# Patient Record
Sex: FEMALE | Race: WHITE | NOT HISPANIC OR LATINO | Employment: OTHER | URBAN - METROPOLITAN AREA
[De-identification: names, ages, dates, MRNs, and addresses within clinical notes are randomized per-mention and may not be internally consistent; named-entity substitution may affect disease eponyms.]

---

## 2024-02-03 ENCOUNTER — HOSPITAL ENCOUNTER (INPATIENT)
Facility: MEDICAL CENTER | Age: 54
LOS: 2 days | DRG: 378 | End: 2024-02-06
Attending: EMERGENCY MEDICINE | Admitting: STUDENT IN AN ORGANIZED HEALTH CARE EDUCATION/TRAINING PROGRAM
Payer: COMMERCIAL

## 2024-02-03 DIAGNOSIS — F10.90 ALCOHOL DRINKING PROBLEM: ICD-10-CM

## 2024-02-03 DIAGNOSIS — K70.30 ALCOHOLIC CIRRHOSIS, UNSPECIFIED WHETHER ASCITES PRESENT (HCC): ICD-10-CM

## 2024-02-03 DIAGNOSIS — K92.2 UPPER GI BLEED: Primary | ICD-10-CM

## 2024-02-03 PROCEDURE — 99285 EMERGENCY DEPT VISIT HI MDM: CPT

## 2024-02-04 ENCOUNTER — APPOINTMENT (OUTPATIENT)
Dept: RADIOLOGY | Facility: MEDICAL CENTER | Age: 54
DRG: 378 | End: 2024-02-04
Attending: EMERGENCY MEDICINE
Payer: COMMERCIAL

## 2024-02-04 ENCOUNTER — ANESTHESIA (OUTPATIENT)
Dept: SURGERY | Facility: MEDICAL CENTER | Age: 54
DRG: 378 | End: 2024-02-04
Payer: COMMERCIAL

## 2024-02-04 ENCOUNTER — ANESTHESIA EVENT (OUTPATIENT)
Dept: SURGERY | Facility: MEDICAL CENTER | Age: 54
DRG: 378 | End: 2024-02-04
Payer: COMMERCIAL

## 2024-02-04 PROBLEM — K70.30 ALCOHOLIC CIRRHOSIS OF LIVER WITHOUT ASCITES (HCC): Status: ACTIVE | Noted: 2024-02-04

## 2024-02-04 PROBLEM — D62 ACUTE BLOOD LOSS ANEMIA: Status: ACTIVE | Noted: 2024-02-04

## 2024-02-04 PROBLEM — F10.10 ALCOHOL ABUSE: Status: ACTIVE | Noted: 2024-02-04

## 2024-02-04 PROBLEM — E83.42 HYPOMAGNESEMIA: Status: ACTIVE | Noted: 2024-02-04

## 2024-02-04 PROBLEM — K92.0 HEMATEMESIS: Status: ACTIVE | Noted: 2024-02-04

## 2024-02-04 PROBLEM — Z71.89 ADVANCE CARE PLANNING: Status: ACTIVE | Noted: 2024-02-04

## 2024-02-04 PROBLEM — E87.6 HYPOKALEMIA: Status: ACTIVE | Noted: 2024-02-04

## 2024-02-04 LAB
ABO + RH BLD: NORMAL
ABO GROUP BLD: NORMAL
ALBUMIN SERPL BCP-MCNC: 3.2 G/DL (ref 3.2–4.9)
ALBUMIN/GLOB SERPL: 1 G/DL
ALP SERPL-CCNC: 76 U/L (ref 30–99)
ALT SERPL-CCNC: 11 U/L (ref 2–50)
ANION GAP SERPL CALC-SCNC: 11 MMOL/L (ref 7–16)
APTT PPP: 27.8 SEC (ref 24.7–36)
AST SERPL-CCNC: 21 U/L (ref 12–45)
BARCODED ABORH UBTYP: 6200
BARCODED PRD CODE UBPRD: NORMAL
BARCODED UNIT NUM UBUNT: NORMAL
BASOPHILS # BLD AUTO: 0.7 % (ref 0–1.8)
BASOPHILS # BLD: 0.06 K/UL (ref 0–0.12)
BILIRUB SERPL-MCNC: 1.6 MG/DL (ref 0.1–1.5)
BLD GP AB SCN SERPL QL: NORMAL
BUN SERPL-MCNC: 22 MG/DL (ref 8–22)
CALCIUM ALBUM COR SERPL-MCNC: 8.9 MG/DL (ref 8.5–10.5)
CALCIUM SERPL-MCNC: 8.3 MG/DL (ref 8.5–10.5)
CHLORIDE SERPL-SCNC: 102 MMOL/L (ref 96–112)
CO2 SERPL-SCNC: 22 MMOL/L (ref 20–33)
COMPONENT R 8504R: NORMAL
CREAT SERPL-MCNC: 0.48 MG/DL (ref 0.5–1.4)
EKG IMPRESSION: NORMAL
EOSINOPHIL # BLD AUTO: 0.06 K/UL (ref 0–0.51)
EOSINOPHIL NFR BLD: 0.7 % (ref 0–6.9)
ERYTHROCYTE [DISTWIDTH] IN BLOOD BY AUTOMATED COUNT: 62.6 FL (ref 35.9–50)
ERYTHROCYTE [DISTWIDTH] IN BLOOD BY AUTOMATED COUNT: 64.5 FL (ref 35.9–50)
GFR SERPLBLD CREATININE-BSD FMLA CKD-EPI: 112 ML/MIN/1.73 M 2
GLOBULIN SER CALC-MCNC: 3.1 G/DL (ref 1.9–3.5)
GLUCOSE SERPL-MCNC: 124 MG/DL (ref 65–99)
HCT VFR BLD AUTO: 22.2 % (ref 37–47)
HCT VFR BLD AUTO: 26.5 % (ref 37–47)
HCT VFR BLD AUTO: 27.7 % (ref 37–47)
HCT VFR BLD AUTO: 31.7 % (ref 37–47)
HGB BLD-MCNC: 11 G/DL (ref 12–16)
HGB BLD-MCNC: 7.6 G/DL (ref 12–16)
HGB BLD-MCNC: 9.2 G/DL (ref 12–16)
HGB BLD-MCNC: 9.3 G/DL (ref 12–16)
IMM GRANULOCYTES # BLD AUTO: 0.03 K/UL (ref 0–0.11)
IMM GRANULOCYTES NFR BLD AUTO: 0.3 % (ref 0–0.9)
INR PPP: 1.33 (ref 0.87–1.13)
LACTATE SERPL-SCNC: 2 MMOL/L (ref 0.5–2)
LIPASE SERPL-CCNC: 17 U/L (ref 11–82)
LYMPHOCYTES # BLD AUTO: 1.59 K/UL (ref 1–4.8)
LYMPHOCYTES NFR BLD: 17.9 % (ref 22–41)
MAGNESIUM SERPL-MCNC: 1.3 MG/DL (ref 1.5–2.5)
MAGNESIUM SERPL-MCNC: 1.4 MG/DL (ref 1.5–2.5)
MCH RBC QN AUTO: 32.5 PG (ref 27–33)
MCH RBC QN AUTO: 32.7 PG (ref 27–33)
MCHC RBC AUTO-ENTMCNC: 33.6 G/DL (ref 32.2–35.5)
MCHC RBC AUTO-ENTMCNC: 34.7 G/DL (ref 32.2–35.5)
MCV RBC AUTO: 93.6 FL (ref 81.4–97.8)
MCV RBC AUTO: 97.5 FL (ref 81.4–97.8)
MONOCYTES # BLD AUTO: 0.85 K/UL (ref 0–0.85)
MONOCYTES NFR BLD AUTO: 9.6 % (ref 0–13.4)
NEUTROPHILS # BLD AUTO: 6.29 K/UL (ref 1.82–7.42)
NEUTROPHILS NFR BLD: 70.8 % (ref 44–72)
NRBC # BLD AUTO: 0 K/UL
NRBC BLD-RTO: 0 /100 WBC (ref 0–0.2)
PLATELET # BLD AUTO: 119 K/UL (ref 164–446)
PLATELET # BLD AUTO: 86 K/UL (ref 164–446)
PLATELETS.RETICULATED NFR BLD AUTO: 4.7 % (ref 0.6–13.1)
PMV BLD AUTO: 10.6 FL (ref 9–12.9)
PMV BLD AUTO: 10.7 FL (ref 9–12.9)
POTASSIUM SERPL-SCNC: 3.4 MMOL/L (ref 3.6–5.5)
PRODUCT TYPE UPROD: NORMAL
PROT SERPL-MCNC: 6.3 G/DL (ref 6–8.2)
PROTHROMBIN TIME: 16.7 SEC (ref 12–14.6)
RBC # BLD AUTO: 2.83 M/UL (ref 4.2–5.4)
RBC # BLD AUTO: 2.84 M/UL (ref 4.2–5.4)
RH BLD: NORMAL
SODIUM SERPL-SCNC: 135 MMOL/L (ref 135–145)
TROPONIN T SERPL-MCNC: <6 NG/L (ref 6–19)
UNIT STATUS USTAT: NORMAL
WBC # BLD AUTO: 7.1 K/UL (ref 4.8–10.8)
WBC # BLD AUTO: 8.9 K/UL (ref 4.8–10.8)

## 2024-02-04 PROCEDURE — 93005 ELECTROCARDIOGRAM TRACING: CPT | Performed by: ANESTHESIOLOGY

## 2024-02-04 PROCEDURE — 700102 HCHG RX REV CODE 250 W/ 637 OVERRIDE(OP): Performed by: STUDENT IN AN ORGANIZED HEALTH CARE EDUCATION/TRAINING PROGRAM

## 2024-02-04 PROCEDURE — 160048 HCHG OR STATISTICAL LEVEL 1-5: Performed by: INTERNAL MEDICINE

## 2024-02-04 PROCEDURE — A9270 NON-COVERED ITEM OR SERVICE: HCPCS | Performed by: STUDENT IN AN ORGANIZED HEALTH CARE EDUCATION/TRAINING PROGRAM

## 2024-02-04 PROCEDURE — 96367 TX/PROPH/DG ADDL SEQ IV INF: CPT

## 2024-02-04 PROCEDURE — 36430 TRANSFUSION BLD/BLD COMPNT: CPT

## 2024-02-04 PROCEDURE — 700105 HCHG RX REV CODE 258: Performed by: HOSPITALIST

## 2024-02-04 PROCEDURE — 06L38CZ OCCLUSION OF ESOPHAGEAL VEIN WITH EXTRALUMINAL DEVICE, VIA NATURAL OR ARTIFICIAL OPENING ENDOSCOPIC: ICD-10-PCS | Performed by: INTERNAL MEDICINE

## 2024-02-04 PROCEDURE — 83690 ASSAY OF LIPASE: CPT

## 2024-02-04 PROCEDURE — 160203 HCHG ENDO MINUTES - 1ST 30 MINS LEVEL 4: Performed by: INTERNAL MEDICINE

## 2024-02-04 PROCEDURE — 99497 ADVNCD CARE PLAN 30 MIN: CPT | Mod: MISDOCU | Performed by: HOSPITALIST

## 2024-02-04 PROCEDURE — 85025 COMPLETE CBC W/AUTO DIFF WBC: CPT

## 2024-02-04 PROCEDURE — 86901 BLOOD TYPING SEROLOGIC RH(D): CPT

## 2024-02-04 PROCEDURE — C9113 INJ PANTOPRAZOLE SODIUM, VIA: HCPCS | Performed by: EMERGENCY MEDICINE

## 2024-02-04 PROCEDURE — 700111 HCHG RX REV CODE 636 W/ 250 OVERRIDE (IP): Performed by: STUDENT IN AN ORGANIZED HEALTH CARE EDUCATION/TRAINING PROGRAM

## 2024-02-04 PROCEDURE — 86900 BLOOD TYPING SEROLOGIC ABO: CPT

## 2024-02-04 PROCEDURE — 30233N1 TRANSFUSION OF NONAUTOLOGOUS RED BLOOD CELLS INTO PERIPHERAL VEIN, PERCUTANEOUS APPROACH: ICD-10-PCS | Performed by: STUDENT IN AN ORGANIZED HEALTH CARE EDUCATION/TRAINING PROGRAM

## 2024-02-04 PROCEDURE — 85610 PROTHROMBIN TIME: CPT

## 2024-02-04 PROCEDURE — 85055 RETICULATED PLATELET ASSAY: CPT

## 2024-02-04 PROCEDURE — 96366 THER/PROPH/DIAG IV INF ADDON: CPT

## 2024-02-04 PROCEDURE — 80053 COMPREHEN METABOLIC PANEL: CPT

## 2024-02-04 PROCEDURE — 93005 ELECTROCARDIOGRAM TRACING: CPT | Performed by: EMERGENCY MEDICINE

## 2024-02-04 PROCEDURE — 84484 ASSAY OF TROPONIN QUANT: CPT

## 2024-02-04 PROCEDURE — 85730 THROMBOPLASTIN TIME PARTIAL: CPT

## 2024-02-04 PROCEDURE — 99222 1ST HOSP IP/OBS MODERATE 55: CPT | Performed by: INTERNAL MEDICINE

## 2024-02-04 PROCEDURE — 700111 HCHG RX REV CODE 636 W/ 250 OVERRIDE (IP): Mod: JZ | Performed by: HOSPITALIST

## 2024-02-04 PROCEDURE — 85027 COMPLETE CBC AUTOMATED: CPT

## 2024-02-04 PROCEDURE — 160035 HCHG PACU - 1ST 60 MINS PHASE I: Performed by: INTERNAL MEDICINE

## 2024-02-04 PROCEDURE — 83605 ASSAY OF LACTIC ACID: CPT

## 2024-02-04 PROCEDURE — 43244 EGD VARICES LIGATION: CPT | Performed by: INTERNAL MEDICINE

## 2024-02-04 PROCEDURE — 96368 THER/DIAG CONCURRENT INF: CPT

## 2024-02-04 PROCEDURE — 770000 HCHG ROOM/CARE - INTERMEDIATE ICU *

## 2024-02-04 PROCEDURE — 86850 RBC ANTIBODY SCREEN: CPT

## 2024-02-04 PROCEDURE — 85018 HEMOGLOBIN: CPT

## 2024-02-04 PROCEDURE — 99291 CRITICAL CARE FIRST HOUR: CPT | Performed by: STUDENT IN AN ORGANIZED HEALTH CARE EDUCATION/TRAINING PROGRAM

## 2024-02-04 PROCEDURE — 700111 HCHG RX REV CODE 636 W/ 250 OVERRIDE (IP): Performed by: EMERGENCY MEDICINE

## 2024-02-04 PROCEDURE — 700111 HCHG RX REV CODE 636 W/ 250 OVERRIDE (IP): Performed by: ANESTHESIOLOGY

## 2024-02-04 PROCEDURE — 36415 COLL VENOUS BLD VENIPUNCTURE: CPT

## 2024-02-04 PROCEDURE — P9016 RBC LEUKOCYTES REDUCED: HCPCS

## 2024-02-04 PROCEDURE — 96375 TX/PRO/DX INJ NEW DRUG ADDON: CPT

## 2024-02-04 PROCEDURE — 83735 ASSAY OF MAGNESIUM: CPT

## 2024-02-04 PROCEDURE — 700105 HCHG RX REV CODE 258: Performed by: ANESTHESIOLOGY

## 2024-02-04 PROCEDURE — 700105 HCHG RX REV CODE 258: Performed by: STUDENT IN AN ORGANIZED HEALTH CARE EDUCATION/TRAINING PROGRAM

## 2024-02-04 PROCEDURE — 86923 COMPATIBILITY TEST ELECTRIC: CPT

## 2024-02-04 PROCEDURE — 160002 HCHG RECOVERY MINUTES (STAT): Performed by: INTERNAL MEDICINE

## 2024-02-04 PROCEDURE — 85014 HEMATOCRIT: CPT

## 2024-02-04 PROCEDURE — 160009 HCHG ANES TIME/MIN: Performed by: INTERNAL MEDICINE

## 2024-02-04 PROCEDURE — 71045 X-RAY EXAM CHEST 1 VIEW: CPT

## 2024-02-04 PROCEDURE — 700105 HCHG RX REV CODE 258: Performed by: EMERGENCY MEDICINE

## 2024-02-04 PROCEDURE — 06L28CZ OCCLUSION OF GASTRIC VEIN WITH EXTRALUMINAL DEVICE, VIA NATURAL OR ARTIFICIAL OPENING ENDOSCOPIC: ICD-10-PCS | Performed by: INTERNAL MEDICINE

## 2024-02-04 PROCEDURE — 700101 HCHG RX REV CODE 250: Performed by: ANESTHESIOLOGY

## 2024-02-04 PROCEDURE — C9113 INJ PANTOPRAZOLE SODIUM, VIA: HCPCS | Performed by: STUDENT IN AN ORGANIZED HEALTH CARE EDUCATION/TRAINING PROGRAM

## 2024-02-04 PROCEDURE — 96365 THER/PROPH/DIAG IV INF INIT: CPT

## 2024-02-04 RX ORDER — MIDAZOLAM HYDROCHLORIDE 1 MG/ML
INJECTION INTRAMUSCULAR; INTRAVENOUS PRN
Status: DISCONTINUED | OUTPATIENT
Start: 2024-02-04 | End: 2024-02-04 | Stop reason: SURG

## 2024-02-04 RX ORDER — SPIRONOLACTONE 25 MG/1
25 TABLET ORAL 2 TIMES DAILY
Status: ON HOLD | COMMUNITY
Start: 2024-01-15 | End: 2024-02-06

## 2024-02-04 RX ORDER — POTASSIUM CHLORIDE 20 MEQ/1
40 TABLET, EXTENDED RELEASE ORAL ONCE
Status: COMPLETED | OUTPATIENT
Start: 2024-02-04 | End: 2024-02-04

## 2024-02-04 RX ORDER — MAGNESIUM SULFATE HEPTAHYDRATE 40 MG/ML
2 INJECTION, SOLUTION INTRAVENOUS ONCE
Status: COMPLETED | OUTPATIENT
Start: 2024-02-04 | End: 2024-02-04

## 2024-02-04 RX ORDER — DEXTROSE AND SODIUM CHLORIDE 5; .9 G/100ML; G/100ML
INJECTION, SOLUTION INTRAVENOUS CONTINUOUS
Status: DISCONTINUED | OUTPATIENT
Start: 2024-02-04 | End: 2024-02-05

## 2024-02-04 RX ORDER — DEXAMETHASONE SODIUM PHOSPHATE 4 MG/ML
INJECTION, SOLUTION INTRA-ARTICULAR; INTRALESIONAL; INTRAMUSCULAR; INTRAVENOUS; SOFT TISSUE PRN
Status: DISCONTINUED | OUTPATIENT
Start: 2024-02-04 | End: 2024-02-04 | Stop reason: SURG

## 2024-02-04 RX ORDER — GAUZE BANDAGE 2" X 2"
100 BANDAGE TOPICAL DAILY
Status: DISCONTINUED | OUTPATIENT
Start: 2024-02-04 | End: 2024-02-06 | Stop reason: HOSPADM

## 2024-02-04 RX ORDER — ONDANSETRON 2 MG/ML
4 INJECTION INTRAMUSCULAR; INTRAVENOUS ONCE
Status: COMPLETED | OUTPATIENT
Start: 2024-02-04 | End: 2024-02-04

## 2024-02-04 RX ORDER — SODIUM CHLORIDE, SODIUM LACTATE, POTASSIUM CHLORIDE, CALCIUM CHLORIDE 600; 310; 30; 20 MG/100ML; MG/100ML; MG/100ML; MG/100ML
INJECTION, SOLUTION INTRAVENOUS CONTINUOUS
Status: DISCONTINUED | OUTPATIENT
Start: 2024-02-04 | End: 2024-02-04 | Stop reason: HOSPADM

## 2024-02-04 RX ORDER — HALOPERIDOL 5 MG/ML
1 INJECTION INTRAMUSCULAR
Status: DISCONTINUED | OUTPATIENT
Start: 2024-02-04 | End: 2024-02-04 | Stop reason: HOSPADM

## 2024-02-04 RX ORDER — PANTOPRAZOLE SODIUM 40 MG/10ML
80 INJECTION, POWDER, LYOPHILIZED, FOR SOLUTION INTRAVENOUS ONCE
Status: COMPLETED | OUTPATIENT
Start: 2024-02-04 | End: 2024-02-04

## 2024-02-04 RX ORDER — PANTOPRAZOLE SODIUM 40 MG/10ML
40 INJECTION, POWDER, LYOPHILIZED, FOR SOLUTION INTRAVENOUS 2 TIMES DAILY
Status: DISCONTINUED | OUTPATIENT
Start: 2024-02-04 | End: 2024-02-06 | Stop reason: HOSPADM

## 2024-02-04 RX ORDER — SODIUM CHLORIDE 9 MG/ML
1000 INJECTION, SOLUTION INTRAVENOUS ONCE
Status: COMPLETED | OUTPATIENT
Start: 2024-02-04 | End: 2024-02-04

## 2024-02-04 RX ORDER — MAGNESIUM SULFATE HEPTAHYDRATE 40 MG/ML
2 INJECTION, SOLUTION INTRAVENOUS ONCE
Status: DISCONTINUED | OUTPATIENT
Start: 2024-02-04 | End: 2024-02-04

## 2024-02-04 RX ORDER — LIDOCAINE HYDROCHLORIDE 20 MG/ML
INJECTION, SOLUTION EPIDURAL; INFILTRATION; INTRACAUDAL; PERINEURAL PRN
Status: DISCONTINUED | OUTPATIENT
Start: 2024-02-04 | End: 2024-02-04 | Stop reason: SURG

## 2024-02-04 RX ORDER — ACETAMINOPHEN 325 MG/1
650 TABLET ORAL EVERY 6 HOURS PRN
Status: DISCONTINUED | OUTPATIENT
Start: 2024-02-04 | End: 2024-02-06 | Stop reason: HOSPADM

## 2024-02-04 RX ORDER — HYDRALAZINE HYDROCHLORIDE 20 MG/ML
10 INJECTION INTRAMUSCULAR; INTRAVENOUS EVERY 4 HOURS PRN
Status: DISCONTINUED | OUTPATIENT
Start: 2024-02-04 | End: 2024-02-06 | Stop reason: HOSPADM

## 2024-02-04 RX ORDER — FUROSEMIDE 20 MG/1
20 TABLET ORAL
Status: ON HOLD | COMMUNITY
Start: 2023-10-17 | End: 2024-02-06

## 2024-02-04 RX ORDER — ONDANSETRON 2 MG/ML
4 INJECTION INTRAMUSCULAR; INTRAVENOUS
Status: DISCONTINUED | OUTPATIENT
Start: 2024-02-04 | End: 2024-02-04 | Stop reason: HOSPADM

## 2024-02-04 RX ORDER — MIDAZOLAM HYDROCHLORIDE 1 MG/ML
1 INJECTION INTRAMUSCULAR; INTRAVENOUS
Status: DISCONTINUED | OUTPATIENT
Start: 2024-02-04 | End: 2024-02-04 | Stop reason: HOSPADM

## 2024-02-04 RX ORDER — MAGNESIUM SULFATE HEPTAHYDRATE 40 MG/ML
4 INJECTION, SOLUTION INTRAVENOUS ONCE
Status: COMPLETED | OUTPATIENT
Start: 2024-02-04 | End: 2024-02-04

## 2024-02-04 RX ORDER — SUCCINYLCHOLINE CHLORIDE 20 MG/ML
INJECTION INTRAMUSCULAR; INTRAVENOUS PRN
Status: DISCONTINUED | OUTPATIENT
Start: 2024-02-04 | End: 2024-02-04 | Stop reason: SURG

## 2024-02-04 RX ORDER — SODIUM CHLORIDE 9 MG/ML
INJECTION, SOLUTION INTRAVENOUS CONTINUOUS
Status: DISCONTINUED | OUTPATIENT
Start: 2024-02-04 | End: 2024-02-04

## 2024-02-04 RX ORDER — ONDANSETRON 2 MG/ML
INJECTION INTRAMUSCULAR; INTRAVENOUS PRN
Status: DISCONTINUED | OUTPATIENT
Start: 2024-02-04 | End: 2024-02-04 | Stop reason: SURG

## 2024-02-04 RX ORDER — SODIUM CHLORIDE, SODIUM LACTATE, POTASSIUM CHLORIDE, CALCIUM CHLORIDE 600; 310; 30; 20 MG/100ML; MG/100ML; MG/100ML; MG/100ML
INJECTION, SOLUTION INTRAVENOUS
Status: DISCONTINUED | OUTPATIENT
Start: 2024-02-04 | End: 2024-02-04 | Stop reason: SURG

## 2024-02-04 RX ORDER — DIPHENHYDRAMINE HYDROCHLORIDE 50 MG/ML
12.5 INJECTION INTRAMUSCULAR; INTRAVENOUS
Status: DISCONTINUED | OUTPATIENT
Start: 2024-02-04 | End: 2024-02-04 | Stop reason: HOSPADM

## 2024-02-04 RX ORDER — FOLIC ACID 1 MG/1
1 TABLET ORAL DAILY
Status: DISCONTINUED | OUTPATIENT
Start: 2024-02-04 | End: 2024-02-06 | Stop reason: HOSPADM

## 2024-02-04 RX ORDER — HYDRALAZINE HYDROCHLORIDE 20 MG/ML
5 INJECTION INTRAMUSCULAR; INTRAVENOUS
Status: DISCONTINUED | OUTPATIENT
Start: 2024-02-04 | End: 2024-02-04 | Stop reason: HOSPADM

## 2024-02-04 RX ADMIN — THERA TABS 1 TABLET: TAB at 10:11

## 2024-02-04 RX ADMIN — ONDANSETRON 4 MG: 2 INJECTION INTRAMUSCULAR; INTRAVENOUS at 02:37

## 2024-02-04 RX ADMIN — PANTOPRAZOLE SODIUM 40 MG: 40 INJECTION, POWDER, FOR SOLUTION INTRAVENOUS at 17:59

## 2024-02-04 RX ADMIN — PANTOPRAZOLE SODIUM 40 MG: 40 INJECTION, POWDER, FOR SOLUTION INTRAVENOUS at 12:32

## 2024-02-04 RX ADMIN — SODIUM CHLORIDE, POTASSIUM CHLORIDE, SODIUM LACTATE AND CALCIUM CHLORIDE: 600; 310; 30; 20 INJECTION, SOLUTION INTRAVENOUS at 15:53

## 2024-02-04 RX ADMIN — OCTREOTIDE ACETATE 50 MCG/HR: 200 INJECTION, SOLUTION INTRAVENOUS; SUBCUTANEOUS at 01:17

## 2024-02-04 RX ADMIN — MAGNESIUM SULFATE HEPTAHYDRATE 4 G: 4 INJECTION, SOLUTION INTRAVENOUS at 04:35

## 2024-02-04 RX ADMIN — LIDOCAINE HYDROCHLORIDE 60 MG: 20 INJECTION, SOLUTION EPIDURAL; INFILTRATION; INTRACAUDAL at 15:56

## 2024-02-04 RX ADMIN — DEXTROSE AND SODIUM CHLORIDE: 5; 900 INJECTION, SOLUTION INTRAVENOUS at 23:35

## 2024-02-04 RX ADMIN — DEXAMETHASONE SODIUM PHOSPHATE 8 MG: 4 INJECTION INTRA-ARTICULAR; INTRALESIONAL; INTRAMUSCULAR; INTRAVENOUS; SOFT TISSUE at 15:59

## 2024-02-04 RX ADMIN — FOLIC ACID 1 MG: 1 TABLET ORAL at 10:12

## 2024-02-04 RX ADMIN — ONDANSETRON 4 MG: 2 INJECTION INTRAMUSCULAR; INTRAVENOUS at 16:00

## 2024-02-04 RX ADMIN — Medication 100 MG: at 10:12

## 2024-02-04 RX ADMIN — SODIUM CHLORIDE: 9 INJECTION, SOLUTION INTRAVENOUS at 03:55

## 2024-02-04 RX ADMIN — PROPOFOL 200 MG: 10 INJECTION, EMULSION INTRAVENOUS at 15:56

## 2024-02-04 RX ADMIN — FENTANYL CITRATE 50 MCG: 50 INJECTION, SOLUTION INTRAMUSCULAR; INTRAVENOUS at 15:56

## 2024-02-04 RX ADMIN — PANTOPRAZOLE SODIUM 80 MG: 40 INJECTION, POWDER, FOR SOLUTION INTRAVENOUS at 01:03

## 2024-02-04 RX ADMIN — MIDAZOLAM HYDROCHLORIDE 1 MG: 1 INJECTION, SOLUTION INTRAMUSCULAR; INTRAVENOUS at 15:53

## 2024-02-04 RX ADMIN — SODIUM CHLORIDE 1000 ML: 9 INJECTION, SOLUTION INTRAVENOUS at 00:41

## 2024-02-04 RX ADMIN — CEFTRIAXONE SODIUM 1000 MG: 1 INJECTION, POWDER, FOR SOLUTION INTRAMUSCULAR; INTRAVENOUS at 02:36

## 2024-02-04 RX ADMIN — POTASSIUM CHLORIDE 40 MEQ: 1500 TABLET, EXTENDED RELEASE ORAL at 02:36

## 2024-02-04 RX ADMIN — MAGNESIUM SULFATE HEPTAHYDRATE 2 G: 2 INJECTION, SOLUTION INTRAVENOUS at 17:04

## 2024-02-04 RX ADMIN — SUCCINYLCHOLINE CHLORIDE 100 MG: 20 INJECTION, SOLUTION INTRAMUSCULAR; INTRAVENOUS at 15:56

## 2024-02-04 RX ADMIN — OCTREOTIDE ACETATE 50 MCG/HR: 200 INJECTION, SOLUTION INTRAVENOUS; SUBCUTANEOUS at 03:53

## 2024-02-04 ASSESSMENT — LIFESTYLE VARIABLES
NAUSEA AND VOMITING: NO NAUSEA AND NO VOMITING
VISUAL DISTURBANCES: NOT PRESENT
AVERAGE NUMBER OF DAYS PER WEEK YOU HAVE A DRINK CONTAINING ALCOHOL: 7
AGITATION: NORMAL ACTIVITY
HEADACHE, FULLNESS IN HEAD: NOT PRESENT
TOTAL SCORE: 0
HAVE PEOPLE ANNOYED YOU BY CRITICIZING YOUR DRINKING: NO
TOTAL SCORE: 0
TOTAL SCORE: 0
HOW MANY TIMES IN THE PAST YEAR HAVE YOU HAD 5 OR MORE DRINKS IN A DAY: 10
ALCOHOL_USE: YES
DOES PATIENT WANT TO TALK TO SOMEONE ABOUT QUITTING: NO
TREMOR: NO TREMOR
AUDITORY DISTURBANCES: NOT PRESENT
VISUAL DISTURBANCES: NOT PRESENT
TREMOR: NO TREMOR
HAVE YOU EVER FELT YOU SHOULD CUT DOWN ON YOUR DRINKING: NO
DOES PATIENT WANT TO STOP DRINKING: YES
ON A TYPICAL DAY WHEN YOU DRINK ALCOHOL HOW MANY DRINKS DO YOU HAVE: 5
ANXIETY: NO ANXIETY (AT EASE)
NAUSEA AND VOMITING: NO NAUSEA AND NO VOMITING
TOTAL SCORE: 0
EVER HAD A DRINK FIRST THING IN THE MORNING TO STEADY YOUR NERVES TO GET RID OF A HANGOVER: NO
PAROXYSMAL SWEATS: NO SWEAT VISIBLE
ORIENTATION AND CLOUDING OF SENSORIUM: ORIENTED AND CAN DO SERIAL ADDITIONS
ANXIETY: NO ANXIETY (AT EASE)
CONSUMPTION TOTAL: POSITIVE
HEADACHE, FULLNESS IN HEAD: NOT PRESENT
TOTAL SCORE: 0
AGITATION: NORMAL ACTIVITY
PAROXYSMAL SWEATS: NO SWEAT VISIBLE
EVER FELT BAD OR GUILTY ABOUT YOUR DRINKING: NO
ORIENTATION AND CLOUDING OF SENSORIUM: ORIENTED AND CAN DO SERIAL ADDITIONS
AUDITORY DISTURBANCES: NOT PRESENT

## 2024-02-04 ASSESSMENT — PATIENT HEALTH QUESTIONNAIRE - PHQ9
1. LITTLE INTEREST OR PLEASURE IN DOING THINGS: NOT AT ALL
2. FEELING DOWN, DEPRESSED, IRRITABLE, OR HOPELESS: NOT AT ALL
SUM OF ALL RESPONSES TO PHQ9 QUESTIONS 1 AND 2: 0
2. FEELING DOWN, DEPRESSED, IRRITABLE, OR HOPELESS: NOT AT ALL
SUM OF ALL RESPONSES TO PHQ9 QUESTIONS 1 AND 2: 0
1. LITTLE INTEREST OR PLEASURE IN DOING THINGS: NOT AT ALL

## 2024-02-04 ASSESSMENT — COGNITIVE AND FUNCTIONAL STATUS - GENERAL
SUGGESTED CMS G CODE MODIFIER DAILY ACTIVITY: CH
DAILY ACTIVITIY SCORE: 24
MOBILITY SCORE: 24
SUGGESTED CMS G CODE MODIFIER MOBILITY: CH

## 2024-02-04 ASSESSMENT — ENCOUNTER SYMPTOMS
COUGH: 0
DIARRHEA: 0
BLOOD IN STOOL: 1
BLURRED VISION: 0
VOMITING: 1
NAUSEA: 0
SHORTNESS OF BREATH: 0
ABDOMINAL PAIN: 0
SPUTUM PRODUCTION: 0
HEARTBURN: 0
VOMITING: 0
FEVER: 0
HEADACHES: 1
NERVOUS/ANXIOUS: 1
CHILLS: 0
ABDOMINAL PAIN: 1
NAUSEA: 1
MYALGIAS: 0

## 2024-02-04 ASSESSMENT — PAIN DESCRIPTION - PAIN TYPE
TYPE: ACUTE PAIN;SURGICAL PAIN
TYPE: SURGICAL PAIN
TYPE: ACUTE PAIN

## 2024-02-04 ASSESSMENT — PAIN SCALES - GENERAL: PAIN_LEVEL: 5

## 2024-02-04 ASSESSMENT — FIBROSIS 4 INDEX
FIB4 SCORE: 4.05
FIB4 SCORE: 4.05

## 2024-02-04 NOTE — ANESTHESIA PREPROCEDURE EVALUATION
Case: [id number] Date/Time: 02/04/24 1616    Procedure: GASTROSCOPY (Esophagus) - pt states to preop nurse and OR nurse that she is post menopausal    Anesthesia type: MAC    Pre-op diagnosis: GI bleeding    Location: [facility] OR 06 / SURGERY [facility]    Surgeons: Javi Paz M.D.           GI bleeding, likely variceal due to hx of EtOH cirrhosis.  Hb improved with PRBC.    Relevant Problems      (positive) Alcoholic cirrhosis of liver without ascites (HCC)       Physical Exam    Airway   Mallampati: II  TM distance: >3 FB  Neck ROM: full       Cardiovascular - normal exam  Rhythm: regular  Rate: normal  (-) murmur     Dental       Very poor dentition     Pulmonary - normal exam  Breath sounds clear to auscultation     Abdominal    Neurological - normal exam                   Anesthesia Plan    ASA 3   ASA physical status 3 criteria: alcohol and/or substance dependence or abuse    Plan - general       Airway plan will be ETT    (GETA given hx of vomiting during prior upper endoscopy)      Induction: intravenous    Postoperative Plan: Postoperative administration of opioids is intended.    Pertinent diagnostic labs and testing reviewed    Informed Consent:    Anesthetic plan and risks discussed with patient.    Use of blood products discussed with: patient whom consented to blood products.

## 2024-02-04 NOTE — ASSESSMENT & PLAN NOTE
Patient was of sound mind and voluntarily participated in the conversation.  Patient and nursing staff were present for the conversation.  I discussed advance care planning face to face with the patient for at least 18 minutes, including diagnosis, prognosis, plan of care, risks and benefits of any therapies that could be offered, as well as alternatives including palliation      Code status full

## 2024-02-04 NOTE — ED TRIAGE NOTES
"Chief Complaint   Patient presents with    Bloody Stools     Black tarry stools (diarrhea) since Friday. Denies thinners, denies pain.   Stats vomiting dark red blood today. Noticed clots in both stool and vomit.  +dizziness, pt ambulated to bathroom got dizzy and had to lower self to floor. Did not hit head/denies injuries.        BIB [facility] to [facility], pt on monitor and in gown, labs drawn and sent. Pt consists of: above complaint, pt A&Ox4, on room air. Pt states she quit drinking in August, used to drink 1-2 glasses of wine/night. Pt states she \"slipped\" on Thursday and had 6 shots of fireball. Pt states taking lasix and spironolactone. States \"I got my stomach drained\" in September.     Medications given en route:1L NS , 4 mg zofran     /58   Pulse (!) 115   Resp 15   Ht 1.651 m (5' 5\")   Wt 63.5 kg (140 lb)   SpO2 98%   BMI 23.30 kg/m²     "

## 2024-02-04 NOTE — ASSESSMENT & PLAN NOTE
She recently relapsed and started drinking again, last drink on Thursday    No clinical signs of withdrawal  Patient counseled on alcohol cessation

## 2024-02-04 NOTE — ED NOTES
Pharmacy Medication Reconciliation      ~Medication reconciliation updated and complete per patient at bedside   ~Allergies have been verified and updated   ~No oral ABX within the last 30 days  ~Patient home pharmacy :  [facility]      ~Anticoagulants (rivaroxaban, apixaban, edoxaban, dabigatran, warfarin, enoxaparin) taken in the last 14 days? NO  ~Anticoagulant: N/A Last dose: N/A

## 2024-02-04 NOTE — HOSPITAL COURSE
This a 54-year-old female with a past medical history significant for alcoholic liver cirrhosis, history of GI bleed on 11/8/2023 status post endoscopy done at office 20 show patient advises status post banding presented to the ER on 2/4/2024 after she was noted to have black tarry stool.    Patient report that he has been having melena for the last 2 days, hematemesis at 2 AM.  Patient report that he has been taking ibuprofen for few days as her dog knocked her on the ground.    She stated that she relapsed on drinking alcohol.  Upen presentation in ER, she noted to be tachycardic, hypotensive; hemoglobin 9.3, platelet 109.    Interval events:  --Patient continues remain tachycardic hypotensive, currently getting IV fluid along with octreotide drip and Protonix 40 mg IV twice daily.   --Her hemoglobin dropped from 9.3-7.6  --GI was consulted, patient will be undergoing endoscopy.  Currently NPO.  --See will be started on CIWA protocol for monitoring alcohol withdrawal, will replete patient electrolyte as needed.  Will continue p.o. thiamine and folic acid.

## 2024-02-04 NOTE — ASSESSMENT & PLAN NOTE
Hematemesis and melena.  History of esophageal varices due to alcoholic cirrhosis s/p banding 11/2023    EGD 2/5/2024  GAVE bleeding, s/p banding x 1.   Some PHG oozing, not significant.   No overt gastric varices.   Esophageal varices x 3, moderate sized, no active bleeding, s/p banding x 4.   Grossly normal other part of upper GI.   Old blood in stomach, cover around 45% of area.     Hemoglobin stable continue close clinical monitoring  Continue Protonix and octreotide transition to Prilosec tomorrow  Will need to transition to beta-blocker on discharge    Bleeding appears to be clinically resolved at this time will DC ceftriaxone  Reinforced importance of complete alcohol cessation and close outpatient follow-up with GI

## 2024-02-04 NOTE — PROGRESS NOTES
4 Eyes Skin Assessment Completed by GUS Roach and GUS Correa.    Head WDL  Ears WDL  Nose WDL  Mouth WDL  Neck WDL  Breast/Chest WDL  Shoulder Blades WDL  Spine WDL  (R) Arm/Elbow/Hand WDL  (L) Arm/Elbow/Hand WDL  Abdomen WDL  Groin WDL  Scrotum/Coccyx/Buttocks WDL  (R) Leg WDL  (L) Leg WDL  (R) Heel/Foot/Toe Redness and Blanching  (L) Heel/Foot/Toe Redness and Blanching          Devices In Places ECG, Blood Pressure Cuff, and Pulse Ox      Interventions In Place Pillows and Pressure Redistribution Mattress    Possible Skin Injury No    Pictures Uploaded Into Epic N/A  Wound Consult Placed N/A  RN Wound Prevention Protocol Ordered No

## 2024-02-04 NOTE — H&P
Hospital Medicine History & Physical Note    Date of Service  2/4/2024    Primary Care Physician  Pcp Pt States None    Code Status  Full Code    Chief Complaint  Chief Complaint   Patient presents with    Bloody Stools     Black tarry stools (diarrhea) since Friday. Denies thinners, denies pain.   Stats vomiting dark red blood today. Noticed clots in both stool and vomit.  +dizziness, pt ambulated to bathroom got dizzy and had to lower self to floor. Did not hit head/denies injuries.        History of Presenting Illness  Stacy Oliva is a 54 y.o. female who presented 2/3/2024 with hematemesis and blood in stools.  PMH of alcoholic cirrhosis.  Patient has had melena for the past 2 days, hematemesis since earlier tonight.  She has a history of GI bleed 11/8/2023, EGD was done at [facility] which showed esophageal varices s/p banding.  She said she relapsed and has been drinking alcohol for the past week, last drink was 2 days ago.  She is also been taking ibuprofen for a few days after her dog knocked her on the ground.    In the ED afebrile, tachycardic.  Labs show hemoglobin of 5.3, potassium 3.4, T. bili 1.6.    Addendum:  Patient's blood pressure dropped to 80 systolic on multiple measurements in both arms, arterial hemoglobin stat and had dropped from 9.3 on presentation to 7.6, due to hypotension and significant drop in hemoglobin 1 unit PRBC ordered.    I discussed the plan of care with patient, bedside RN, and edp .    Review of Systems  Review of Systems   Constitutional:  Negative for chills and fever.   Respiratory:  Negative for cough and shortness of breath.    Cardiovascular:  Negative for chest pain.   Gastrointestinal:  Positive for blood in stool, nausea and vomiting. Negative for abdominal pain and diarrhea.   Genitourinary:  Negative for dysuria and urgency.       Past Medical History  Etoh cirrhosis    Surgical History   has no past surgical history on file.     Family History  Family history  reviewed with patient. There is no family history that is pertinent to the chief complaint.     Social History   reports that she has never smoked. She has never used smokeless tobacco. She reports current alcohol use. She reports current drug use.    Allergies  No Known Allergies    Medications  None       Physical Exam  Temp:  [36.3 °C (97.4 °F)] 36.3 °C (97.4 °F)  Pulse:  [114-115] 114  Resp:  [15-20] 20  BP: (111-121)/(58-60) 121/60  SpO2:  [98 %] 98 %  Blood Pressure: 121/60   Temperature: 36.3 °C (97.4 °F)   Pulse: (!) 114   Respiration: 20   Pulse Oximetry: 98 %       Physical Exam  Constitutional:       Appearance: Normal appearance.   HENT:      Head: Normocephalic and atraumatic.      Mouth/Throat:      Mouth: Mucous membranes are moist.      Pharynx: No oropharyngeal exudate or posterior oropharyngeal erythema.   Cardiovascular:      Rate and Rhythm: Normal rate and regular rhythm.      Pulses: Normal pulses.      Heart sounds: Normal heart sounds. No murmur heard.  Pulmonary:      Effort: Pulmonary effort is normal. No respiratory distress.      Breath sounds: Normal breath sounds.   Musculoskeletal:         General: No swelling or tenderness. Normal range of motion.   Skin:     General: Skin is warm and dry.   Neurological:      General: No focal deficit present.      Mental Status: She is alert and oriented to person, place, and time.   Psychiatric:         Mood and Affect: Mood normal.         Laboratory:  Recent Labs     02/04/24 0023   WBC 8.9   RBC 2.84*   HEMOGLOBIN 9.3*   HEMATOCRIT 27.7*   MCV 97.5   MCH 32.7   MCHC 33.6   RDW 62.6*   PLATELETCT 119*   MPV 10.6     Recent Labs     02/04/24 0023   SODIUM 135   POTASSIUM 3.4*   CHLORIDE 102   CO2 22   GLUCOSE 124*   BUN 22   CREATININE 0.48*   CALCIUM 8.3*     Recent Labs     02/04/24 0023   ALTSGPT 11   ASTSGOT 21   ALKPHOSPHAT 76   TBILIRUBIN 1.6*   LIPASE 17   GLUCOSE 124*     Recent Labs     02/04/24 0023   APTT 27.8   INR 1.33*     No  "results for input(s): \"NTPROBNP\" in the last 72 hours.      No results for input(s): \"TROPONINT\" in the last 72 hours.    Imaging:  DX-CHEST-PORTABLE (1 VIEW)   Final Result      No acute cardiopulmonary disease.          X-Ray:  I have personally reviewed the images and compared with prior images. and My impression is: No acute process  EKG:  I have personally reviewed the images and compared with prior images. and My impression is: Sinus tachycardia, ST depressions anteriorly inversions in the inferior and lateral leads    Assessment/Plan:  Justification for Admission Status  I anticipate this patient will require at least two midnights for appropriate medical management, necessitating inpatient admission because GI bleed    * Hematemesis- (present on admission)  Assessment & Plan  Hematemesis and melena.  History of esophageal varices due to alcoholic cirrhosis s/p banding 11/2023  Octreotide drip, IV pantoprazole, ceftriaxone  Keep n.p.o., GI consult in the morning    Hypomagnesemia- (present on admission)  Assessment & Plan  1.4 on presentation, IV repletion.  Labs ordered to continue monitoring    Alcohol abuse- (present on admission)  Assessment & Plan  Recently relapsed and has been drinking heavily this past week, last week was 2 days ago  She is tachycardic possibly due to anemia.  May be early withdrawal, otherwise no symptoms of withdrawal  Thiamine, folate, multivitamin  Monitoring start CIWA if necessary    Alcoholic cirrhosis of liver without ascites (HCC)- (present on admission)  Assessment & Plan  She recently relapsed and started drinking again, last request 2 days ago  MELD-Na 15 indicating less than 2% 90-day mortality    Acute blood loss anemia- (present on admission)  Assessment & Plan  Baseline hemoglobin 11-12, 9.3 on presentation.  Secondary to GI bleed  H/H ordered continue monitoring, transfuse if less than 7    Hypokalemia- (present on admission)  Assessment & Plan  3.4 on presentation, " secondary to vomiting.  Replete as needed check magnesium levels.  BMP ordered continue monitoring      Patient is critically ill.   The patient continues to have: GI bleed and acute blood loss anemia  If untreated there is a high chance of deterioration And eventually death.   The critical care that I am providing today is: Extensive chart review including notes, labs, imaging, EKG and interpretation.  Patient has a GI bleed with history of esophageal varices and banding of alcoholic cirrhosis, started on octreotide drip.  Needs very close monitoring with frequent labs and transfusion as needed.  The critical that has been undertaken is medically complex.   There has been no overlap in critical care time.   Critical Care Time not including procedures: 37 min      VTE prophylaxis:    pharmacologic prophylaxis contraindicated due to gi bleed

## 2024-02-04 NOTE — ED PROVIDER NOTES
ER Provider Note    Scribed for Sae Rojas Ii, M.d. by Mame Salomon. 2/4/2024  12:23 AM    Primary Care Provider: No primary care provider noted.    CHIEF COMPLAINT  Chief Complaint   Patient presents with    Bloody Stools     Black tarry stools (diarrhea) since Friday. Denies thinners, denies pain.   Stats vomiting dark red blood today. Noticed clots in both stool and vomit.  +dizziness, pt ambulated to bathroom got dizzy and had to lower self to floor. Did not hit head/denies injuries.      EXTERNAL RECORDS REVIEWED  Other No other records available for review.    HPI/ROS  LIMITATION TO HISTORY   Select: : None  OUTSIDE HISTORIAN(S):  None.    Stacy Oliva is a 54 y.o. female who presents to the ED via EMS for evaluation of black tarry stools onset 2 days ago. She describes that she has had this problem before in September when she required a upper endoscopy with variceal banding. The patient mentions that she did not need a blood transfusion.The patient notes that she had an appointment set up for a follow up 3 months later, which would have been this month. The patient got a letter telling her the amount of the visit, which was around $8,000, so she made her concerns aware to her doctor.  Her doctor in [facility] cancelled the appointment as long as the patient was not having any symptoms. The patient started to have loose black tarry stools on Friday and then began to vomit dark red blood today. The patient reports that she noticed clots in both her stool and emesis. The patient was given 1 liter NS and 4 mg of Zofran en route. The patient states she also has dizziness, but denies any pain. She states that she ambulated to the bathroom earlier today, got dizzy and had to lower herself to the floor. The patient did not hit her head and denies any injuries. The patient has liver disease due to alcohol.  The patient notes that she has not drank from August, but had a slip up on Thursday  "and had 6 shots of fireball due to a lot of recent stress. Per nursing note, the patient used to drink 1 to 2 glasses of wine a night. She reports that she takes lasix and spironolactone. The patient denies the use of blood thinners.     PAST MEDICAL HISTORY  Past Medical History:   Diagnosis Date    Patient denies medical problems        SURGICAL HISTORY  History reviewed. No pertinent surgical history.      FAMILY HISTORY  History reviewed. No pertinent family history.      SOCIAL HISTORY   reports that she has never smoked. She has never used smokeless tobacco. She reports current alcohol use. She reports current drug use.      CURRENT MEDICATIONS  No current outpatient medications       ALLERGIES  Patient has no known allergies.      PHYSICAL EXAM  /58   Pulse (!) 115   Resp 15   Ht 1.651 m (5' 5\")   Wt 63.5 kg (140 lb)   SpO2 98%   BMI 23.30 kg/m²     Physical Exam  Vitals and nursing note reviewed.   Constitutional:       Appearance: Normal appearance.   HENT:      Nose: Nose normal. No congestion.      Mouth/Throat:      Mouth: Mucous membranes are moist.   Eyes:      General: No scleral icterus.     Extraocular Movements: Extraocular movements intact.      Pupils: Pupils are equal, round, and reactive to light.   Cardiovascular:      Rate and Rhythm: Regular rhythm. Tachycardia present.   Pulmonary:      Effort: Pulmonary effort is normal.      Breath sounds: Normal breath sounds.   Abdominal:      General: There is no distension.      Tenderness: There is no abdominal tenderness.   Musculoskeletal:         General: No swelling or tenderness.   Skin:     General: Skin is warm.   Neurological:      General: No focal deficit present.      Mental Status: She is alert.      Comments: No tremor.    Psychiatric:         Mood and Affect: Mood normal.          DIAGNOSTIC STUDIES    Labs:   Results for orders placed or performed during the hospital encounter of 02/03/24   COD (ADULT)   Result Value Ref " Range    ABO Grouping Only A     Rh Grouping Only POS     Antibody Screen-Cod NEG     Component R       R99                 Red Cells, LR       [id number]   issued       02/04/24   05:44      Product Type R99     Dispense Status issued     Unit Number (Barcoded) [id number]     Product Code (Barcoded) F2537G44     Blood Type (Barcoded) 6200    CBC WITH DIFFERENTIAL   Result Value Ref Range    WBC 8.9 4.8 - 10.8 K/uL    RBC 2.84 (L) 4.20 - 5.40 M/uL    Hemoglobin 9.3 (L) 12.0 - 16.0 g/dL    Hematocrit 27.7 (L) 37.0 - 47.0 %    MCV 97.5 81.4 - 97.8 fL    MCH 32.7 27.0 - 33.0 pg    MCHC 33.6 32.2 - 35.5 g/dL    RDW 62.6 (H) 35.9 - 50.0 fL    Platelet Count 119 (L) 164 - 446 K/uL    MPV 10.6 9.0 - 12.9 fL    Neutrophils-Polys 70.80 44.00 - 72.00 %    Lymphocytes 17.90 (L) 22.00 - 41.00 %    Monocytes 9.60 0.00 - 13.40 %    Eosinophils 0.70 0.00 - 6.90 %    Basophils 0.70 0.00 - 1.80 %    Immature Granulocytes 0.30 0.00 - 0.90 %    Nucleated RBC 0.00 0.00 - 0.20 /100 WBC    Neutrophils (Absolute) 6.29 1.82 - 7.42 K/uL    Lymphs (Absolute) 1.59 1.00 - 4.80 K/uL    Monos (Absolute) 0.85 0.00 - 0.85 K/uL    Eos (Absolute) 0.06 0.00 - 0.51 K/uL    Baso (Absolute) 0.06 0.00 - 0.12 K/uL    Immature Granulocytes (abs) 0.03 0.00 - 0.11 K/uL    NRBC (Absolute) 0.00 K/uL   COMP METABOLIC PANEL   Result Value Ref Range    Sodium 135 135 - 145 mmol/L    Potassium 3.4 (L) 3.6 - 5.5 mmol/L    Chloride 102 96 - 112 mmol/L    Co2 22 20 - 33 mmol/L    Anion Gap 11.0 7.0 - 16.0    Glucose 124 (H) 65 - 99 mg/dL    Bun 22 8 - 22 mg/dL    Creatinine 0.48 (L) 0.50 - 1.40 mg/dL    Calcium 8.3 (L) 8.5 - 10.5 mg/dL    Correct Calcium 8.9 8.5 - 10.5 mg/dL    AST(SGOT) 21 12 - 45 U/L    ALT(SGPT) 11 2 - 50 U/L    Alkaline Phosphatase 76 30 - 99 U/L    Total Bilirubin 1.6 (H) 0.1 - 1.5 mg/dL    Albumin 3.2 3.2 - 4.9 g/dL    Total Protein 6.3 6.0 - 8.2 g/dL    Globulin 3.1 1.9 - 3.5 g/dL    A-G Ratio 1.0 g/dL   LIPASE   Result Value Ref Range     Lipase 17 11 - 82 U/L   PROTHROMBIN TIME   Result Value Ref Range    PT 16.7 (H) 12.0 - 14.6 sec    INR 1.33 (H) 0.87 - 1.13   APTT   Result Value Ref Range    APTT 27.8 24.7 - 36.0 sec   LACTIC ACID   Result Value Ref Range    Lactic Acid 2.0 0.5 - 2.0 mmol/L   ABO Rh Confirm   Result Value Ref Range    ABO Rh Confirm A POS    ESTIMATED GFR   Result Value Ref Range    GFR (CKD-EPI) 112 >60 mL/min/1.73 m 2   MAGNESIUM   Result Value Ref Range    Magnesium 1.4 (L) 1.5 - 2.5 mg/dL   TROPONIN   Result Value Ref Range    Troponin T <6 6 - 19 ng/L   HEMOGLOBIN AND HEMATOCRIT   Result Value Ref Range    Hemoglobin 7.6 (L) 12.0 - 16.0 g/dL    Hematocrit 22.2 (L) 37.0 - 47.0 %   EKG   Result Value Ref Range    Report       [facility].    Test Date:  2024  Pt Name:    CARMEN CUADRA                 Department: ER  MRN:        [id number]                      Room:       [facility]  Gender:     Female                       Technician: [id number]  :        1970                   Requested By:ER TRIAGE PROTOCOL  Order #:    [id number]                    Reading MD: Sae Rojas II, MD    Measurements  Intervals                                Axis  Rate:       111                          P:          66  OK:         115                          QRS:        62  QRSD:       83                           T:          -86  QT:         313  QTc:        426    Interpretive Statements  Sinus tachycardia  Probable left atrial enlargement  Nonspecific repol abnormality, diffuse leads. 1mm ST depression inferior  lateral leads  Impression: Sinus tachycardia with nonspecific repol changes  No previous ECG available for comparison  Electronically Signed On 2024 00:33:57 PST by An jessica Rojas II, MD              EKG:   I have independently interpreted this EKG  Results for orders placed or performed during the hospital encounter of 24   EKG   Result Value Ref Range    Report       [facility] Emergency Dept.    Test Date:  2024  Pt Name:    CARMEN CUADRA                 Department: ER  MRN:        [id number]                      Room:        28  Gender:     Female                       Technician: [id number]  :        1970                   Requested By:ER TRIAGE PROTOCOL  Order #:    [id number]                    Reading MD: Sae Rojas II, MD    Measurements  Intervals                                Axis  Rate:       111                          P:          66  WV:         115                          QRS:        62  QRSD:       83                           T:          -86  QT:         313  QTc:        426    Interpretive Statements  Sinus tachycardia  Probable left atrial enlargement  Nonspecific repol abnormality, diffuse leads. 1mm ST depression inferior  lateral leads  Impression: Sinus tachycardia with nonspecific repol changes  No previous ECG available for comparison  Electronically Signed On 2024 00:33:57 PST by An jessica Rojas II, MD             Radiology:     Radiologist interpretation:   DX-CHEST-PORTABLE (1 VIEW)   Final Result      No acute cardiopulmonary disease.           COURSE & MEDICAL DECISION MAKING     INITIAL ASSESSMENT, COURSE AND PLAN  Care Narrative:     12:23 AM - This is an emergent evaluation of a 54 year old woman with history of alcohol dependence, variceal bleeding and banding in November now here with vomiting blood and dark tarry stools starting yesterday.  On exam she has tachycardia, nontender abdomen. Will start treatment for presumed bleeding varices with protonix, octreotide infusion. Labs ordered per nursing GI bleed protocol. I independently reviewed CXR at bedside and there is no sign of free air. EKG with sinus tachycardia.     1:08 AM - Reviewed labs. Hemoglobin 9.3. Hematocrit 27.7. (no comparison) Platelet count of 119. Potassium of 3.4. Glucose 124.     1:38 AM - The patient will be medicated with zofran 4 mg.      2:04 AM - I discussed the patient's case and the above findings with Dr. Cuevas (Hospitalist) who will admit the patient for GI bleed. She has not had any vomiting or bloody stools while in the ER.      CRITICAL CARE  The very real possibilty of a deterioration of this patient's condition required the highest level of my preparedness for sudden, emergent intervention.  I provided critical care services, which included medication orders, frequent reevaluations of the patient's condition and response to treatment, ordering and reviewing test results, and discussing the case with various consultants.  The critical care time associated with the care of the patient was 30 minutes. Review chart for interventions. This time is exclusive of any other billable procedures.        PROBLEM LIST  #Upper GI bleed    #Cirrhosis    DISPOSITION AND DISCUSSIONS  I have discussed management of the patient with the following physicians and SALMA's:  Dr. Cuevas (Hospitalist)     Discussion of management with other [facility] or appropriate source(s): None       Barriers to care at this time, including but not limited to: Patient does not have established PCP.     DISPOSITION:  Patient will be hospitalized by Dr. Cuevas in guarded condition.     FINAL DIAGNOSIS  1. Upper GI bleed    2. Alcohol drinking problem    3. Alcoholic cirrhosis, unspecified whether ascites present (HCC)       IMame (Scribe), am scribing for, and in the presence of, VIRGIL Goldstein II.    Electronically signed by: Mame Salomon (Scribe), 2/4/2024    ISae II, M* personally performed the services described in this documentation, as scribed by Mame Salomon in my presence, and it is both accurate and complete.      The note accurately reflects work and decisions made by me.  Sae Rojas II, M.D.  2/4/2024  8:32 AM

## 2024-02-04 NOTE — ED NOTES
Pt transferring to S169/01 via gurney on cardiac monitor with SMT RN. Pt A&Ox4, on room air, steady gait. Belongings within possession. Pt transferring with blood transfusion on pump @ 120mL/hr.

## 2024-02-04 NOTE — PROGRESS NOTES
Hospital Medicine Daily Progress Note    Date of Service  2/4/2024    Chief Complaint  Stacy Oliva is a 54 y.o. female admitted 2/3/2024 with   Chief Complaint   Patient presents with    Bloody Stools     Black tarry stools (diarrhea) since Friday. Denies thinners, denies pain.   Stats vomiting dark red blood today. Noticed clots in both stool and vomit.  +dizziness, pt ambulated to bathroom got dizzy and had to lower self to floor. Did not hit head/denies injuries.         Hospital Course  This a 54-year-old female with a past medical history significant for alcoholic liver cirrhosis, history of GI bleed on 11/8/2023 status post endoscopy done at office 20 show patient advises status post banding presented to the ER on 2/4/2024 after she was noted to have black tarry stool.    Patient report that he has been having melena for the last 2 days, hematemesis at 2 AM.  Patient report that he has been taking ibuprofen for few days as her dog knocked her on the ground.    She stated that she relapsed on drinking alcohol.  Upen presentation in ER, she noted to be tachycardic, hypotensive; hemoglobin 9.3, platelet 109.    Interval events:  --Patient continues remain tachycardic hypotensive, currently getting IV fluid along with octreotide drip and Protonix 40 mg IV twice daily.   --Her hemoglobin dropped from 9.3-7.6  --GI was consulted, patient will be undergoing endoscopy.  Currently NPO.  --See will be started on CIWA protocol for monitoring alcohol withdrawal, will replete patient electrolyte as needed.  Will continue p.o. thiamine and folic acid.    I have discussed this patient's plan of care and discharge plan at IDT rounds today with Case Management, Nursing, Nursing leadership, and other members of the IDT team.    Consultants/Specialty  GI and Critocal care     Code Status  Full Code    Disposition  The patient is not medically cleared for discharge to home or a post-acute facility.  Anticipate discharge to: home  with close outpatient follow-up    I have placed the appropriate orders for post-discharge needs.    Review of Systems  Review of Systems   Constitutional:  Positive for malaise/fatigue.   HENT:  Negative for congestion.    Eyes:  Negative for blurred vision.   Respiratory:  Negative for cough and sputum production.    Gastrointestinal:  Positive for abdominal pain and melena. Negative for heartburn, nausea and vomiting.   Genitourinary:  Negative for dysuria.   Musculoskeletal:  Negative for myalgias.   Neurological:  Positive for headaches.   Psychiatric/Behavioral:  The patient is nervous/anxious.         Physical Exam  Temp:  [36.3 °C (97.4 °F)-36.7 °C (98 °F)] 36.5 °C (97.7 °F)  Pulse:  [103-120] 103  Resp:  [14-20] 18  BP: ()/(49-65) 105/65  SpO2:  [89 %-99 %] 97 %    Physical Exam  Vitals and nursing note reviewed.   HENT:      Head: Normocephalic and atraumatic.   Eyes:      Extraocular Movements: Extraocular movements intact.   Cardiovascular:      Rate and Rhythm: Tachycardia present.   Abdominal:      General: There is no distension.      Palpations: Abdomen is soft.   Musculoskeletal:      Cervical back: Neck supple.      Right lower leg: No edema.      Left lower leg: No edema.   Neurological:      Mental Status: She is alert and oriented to person, place, and time.         Fluids    Intake/Output Summary (Last 24 hours) at 2/4/2024 1152  Last data filed at 2/4/2024 0915  Gross per 24 hour   Intake 1315 ml   Output --   Net 1315 ml       Laboratory  Recent Labs     02/04/24 0023 02/04/24  0428   WBC 8.9  --    RBC 2.84*  --    HEMOGLOBIN 9.3* 7.6*   HEMATOCRIT 27.7* 22.2*   MCV 97.5  --    MCH 32.7  --    MCHC 33.6  --    RDW 62.6*  --    PLATELETCT 119*  --    MPV 10.6  --      Recent Labs     02/04/24 0023   SODIUM 135   POTASSIUM 3.4*   CHLORIDE 102   CO2 22   GLUCOSE 124*   BUN 22   CREATININE 0.48*   CALCIUM 8.3*     Recent Labs     02/04/24 0023   APTT 27.8   INR 1.33*                Imaging  DX-CHEST-PORTABLE (1 VIEW)   Final Result      No acute cardiopulmonary disease.           Assessment/Plan  * Hematemesis- (present on admission)  Assessment & Plan  Hematemesis and melena.  History of esophageal varices due to alcoholic cirrhosis s/p banding 11/2023  Octreotide drip, IV pantoprazole, ceftriaxone  Repeat H&H every 6 hours.  Her hemoglobin dropped from 9.8-7.6.  GI consulted, currently n.p.o.    Hypomagnesemia- (present on admission)  Assessment & Plan  1.4  Will replete as needed    Alcohol abuse- (present on admission)  Assessment & Plan  Recently relapsed and has been drinking heavily this past week, last week was 2 days ago  She is tachycardic possibly due to anemia.  May be early withdrawal, otherwise no symptoms of withdrawal  Thiamine, folate, multivitamin  Monitoring start CIWA if necessary    Alcoholic cirrhosis of liver without ascites (HCC)- (present on admission)  Assessment & Plan  She recently relapsed and started drinking again, last request 2 days ago  MELD-Na 15 indicating less than 2% 90-day mortality    Acute blood loss anemia- (present on admission)  Assessment & Plan  Patient baseline hemoglobin is 11-12, presents to 9.3.  Repeat hemoglobin dropped significantly to 7.6  Continue PPI, octreotide      Hypokalemia- (present on admission)  Assessment & Plan  3.4 on presentation, secondary to vomiting.   replete         VTE prophylaxis: scd    During this time.  I went and evaluated the patient at bedside, ordered stat hemoglobin hematocrit; called GI physician; and IMCU for close monitoring  intensivist.  Patient is being transferred to.  Currently patient is n.p.o. and will be going for egd

## 2024-02-04 NOTE — PROGRESS NOTES
Pt assessed during bedside shift report by RN. Pt currently receiving blood transfusion with no complaints. Pt vitals stable, but pt tachycardic. Pt on room air with no complaints of nausea, vomiting, and no BM since admission. Following transfusion, RN paged hospitalist for orders. Page done at 0948. Answering service could not see who was assigned to the pt and let on-call provider know. On-call provider reached out at 1105. Provider expressed concern over pt condition and came to see pt; provider recommending transfer to IMCU for monitoring. Pt transferred. Report given at bedside to RN.

## 2024-02-04 NOTE — ED NOTES
PIV established via USG, pt tolerated well.   Hospitalist updated on pt hgb 7.6, drop from 9.3 Hgb @ 0030 and BP 97/53 now.

## 2024-02-04 NOTE — ED NOTES
Pt BP checked bilaterally, SBP in the 80s, most recent 84/49. Pt A&Ox4, -119. Hospitalist notified, new orders placed for repeat Hgb.

## 2024-02-04 NOTE — CONSULTS
55yo F with previous variceal bleed here with melena/hematemesis.  Hgb drop from 9.3 --> 7.6, BP 90s-100s.   GI consulted from floor team    She is well appearing at bedside, no current complaints    Will transfer to IMCU for higher level monitoring given risk of decompensation with variceal bleed and hgb drop    Alex Tejeda M.D.

## 2024-02-04 NOTE — CONSULTS
Date of Consultation:  2/4/2024    Patient: : Stacy Oliva  MRN: [id number]    Referring Physician:  Dr. Magalie Fowler MD     GI:Javi Paz M.D.     Reason for Consultation: GI bleeding    History of Present Illness: The patient is a 54 years old female with medical history of alcohol cirrhosis, actively drinking, recent GI bleeding in November 2003, s/p EGD and colonoscopy, which showed variceal bleeding s/p banding.  This time, presented to inpatient GI service for bloody bowel movement, coffee-ground emesis.      Patient also taking ibuprofen for couple days due to an accident with her dog.        Per the patient, there was no significant findings on colonoscopy.  She will be rescope in 5 to 10 years.  We saw the patient at bedside, the patient is breathing on room air, denies any abdominal pain.  Feeling better after transfusion.  Last bowel movement hours ago.  Last coffee-ground emesis/hematemesis, yesterday.              Past Medical History:   Diagnosis Date    Patient denies medical problems          History reviewed. No pertinent surgical history.    History reviewed. No pertinent family history.    Social History     Socioeconomic History    Marital status:    Tobacco Use    Smoking status: Never    Smokeless tobacco: Never   Vaping Use    Vaping Use: Never used   Substance and Sexual Activity    Alcohol use: Yes     Comment: quit august -- used to drink 1-2 glasses wine/night    Drug use: Yes     Comment: marijuana           Physical Exam:  Vitals:    02/04/24 0611 02/04/24 0631 02/04/24 0725 02/04/24 0915   BP: (!) 89/52 92/50 92/59 105/65   Pulse: (!) 109 (!) 116 (!) 119 (!) 103   Resp: 20 20 20 18   Temp: 36.7 °C (98 °F) 36.6 °C (97.8 °F) 36.5 °C (97.7 °F) 36.5 °C (97.7 °F)   TempSrc:  Temporal Temporal Temporal   SpO2: 90% 96% 94% 97%   Weight:       Height:           Constitutional: No fevers.  Eyes: No symptoms reported.   Ears/Nose/Throat/Mouth: No choking reported.  Cardiovascular: No  chest pain reported.   Respiratory: Denies shortness of breath.  Gastrointestinal/Hepatic: Per H/P.   Skin/Breast: No new rash reported.   Psychiatric: No complaints        HEENT: grossly normal.  Cardiovascular: Please refer to primary team's daily assessment.   Lungs: Please refer to primary team's daily assessment.   Abdomen: No abd pain.   Skin: No erythema, No rash.  Neurologic: Alert & oriented x 3, Normal motor function, No focal deficits noted.  PSY: stable mood.         Labs:  Recent Labs     02/04/24 0023 02/04/24  0428   WBC 8.9  --    RBC 2.84*  --    HEMOGLOBIN 9.3* 7.6*   HEMATOCRIT 27.7* 22.2*   MCV 97.5  --    MCH 32.7  --    MCHC 33.6  --    RDW 62.6*  --    PLATELETCT 119*  --    MPV 10.6  --      Recent Labs     02/04/24 0023   SODIUM 135   POTASSIUM 3.4*   CHLORIDE 102   CO2 22   GLUCOSE 124*   BUN 22     Recent Labs     02/04/24 0023   APTT 27.8   INR 1.33*       Recent Labs     02/04/24 0023   ASTSGOT 21   ALTSGPT 11   TBILIRUBIN 1.6*   ALKPHOSPHAT 76   GLOBULIN 3.1   INR 1.33*         Imaging:  DX-CHEST-PORTABLE (1 VIEW)  Narrative: 2/4/2024 12:33 AM    HISTORY/REASON FOR EXAM:  Blood in vomit or stool or dark tarry stool.  Dizziness.    TECHNIQUE/EXAM DESCRIPTION AND NUMBER OF VIEWS:  Single portable view of the chest.    COMPARISON: None    FINDINGS:  Cardiomediastinal contour is within normal limits.  No focal pulmonary consolidation.  No pleural fluid collection or pneumothorax.  No major bony abnormality is seen.  Impression: No acute cardiopulmonary disease.            Impressions:  The patient is a 54 years old female with medical history of alcohol cirrhosis, actively drinking, recent GI bleeding in November 2003, s/p EGD and colonoscopy, which showed variceal bleeding s/p banding.  This time, presented to inpatient GI service for bloody bowel movement, coffee-ground emesis.      Likely upper GI recurrent bleeding, could be varices, PHG or NSAID related upper GI pathology.      The  GI team agrees to continue octreotide, IV PPI, antibiotic, GI team will offer upper GI scope today with possible banding.    Diagnosis: upper gastrointestinal bleeding.   Procedure: Esophagogastroduodenoscopy with bleeding control including banding.         This note was generated using voice recognition software which has a small chance of producing errors of grammar and possibly content. I have made every reasonable attempt to find and correct any obvious errors, but expect that some may not be found prior to finalization of this note.

## 2024-02-04 NOTE — ED NOTES
1 unit PRBC started with pt, education provided. Pt verbalized understanding. Consent signed and in chart.

## 2024-02-04 NOTE — ASSESSMENT & PLAN NOTE
Secondary to upper GI bleed    Monitor hemoglobin and transfuse if less than 7  Continue PPI and octreotide

## 2024-02-05 ENCOUNTER — APPOINTMENT (OUTPATIENT)
Dept: CARDIOLOGY | Facility: MEDICAL CENTER | Age: 54
DRG: 378 | End: 2024-02-05
Attending: HOSPITALIST
Payer: COMMERCIAL

## 2024-02-05 LAB
ALBUMIN SERPL BCP-MCNC: 3.2 G/DL (ref 3.2–4.9)
ALBUMIN/GLOB SERPL: 1 G/DL
ALP SERPL-CCNC: 85 U/L (ref 30–99)
ALT SERPL-CCNC: 19 U/L (ref 2–50)
ANION GAP SERPL CALC-SCNC: 9 MMOL/L (ref 7–16)
AST SERPL-CCNC: 28 U/L (ref 12–45)
BILIRUB SERPL-MCNC: 1.2 MG/DL (ref 0.1–1.5)
BUN SERPL-MCNC: 12 MG/DL (ref 8–22)
CALCIUM ALBUM COR SERPL-MCNC: 8.6 MG/DL (ref 8.5–10.5)
CALCIUM SERPL-MCNC: 8 MG/DL (ref 8.5–10.5)
CHLORIDE SERPL-SCNC: 105 MMOL/L (ref 96–112)
CO2 SERPL-SCNC: 20 MMOL/L (ref 20–33)
CREAT SERPL-MCNC: 0.53 MG/DL (ref 0.5–1.4)
EKG IMPRESSION: NORMAL
ERYTHROCYTE [DISTWIDTH] IN BLOOD BY AUTOMATED COUNT: 63.1 FL (ref 35.9–50)
GFR SERPLBLD CREATININE-BSD FMLA CKD-EPI: 109 ML/MIN/1.73 M 2
GLOBULIN SER CALC-MCNC: 3.2 G/DL (ref 1.9–3.5)
GLUCOSE SERPL-MCNC: 186 MG/DL (ref 65–99)
HCT VFR BLD AUTO: 29.9 % (ref 37–47)
HGB BLD-MCNC: 10.6 G/DL (ref 12–16)
MAGNESIUM SERPL-MCNC: 2 MG/DL (ref 1.5–2.5)
MCH RBC QN AUTO: 33.1 PG (ref 27–33)
MCHC RBC AUTO-ENTMCNC: 35.5 G/DL (ref 32.2–35.5)
MCV RBC AUTO: 93.4 FL (ref 81.4–97.8)
PLATELET # BLD AUTO: 109 K/UL (ref 164–446)
PMV BLD AUTO: 11.1 FL (ref 9–12.9)
POTASSIUM SERPL-SCNC: 4.3 MMOL/L (ref 3.6–5.5)
PROT SERPL-MCNC: 6.4 G/DL (ref 6–8.2)
RBC # BLD AUTO: 3.2 M/UL (ref 4.2–5.4)
SODIUM SERPL-SCNC: 134 MMOL/L (ref 135–145)
WBC # BLD AUTO: 4.3 K/UL (ref 4.8–10.8)

## 2024-02-05 PROCEDURE — 80053 COMPREHEN METABOLIC PANEL: CPT

## 2024-02-05 PROCEDURE — 83735 ASSAY OF MAGNESIUM: CPT

## 2024-02-05 PROCEDURE — 93010 ELECTROCARDIOGRAM REPORT: CPT | Performed by: INTERNAL MEDICINE

## 2024-02-05 PROCEDURE — 700102 HCHG RX REV CODE 250 W/ 637 OVERRIDE(OP): Performed by: STUDENT IN AN ORGANIZED HEALTH CARE EDUCATION/TRAINING PROGRAM

## 2024-02-05 PROCEDURE — 700105 HCHG RX REV CODE 258: Performed by: STUDENT IN AN ORGANIZED HEALTH CARE EDUCATION/TRAINING PROGRAM

## 2024-02-05 PROCEDURE — 99232 SBSQ HOSP IP/OBS MODERATE 35: CPT | Performed by: HOSPITALIST

## 2024-02-05 PROCEDURE — A9270 NON-COVERED ITEM OR SERVICE: HCPCS | Performed by: STUDENT IN AN ORGANIZED HEALTH CARE EDUCATION/TRAINING PROGRAM

## 2024-02-05 PROCEDURE — 770020 HCHG ROOM/CARE - TELE (206)

## 2024-02-05 PROCEDURE — C9113 INJ PANTOPRAZOLE SODIUM, VIA: HCPCS | Performed by: STUDENT IN AN ORGANIZED HEALTH CARE EDUCATION/TRAINING PROGRAM

## 2024-02-05 PROCEDURE — 93306 TTE W/DOPPLER COMPLETE: CPT

## 2024-02-05 PROCEDURE — 700111 HCHG RX REV CODE 636 W/ 250 OVERRIDE (IP): Mod: JZ | Performed by: STUDENT IN AN ORGANIZED HEALTH CARE EDUCATION/TRAINING PROGRAM

## 2024-02-05 PROCEDURE — 99232 SBSQ HOSP IP/OBS MODERATE 35: CPT | Performed by: NURSE PRACTITIONER

## 2024-02-05 PROCEDURE — 85027 COMPLETE CBC AUTOMATED: CPT

## 2024-02-05 RX ADMIN — PANTOPRAZOLE SODIUM 40 MG: 40 INJECTION, POWDER, FOR SOLUTION INTRAVENOUS at 05:33

## 2024-02-05 RX ADMIN — CEFTRIAXONE SODIUM 1000 MG: 1 INJECTION, POWDER, FOR SOLUTION INTRAMUSCULAR; INTRAVENOUS at 05:43

## 2024-02-05 RX ADMIN — PANTOPRAZOLE SODIUM 40 MG: 40 INJECTION, POWDER, FOR SOLUTION INTRAVENOUS at 17:04

## 2024-02-05 RX ADMIN — THERA TABS 1 TABLET: TAB at 05:32

## 2024-02-05 RX ADMIN — Medication 100 MG: at 05:33

## 2024-02-05 RX ADMIN — OCTREOTIDE ACETATE 50 MCG/HR: 200 INJECTION, SOLUTION INTRAVENOUS; SUBCUTANEOUS at 05:29

## 2024-02-05 RX ADMIN — ACETAMINOPHEN 650 MG: 325 TABLET, FILM COATED ORAL at 09:28

## 2024-02-05 RX ADMIN — FOLIC ACID 1 MG: 1 TABLET ORAL at 05:32

## 2024-02-05 ASSESSMENT — ENCOUNTER SYMPTOMS
BLOOD IN STOOL: 0
ABDOMINAL PAIN: 0
DIZZINESS: 0
FEVER: 0
COUGH: 0
HEARTBURN: 0
VOMITING: 0
NAUSEA: 0
DEPRESSION: 0
DIARRHEA: 0
WEAKNESS: 0
SHORTNESS OF BREATH: 0
CHILLS: 0
CONSTIPATION: 0
BACK PAIN: 0
BLURRED VISION: 0

## 2024-02-05 ASSESSMENT — PAIN DESCRIPTION - PAIN TYPE
TYPE: ACUTE PAIN

## 2024-02-05 ASSESSMENT — LIFESTYLE VARIABLES: SUBSTANCE_ABUSE: 1

## 2024-02-05 ASSESSMENT — PATIENT HEALTH QUESTIONNAIRE - PHQ9
1. LITTLE INTEREST OR PLEASURE IN DOING THINGS: NOT AT ALL
SUM OF ALL RESPONSES TO PHQ9 QUESTIONS 1 AND 2: 0
2. FEELING DOWN, DEPRESSED, IRRITABLE, OR HOPELESS: NOT AT ALL

## 2024-02-05 ASSESSMENT — FIBROSIS 4 INDEX: FIB4 SCORE: 3.05

## 2024-02-05 NOTE — PROGRESS NOTES
Hospital Medicine Daily Progress Note    Date of Service  2/5/2024    Chief Complaint  Stacy Oliva is a 54 y.o. female admitted 2/3/2024 with   Chief Complaint   Patient presents with    Bloody Stools     Black tarry stools (diarrhea) since Friday. Denies thinners, denies pain.   Stats vomiting dark red blood today. Noticed clots in both stool and vomit.  +dizziness, pt ambulated to bathroom got dizzy and had to lower self to floor. Did not hit head/denies injuries.         Hospital Course  This a 54-year-old female with a past medical history significant for alcoholic liver cirrhosis, history of GI bleed on 11/8/2023 status post endoscopy done at [facility] status post banding of esophageal varices presented to the ER on 2/4/2024 after she  noted  black tarry stool.    Patient report that he has been having melena for the last 2 days, hematemesis at 2 AM.  Patient report that he has been taking ibuprofen for few days as her dog knocked her on the ground.    She stated that she relapsed on drinking alcohol.  Upen presentation in ER, she noted to be tachycardic, hypotensive; hemoglobin 9.3, platelet 109.    The patient was admitted and evaluated by GI underwent EGD with GAVE bleeding status post banding and also esophageal varices x 3 with no active bleeding status post banding she was admitted to IMCU postprocedure for close clinical monitoring    Interval events:      Chart reviewed and discussed with GI  Patient currently on octreotide  Bleeding clinically resolved.  Ceftriaxone  On IV Protonix will start clear liquid diet  I reviewed CMP sodium 134 glucose 186 will DC D5 infusion  I reviewed CBC WBC 4.3 hemoglobin 10.6 platelets 109    I have discussed this patient's plan of care and discharge plan at IDT rounds today with Case Management, Nursing, Nursing leadership, and other members of the IDT team.    Consultants/Specialty  GI and Critocal care     Code Status  Full Code    Disposition  Medically  Cleared  I have placed the appropriate orders for post-discharge needs.    Review of Systems  Review of Systems   Constitutional:  Positive for malaise/fatigue. Negative for chills and fever.   Respiratory:  Negative for shortness of breath.    Cardiovascular:  Negative for chest pain.   Gastrointestinal:  Positive for melena.        Physical Exam  Temp:  [36.4 °C (97.5 °F)-36.7 °C (98.1 °F)] 36.5 °C (97.7 °F)  Pulse:  [] 78  Resp:  [14-23] 20  BP: (113-146)/(62-78) 132/78  SpO2:  [91 %-100 %] 98 %    Physical Exam  Vitals and nursing note reviewed.   Constitutional:       General: She is not in acute distress.  HENT:      Head: Normocephalic and atraumatic.      Nose: Nose normal. No rhinorrhea.      Mouth/Throat:      Pharynx: No oropharyngeal exudate or posterior oropharyngeal erythema.   Eyes:      General:         Right eye: No discharge.         Left eye: No discharge.   Cardiovascular:      Rate and Rhythm: Normal rate and regular rhythm.      Heart sounds: Normal heart sounds. No murmur heard.     No friction rub. No gallop.   Pulmonary:      Effort: Pulmonary effort is normal. No respiratory distress.      Breath sounds: Normal breath sounds. No stridor. No wheezing, rhonchi or rales.   Chest:      Chest wall: No tenderness.   Abdominal:      General: Bowel sounds are normal. There is no distension.      Palpations: Abdomen is soft. There is no mass.      Tenderness: There is no abdominal tenderness. There is no rebound.   Musculoskeletal:         General: No swelling or tenderness.      Cervical back: Neck supple. No rigidity.   Skin:     General: Skin is warm and dry.      Coloration: Skin is not cyanotic or jaundiced.      Nails: There is no clubbing.   Neurological:      General: No focal deficit present.      Mental Status: She is alert and oriented to person, place, and time.      Cranial Nerves: No cranial nerve deficit.      Motor: No weakness.   Psychiatric:         Mood and Affect: Mood  normal.         Behavior: Behavior normal.         Fluids    Intake/Output Summary (Last 24 hours) at 2/5/2024 1541  Last data filed at 2/4/2024 1649  Gross per 24 hour   Intake 620 ml   Output 2 ml   Net 618 ml         Laboratory  Recent Labs     02/04/24  0023 02/04/24  0428 02/04/24  1214 02/04/24 2010 02/05/24  0355   WBC 8.9  --  7.1  --  4.3*   RBC 2.84*  --  2.83*  --  3.20*   HEMOGLOBIN 9.3*   < > 9.2* 11.0* 10.6*   HEMATOCRIT 27.7*   < > 26.5* 31.7* 29.9*   MCV 97.5  --  93.6  --  93.4   MCH 32.7  --  32.5  --  33.1*   MCHC 33.6  --  34.7  --  35.5   RDW 62.6*  --  64.5*  --  63.1*   PLATELETCT 119*  --  86*  --  109*   MPV 10.6  --  10.7  --  11.1    < > = values in this interval not displayed.       Recent Labs     02/04/24  0023 02/05/24  0355   SODIUM 135 134*   POTASSIUM 3.4* 4.3   CHLORIDE 102 105   CO2 22 20   GLUCOSE 124* 186*   BUN 22 12   CREATININE 0.48* 0.53   CALCIUM 8.3* 8.0*       Recent Labs     02/04/24  0023   APTT 27.8   INR 1.33*                 Imaging  DX-CHEST-PORTABLE (1 VIEW)   Final Result      No acute cardiopulmonary disease.           Assessment/Plan  * Hematemesis- (present on admission)  Assessment & Plan  Hematemesis and melena.  History of esophageal varices due to alcoholic cirrhosis s/p banding 11/2023    EGD 2/5/2024  GAVE bleeding, s/p banding x 1.   Some PHG oozing, not significant.   No overt gastric varices.   Esophageal varices x 3, moderate sized, no active bleeding, s/p banding x 4.   Grossly normal other part of upper GI.   Old blood in stomach, cover around 45% of area.     Hemoglobin stable continue close clinical monitoring  Continue Protonix and octreotide transition to Prilosec tomorrow  Will need to transition to beta-blocker on discharge    Bleeding appears to be clinically resolved at this time will DC ceftriaxone  Reinforced importance of complete alcohol cessation and close outpatient follow-up with GI    Hypomagnesemia- (present on  admission)  Assessment & Plan  Repleted    Alcohol abuse- (present on admission)  Assessment & Plan  Continue thiamine and folate  Monitor for signs of withdrawal    Alcoholic cirrhosis of liver without ascites (HCC)- (present on admission)  Assessment & Plan  She recently relapsed and started drinking again, last drink on Thursday    No clinical signs of withdrawal  Patient counseled on alcohol cessation    Acute blood loss anemia- (present on admission)  Assessment & Plan  Secondary to upper GI bleed    Monitor hemoglobin and transfuse if less than 7  Continue PPI and octreotide      Hypokalemia- (present on admission)  Assessment & Plan  Repleted monitor electrolytes         VTE prophylaxis: scd

## 2024-02-05 NOTE — ANESTHESIA POSTPROCEDURE EVALUATION
Patient: Stacy Oliva    Procedure Summary       Date: 02/04/24 Room / Location: [facility] 06 / SURGERY [facility]    Anesthesia Start: 1553 Anesthesia Stop: 1617    Procedures:       GASTROSCOPY (Esophagus)      GASTROSCOPY, WITH BANDING (Esophagus) Diagnosis: (portal hypertensive gastropathy, gastric antral vascular ectasia (gave))    Surgeons: Javi Paz M.D. Responsible Provider: Karthikeyan Desai M.D.    Anesthesia Type: MAC ASA Status: 3            Final Anesthesia Type: MAC  Last vitals  BP   Blood Pressure: 116/74    Temp   36.7 °C (98.1 °F)    Pulse   91   Resp   20    SpO2   93 %      Anesthesia Post Evaluation    Patient location during evaluation: PACU  Patient participation: complete - patient participated  Level of consciousness: awake and alert  Pain score: 5    Airway patency: patent  Anesthetic complications: no  Cardiovascular status: hemodynamically stable  Respiratory status: acceptable  Hydration status: euvolemic    PONV: none          No notable events documented.     Nurse Pain Score: 6 (NPRS)

## 2024-02-05 NOTE — PROCEDURES
OPERATIVE REPORT    PATIENT:   Stacy Oliva   1970       PREOPERATIVE DIAGNOSES/INDICATIONS: Gi bleeding.     POSTOPERATIVE DIAGNOSIS:   GAVE bleeding, s/p banding x 1.   Some PHG oozing, not significant.   No overt gastric varices.   Esophageal varices x 3, moderate sized, no active bleeding, s/p banding x 4.   Grossly normal other part of upper GI.   Old blood in stomach, cover around 45% of area.     PROCEDURE:  ESOPHAGOGASTRODUODENOSCOPY    PHYSICIAN:  Javi Paz MD. MPH.    ANESTHESIA:  Per anesthesiologist or ICU/ED team.     LOCATION: [facility]    CONSENT:  OBTAINED. The risks, benefits and alternatives of the procedure were discussed in details. The risks include and are not limited to bleeding, infection, perforation, missed lesions, and sedations risks (cardiopulmonary compromise and allergic reaction to medications).    DESCRIPTION: The patient presented to the procedure room.  After routine checkup was performed, patient was brought into the endoscopy suite.  Patient was placed on his left lateral decubitus position. A bite block was placed in patient's mouth. Patient was sedated by anesthesia.  Vital signs were monitored throughout the procedure.  Oxygenation support was provided throughout the procedure. Upper endoscope was inserted into patient's mouth and advanced to the second portion of the duodenum under direct visualization.      Once the site was reached and examined, the upper endoscope was withdrawn.  Retroflexion was made within the stomach.  The stomach was decompressed, scope was withdrawn and the procedure was terminated.    The patient tolerated the procedure well.  There were no immediate complications.    OPERATIVE FINDINGS:    RECOMMENDATIONS:  Continue octreotide.   IV ppi bid or infusion.   Okay to have water, consider clear liquid tomorrow morning if stable.   IV antibiotics as cirrhotic bleeding.   Inpatient GI team will continue to follow up.       This note has been  transcribed with digital voice recognition software and although it has been reviewed may contain grammatical or word errors

## 2024-02-05 NOTE — CARE PLAN
The patient is Watcher - Medium risk of patient condition declining or worsening    Shift Goals  Clinical Goals: hemodynamic stability, monitor H&H  Patient Goals: be able to eat  Family Goals: ALEXANDRU    Progress made toward(s) clinical / shift goals:       Problem: Knowledge Deficit - Standard  Goal: Patient and family/care givers will demonstrate understanding of plan of care, disease process/condition, diagnostic tests and medications  Outcome: Progressing     Problem: Pain - Standard  Goal: Alleviation of pain or a reduction in pain to the patient’s comfort goal  Outcome: Progressing     Problem: Fall Risk  Goal: Patient will remain free from falls  Outcome: Progressing     Problem: Hemodynamics  Goal: Patient's hemodynamics, fluid balance and neurologic status will be stable or improve  Outcome: Progressing

## 2024-02-05 NOTE — PROGRESS NOTES
.Gastroenterology Progress Note               Author:  MELO Ghosh Date & Time Created: 2/5/2024 3:32 PM       Patient ID:  Name:             Stacy Oliva    YOB: 1970  Age:                 54 y.o.  female  MRN:               [id number]        Medical Decision Making, by Problem:  Active Hospital Problems    Diagnosis     Hematemesis [K92.0]     Hypokalemia [E87.6]     Acute blood loss anemia [D62]     Alcoholic cirrhosis of liver without ascites (HCC) [K70.30]     Alcohol abuse [F10.10]     Hypomagnesemia [E83.42]     Advance care planning [Z71.89]            Presenting Chief Complaint:  Hematemesis and melena    Patient is a 54-year-old female who presented with hematemesis and melena.  She has a history of decompensated alcoholic cirrhosis and upon presentation had a hemoglobin of 9.3.  She is still actively drinking.    EPOSTOPERATIVE DIAGNOSIS:   GAVE bleeding, s/p banding x 1.   Some PHG oozing, not significant.   No overt gastric varices.   Esophageal varices x 3, moderate sized, no active bleeding, s/p banding x 4.   Grossly normal other part of upper GI.   Old blood in stomach, cover around 45% of area.     Interval History:  2/5/2024: Patient seen and examined.  Feels great, no complaints.  No further evidence of bleeding, has not had a bowel movement.  Hemoglobin 10.6.  Continues on octreotide and PPI therapy.      Hospital Medications:  Current Facility-Administered Medications   Medication Dose Frequency Provider Last Rate Last Admin    acetaminophen (Tylenol) tablet 650 mg  650 mg Q6HRS PRN Aníbal Cuevas M.D.   650 mg at 02/05/24 0928    hydrALAZINE (Apresoline) injection 10 mg  10 mg Q4HRS PRN Aníbal Cuevas M.D.        pantoprazole (Protonix) injection 40 mg  40 mg BID Aníbal Cuevas M.D.   40 mg at 02/05/24 0533    octreotide (SandoSTATIN) 1,250 mcg in  mL Infusion  50 mcg/hr Continuous Ricci Perales M.D. 10 mL/hr at 02/05/24 0529 50 mcg/hr at  "02/05/24 0529    thiamine (Vitamin B-1) tablet 100 mg  100 mg DAILY Aníbal Cuevas M.D.   100 mg at 02/05/24 0533    folic acid (Folvite) tablet 1 mg  1 mg DAILY Aníbal Cuevas M.D.   1 mg at 02/05/24 0532    multivitamin tablet 1 Tablet  1 Tablet DAILY Aníbal Cuevas M.D.   1 Tablet at 02/05/24 0532   Last reviewed on 2/4/2024  9:31 AM by Eliu Yanez R.N.       Review of Systems:  Review of Systems   Constitutional:  Negative for chills, fever and malaise/fatigue.   HENT:  Negative for hearing loss.    Eyes:  Negative for blurred vision.   Respiratory:  Negative for cough and shortness of breath.    Cardiovascular:  Negative for chest pain and leg swelling.   Gastrointestinal:  Negative for abdominal pain, blood in stool, constipation, diarrhea, heartburn, melena, nausea and vomiting.   Genitourinary:  Negative for dysuria.   Musculoskeletal:  Negative for back pain.   Skin:  Negative for rash.   Neurological:  Negative for dizziness and weakness.   Psychiatric/Behavioral:  Positive for substance abuse. Negative for depression.    All other systems reviewed and are negative.        Vital signs:  Weight/BMI: Body mass index is 23.04 kg/m².  /78   Pulse 78   Temp 36.5 °C (97.7 °F) (Temporal)   Resp 20   Ht 1.651 m (5' 5\")   Wt 62.8 kg (138 lb 7.2 oz)   SpO2 98%   Vitals:    02/05/24 0500 02/05/24 0600 02/05/24 0800 02/05/24 1200   BP: 130/75 137/76  132/78   Pulse: 77 97 89 78   Resp: 16 18 16 20   Temp:   36.5 °C (97.7 °F)    TempSrc:   Temporal    SpO2: 95% 91% 93% 98%   Weight:       Height:         Oxygen Therapy:  Pulse Oximetry: 98 %, O2 (LPM): 0, O2 Delivery Device: None - Room Air    Intake/Output Summary (Last 24 hours) at 2/5/2024 1532  Last data filed at 2/4/2024 1649  Gross per 24 hour   Intake 620 ml   Output 2 ml   Net 618 ml         Physical Exam:  Physical Exam  Vitals and nursing note reviewed.   Constitutional:       General: She is not in acute distress.     Appearance: Normal " appearance.   HENT:      Head: Normocephalic and atraumatic.      Right Ear: External ear normal.      Left Ear: External ear normal.      Nose: Nose normal.      Mouth/Throat:      Mouth: Mucous membranes are moist.      Pharynx: Oropharynx is clear.   Eyes:      General: No scleral icterus.  Cardiovascular:      Rate and Rhythm: Normal rate and regular rhythm.      Pulses: Normal pulses.      Heart sounds: Normal heart sounds.   Pulmonary:      Effort: Pulmonary effort is normal. No respiratory distress.      Breath sounds: Normal breath sounds.   Abdominal:      General: Abdomen is flat. Bowel sounds are normal. There is no distension.      Palpations: Abdomen is soft.      Tenderness: There is no abdominal tenderness.   Musculoskeletal:         General: Normal range of motion.      Cervical back: Normal range of motion.   Skin:     General: Skin is warm and dry.      Capillary Refill: Capillary refill takes less than 2 seconds.   Neurological:      Mental Status: She is alert and oriented to person, place, and time.   Psychiatric:         Mood and Affect: Mood normal.         Behavior: Behavior normal.             Labs:  Recent Labs     02/04/24  0023 02/04/24  0409 02/05/24  0355   SODIUM 135  --  134*   POTASSIUM 3.4*  --  4.3   CHLORIDE 102  --  105   CO2 22  --  20   BUN 22  --  12   CREATININE 0.48*  --  0.53   MAGNESIUM 1.4* 1.3* 2.0   CALCIUM 8.3*  --  8.0*     Recent Labs     02/04/24  0023 02/05/24  0355   ALTSGPT 11 19   ASTSGOT 21 28   ALKPHOSPHAT 76 85   TBILIRUBIN 1.6* 1.2   LIPASE 17  --    GLUCOSE 124* 186*     Recent Labs     02/04/24  0023 02/04/24  1214 02/05/24  0355   WBC 8.9 7.1 4.3*   NEUTSPOLYS 70.80  --   --    LYMPHOCYTES 17.90*  --   --    MONOCYTES 9.60  --   --    EOSINOPHILS 0.70  --   --    BASOPHILS 0.70  --   --    ASTSGOT 21  --  28   ALTSGPT 11  --  19   ALKPHOSPHAT 76  --  85   TBILIRUBIN 1.6*  --  1.2     Recent Labs     02/04/24  0023 02/04/24  0428 02/04/24  1214  24  0355   RBC 2.84*  --  2.83*  --  3.20*   HEMOGLOBIN 9.3*   < > 9.2* 11.0* 10.6*   HEMATOCRIT 27.7*   < > 26.5* 31.7* 29.9*   PLATELETCT 119*  --  86*  --  109*   PROTHROMBTM 16.7*  --   --   --   --    APTT 27.8  --   --   --   --    INR 1.33*  --   --   --   --     < > = values in this interval not displayed.     Recent Results (from the past 24 hour(s))   EKG    Collection Time: 24  4:32 PM   Result Value Ref Range    Report       [facility]    Test Date:  2024  Pt Name:    CARMEN CUADRA                 Department: [facility]  MRN:        [id number]                      Room:       [facility]  Gender:     Female                       Technician: [facility]  :        1970                   Requested By:IRVIN LIN  Order #:    [id number]                    Reading MD: Harry Camacho MD    Measurements  Intervals                                Axis  Rate:       98                           P:          56  NY:         115                          QRS:        63  QRSD:       84                           T:          -79  QT:         325  QTc:        415    Interpretive Statements  Sinus rhythm  Borderline short NY interval  Borderline repolarization abnormality  Compared to ECG 2024 00:28:27  NO SIGNIFICANT CHANGES  Electronically Signed On 2024 01:19:46 PST by Harry Camacho MD     HEMOGLOBIN AND HEMATOCRIT    Collection Time: 24  8:10 PM   Result Value Ref Range    Hemoglobin 11.0 (L) 12.0 - 16.0 g/dL    Hematocrit 31.7 (L) 37.0 - 47.0 %   Comp Metabolic Panel (CMP)    Collection Time: 24  3:55 AM   Result Value Ref Range    Sodium 134 (L) 135 - 145 mmol/L    Potassium 4.3 3.6 - 5.5 mmol/L    Chloride 105 96 - 112 mmol/L    Co2 20 20 - 33 mmol/L    Anion Gap 9.0 7.0 - 16.0    Glucose 186 (H) 65 - 99 mg/dL    Bun 12 8 - 22 mg/dL    Creatinine 0.53 0.50 - 1.40 mg/dL    Calcium 8.0 (L) 8.5 - 10.5 mg/dL    Correct Calcium 8.6 8.5 - 10.5 mg/dL     AST(SGOT) 28 12 - 45 U/L    ALT(SGPT) 19 2 - 50 U/L    Alkaline Phosphatase 85 30 - 99 U/L    Total Bilirubin 1.2 0.1 - 1.5 mg/dL    Albumin 3.2 3.2 - 4.9 g/dL    Total Protein 6.4 6.0 - 8.2 g/dL    Globulin 3.2 1.9 - 3.5 g/dL    A-G Ratio 1.0 g/dL   CBC without Differential    Collection Time: 02/05/24  3:55 AM   Result Value Ref Range    WBC 4.3 (L) 4.8 - 10.8 K/uL    RBC 3.20 (L) 4.20 - 5.40 M/uL    Hemoglobin 10.6 (L) 12.0 - 16.0 g/dL    Hematocrit 29.9 (L) 37.0 - 47.0 %    MCV 93.4 81.4 - 97.8 fL    MCH 33.1 (H) 27.0 - 33.0 pg    MCHC 35.5 32.2 - 35.5 g/dL    RDW 63.1 (H) 35.9 - 50.0 fL    Platelet Count 109 (L) 164 - 446 K/uL    MPV 11.1 9.0 - 12.9 fL   MAGNESIUM    Collection Time: 02/05/24  3:55 AM   Result Value Ref Range    Magnesium 2.0 1.5 - 2.5 mg/dL   ESTIMATED GFR    Collection Time: 02/05/24  3:55 AM   Result Value Ref Range    GFR (CKD-EPI) 109 >60 mL/min/1.73 m 2       Radiology Review:  DX-CHEST-PORTABLE (1 VIEW)   Final Result      No acute cardiopulmonary disease.            MDM (Data Review):   -Records reviewed and summarized in current documentation  -I personally reviewed and interpreted the laboratory results  -I personally reviewed the radiology images    Assessment/Recommendations:  Acute blood loss anemia  Hematemesis  Melena  Esophageal varices banding x 4  Decompensated alcoholic cirrhosis   Ascites  Alcohol misuse disorder  GAVE on EGD  Portal hypertensive gastropathy    Recommendations:  PPI twice daily  Advance diet  Will need diuretics restarted on discharge, 20 mg p.o. daily furosemide and 100 mg p.o. daily spironolactone  Also recommend beta-blocker with carvedilol 3.125 twice daily and uptitrate as indicated  Absolute alcohol cessation  Patient has established GI follow-up and [facility], recommend that she make an appointment with them  Could consider TIPS in the future given patient is not encephalopathic, has now had variceal bleeding, and ascites    GI signing off.  Please  do not hesitate to reconsult as needed.    Plan discussed with patient, RN, Dr. Jey Perales, Dr. Agosto    Core Quality Measures   Reviewed items::  Labs, Medications and Radiology reports reviewed

## 2024-02-05 NOTE — OR NURSING
Discussed pt c/o chest heaviness with Dr. Desai and Dr. Paz.  EKG obtained. Mds believe chest discomfort from banding and gas pressure. Encourage burping and clear liquids.

## 2024-02-05 NOTE — ANESTHESIA PROCEDURE NOTES
Airway    Date/Time: 2/4/2024 3:56 PM    Performed by: Karthikeyan Desai M.D.  Authorized by: Karthikeyan Desai M.D.    Location:  OR  Urgency:  Elective  Indications for Airway Management:  Anesthesia      Spontaneous Ventilation: absent    Sedation Level:  Deep  Preoxygenated: Yes    Patient Position:  Sniffing  Mask Difficulty Assessment:  0 - not attempted  Final Airway Type:  Endotracheal airway  Final Endotracheal Airway:  ETT  Cuffed: Yes    Technique Used for Successful ETT Placement:  Direct laryngoscopy    Insertion Site:  Oral  Blade Type:  Perla  Laryngoscope Blade/Videolaryngoscope Blade Size:  2  ETT Size (mm):  6.5  Measured from:  Lips  ETT to Lips (cm):  22  Placement Verified by: auscultation and capnometry    Cormack-Lehane Classification:  Grade I - full view of glottis  Number of Attempts at Approach:  1

## 2024-02-05 NOTE — CARE PLAN
The patient is Watcher - Medium risk of patient condition declining or worsening    Shift Goals  Clinical Goals: Hemodynamic stability  Patient Goals: Get EGD  Family Goals: ALEXANDRU    Progress made toward(s) clinical / shift goals:      Problem: Knowledge Deficit - Standard  Goal: Patient and family/care givers will demonstrate understanding of plan of care, disease process/condition, diagnostic tests and medications  Outcome: Progressing     Problem: Pain - Standard  Goal: Alleviation of pain or a reduction in pain to the patient’s comfort goal  Outcome: Progressing       Patient is not progressing towards the following goals:

## 2024-02-05 NOTE — ANESTHESIA TIME REPORT
Anesthesia Start and Stop Event Times       Date Time Event    2/4/2024 1508 Ready for Procedure     1553 Anesthesia Start     1617 Anesthesia Stop          Responsible Staff  02/04/24      Name Role Begin End    Karthikeyan Desai M.D. Anesth 1553 1617          Overtime Reason:  no overtime (within assigned shift)    Comments:

## 2024-02-05 NOTE — CARE PLAN
The patient is Watcher - Medium risk of patient condition declining or worsening    Shift Goals  Clinical Goals: Hemodynamic stability  Patient Goals: Shower  Family Goals: ALEXANDRU    Progress made toward(s) clinical / shift goals:        Problem: Knowledge Deficit - Standard  Goal: Patient and family/care givers will demonstrate understanding of plan of care, disease process/condition, diagnostic tests and medications  Outcome: Progressing     Problem: Pain - Standard  Goal: Alleviation of pain or a reduction in pain to the patient’s comfort goal  Outcome: Progressing     Problem: Fall Risk  Goal: Patient will remain free from falls  Outcome: Progressing     Patient is not progressing towards the following goals:

## 2024-02-06 ENCOUNTER — PHARMACY VISIT (OUTPATIENT)
Dept: PHARMACY | Facility: MEDICAL CENTER | Age: 54
End: 2024-02-06
Payer: COMMERCIAL

## 2024-02-06 VITALS
OXYGEN SATURATION: 94 % | WEIGHT: 139.77 LBS | DIASTOLIC BLOOD PRESSURE: 57 MMHG | TEMPERATURE: 97.7 F | SYSTOLIC BLOOD PRESSURE: 100 MMHG | RESPIRATION RATE: 14 BRPM | HEIGHT: 65 IN | HEART RATE: 72 BPM | BODY MASS INDEX: 23.29 KG/M2

## 2024-02-06 LAB
ANION GAP SERPL CALC-SCNC: 9 MMOL/L (ref 7–16)
BUN SERPL-MCNC: 11 MG/DL (ref 8–22)
CALCIUM SERPL-MCNC: 8 MG/DL (ref 8.5–10.5)
CHLORIDE SERPL-SCNC: 109 MMOL/L (ref 96–112)
CO2 SERPL-SCNC: 22 MMOL/L (ref 20–33)
CREAT SERPL-MCNC: 0.6 MG/DL (ref 0.5–1.4)
ERYTHROCYTE [DISTWIDTH] IN BLOOD BY AUTOMATED COUNT: 63.5 FL (ref 35.9–50)
GFR SERPLBLD CREATININE-BSD FMLA CKD-EPI: 106 ML/MIN/1.73 M 2
GLUCOSE SERPL-MCNC: 112 MG/DL (ref 65–99)
HCT VFR BLD AUTO: 27.5 % (ref 37–47)
HGB BLD-MCNC: 9.5 G/DL (ref 12–16)
LV EJECT FRACT  99904: 55
LV EJECT FRACT MOD 2C 99903: 62.96
LV EJECT FRACT MOD 4C 99902: 66.99
LV EJECT FRACT MOD BP 99901: 65.31
MAGNESIUM SERPL-MCNC: 1.8 MG/DL (ref 1.5–2.5)
MCH RBC QN AUTO: 33.3 PG (ref 27–33)
MCHC RBC AUTO-ENTMCNC: 34.5 G/DL (ref 32.2–35.5)
MCV RBC AUTO: 96.5 FL (ref 81.4–97.8)
PHOSPHATE SERPL-MCNC: 3.2 MG/DL (ref 2.5–4.5)
PLATELET # BLD AUTO: 116 K/UL (ref 164–446)
PMV BLD AUTO: 10.9 FL (ref 9–12.9)
POTASSIUM SERPL-SCNC: 4 MMOL/L (ref 3.6–5.5)
RBC # BLD AUTO: 2.85 M/UL (ref 4.2–5.4)
SODIUM SERPL-SCNC: 140 MMOL/L (ref 135–145)
WBC # BLD AUTO: 9 K/UL (ref 4.8–10.8)

## 2024-02-06 PROCEDURE — RXMED WILLOW AMBULATORY MEDICATION CHARGE: Performed by: HOSPITALIST

## 2024-02-06 PROCEDURE — A9270 NON-COVERED ITEM OR SERVICE: HCPCS | Performed by: STUDENT IN AN ORGANIZED HEALTH CARE EDUCATION/TRAINING PROGRAM

## 2024-02-06 PROCEDURE — C9113 INJ PANTOPRAZOLE SODIUM, VIA: HCPCS | Performed by: STUDENT IN AN ORGANIZED HEALTH CARE EDUCATION/TRAINING PROGRAM

## 2024-02-06 PROCEDURE — 80048 BASIC METABOLIC PNL TOTAL CA: CPT

## 2024-02-06 PROCEDURE — 99239 HOSP IP/OBS DSCHRG MGMT >30: CPT | Performed by: HOSPITALIST

## 2024-02-06 PROCEDURE — 83735 ASSAY OF MAGNESIUM: CPT

## 2024-02-06 PROCEDURE — 84100 ASSAY OF PHOSPHORUS: CPT

## 2024-02-06 PROCEDURE — 85027 COMPLETE CBC AUTOMATED: CPT

## 2024-02-06 PROCEDURE — 93306 TTE W/DOPPLER COMPLETE: CPT | Mod: 26 | Performed by: INTERNAL MEDICINE

## 2024-02-06 PROCEDURE — 700111 HCHG RX REV CODE 636 W/ 250 OVERRIDE (IP): Performed by: STUDENT IN AN ORGANIZED HEALTH CARE EDUCATION/TRAINING PROGRAM

## 2024-02-06 PROCEDURE — 700102 HCHG RX REV CODE 250 W/ 637 OVERRIDE(OP): Performed by: STUDENT IN AN ORGANIZED HEALTH CARE EDUCATION/TRAINING PROGRAM

## 2024-02-06 RX ORDER — FUROSEMIDE 20 MG/1
20 TABLET ORAL DAILY
Qty: 30 TABLET | Refills: 2 | Status: SHIPPED | OUTPATIENT
Start: 2024-02-06

## 2024-02-06 RX ORDER — SPIRONOLACTONE 25 MG/1
25 TABLET ORAL DAILY
Qty: 30 TABLET | Refills: 2 | Status: SHIPPED | OUTPATIENT
Start: 2024-02-06

## 2024-02-06 RX ORDER — OMEPRAZOLE 20 MG/1
20 CAPSULE, DELAYED RELEASE ORAL DAILY
Qty: 60 CAPSULE | Refills: 2 | Status: SHIPPED | OUTPATIENT
Start: 2024-02-06

## 2024-02-06 RX ORDER — CARVEDILOL 6.25 MG/1
3.12 TABLET ORAL 2 TIMES DAILY WITH MEALS
Qty: 60 TABLET | Refills: 2 | Status: SHIPPED | OUTPATIENT
Start: 2024-02-06

## 2024-02-06 RX ADMIN — PANTOPRAZOLE SODIUM 40 MG: 40 INJECTION, POWDER, FOR SOLUTION INTRAVENOUS at 05:15

## 2024-02-06 RX ADMIN — Medication 100 MG: at 05:15

## 2024-02-06 RX ADMIN — THERA TABS 1 TABLET: TAB at 05:15

## 2024-02-06 RX ADMIN — FOLIC ACID 1 MG: 1 TABLET ORAL at 05:15

## 2024-02-06 ASSESSMENT — PAIN DESCRIPTION - PAIN TYPE
TYPE: ACUTE PAIN

## 2024-02-06 NOTE — CARE PLAN
The patient is Watcher - Medium risk of patient condition declining or worsening    Shift Goals  Clinical Goals: hemodynamic stability  Patient Goals: rest  Family Goals: ALEXANDRU    Progress made toward(s) clinical / shift goals:       Problem: Knowledge Deficit - Standard  Goal: Patient and family/care givers will demonstrate understanding of plan of care, disease process/condition, diagnostic tests and medications  Outcome: Progressing     Problem: Pain - Standard  Goal: Alleviation of pain or a reduction in pain to the patient’s comfort goal  Outcome: Progressing     Problem: Hemodynamics  Goal: Patient's hemodynamics, fluid balance and neurologic status will be stable or improve  Outcome: Progressing

## 2024-02-06 NOTE — PROGRESS NOTES
Patient being discharged via discharge lounge. Pt educated on discharge instructions and new prescriptions, verbalized understanding. Follow up appointment made with GI MD. PIV removed. Patient going home via car with family.

## 2024-02-06 NOTE — CARE PLAN
The patient is Stable - Low risk of patient condition declining or worsening    Shift Goals  Clinical Goals: hemodynamic stability  Patient Goals: rest  Family Goals: ALEXANDRU    Progress made toward(s) clinical / shift goals:      Problem: Knowledge Deficit - Standard  Goal: Patient and family/care givers will demonstrate understanding of plan of care, disease process/condition, diagnostic tests and medications  Outcome: Met     Problem: Pain - Standard  Goal: Alleviation of pain or a reduction in pain to the patient’s comfort goal  Outcome: Met     Problem: Fall Risk  Goal: Patient will remain free from falls  Outcome: Met     Problem: Hemodynamics  Goal: Patient's hemodynamics, fluid balance and neurologic status will be stable or improve  Outcome: Met       Patient is not progressing towards the following goals:

## 2024-02-06 NOTE — DISCHARGE SUMMARY
Discharge Summary    CHIEF COMPLAINT ON ADMISSION  Chief Complaint   Patient presents with    Bloody Stools     Black tarry stools (diarrhea) since Friday. Denies thinners, denies pain.   Stats vomiting dark red blood today. Noticed clots in both stool and vomit.  +dizziness, pt ambulated to bathroom got dizzy and had to lower self to floor. Did not hit head/denies injuries.        Reason for Admission  EMS     Admission Date  2/3/2024    CODE STATUS  Full Code    HPI & HOSPITAL COURSE  This is a 54 y.o. female here with black  stools.    This a 54-year-old female with a past medical history significant for alcoholic liver cirrhosis, history of GI bleed on 11/8/2023 status post endoscopy done at [facility] by Dr. Renita ELLISON status post banding presented to the ER on 2/4/2024 after she was noted to have black tarry stool.     Patient report that he has been having melena for the last 2 days, hematemesis at 2 AM.  Patient report that he has been taking ibuprofen for few days as her dog knocked her on the ground.     She stated that she relapsed on drinking alcohol.  Upen presentation in ER, she noted to be tachycardic, hypotensive; hemoglobin 9.3, platelet 109.  She was placed on an IV octreotide drip and Protonix 40 mg IV twice daily and started on CIWA protocol for monitoring alcohol withdrawal, will replete patient electrolyte as needed.  Will continue p.o. thiamine and folic acid.  On 2/4 she underwent EGD with banding x 4 of esophageal varices.   Her diet was advanced and she has been tolerating a GI  soft diet. Mrs. Oliva has not experienced any detox symptoms.     2/6: Mrs. Oliva is feeling much better today and is anxious to go home. We discussed absolute alcohol sobriety.  Her blood  pressure has been low (100/57) she will be  on daily lasix and aldactone and coreg will be added due to  portal  hypertension. BID prilosec added. She is followed by Dr. Renita ELLISON at [facility] and actually  was to undergo a repeat EGD. No NSAIDs discussed. She currently does not have any evidence of ascites.     Therefore, she is discharged in good and stable condition to home with close outpatient follow-up.    The patient met 2-midnight criteria for an inpatient stay at the time of discharge.    Discharge Date  2/6    FOLLOW UP ITEMS POST DISCHARGE  Dr. Maravilla GI  She may be a candidate for  a TIPS if she continues to have variceal bleeding. Currently though she does not have any ascites.     DISCHARGE DIAGNOSES  Principal Problem:    Hematemesis (POA: Yes)  Active Problems:    Hypokalemia (POA: Yes)    Acute blood loss anemia (POA: Yes)    Alcoholic cirrhosis of liver without ascites (HCC) (POA: Yes)    Alcohol abuse (POA: Yes)    Hypomagnesemia (POA: Yes)    Advance care planning (POA: Unknown)  Resolved Problems:    * No resolved hospital problems. *      FOLLOW UP  No future appointments.  Artur Maravilla M.D.  [address] [place] [id number] [phone]    Schedule an appointment as soon as possible for a visit        MEDICATIONS ON DISCHARGE     Medication List        START taking these medications        Instructions   carvedilol 6.25 MG Tabs  Commonly known as: Coreg   Take 0.5 Tablets by mouth 2 times a day with meals.  Dose: 3.125 mg     omeprazole 20 MG delayed-release capsule  Commonly known as: PriLOSEC   Take 1 Capsule by mouth every day.  Dose: 20 mg            CHANGE how you take these medications        Instructions   furosemide 20 MG Tabs  What changed:   when to take this  reasons to take this  Commonly known as: Lasix   Take 1 Tablet by mouth every day.  Dose: 20 mg     spironolactone 25 MG Tabs  What changed: when to take this  Commonly known as: Aldactone   Take 1 Tablet by mouth every day.  Dose: 25 mg            CONTINUE taking these medications        Instructions   FISH OIL PO   Take  by mouth every day.     TURMERIC PO   Take 1 Dose by mouth every day.  Dose: 1 Dose               Allergies  No Known Allergies    DIET  Orders Placed This Encounter   Procedures    Diet Order Diet: Low Fiber(GI Soft)     Standing Status:   Standing     Number of Occurrences:   1     Order Specific Question:   Diet:     Answer:   Low Fiber(GI Soft) [2]       ACTIVITY  As tolerated.      CONSULTATIONS  GI    PROCEDURES  EGD Dr. Paz 2/4/24:  PREOPERATIVE DIAGNOSES/INDICATIONS: Gi bleeding.      POSTOPERATIVE DIAGNOSIS:   GAVE bleeding, s/p banding x 1.   Some PHG oozing, not significant.   No overt gastric varices.   Esophageal varices x 3, moderate sized, no active bleeding, s/p banding x 4.   Grossly normal other part of upper GI.   Old blood in stomach, cover around 45% of area.        LABORATORY  Lab Results   Component Value Date    SODIUM 140 02/06/2024    POTASSIUM 4.0 02/06/2024    CHLORIDE 109 02/06/2024    CO2 22 02/06/2024    GLUCOSE 112 (H) 02/06/2024    BUN 11 02/06/2024    CREATININE 0.60 02/06/2024        Lab Results   Component Value Date    WBC 9.0 02/06/2024    HEMOGLOBIN 9.5 (L) 02/06/2024    HEMATOCRIT 27.5 (L) 02/06/2024    PLATELETCT 116 (L) 02/06/2024    Echo:  CONCLUSIONS  No prior study is available for comparison.   Normal left ventricular systolic function.  The left ventricular ejection fraction is visually estimated to be 55%.  Mild tricuspid regurgitation.  Estimated right ventricular systolic pressure is 35 mmHg.    EC-ECHOCARDIOGRAM COMPLETE W/O CONT   Final Result      DX-CHEST-PORTABLE (1 VIEW)   Final Result      No acute cardiopulmonary disease.            Total time of the discharge process exceeds 34 minutes.

## 2024-12-31 ENCOUNTER — HOSPITAL ENCOUNTER (INPATIENT)
Facility: MEDICAL CENTER | Age: 54
End: 2024-12-31
Attending: EMERGENCY MEDICINE | Admitting: INTERNAL MEDICINE
Payer: COMMERCIAL

## 2024-12-31 ENCOUNTER — APPOINTMENT (OUTPATIENT)
Dept: RADIOLOGY | Facility: MEDICAL CENTER | Age: 54
DRG: 432 | End: 2024-12-31
Attending: EMERGENCY MEDICINE
Payer: COMMERCIAL

## 2024-12-31 VITALS
BODY MASS INDEX: 26.48 KG/M2 | HEART RATE: 92 BPM | RESPIRATION RATE: 18 BRPM | OXYGEN SATURATION: 93 % | WEIGHT: 158.95 LBS | HEIGHT: 65 IN | TEMPERATURE: 97.3 F | DIASTOLIC BLOOD PRESSURE: 73 MMHG | SYSTOLIC BLOOD PRESSURE: 100 MMHG

## 2024-12-31 DIAGNOSIS — R09.02 HYPOXIA: ICD-10-CM

## 2024-12-31 DIAGNOSIS — K92.2 UPPER GI BLEED: ICD-10-CM

## 2024-12-31 DIAGNOSIS — J90 PLEURAL EFFUSION: ICD-10-CM

## 2024-12-31 DIAGNOSIS — E87.6 HYPOKALEMIA: ICD-10-CM

## 2024-12-31 DIAGNOSIS — E87.1 HYPONATREMIA: ICD-10-CM

## 2024-12-31 DIAGNOSIS — K70.31 ALCOHOLIC CIRRHOSIS OF LIVER WITH ASCITES (HCC): ICD-10-CM

## 2024-12-31 PROBLEM — D69.6 THROMBOCYTOPENIA (HCC): Status: ACTIVE | Noted: 2024-12-31

## 2024-12-31 PROBLEM — J96.90 RESPIRATORY FAILURE (HCC): Status: ACTIVE | Noted: 2024-12-31

## 2024-12-31 PROBLEM — E87.20 LACTIC ACID ACIDOSIS: Status: ACTIVE | Noted: 2024-12-31

## 2024-12-31 PROBLEM — D68.9 COAGULOPATHY (HCC): Status: ACTIVE | Noted: 2024-12-31

## 2024-12-31 LAB
ALBUMIN SERPL BCP-MCNC: 3.2 G/DL (ref 3.2–4.9)
ALBUMIN/GLOB SERPL: 0.8 G/DL
ALP SERPL-CCNC: 142 U/L (ref 30–99)
ALT SERPL-CCNC: 69 U/L (ref 2–50)
ANION GAP SERPL CALC-SCNC: 15 MMOL/L (ref 7–16)
ANION GAP SERPL CALC-SCNC: 17 MMOL/L (ref 7–16)
ANISOCYTOSIS BLD QL SMEAR: ABNORMAL
APPEARANCE UR: CLEAR
APTT PPP: 40.1 SEC (ref 24.7–36)
AST SERPL-CCNC: 147 U/L (ref 12–45)
BACTERIA #/AREA URNS HPF: ABNORMAL /HPF
BACTERIA BLD CULT: NORMAL
BACTERIA BLD CULT: NORMAL
BASOPHILS # BLD AUTO: 0.2 % (ref 0–1.8)
BASOPHILS # BLD: 0.02 K/UL (ref 0–0.12)
BILIRUB SERPL-MCNC: 24.7 MG/DL (ref 0.1–1.5)
BILIRUB UR QL STRIP.AUTO: ABNORMAL
BUN SERPL-MCNC: 14 MG/DL (ref 8–22)
BUN SERPL-MCNC: 14 MG/DL (ref 8–22)
CALCIUM ALBUM COR SERPL-MCNC: 9 MG/DL (ref 8.5–10.5)
CALCIUM SERPL-MCNC: 8 MG/DL (ref 8.5–10.5)
CALCIUM SERPL-MCNC: 8.4 MG/DL (ref 8.5–10.5)
CASTS URNS QL MICRO: ABNORMAL /LPF (ref 0–2)
CHLORIDE SERPL-SCNC: 86 MMOL/L (ref 96–112)
CHLORIDE SERPL-SCNC: 88 MMOL/L (ref 96–112)
CO2 SERPL-SCNC: 24 MMOL/L (ref 20–33)
CO2 SERPL-SCNC: 25 MMOL/L (ref 20–33)
COLOR UR: ABNORMAL
COMMENT 1642: NORMAL
CREAT SERPL-MCNC: 0.28 MG/DL (ref 0.5–1.4)
CREAT SERPL-MCNC: <0.17 MG/DL (ref 0.5–1.4)
EKG IMPRESSION: NORMAL
EOSINOPHIL # BLD AUTO: 0.04 K/UL (ref 0–0.51)
EOSINOPHIL NFR BLD: 0.4 % (ref 0–6.9)
EPITHELIAL CELLS 1715: ABNORMAL /HPF (ref 0–5)
EPITHELIAL CELLS RENAL  1715R: PRESENT /HPF
ERYTHROCYTE [DISTWIDTH] IN BLOOD BY AUTOMATED COUNT: 82.2 FL (ref 35.9–50)
FLUAV RNA SPEC QL NAA+PROBE: NEGATIVE
FLUBV RNA SPEC QL NAA+PROBE: NEGATIVE
GFR SERPLBLD CREATININE-BSD FMLA CKD-EPI: 127 ML/MIN/1.73 M 2
GFR SERPLBLD CREATININE-BSD FMLA CKD-EPI: 143 ML/MIN/1.73 M 2
GLOBULIN SER CALC-MCNC: 4.2 G/DL (ref 1.9–3.5)
GLUCOSE SERPL-MCNC: 116 MG/DL (ref 65–99)
GLUCOSE SERPL-MCNC: 123 MG/DL (ref 65–99)
GLUCOSE UR STRIP.AUTO-MCNC: NEGATIVE MG/DL
HCT VFR BLD AUTO: 38.5 % (ref 37–47)
HGB BLD-MCNC: 13 G/DL (ref 12–16)
IMM GRANULOCYTES # BLD AUTO: 0.05 K/UL (ref 0–0.11)
IMM GRANULOCYTES NFR BLD AUTO: 0.6 % (ref 0–0.9)
INR PPP: 2.29 (ref 0.87–1.13)
KETONES UR STRIP.AUTO-MCNC: NEGATIVE MG/DL
LACTATE SERPL-SCNC: 2 MMOL/L (ref 0.5–2)
LACTATE SERPL-SCNC: 2.5 MMOL/L (ref 0.5–2)
LACTATE SERPL-SCNC: 2.7 MMOL/L (ref 0.5–2)
LEUKOCYTE ESTERASE UR QL STRIP.AUTO: ABNORMAL
LIPASE SERPL-CCNC: 39 U/L (ref 11–82)
LYMPHOCYTES # BLD AUTO: 1.09 K/UL (ref 1–4.8)
LYMPHOCYTES NFR BLD: 12.2 % (ref 22–41)
MACROCYTES BLD QL SMEAR: ABNORMAL
MAGNESIUM SERPL-MCNC: 1.7 MG/DL (ref 1.5–2.5)
MCH RBC QN AUTO: 32.7 PG (ref 27–33)
MCHC RBC AUTO-ENTMCNC: 33.8 G/DL (ref 32.2–35.5)
MCV RBC AUTO: 96.7 FL (ref 81.4–97.8)
MICRO URNS: ABNORMAL
MONOCYTES # BLD AUTO: 1.09 K/UL (ref 0–0.85)
MONOCYTES NFR BLD AUTO: 12.2 % (ref 0–13.4)
MORPHOLOGY BLD-IMP: NORMAL
NEUTROPHILS # BLD AUTO: 6.67 K/UL (ref 1.82–7.42)
NEUTROPHILS NFR BLD: 74.4 % (ref 44–72)
NITRITE UR QL STRIP.AUTO: POSITIVE
NRBC # BLD AUTO: 0 K/UL
NRBC BLD-RTO: 0 /100 WBC (ref 0–0.2)
NT-PROBNP SERPL IA-MCNC: 149 PG/ML (ref 0–125)
PH UR STRIP.AUTO: 6 [PH] (ref 5–8)
PHOSPHATE SERPL-MCNC: 3.2 MG/DL (ref 2.5–4.5)
PLATELET # BLD AUTO: 132 K/UL (ref 164–446)
PLATELET BLD QL SMEAR: NORMAL
PMV BLD AUTO: 10.6 FL (ref 9–12.9)
POIKILOCYTOSIS BLD QL SMEAR: NORMAL
POLYCHROMASIA BLD QL SMEAR: NORMAL
POTASSIUM SERPL-SCNC: 2.6 MMOL/L (ref 3.6–5.5)
POTASSIUM SERPL-SCNC: 3.2 MMOL/L (ref 3.6–5.5)
PROCALCITONIN SERPL-MCNC: 0.25 NG/ML
PROT SERPL-MCNC: 7.4 G/DL (ref 6–8.2)
PROT UR QL STRIP: NEGATIVE MG/DL
PROTHROMBIN TIME: 25.3 SEC (ref 12–14.6)
RBC # BLD AUTO: 3.98 M/UL (ref 4.2–5.4)
RBC # URNS HPF: ABNORMAL /HPF (ref 0–2)
RBC BLD AUTO: PRESENT
RBC UR QL AUTO: NEGATIVE
RSV RNA SPEC QL NAA+PROBE: NEGATIVE
SARS-COV-2 RNA RESP QL NAA+PROBE: NOTDETECTED
SIGNIFICANT IND 70042: NORMAL
SIGNIFICANT IND 70042: NORMAL
SITE SITE: NORMAL
SITE SITE: NORMAL
SODIUM SERPL-SCNC: 127 MMOL/L (ref 135–145)
SODIUM SERPL-SCNC: 128 MMOL/L (ref 135–145)
SOURCE SOURCE: NORMAL
SOURCE SOURCE: NORMAL
SP GR UR STRIP.AUTO: >1.045
TARGETS BLD QL SMEAR: NORMAL
TROPONIN T SERPL-MCNC: 11 NG/L (ref 6–19)
UROBILINOGEN UR STRIP.AUTO-MCNC: 1 EU/DL
WBC # BLD AUTO: 9 K/UL (ref 4.8–10.8)
WBC #/AREA URNS HPF: ABNORMAL /HPF

## 2024-12-31 PROCEDURE — 700111 HCHG RX REV CODE 636 W/ 250 OVERRIDE (IP): Performed by: EMERGENCY MEDICINE

## 2024-12-31 PROCEDURE — 84100 ASSAY OF PHOSPHORUS: CPT

## 2024-12-31 PROCEDURE — 700117 HCHG RX CONTRAST REV CODE 255: Performed by: EMERGENCY MEDICINE

## 2024-12-31 PROCEDURE — 93005 ELECTROCARDIOGRAM TRACING: CPT | Mod: TC | Performed by: EMERGENCY MEDICINE

## 2024-12-31 PROCEDURE — 96366 THER/PROPH/DIAG IV INF ADDON: CPT

## 2024-12-31 PROCEDURE — 80048 BASIC METABOLIC PNL TOTAL CA: CPT

## 2024-12-31 PROCEDURE — 99223 1ST HOSP IP/OBS HIGH 75: CPT | Performed by: INTERNAL MEDICINE

## 2024-12-31 PROCEDURE — A9270 NON-COVERED ITEM OR SERVICE: HCPCS | Performed by: EMERGENCY MEDICINE

## 2024-12-31 PROCEDURE — 700102 HCHG RX REV CODE 250 W/ 637 OVERRIDE(OP): Mod: JZ | Performed by: INTERNAL MEDICINE

## 2024-12-31 PROCEDURE — 83690 ASSAY OF LIPASE: CPT

## 2024-12-31 PROCEDURE — 83880 ASSAY OF NATRIURETIC PEPTIDE: CPT

## 2024-12-31 PROCEDURE — 83605 ASSAY OF LACTIC ACID: CPT

## 2024-12-31 PROCEDURE — 96365 THER/PROPH/DIAG IV INF INIT: CPT

## 2024-12-31 PROCEDURE — 87205 SMEAR GRAM STAIN: CPT

## 2024-12-31 PROCEDURE — 81001 URINALYSIS AUTO W/SCOPE: CPT

## 2024-12-31 PROCEDURE — 87040 BLOOD CULTURE FOR BACTERIA: CPT

## 2024-12-31 PROCEDURE — 80053 COMPREHEN METABOLIC PANEL: CPT

## 2024-12-31 PROCEDURE — 85610 PROTHROMBIN TIME: CPT

## 2024-12-31 PROCEDURE — 36415 COLL VENOUS BLD VENIPUNCTURE: CPT

## 2024-12-31 PROCEDURE — 87086 URINE CULTURE/COLONY COUNT: CPT

## 2024-12-31 PROCEDURE — 71275 CT ANGIOGRAPHY CHEST: CPT

## 2024-12-31 PROCEDURE — 93005 ELECTROCARDIOGRAM TRACING: CPT | Mod: TC

## 2024-12-31 PROCEDURE — 84145 PROCALCITONIN (PCT): CPT

## 2024-12-31 PROCEDURE — 71045 X-RAY EXAM CHEST 1 VIEW: CPT

## 2024-12-31 PROCEDURE — 85730 THROMBOPLASTIN TIME PARTIAL: CPT

## 2024-12-31 PROCEDURE — 99285 EMERGENCY DEPT VISIT HI MDM: CPT

## 2024-12-31 PROCEDURE — 770020 HCHG ROOM/CARE - TELE (206)

## 2024-12-31 PROCEDURE — 84484 ASSAY OF TROPONIN QUANT: CPT

## 2024-12-31 PROCEDURE — A9270 NON-COVERED ITEM OR SERVICE: HCPCS | Mod: JZ | Performed by: INTERNAL MEDICINE

## 2024-12-31 PROCEDURE — 0241U HCHG SARS-COV-2 COVID-19 NFCT DS RESP RNA 4 TRGT ED POC: CPT

## 2024-12-31 PROCEDURE — 85025 COMPLETE CBC W/AUTO DIFF WBC: CPT

## 2024-12-31 PROCEDURE — 83735 ASSAY OF MAGNESIUM: CPT

## 2024-12-31 PROCEDURE — 700102 HCHG RX REV CODE 250 W/ 637 OVERRIDE(OP): Performed by: EMERGENCY MEDICINE

## 2024-12-31 RX ORDER — POTASSIUM CHLORIDE 7.45 MG/ML
10 INJECTION INTRAVENOUS
Status: DISCONTINUED | OUTPATIENT
Start: 2024-12-31 | End: 2024-12-31

## 2024-12-31 RX ORDER — OXYCODONE HYDROCHLORIDE 5 MG/1
2.5 TABLET ORAL
Status: DISCONTINUED | OUTPATIENT
Start: 2024-12-31 | End: 2025-01-03 | Stop reason: HOSPADM

## 2024-12-31 RX ORDER — ACETAMINOPHEN 325 MG/1
650 TABLET ORAL EVERY 6 HOURS PRN
Status: DISCONTINUED | OUTPATIENT
Start: 2024-12-31 | End: 2025-01-03 | Stop reason: HOSPADM

## 2024-12-31 RX ORDER — SPIRONOLACTONE 25 MG/1
25 TABLET ORAL DAILY
Status: DISCONTINUED | OUTPATIENT
Start: 2025-01-01 | End: 2025-01-03 | Stop reason: HOSPADM

## 2024-12-31 RX ORDER — LABETALOL HYDROCHLORIDE 5 MG/ML
10 INJECTION, SOLUTION INTRAVENOUS EVERY 4 HOURS PRN
Status: DISCONTINUED | OUTPATIENT
Start: 2024-12-31 | End: 2025-01-03 | Stop reason: HOSPADM

## 2024-12-31 RX ORDER — PROMETHAZINE HYDROCHLORIDE 25 MG/1
12.5-25 TABLET ORAL EVERY 4 HOURS PRN
Status: DISCONTINUED | OUTPATIENT
Start: 2024-12-31 | End: 2024-12-31

## 2024-12-31 RX ORDER — PROMETHAZINE HYDROCHLORIDE 25 MG/1
12.5-25 SUPPOSITORY RECTAL EVERY 4 HOURS PRN
Status: DISCONTINUED | OUTPATIENT
Start: 2024-12-31 | End: 2024-12-31

## 2024-12-31 RX ORDER — ONDANSETRON 2 MG/ML
4 INJECTION INTRAMUSCULAR; INTRAVENOUS EVERY 4 HOURS PRN
Status: DISCONTINUED | OUTPATIENT
Start: 2024-12-31 | End: 2024-12-31

## 2024-12-31 RX ORDER — PROCHLORPERAZINE EDISYLATE 5 MG/ML
5-10 INJECTION INTRAMUSCULAR; INTRAVENOUS EVERY 4 HOURS PRN
Status: DISCONTINUED | OUTPATIENT
Start: 2024-12-31 | End: 2024-12-31

## 2024-12-31 RX ORDER — GAUZE BANDAGE 2" X 2"
100 BANDAGE TOPICAL DAILY
Status: DISCONTINUED | OUTPATIENT
Start: 2025-01-01 | End: 2025-01-01

## 2024-12-31 RX ORDER — ONDANSETRON 4 MG/1
4 TABLET, ORALLY DISINTEGRATING ORAL EVERY 4 HOURS PRN
Status: DISCONTINUED | OUTPATIENT
Start: 2024-12-31 | End: 2024-12-31

## 2024-12-31 RX ORDER — OXYCODONE HYDROCHLORIDE 5 MG/1
5 TABLET ORAL
Status: DISCONTINUED | OUTPATIENT
Start: 2024-12-31 | End: 2025-01-03 | Stop reason: HOSPADM

## 2024-12-31 RX ORDER — AMOXICILLIN 250 MG
2 CAPSULE ORAL EVERY EVENING
Status: DISCONTINUED | OUTPATIENT
Start: 2024-12-31 | End: 2025-01-03 | Stop reason: HOSPADM

## 2024-12-31 RX ORDER — GUAIFENESIN/DEXTROMETHORPHAN 100-10MG/5
10 SYRUP ORAL EVERY 6 HOURS PRN
Status: DISCONTINUED | OUTPATIENT
Start: 2024-12-31 | End: 2025-01-03 | Stop reason: HOSPADM

## 2024-12-31 RX ORDER — PHYTONADIONE 5 MG/1
10 TABLET ORAL ONCE
Status: COMPLETED | OUTPATIENT
Start: 2024-12-31 | End: 2024-12-31

## 2024-12-31 RX ORDER — MORPHINE SULFATE 4 MG/ML
2 INJECTION INTRAVENOUS
Status: DISCONTINUED | OUTPATIENT
Start: 2024-12-31 | End: 2025-01-03 | Stop reason: HOSPADM

## 2024-12-31 RX ORDER — POTASSIUM CHLORIDE 1500 MG/1
40 TABLET, EXTENDED RELEASE ORAL
Status: DISPENSED | OUTPATIENT
Start: 2024-12-31 | End: 2024-12-31

## 2024-12-31 RX ORDER — CARVEDILOL 3.12 MG/1
3.12 TABLET ORAL 2 TIMES DAILY WITH MEALS
Status: DISCONTINUED | OUTPATIENT
Start: 2024-12-31 | End: 2025-01-03 | Stop reason: HOSPADM

## 2024-12-31 RX ORDER — POLYETHYLENE GLYCOL 3350 17 G/17G
1 POWDER, FOR SOLUTION ORAL
Status: DISCONTINUED | OUTPATIENT
Start: 2024-12-31 | End: 2025-01-03 | Stop reason: HOSPADM

## 2024-12-31 RX ORDER — POTASSIUM CHLORIDE 1500 MG/1
40 TABLET, EXTENDED RELEASE ORAL ONCE
Status: COMPLETED | OUTPATIENT
Start: 2024-12-31 | End: 2024-12-31

## 2024-12-31 RX ADMIN — POTASSIUM CHLORIDE 40 MEQ: 1500 TABLET, EXTENDED RELEASE ORAL at 16:58

## 2024-12-31 RX ADMIN — CARVEDILOL 3.12 MG: 3.12 TABLET, FILM COATED ORAL at 19:54

## 2024-12-31 RX ADMIN — POTASSIUM CHLORIDE 10 MEQ: 7.46 INJECTION, SOLUTION INTRAVENOUS at 18:11

## 2024-12-31 RX ADMIN — POTASSIUM CHLORIDE 40 MEQ: 1500 TABLET, EXTENDED RELEASE ORAL at 18:29

## 2024-12-31 RX ADMIN — PHYTONADIONE 10 MG: 5 TABLET ORAL at 18:29

## 2024-12-31 RX ADMIN — IOHEXOL 68 ML: 350 INJECTION, SOLUTION INTRAVENOUS at 17:15

## 2024-12-31 RX ADMIN — POTASSIUM CHLORIDE 40 MEQ: 1500 TABLET, EXTENDED RELEASE ORAL at 20:19

## 2024-12-31 RX ADMIN — POTASSIUM CHLORIDE 10 MEQ: 7.46 INJECTION, SOLUTION INTRAVENOUS at 17:00

## 2024-12-31 SDOH — ECONOMIC STABILITY: TRANSPORTATION INSECURITY
IN THE PAST 12 MONTHS, HAS LACK OF RELIABLE TRANSPORTATION KEPT YOU FROM MEDICAL APPOINTMENTS, MEETINGS, WORK OR FROM GETTING THINGS NEEDED FOR DAILY LIVING?: NO

## 2024-12-31 SDOH — ECONOMIC STABILITY: TRANSPORTATION INSECURITY
IN THE PAST 12 MONTHS, HAS THE LACK OF TRANSPORTATION KEPT YOU FROM MEDICAL APPOINTMENTS OR FROM GETTING MEDICATIONS?: NO

## 2024-12-31 ASSESSMENT — ENCOUNTER SYMPTOMS
PALPITATIONS: 0
HEMOPTYSIS: 0
BRUISES/BLEEDS EASILY: 0
PHOTOPHOBIA: 0
DOUBLE VISION: 0
ORTHOPNEA: 0
CHILLS: 0
VOMITING: 0
NERVOUS/ANXIOUS: 0
SPEECH CHANGE: 0
SPUTUM PRODUCTION: 1
COUGH: 1
HEARTBURN: 0
SHORTNESS OF BREATH: 1
HALLUCINATIONS: 0
HEADACHES: 0
TREMORS: 0
FLANK PAIN: 0
BACK PAIN: 0
BLURRED VISION: 0
WEIGHT LOSS: 0
NAUSEA: 0
FOCAL WEAKNESS: 0
POLYDIPSIA: 0
NECK PAIN: 0
FEVER: 0

## 2024-12-31 ASSESSMENT — COGNITIVE AND FUNCTIONAL STATUS - GENERAL
SUGGESTED CMS G CODE MODIFIER MOBILITY: CH
MOBILITY SCORE: 24
DAILY ACTIVITIY SCORE: 24
SUGGESTED CMS G CODE MODIFIER DAILY ACTIVITY: CH

## 2024-12-31 ASSESSMENT — SOCIAL DETERMINANTS OF HEALTH (SDOH)
WITHIN THE LAST YEAR, HAVE TO BEEN RAPED OR FORCED TO HAVE ANY KIND OF SEXUAL ACTIVITY BY YOUR PARTNER OR EX-PARTNER?: NO
WITHIN THE PAST 12 MONTHS, YOU WORRIED THAT YOUR FOOD WOULD RUN OUT BEFORE YOU GOT THE MONEY TO BUY MORE: NEVER TRUE
WITHIN THE PAST 12 MONTHS, THE FOOD YOU BOUGHT JUST DIDN'T LAST AND YOU DIDN'T HAVE MONEY TO GET MORE: NEVER TRUE
WITHIN THE LAST YEAR, HAVE YOU BEEN AFRAID OF YOUR PARTNER OR EX-PARTNER?: NO
WITHIN THE LAST YEAR, HAVE YOU BEEN HUMILIATED OR EMOTIONALLY ABUSED IN OTHER WAYS BY YOUR PARTNER OR EX-PARTNER?: NO
WITHIN THE LAST YEAR, HAVE YOU BEEN KICKED, HIT, SLAPPED, OR OTHERWISE PHYSICALLY HURT BY YOUR PARTNER OR EX-PARTNER?: NO
IN THE PAST 12 MONTHS, HAS THE ELECTRIC, GAS, OIL, OR WATER COMPANY THREATENED TO SHUT OFF SERVICE IN YOUR HOME?: NO

## 2024-12-31 ASSESSMENT — LIFESTYLE VARIABLES
AVERAGE NUMBER OF DAYS PER WEEK YOU HAVE A DRINK CONTAINING ALCOHOL: 0
SUBSTANCE_ABUSE: 0
ON A TYPICAL DAY WHEN YOU DRINK ALCOHOL HOW MANY DRINKS DO YOU HAVE: 0
HAVE YOU EVER FELT YOU SHOULD CUT DOWN ON YOUR DRINKING: NO
EVER FELT BAD OR GUILTY ABOUT YOUR DRINKING: NO
CONSUMPTION TOTAL: NEGATIVE
TOTAL SCORE: 1
HOW MANY TIMES IN THE PAST YEAR HAVE YOU HAD 5 OR MORE DRINKS IN A DAY: 0
EVER HAD A DRINK FIRST THING IN THE MORNING TO STEADY YOUR NERVES TO GET RID OF A HANGOVER: YES
TOTAL SCORE: 1
DOES PATIENT WANT TO STOP DRINKING: NO
ALCOHOL_USE: NO
TOTAL SCORE: 1
HAVE PEOPLE ANNOYED YOU BY CRITICIZING YOUR DRINKING: NO

## 2024-12-31 ASSESSMENT — FIBROSIS 4 INDEX
FIB4 SCORE: 2.07
FIB4 SCORE: 7.37
FIB4 SCORE: 7.37

## 2024-12-31 ASSESSMENT — PATIENT HEALTH QUESTIONNAIRE - PHQ9
1. LITTLE INTEREST OR PLEASURE IN DOING THINGS: NOT AT ALL
2. FEELING DOWN, DEPRESSED, IRRITABLE, OR HOPELESS: NOT AT ALL
SUM OF ALL RESPONSES TO PHQ9 QUESTIONS 1 AND 2: 0

## 2024-12-31 ASSESSMENT — PAIN DESCRIPTION - PAIN TYPE: TYPE: ACUTE PAIN

## 2024-12-31 NOTE — ED TRIAGE NOTES
Pt to triage via w/c c/o non productive cough and sob x 1 week and jaundice x 2 days. Pt states no fever. Pt states has hx of liver cirrhosis. Pt visibly sob after standing to check weight.

## 2024-12-31 NOTE — ED PROVIDER NOTES
ER Provider Note    Scribed for Oz Easley M.d. by Billy Cervantes. 12/31/2024  3:55 PM    Primary Care Provider: Pcp Pt States None    CHIEF COMPLAINT   Chief Complaint   Patient presents with    Shortness of Breath     EXTERNAL RECORDS REVIEWED  Outpatient Notes seen at [facility] care September 29, 2020 for for dental infection    HPI/ROS  LIMITATION TO HISTORY   Select: : None  OUTSIDE HISTORIAN(S):  None    Stacy Oliva is a 54 y.o. female who presents to the ED complaining of shortness of breath onset 1 week ago. Patient explains that she started to develop flu like symptoms one week ago which she notes has since persisted and developed into shortness of breath, which prompted visitation to the ED today. States associated non productive cough with hot flashes. Denies any fevers or chest pain. She reports medical history of cirrhosis secondary to alcohol use and ascites. She adds that she is currently medicate with Spirolactone, but the patient adds that she has been noncompliant with this medication for the past week due to her illness. Denies using at home oxygen. Patient denies a medical history of CHF, asthma, or COPD. She denies a history of smoking. Patient notes a history of alcoholism and she adds that she recently stopped drinking.     PAST MEDICAL HISTORY  Past Medical History:   Diagnosis Date    Liver disease     Patient denies medical problems      SURGICAL HISTORY  Past Surgical History:   Procedure Laterality Date    AL UPPER GI ENDOSCOPY,DIAGNOSIS N/A 2/4/2024    Procedure: GASTROSCOPY;  Surgeon: Javi Paz M.D.;  Location: SURGERY [facility];  Service: Gastroenterology    AL UPPER GI ENDOSCOPY,LIGAT VARIX  2/4/2024    Procedure: GASTROSCOPY, WITH BANDING;  Surgeon: Javi Paz M.D.;  Location: SURGERY [facility];  Service: Gastroenterology     FAMILY HISTORY  History reviewed. No pertinent family history.    SOCIAL HISTORY   reports that she has never smoked. She  has never used smokeless tobacco. She reports that she does not currently use alcohol. She reports current drug use.    CURRENT MEDICATIONS  Previous Medications    CARVEDILOL (COREG) 6.25 MG TAB    Take 0.5 Tablets by mouth 2 times a day with meals.    FUROSEMIDE (LASIX) 20 MG TAB    Take 1 Tablet by mouth every day.    OMEGA-3 FATTY ACIDS (FISH OIL PO)    Take  by mouth every day.    OMEPRAZOLE (PRILOSEC) 20 MG DELAYED-RELEASE CAPSULE    Take 1 Capsule by mouth every day.    SPIRONOLACTONE (ALDACTONE) 25 MG TAB    Take 1 Tablet by mouth every day.    TURMERIC PO    Take 1 Dose by mouth every day.     ALLERGIES  Patient has no known allergies.    PHYSICAL EXAM  /66   Pulse (!) 105   Temp 36.6 °C (97.9 °F) (Temporal)   Resp 16   Wt 72 kg (158 lb 11.7 oz)   SpO2 93%   BMI 26.41 kg/m²     Constitutional: Well developed, Well nourished, Mild distress.   HENT: Normocephalic, Atraumatic  Eyes: Scleral icterus, No discharge.   Cardiovascular: Normal heart rate, Normal rhythm, No murmurs, equal pulses.   Pulmonary: diminished breath sounds on the right with positive egophony, mild respiratory distress, No wheezing, No rales, No rhonchi.  Chest: No chest wall tenderness or deformity.   Abdomen:Soft, No tenderness, No masses, no rebound, no guarding. No significant ascites.  Musculoskeletal: No major deformities noted, No tenderness.   Skin: Warm, Dry, No erythema, No rash, Jaundice.  Neurologic: Alert & oriented x 3, Normal motor function,  No focal deficits noted.   Psychiatric: Affect normal, Judgment normal, Mood normal.     DIAGNOSTIC STUDIES    EKG/LABS  Results for orders placed or performed during the hospital encounter of 12/31/24   EKG    Collection Time: 12/31/24  3:02 PM   Result Value Ref Range    Report       [facility] Emergency Dept.    Test Date:  2024-12-31  Pt Name:    CARMEN CUADRA                 Department: ER  MRN:        [id number]                      Room:  Gender:      Female                       Technician: [id number]  :        [id number]                   Requested By:ER TRIAGE PROTOCOL  Order #:    [id number]                    Reading MD:    Measurements  Intervals                                Axis  Rate:       100                          P:          95  KY:         129                          QRS:        98  QRSD:       87                           T:          -85  QT:         392  QTc:        506    Interpretive Statements  Sinus tachycardia  Consider left atrial enlargement  Borderline right axis deviation  Repol abnrm suggests ischemia, diffuse leads  Compared to ECG 2024 16:32:20  Possible ischemia now present  Sinus rhythm no longer present     CBC with Differential    Collection Time: 24  3:15 PM   Result Value Ref Range    WBC 9.0 4.8 - 10.8 K/uL    RBC 3.98 (L) 4.20 - 5.40 M/uL    Hemoglobin 13.0 12.0 - 16.0 g/dL    Hematocrit 38.5 37.0 - 47.0 %    MCV 96.7 81.4 - 97.8 fL    MCH 32.7 27.0 - 33.0 pg    MCHC 33.8 32.2 - 35.5 g/dL    RDW 82.2 (H) 35.9 - 50.0 fL    Platelet Count 132 (L) 164 - 446 K/uL    MPV 10.6 9.0 - 12.9 fL    Neutrophils-Polys 74.40 (H) 44.00 - 72.00 %    Lymphocytes 12.20 (L) 22.00 - 41.00 %    Monocytes 12.20 0.00 - 13.40 %    Eosinophils 0.40 0.00 - 6.90 %    Basophils 0.20 0.00 - 1.80 %    Immature Granulocytes 0.60 0.00 - 0.90 %    Nucleated RBC 0.00 0.00 - 0.20 /100 WBC    Neutrophils (Absolute) 6.67 1.82 - 7.42 K/uL    Lymphs (Absolute) 1.09 1.00 - 4.80 K/uL    Monos (Absolute) 1.09 (H) 0.00 - 0.85 K/uL    Eos (Absolute) 0.04 0.00 - 0.51 K/uL    Baso (Absolute) 0.02 0.00 - 0.12 K/uL    Immature Granulocytes (abs) 0.05 0.00 - 0.11 K/uL    NRBC (Absolute) 0.00 K/uL    Anisocytosis 3+ (A)     Macrocytosis 3+ (A)    Comp Metabolic Panel    Collection Time: 24  3:15 PM   Result Value Ref Range    Sodium 127 (L) 135 - 145 mmol/L    Potassium 2.6 (LL) 3.6 - 5.5 mmol/L    Chloride 86 (L) 96 - 112 mmol/L    Co2 24 20 - 33 mmol/L     Anion Gap 17.0 (H) 7.0 - 16.0    Glucose 116 (H) 65 - 99 mg/dL    Bun 14 8 - 22 mg/dL    Creatinine 0.28 (L) 0.50 - 1.40 mg/dL    Calcium 8.4 (L) 8.5 - 10.5 mg/dL    Correct Calcium 9.0 8.5 - 10.5 mg/dL    AST(SGOT) 147 (H) 12 - 45 U/L    ALT(SGPT) 69 (H) 2 - 50 U/L    Alkaline Phosphatase 142 (H) 30 - 99 U/L    Total Bilirubin 24.7 (H) 0.1 - 1.5 mg/dL    Albumin 3.2 3.2 - 4.9 g/dL    Total Protein 7.4 6.0 - 8.2 g/dL    Globulin 4.2 (H) 1.9 - 3.5 g/dL    A-G Ratio 0.8 g/dL   proBrain Natriuretic Peptide, NT    Collection Time: 12/31/24  3:15 PM   Result Value Ref Range    NT-proBNP 149 (H) 0 - 125 pg/mL   Troponin    Collection Time: 12/31/24  3:15 PM   Result Value Ref Range    Troponin T 11 6 - 19 ng/L   PLATELET ESTIMATE    Collection Time: 12/31/24  3:15 PM   Result Value Ref Range    Plt Estimation Decreased    MORPHOLOGY    Collection Time: 12/31/24  3:15 PM   Result Value Ref Range    RBC Morphology Present     Polychromia 2+     Poikilocytosis 2+     Target Cells 2+    PERIPHERAL SMEAR REVIEW    Collection Time: 12/31/24  3:15 PM   Result Value Ref Range    Peripheral Smear Review see below    DIFFERENTIAL COMMENT    Collection Time: 12/31/24  3:15 PM   Result Value Ref Range    Comments-Diff see below    APTT    Collection Time: 12/31/24  3:15 PM   Result Value Ref Range    APTT 40.1 (H) 24.7 - 36.0 sec   PROTHROMBIN TIME (INR)    Collection Time: 12/31/24  3:15 PM   Result Value Ref Range    PT 25.3 (H) 12.0 - 14.6 sec    INR 2.29 (H) 0.87 - 1.13   ESTIMATED GFR    Collection Time: 12/31/24  3:15 PM   Result Value Ref Range    GFR (CKD-EPI) 127 >60 mL/min/1.73 m 2   Lactic Acid    Collection Time: 12/31/24  4:01 PM   Result Value Ref Range    Lactic Acid 2.7 (H) 0.5 - 2.0 mmol/L   POC CoV-2, FLU A/B, RSV by PCR    Collection Time: 12/31/24  4:15 PM   Result Value Ref Range    POC Influenza A RNA, PCR Negative Negative    POC Influenza B RNA, PCR Negative Negative    POC RSV, by PCR Negative Negative    POC  SARS-CoV-2, PCR NotDetected NotDetected   12 Lead EKG interpreted by me as shown above.    RADIOLOGY/PROCEDURES   The attending emergency physician has independently interpreted the diagnostic imaging associated with this visit and am waiting the final reading from the radiologist.   My preliminary interpretation is a follows: CT shows a large pleural effusion, x-ray shows complete whiteout of the right lung field  Radiologist interpretation:  CT-CTA CHEST PULMONARY ARTERY W/ RECONS   Final Result      1.  Very large right-sided pleural effusion which collapses the right long and causes the mediastinum to shift from right to left.      2.  Cirrhosis with borderline splenomegaly.      3.  Small wedge-shaped area of groundglass opacity in the left upper lobe anteriorly consistent with atelectasis, pneumonitis, interstitial edema.      DX-CHEST-PORTABLE (1 VIEW)   Final Result      1.  Complete opacification of the right hemithorax without mediastinal shift consistent with pneumonitis, mass , atelectasis, and/or pleural effusion.        COURSE & MEDICAL DECISION MAKING     ASSESSMENT, COURSE AND PLAN  Care Narrative:       3:11 PM - A DX-Chest, EKG X2, Troponin, PBNT, CMP, CBC w/Diff, Differential Comment, Peripheral Smear, Morphology, and Platelet Estimate was ordered in triage by nursing staff as per protocol.     3:55 PM - Patient seen and examined at bedside. Discussed plan of care, including lab and imaging to evaluate and possible hospitalization. Patient agrees to the plan of care. Patient will be medicated with Kcl 10 mEq via IV and Kdur 40 mEq PO. Ordered for CT-CTA Chest Pulmonary Artery w/Recons, Blood Culture X2, Urine Culture, UA, Lactic Acid, Prothrombin Time, APTT, CoV-2 Flu A/B and RSV PC to evaluate her symptoms. Differential diagnoses include but not limited to: Pneumonia versus Covid versus Flu versus pleural effusion.    5:41 PM - Patient was reevaluated at bedside. Discussed diagnostic study  results with the patient which are outlined above. Discussed a plan for hospitalization and a potential thoracentesis in the ED, but the patient informed me that she would rather receive one through [facility] hospitalist.     5:58 PM - I discussed the patient's case and the above findings with Dr. Villavicencio (Hospitalist) who agrees to evaluate the patient for hospitalization.    PROBLEM LIST  Patient presents emergency department with increasing shortness of breath at this point time this appears to be secondary to a large pleural effusion.  This may be secondary to her cirrhosis and the fact that she has been drinking again.  I do not see any signs of congestive heart failure.  She does not have any large amount of ascites in her abdomen which makes it somewhat confusing as to why this would be all in her right chest.  At this point time I think she would benefit from hospitalization with thoracentesis by interventional radiology and workup for the pleural effusion.    DISPOSITION AND DISCUSSIONS  I have discussed management of the patient with the following physicians and SALMA's:  Dr. Villavicencio (Hospitalist)    Discussion of management with other QHP or appropriate source(s): None     Barriers to care at this time, including but not limited to: Patient does not have established PCP.       DISPOSITION:  Patient will be hospitalized by Dr. Villavicencio (Hospitalist) in guarded condition.    FINAL DIAGNOSIS  1. Pleural effusion    2. Hypoxia    3. Hypokalemia    4. Alcoholic cirrhosis of liver with ascites (HCC)    5. Hyponatremia       Billy GOMEZ (Scribe), am scribing for, and in the presence of, SARAH French*.    Electronically signed by: Billy Cervantes (Scribe), 12/31/2024    Oz GOMEZ M.* personally performed the services described in this documentation, as scribed by Billy Cervantes in my presence, and it is both accurate and complete.     The note accurately reflects work and decisions made  by me.  Oz Easley M.D.  12/31/2024  7:09 PM

## 2025-01-01 ENCOUNTER — APPOINTMENT (OUTPATIENT)
Dept: RADIOLOGY | Facility: MEDICAL CENTER | Age: 55
DRG: 432 | End: 2025-01-01
Attending: INTERNAL MEDICINE
Payer: COMMERCIAL

## 2025-01-01 LAB
ALBUMIN SERPL BCP-MCNC: 2.5 G/DL (ref 3.2–4.9)
ALBUMIN/GLOB SERPL: 0.7 G/DL
ALP SERPL-CCNC: 113 U/L (ref 30–99)
ALT SERPL-CCNC: 60 U/L (ref 2–50)
AMYLASE FLD-CCNC: 24 U/L
ANION GAP SERPL CALC-SCNC: 9 MMOL/L (ref 7–16)
APPEARANCE FLD: CLEAR
AST SERPL-CCNC: 133 U/L (ref 12–45)
BASOPHILS # BLD AUTO: 0.4 % (ref 0–1.8)
BASOPHILS # BLD: 0.03 K/UL (ref 0–0.12)
BILIRUB SERPL-MCNC: 20 MG/DL (ref 0.1–1.5)
BODY FLD TYPE: NORMAL
BUN SERPL-MCNC: 15 MG/DL (ref 8–22)
CALCIUM ALBUM COR SERPL-MCNC: 9.5 MG/DL (ref 8.5–10.5)
CALCIUM SERPL-MCNC: 8.3 MG/DL (ref 8.5–10.5)
CELLS FLD: 7
CHLORIDE SERPL-SCNC: 95 MMOL/L (ref 96–112)
CO2 SERPL-SCNC: 22 MMOL/L (ref 20–33)
COLOR FLD: YELLOW
COMMENT 1642: NORMAL
CREAT SERPL-MCNC: 0.88 MG/DL (ref 0.5–1.4)
CYTOLOGY REG CYTOL: NORMAL
EOSINOPHIL # BLD AUTO: 0.14 K/UL (ref 0–0.51)
EOSINOPHIL NFR BLD: 1.9 % (ref 0–6.9)
ERYTHROCYTE [DISTWIDTH] IN BLOOD BY AUTOMATED COUNT: 84.7 FL (ref 35.9–50)
FUNGUS SPEC FUNGUS STN: NORMAL
GFR SERPLBLD CREATININE-BSD FMLA CKD-EPI: 77 ML/MIN/1.73 M 2
GLOBULIN SER CALC-MCNC: 3.8 G/DL (ref 1.9–3.5)
GLUCOSE FLD-MCNC: 116 MG/DL
GLUCOSE SERPL-MCNC: 127 MG/DL (ref 65–99)
GRAM STN SPEC: NORMAL
GRAM STN SPEC: NORMAL
HCT VFR BLD AUTO: 35.1 % (ref 37–47)
HGB BLD-MCNC: 11.7 G/DL (ref 12–16)
IMM GRANULOCYTES # BLD AUTO: 0.04 K/UL (ref 0–0.11)
IMM GRANULOCYTES NFR BLD AUTO: 0.5 % (ref 0–0.9)
INR PPP: 2.33 (ref 0.87–1.13)
LACTATE SERPL-SCNC: 2.1 MMOL/L (ref 0.5–2)
LDH FLD L TO P-CCNC: 65 U/L
LYMPHOCYTES # BLD AUTO: 1.07 K/UL (ref 1–4.8)
LYMPHOCYTES NFR BLD: 14.5 % (ref 22–41)
LYMPHOCYTES NFR FLD: 9 %
MACROCYTES BLD QL SMEAR: ABNORMAL
MCH RBC QN AUTO: 32.7 PG (ref 27–33)
MCHC RBC AUTO-ENTMCNC: 33.3 G/DL (ref 32.2–35.5)
MCV RBC AUTO: 98 FL (ref 81.4–97.8)
MONOCYTES # BLD AUTO: 0.93 K/UL (ref 0–0.85)
MONOCYTES NFR BLD AUTO: 12.6 % (ref 0–13.4)
MONOS+MACROS NFR FLD MANUAL: 81 %
MORPHOLOGY BLD-IMP: NORMAL
NEUTROPHILS # BLD AUTO: 5.18 K/UL (ref 1.82–7.42)
NEUTROPHILS NFR BLD: 70.1 % (ref 44–72)
NEUTROPHILS NFR FLD: 3 %
NRBC # BLD AUTO: 0 K/UL
NRBC BLD-RTO: 0 /100 WBC (ref 0–0.2)
NUC CELL # FLD: 126 CELLS/UL
PH FLD: 8 [PH]
PLATELET # BLD AUTO: 89 K/UL (ref 164–446)
PLATELET BLD QL SMEAR: NORMAL
PLATELETS.RETICULATED NFR BLD AUTO: 4.9 % (ref 0.6–13.1)
PMV BLD AUTO: 10.1 FL (ref 9–12.9)
POIKILOCYTOSIS BLD QL SMEAR: NORMAL
POTASSIUM SERPL-SCNC: 4.2 MMOL/L (ref 3.6–5.5)
PROT FLD-MCNC: 0.8 G/DL
PROT SERPL-MCNC: 6.3 G/DL (ref 6–8.2)
PROTHROMBIN TIME: 25.7 SEC (ref 12–14.6)
RBC # BLD AUTO: 3.58 M/UL (ref 4.2–5.4)
RBC # FLD: <2000 CELLS/UL
RBC BLD AUTO: PRESENT
SIGNIFICANT IND 70042: NORMAL
SITE SITE: NORMAL
SODIUM SERPL-SCNC: 126 MMOL/L (ref 135–145)
SOURCE SOURCE: NORMAL
TARGETS BLD QL SMEAR: NORMAL
WBC # BLD AUTO: 7.4 K/UL (ref 4.8–10.8)

## 2025-01-01 PROCEDURE — 90656 IIV3 VACC NO PRSV 0.5 ML IM: CPT | Performed by: INTERNAL MEDICINE

## 2025-01-01 PROCEDURE — 82945 GLUCOSE OTHER FLUID: CPT

## 2025-01-01 PROCEDURE — 76705 ECHO EXAM OF ABDOMEN: CPT

## 2025-01-01 PROCEDURE — 89051 BODY FLUID CELL COUNT: CPT

## 2025-01-01 PROCEDURE — 700111 HCHG RX REV CODE 636 W/ 250 OVERRIDE (IP): Performed by: STUDENT IN AN ORGANIZED HEALTH CARE EDUCATION/TRAINING PROGRAM

## 2025-01-01 PROCEDURE — 770020 HCHG ROOM/CARE - TELE (206)

## 2025-01-01 PROCEDURE — 85025 COMPLETE CBC W/AUTO DIFF WBC: CPT

## 2025-01-01 PROCEDURE — 71045 X-RAY EXAM CHEST 1 VIEW: CPT

## 2025-01-01 PROCEDURE — 700111 HCHG RX REV CODE 636 W/ 250 OVERRIDE (IP): Performed by: INTERNAL MEDICINE

## 2025-01-01 PROCEDURE — 83615 LACTATE (LD) (LDH) ENZYME: CPT

## 2025-01-01 PROCEDURE — 87015 SPECIMEN INFECT AGNT CONCNTJ: CPT

## 2025-01-01 PROCEDURE — 90471 IMMUNIZATION ADMIN: CPT

## 2025-01-01 PROCEDURE — 700102 HCHG RX REV CODE 250 W/ 637 OVERRIDE(OP): Performed by: INTERNAL MEDICINE

## 2025-01-01 PROCEDURE — 87102 FUNGUS ISOLATION CULTURE: CPT

## 2025-01-01 PROCEDURE — 85055 RETICULATED PLATELET ASSAY: CPT

## 2025-01-01 PROCEDURE — 80053 COMPREHEN METABOLIC PANEL: CPT

## 2025-01-01 PROCEDURE — 87070 CULTURE OTHR SPECIMN AEROBIC: CPT

## 2025-01-01 PROCEDURE — 87205 SMEAR GRAM STAIN: CPT | Mod: 91

## 2025-01-01 PROCEDURE — 88305 TISSUE EXAM BY PATHOLOGIST: CPT

## 2025-01-01 PROCEDURE — 84157 ASSAY OF PROTEIN OTHER: CPT

## 2025-01-01 PROCEDURE — 85610 PROTHROMBIN TIME: CPT

## 2025-01-01 PROCEDURE — A9270 NON-COVERED ITEM OR SERVICE: HCPCS | Performed by: INTERNAL MEDICINE

## 2025-01-01 PROCEDURE — C1729 CATH, DRAINAGE: HCPCS

## 2025-01-01 PROCEDURE — 700105 HCHG RX REV CODE 258: Performed by: STUDENT IN AN ORGANIZED HEALTH CARE EDUCATION/TRAINING PROGRAM

## 2025-01-01 PROCEDURE — 83605 ASSAY OF LACTIC ACID: CPT

## 2025-01-01 PROCEDURE — 82150 ASSAY OF AMYLASE: CPT

## 2025-01-01 PROCEDURE — 87206 SMEAR FLUORESCENT/ACID STAI: CPT

## 2025-01-01 PROCEDURE — 83986 ASSAY PH BODY FLUID NOS: CPT

## 2025-01-01 PROCEDURE — 3E01340 INTRODUCTION OF INFLUENZA VACCINE INTO SUBCUTANEOUS TISSUE, PERCUTANEOUS APPROACH: ICD-10-PCS | Performed by: INTERNAL MEDICINE

## 2025-01-01 PROCEDURE — 700102 HCHG RX REV CODE 250 W/ 637 OVERRIDE(OP): Performed by: STUDENT IN AN ORGANIZED HEALTH CARE EDUCATION/TRAINING PROGRAM

## 2025-01-01 PROCEDURE — 99233 SBSQ HOSP IP/OBS HIGH 50: CPT | Performed by: STUDENT IN AN ORGANIZED HEALTH CARE EDUCATION/TRAINING PROGRAM

## 2025-01-01 PROCEDURE — 36415 COLL VENOUS BLD VENIPUNCTURE: CPT

## 2025-01-01 PROCEDURE — 87116 MYCOBACTERIA CULTURE: CPT

## 2025-01-01 PROCEDURE — 88112 CYTOPATH CELL ENHANCE TECH: CPT

## 2025-01-01 RX ORDER — PREDNISOLONE SODIUM PHOSPHATE 15 MG/5ML
40 SOLUTION ORAL DAILY
Status: DISCONTINUED | OUTPATIENT
Start: 2025-01-01 | End: 2025-01-03 | Stop reason: HOSPADM

## 2025-01-01 RX ADMIN — THIAMINE HYDROCHLORIDE 500 MG: 100 INJECTION, SOLUTION INTRAMUSCULAR; INTRAVENOUS at 16:16

## 2025-01-01 RX ADMIN — THIAMINE HYDROCHLORIDE 500 MG: 100 INJECTION, SOLUTION INTRAMUSCULAR; INTRAVENOUS at 09:00

## 2025-01-01 RX ADMIN — Medication 100 MG: at 05:48

## 2025-01-01 RX ADMIN — OMEPRAZOLE 20 MG: 20 CAPSULE, DELAYED RELEASE ORAL at 05:48

## 2025-01-01 RX ADMIN — THIAMINE HYDROCHLORIDE 500 MG: 100 INJECTION, SOLUTION INTRAMUSCULAR; INTRAVENOUS at 20:14

## 2025-01-01 RX ADMIN — INFLUENZA A VIRUS A/VICTORIA/4897/2022 IVR-238 (H1N1) ANTIGEN (FORMALDEHYDE INACTIVATED), INFLUENZA A VIRUS A/CALIFORNIA/122/2022 SAN-022 (H3N2) ANTIGEN (FORMALDEHYDE INACTIVATED), AND INFLUENZA B VIRUS B/MICHIGAN/01/2021 ANTIGEN (FORMALDEHYDE INACTIVATED) 0.5 ML: 15; 15; 15 INJECTION, SUSPENSION INTRAMUSCULAR at 05:49

## 2025-01-01 RX ADMIN — PREDNISOLONE SODIUM PHOSPHATE 39.9 MG: 15 SOLUTION ORAL at 13:42

## 2025-01-01 ASSESSMENT — FIBROSIS 4 INDEX: FIB4 SCORE: 7.37

## 2025-01-01 ASSESSMENT — PAIN DESCRIPTION - PAIN TYPE
TYPE: ACUTE PAIN
TYPE: ACUTE PAIN

## 2025-01-01 NOTE — ASSESSMENT & PLAN NOTE
MELD score 32 in the setting of continuous alcohol abuse  [facility] indicating poor prognosis.  She may benefit from prednisolone for superimposed alcoholic hepatitis, however infectious etiology of pneumonitis needs to be ruled out first, follow-up with procalcitonin and diagnostic thoracentesis  CTA of the chest showed nodular liver compatible with liver cirrhosis  Ordered right upper quadrant ultrasound to evaluate for biliary obstruction, portal vein thrombosis, masses  We will check ammonia  Continue spironolactone, hold Lasix due to hypokalemia

## 2025-01-01 NOTE — PROGRESS NOTES
Monitor Summary  Rhythm: Normal Sinus Rhythm  Rate: 77 - 84  Ectopy: None Noted  .12 / .09 / .32

## 2025-01-01 NOTE — ASSESSMENT & PLAN NOTE
Last drink 5 days ago.  Currently no evidence of withdrawal  Ordered thiamine p.o. supplementation  Alcohol cessation counseling

## 2025-01-01 NOTE — PROGRESS NOTES
4 Eyes Skin Assessment Completed by GUS Bryant and GABRIELLE Chavarria.    Head WDL  Ears WDL  Nose WDL  Mouth WDL  Neck WDL  Breast/Chest WDL  Shoulder Blades WDL  Spine WDL  (R) Arm/Elbow/Hand WDL  (L) Arm/Elbow/Hand WDL  Abdomen Bruising on right flank  Groin WDL  Scrotum/Coccyx/Buttocks WDL  (R) Leg WDL  (L) Leg Bruising on left lateral thigh  (R) Heel/Foot/Toe WDL  (L) Heel/Foot/Toe WDL          Devices In Places Tele Box, Pulse Ox, and Nasal Cannula      Interventions In Place Gray Ear Foams and Pillows    Possible Skin Injury No    Pictures Uploaded Into Epic Yes  Wound Consult Placed N/A  RN Wound Prevention Protocol Ordered No \

## 2025-01-01 NOTE — ASSESSMENT & PLAN NOTE
INR 2.29.  Probably secondary to liver cirrhosis  Will give vitamin K p.o. 10 mg, repeat INR tomorrow considering plan for thoracentesis

## 2025-01-01 NOTE — ED NOTES
Pt watching tv in room. Vs stable. No acute distress noted. Cardiac monitor on NSR Noted. Call light in place. Will continue to monitor.

## 2025-01-01 NOTE — CARE PLAN
The patient is Watcher - Medium risk of patient condition declining or worsening    Shift Goals  Clinical Goals: Thora  Patient Goals: rest    Progress made toward(s) clinical / shift goals:    Problem: Knowledge Deficit - Standard  Goal: Patient and family/care givers will demonstrate understanding of plan of care, disease process/condition, diagnostic tests and medications  Outcome: Progressing       Patient is not progressing towards the following goals:

## 2025-01-01 NOTE — ED NOTES
Pt used bedside commode. Vs stable. Call light in place. Cardiac monitor on. Oxygen on. Will continue to monitor.

## 2025-01-01 NOTE — CARE PLAN
The patient is Stable - Low risk of patient condition declining or worsening    Shift Goals  Clinical Goals: Monitor O2 / labs  Patient Goals: Get updates, sleep    Progress made toward(s) clinical / shift goals:    Problem: Knowledge Deficit - Standard  Goal: Patient and family/care givers will demonstrate understanding of plan of care, disease process/condition, diagnostic tests and medications  Description: Target End Date:  1-3 days or as soon as patient condition allows    Document in Patient Education    1.  Patient and family/caregiver oriented to unit, equipment, visitation policy and means for communicating concern  2.  Complete/review Learning Assessment  3.  Assess knowledge level of disease process/condition, treatment plan, diagnostic tests and medications  4.  Explain disease process/condition, treatment plan, diagnostic tests and medications  Outcome: Progressing       Patient educated on the plan of care including medications and upcoming procedures. Patient verbalizes understanding of teaching.

## 2025-01-01 NOTE — ED NOTES
Checked potassium orders with pharmacist. Per Dr Villavicencio pt is only to have one more dose of 40 meq of potassium given the other dose is to be held. Dose given. Family at bedside. Call light in place. Will continue to monitor.

## 2025-01-01 NOTE — ASSESSMENT & PLAN NOTE
54-year-old female with alcoholic liver cirrhosis with ascites, presented with hypoxia, cough and very large right-sided pleural effusion with mediastinal shift, as well as right upper lobe pneumonitis.  CTA negative for PE  COVID-19/influenza/RSV screen negative.  No fever, no leukocytosis.  BNP elevated at 149.  Continuous pulse ox, supplemental oxygen  Will plan for thoracentesis tomorrow and see if we can wean her off oxygen

## 2025-01-01 NOTE — PROGRESS NOTES
Pt BP trending down. Last BP 90/58. NOC hospitalist advised via Volte text. No interventions placed at this time.

## 2025-01-01 NOTE — ASSESSMENT & PLAN NOTE
Sodium 127.  Probably secondary to alcoholic disease.  Clinically she has pleural effusion and probably ascites, but no lower extremity edema.  Repeat sodium  Consider holding spironolactone if sodium level worsens

## 2025-01-01 NOTE — ED NOTES
Dr Villavicencio aware of potassium of 3.2 only more dose of potassium 40 meq to be given per provider.

## 2025-01-01 NOTE — PROGRESS NOTES
Bedside report received from off going RN/tech: Annette, assumed care of patient.   POC updated. Pt denies pain or any other needs at this time.   Fall Risk Score: LOW RISK  Fall risk interventions in place: Place yellow fall risk ID band on patient, Provide patient/family education based on risk assessment, Educate patient/family to call staff for assistance when getting out of bed, Place fall precaution signage outside patient door, Place patient in room close to nursing station, Utilize bed/chair fall alarm, and Bed alarm connected correctly  Bed type: Regular (Kai Score less than 17 interventions in place)  Patient on cardiac monitor: Yes  IVF/IV medications: Not Applicable   Oxygen: How many liters 4L  Bedside sitter: Not Applicable   Isolation: Not applicable

## 2025-01-01 NOTE — ASSESSMENT & PLAN NOTE
Lactic acid 2.7.  Probably secondary to liver cirrhosis and large pleural effusion.  Doubt sepsis.  Repeat lactic acid

## 2025-01-01 NOTE — ASSESSMENT & PLAN NOTE
very large right-sided pleural effusion with mediastinal shift, as well as right upper lobe pneumonitis.  CTA negative for PE  Etiology may include pneumonitis with associated large pleural effusion, pleural effusion secondary to portal hypertension and CHF  Plan for diagnostic/therapeutic thoracentesis, diagnostic cardiac echo, respiratory PCR panel, procalcitonin

## 2025-01-01 NOTE — ED NOTES
Med rec updated and complete. Allergies reviewed.    Pt stopped taking Carvedilol approximately 4 months ago. Removed from med rec.    Pt hasn't taken spironolactone or furosemide in  approximately  1 month.  Removed from med rec.    No outpatient antibiotic use in last 30 days.    No anticoagulant medications.      Preferred pharmacy  [facility] = [phone]

## 2025-01-01 NOTE — H&P
Hospital Medicine History & Physical Note    Date of Service  12/31/2024    Primary Care Physician  Pcp Pt States None        Code Status  Full Code    Chief Complaint  Chief Complaint   Patient presents with    Shortness of Breath       History of Presenting Illness  Stacy Oliva is a 54 y.o. female with history of alcoholic liver cirrhosis with ascites, GI bleed, alcohol abuse  (last drink was 5 days ago) who presented 12/31/2024 with complaints of shortness of breath on exertion, cough with small sputum production, that started 1 week ago with some flulike symptoms.  She reported some chest discomfort and tightness with cough and deep inspiration.  She denies fever or chills.  Has been sleeping on her side.  Patient reported worsening of abdominal distention, but denies abdominal pain nausea vomiting or diarrhea.  She stated she has had paracentesis 5 times total in the past.  Upon evaluation she desaturated to 86% and was placed on 3 L nasal cannula.  Afebrile, blood pressure stable, not in respiratory distress, heart rate 105.  EKG showed diffuse ST depression QTc 546, but troponin is not elevated.  CTA of pulmonary arteries negative for PE, showed large right-sided pleural effusion with small area of groundglass opacities in the left upper lobe.  Blood work abnormalities include sodium 127, potassium 2.6, , ALT 69, total bilirubin 24.7, INR 2.29, lactic acid 2.7, proBNP 149  COVID-19/influenza/RSV screen negative.  Per discussion with ERP, patient preferred thoracentesis to be done tomorrow by interventional radiology    I discussed the plan of care with patient, bedside RN, and ERP .    Review of Systems  Review of Systems   Constitutional:  Negative for chills, fever and weight loss.   HENT:  Negative for ear pain, hearing loss and tinnitus.    Eyes:  Negative for blurred vision, double vision and photophobia.   Respiratory:  Positive for cough, sputum production and shortness of breath.  Negative for hemoptysis.    Cardiovascular:  Positive for chest pain. Negative for palpitations and orthopnea.   Gastrointestinal:  Negative for heartburn, nausea and vomiting.   Genitourinary:  Negative for dysuria, flank pain, frequency and hematuria.   Musculoskeletal:  Positive for joint pain. Negative for back pain and neck pain.   Skin:  Negative for itching and rash.   Neurological:  Negative for tremors, speech change, focal weakness and headaches.   Endo/Heme/Allergies:  Negative for environmental allergies and polydipsia. Does not bruise/bleed easily.   Psychiatric/Behavioral:  Negative for hallucinations and substance abuse. The patient is not nervous/anxious.        Past Medical History   has a past medical history of Liver disease and Patient denies medical problems.    Surgical History   has a past surgical history that includes pr upper gi endoscopy,diagnosis (N/A, 2/4/2024) and pr upper gi endoscopy,ligat varix (2/4/2024).     Family History  family history is not on file.   Family history reviewed with patient. There is no family history that is pertinent to the chief complaint.     Social History   reports that she has never smoked. She has never used smokeless tobacco. She reports that she does not currently use alcohol. She reports current drug use.    Allergies  No Known Allergies    Medications  Prior to Admission Medications   Prescriptions Last Dose Informant Patient Reported? Taking?   Omega-3 Fatty Acids (FISH OIL PO)  Patient Yes No   Sig: Take  by mouth every day.   TURMERIC PO  Patient Yes No   Sig: Take 1 Dose by mouth every day.   carvedilol (COREG) 6.25 MG Tab   No No   Sig: Take 0.5 Tablets by mouth 2 times a day with meals.   furosemide (LASIX) 20 MG Tab   No No   Sig: Take 1 Tablet by mouth every day.   omeprazole (PRILOSEC) 20 MG delayed-release capsule   No No   Sig: Take 1 Capsule by mouth every day.   spironolactone (ALDACTONE) 25 MG Tab   No No   Sig: Take 1 Tablet by mouth  every day.      Facility-Administered Medications: None       Physical Exam  Temp:  [36.6 °C (97.9 °F)] 36.6 °C (97.9 °F)  Pulse:  [105] 105  Resp:  [16] 16  BP: (125)/(66) 125/66  SpO2:  [86 %-93 %] 93 %  Blood Pressure: 125/66   Temperature: 36.6 °C (97.9 °F)   Pulse: (!) 105   Respiration: 16   Pulse Oximetry: 93 %       Physical Exam  Vitals and nursing note reviewed.   Constitutional:       General: She is not in acute distress.     Appearance: Normal appearance.   HENT:      Head: Normocephalic and atraumatic.      Nose: Nose normal.      Mouth/Throat:      Mouth: Mucous membranes are moist.   Eyes:      Extraocular Movements: Extraocular movements intact.      Pupils: Pupils are equal, round, and reactive to light.   Cardiovascular:      Rate and Rhythm: Normal rate and regular rhythm.   Pulmonary:      Effort: Pulmonary effort is normal.      Breath sounds: Decreased air movement present. Examination of the right-middle field reveals decreased breath sounds. Decreased breath sounds and wheezing present.   Abdominal:      General: Abdomen is flat. There is no distension.      Tenderness: There is no abdominal tenderness.   Musculoskeletal:         General: No swelling or deformity. Normal range of motion.      Cervical back: Normal range of motion and neck supple.   Skin:     General: Skin is warm and dry.      Coloration: Skin is jaundiced.   Neurological:      General: No focal deficit present.      Mental Status: She is alert and oriented to person, place, and time.   Psychiatric:         Mood and Affect: Mood normal.         Behavior: Behavior normal.         Laboratory:  Recent Labs     12/31/24  1515   WBC 9.0   RBC 3.98*   HEMOGLOBIN 13.0   HEMATOCRIT 38.5   MCV 96.7   MCH 32.7   MCHC 33.8   RDW 82.2*   PLATELETCT 132*   MPV 10.6     Recent Labs     12/31/24  1515   SODIUM 127*   POTASSIUM 2.6*   CHLORIDE 86*   CO2 24   GLUCOSE 116*   BUN 14   CREATININE 0.28*   CALCIUM 8.4*     Recent Labs      12/31/24  1515   ALTSGPT 69*   ASTSGOT 147*   ALKPHOSPHAT 142*   TBILIRUBIN 24.7*   GLUCOSE 116*     Recent Labs     12/31/24  1515   APTT 40.1*   INR 2.29*     Recent Labs     12/31/24  1515   NTPROBNP 149*         Recent Labs     12/31/24  1515   TROPONINT 11       Imaging:  CT-CTA CHEST PULMONARY ARTERY W/ RECONS   Final Result      1.  Very large right-sided pleural effusion which collapses the right long and causes the mediastinum to shift from right to left.      2.  Cirrhosis with borderline splenomegaly.      3.  Small wedge-shaped area of groundglass opacity in the left upper lobe anteriorly consistent with atelectasis, pneumonitis, interstitial edema.      DX-CHEST-PORTABLE (1 VIEW)   Final Result      1.  Complete opacification of the right hemithorax without mediastinal shift consistent with pneumonitis, mass , atelectasis, and/or pleural effusion.      IR-THORACENTESIS PUNCTURE RIGHT    (Results Pending)   EC-ECHOCARDIOGRAM COMPLETE W/O CONT    (Results Pending)       X-Ray:  I have personally reviewed the images and compared with prior images.    Assessment/Plan:  Justification for Admission Status  I anticipate this patient will require at least two midnights for appropriate medical management, necessitating inpatient admission because respiratory failure with hypoxia    Patient will need a Telemetry bed on MEDICAL service .  The need is secondary to hypokalemia.    * Acute respiratory failure with hypoxia- (present on admission)  Assessment & Plan  54-year-old female with alcoholic liver cirrhosis with ascites, presented with hypoxia, cough and very large right-sided pleural effusion with mediastinal shift, as well as right upper lobe pneumonitis.  CTA negative for PE  COVID-19/influenza/RSV screen negative.  No fever, no leukocytosis.  BNP elevated at 149.  Continuous pulse ox, supplemental oxygen  Will plan for thoracentesis tomorrow and see if we can wean her off  oxygen      Hyponatremia  Assessment & Plan  Sodium 127.  Probably secondary to alcoholic disease.  Clinically she has pleural effusion and probably ascites, but no lower extremity edema.  Repeat sodium  Consider holding spironolactone if sodium level worsens    Pleural effusion  Assessment & Plan  very large right-sided pleural effusion with mediastinal shift, as well as right upper lobe pneumonitis.  CTA negative for PE  Etiology may include pneumonitis with associated large pleural effusion, pleural effusion secondary to portal hypertension and CHF  Plan for diagnostic/therapeutic thoracentesis, diagnostic cardiac echo, respiratory PCR panel, procalcitonin    Alcoholic cirrhosis of liver with ascites (HCC)- (present on admission)  Assessment & Plan  MELD score 32 in the setting of continuous alcohol abuse  Maddrey discrimination score 76 indicating poor prognosis.  She may benefit from prednisolone for superimposed alcoholic hepatitis, however infectious etiology of pneumonitis needs to be ruled out first, follow-up with procalcitonin and diagnostic thoracentesis  CTA of the chest showed nodular liver compatible with liver cirrhosis  Ordered right upper quadrant ultrasound to evaluate for biliary obstruction, portal vein thrombosis, masses  We will check ammonia  Continue spironolactone, hold Lasix due to hypokalemia    Lactic acid acidosis  Assessment & Plan  Lactic acid 2.7.  Probably secondary to liver cirrhosis and large pleural effusion.  Doubt sepsis.  Repeat lactic acid    Thrombocytopenia (HCC)  Assessment & Plan  Platelets count 132    Coagulopathy (HCC)  Assessment & Plan  INR 2.29.  Probably secondary to liver cirrhosis  Will give vitamin K p.o. 10 mg, repeat INR tomorrow considering plan for thoracentesis    Alcohol abuse- (present on admission)  Assessment & Plan  Last drink 5 days ago.  Currently no evidence of withdrawal  Ordered thiamine p.o. supplementation  Alcohol cessation  counseling    Hypokalemia- (present on admission)  Assessment & Plan  Potassium 2.6  Ordered p.o. and IV replacement of potassium  Monitor on telemetry for arrhythmia        VTE prophylaxis: SCDs/TEDs

## 2025-01-02 LAB
ALBUMIN SERPL BCP-MCNC: 2.5 G/DL (ref 3.2–4.9)
ALBUMIN/GLOB SERPL: 0.7 G/DL
ALP SERPL-CCNC: 117 U/L (ref 30–99)
ALT SERPL-CCNC: 56 U/L (ref 2–50)
AMMONIA PLAS-SCNC: 38 UMOL/L (ref 11–45)
ANION GAP SERPL CALC-SCNC: 8 MMOL/L (ref 7–16)
APPEARANCE UR: ABNORMAL
AST SERPL-CCNC: 106 U/L (ref 12–45)
BACTERIA #/AREA URNS HPF: ABNORMAL /HPF
BACTERIA UR CULT: NORMAL
BASOPHILS # BLD AUTO: 0.4 % (ref 0–1.8)
BASOPHILS # BLD: 0.04 K/UL (ref 0–0.12)
BILIRUB SERPL-MCNC: 18.4 MG/DL (ref 0.1–1.5)
BILIRUB UR QL STRIP.AUTO: ABNORMAL
BUN SERPL-MCNC: 13 MG/DL (ref 8–22)
CALCIUM ALBUM COR SERPL-MCNC: 9.6 MG/DL (ref 8.5–10.5)
CALCIUM SERPL-MCNC: 8.4 MG/DL (ref 8.5–10.5)
CASTS URNS QL MICRO: ABNORMAL /LPF (ref 0–2)
CHLORIDE SERPL-SCNC: 96 MMOL/L (ref 96–112)
CO2 SERPL-SCNC: 25 MMOL/L (ref 20–33)
COLOR UR: ABNORMAL
CREAT SERPL-MCNC: 0.76 MG/DL (ref 0.5–1.4)
EOSINOPHIL # BLD AUTO: 0 K/UL (ref 0–0.51)
EOSINOPHIL NFR BLD: 0 % (ref 0–6.9)
EPITHELIAL CELLS 1715: ABNORMAL /HPF (ref 0–5)
ERYTHROCYTE [DISTWIDTH] IN BLOOD BY AUTOMATED COUNT: 79.6 FL (ref 35.9–50)
GFR SERPLBLD CREATININE-BSD FMLA CKD-EPI: 92 ML/MIN/1.73 M 2
GLOBULIN SER CALC-MCNC: 3.7 G/DL (ref 1.9–3.5)
GLUCOSE SERPL-MCNC: 117 MG/DL (ref 65–99)
GLUCOSE UR STRIP.AUTO-MCNC: ABNORMAL MG/DL
HCT VFR BLD AUTO: 34.5 % (ref 37–47)
HGB BLD-MCNC: 11.7 G/DL (ref 12–16)
IMM GRANULOCYTES # BLD AUTO: 0.07 K/UL (ref 0–0.11)
IMM GRANULOCYTES NFR BLD AUTO: 0.7 % (ref 0–0.9)
KETONES UR STRIP.AUTO-MCNC: ABNORMAL MG/DL
LEUKOCYTE ESTERASE UR QL STRIP.AUTO: ABNORMAL
LYMPHOCYTES # BLD AUTO: 0.82 K/UL (ref 1–4.8)
LYMPHOCYTES NFR BLD: 7.7 % (ref 22–41)
MAGNESIUM SERPL-MCNC: 1.8 MG/DL (ref 1.5–2.5)
MCH RBC QN AUTO: 33.4 PG (ref 27–33)
MCHC RBC AUTO-ENTMCNC: 33.9 G/DL (ref 32.2–35.5)
MCV RBC AUTO: 98.6 FL (ref 81.4–97.8)
MICRO URNS: ABNORMAL
MONOCYTES # BLD AUTO: 0.53 K/UL (ref 0–0.85)
MONOCYTES NFR BLD AUTO: 5 % (ref 0–13.4)
MYCOBACTERIUM SPEC CULT: NORMAL
NEUTROPHILS # BLD AUTO: 9.2 K/UL (ref 1.82–7.42)
NEUTROPHILS NFR BLD: 86.2 % (ref 44–72)
NITRITE UR QL STRIP.AUTO: ABNORMAL
NRBC # BLD AUTO: 0 K/UL
NRBC BLD-RTO: 0 /100 WBC (ref 0–0.2)
PH UR STRIP.AUTO: ABNORMAL [PH] (ref 5–8)
PHOSPHATE SERPL-MCNC: 2.1 MG/DL (ref 2.5–4.5)
PLATELET # BLD AUTO: 105 K/UL (ref 164–446)
PLATELETS.RETICULATED NFR BLD AUTO: 6.8 % (ref 0.6–13.1)
PMV BLD AUTO: 10.4 FL (ref 9–12.9)
POTASSIUM SERPL-SCNC: 4.1 MMOL/L (ref 3.6–5.5)
PROT SERPL-MCNC: 6.2 G/DL (ref 6–8.2)
PROT UR QL STRIP: ABNORMAL MG/DL
RBC # BLD AUTO: 3.5 M/UL (ref 4.2–5.4)
RBC # URNS HPF: ABNORMAL /HPF (ref 0–2)
RBC UR QL AUTO: ABNORMAL
RHODAMINE-AURAMINE STN SPEC: NORMAL
RHODAMINE-AURAMINE STN SPEC: NORMAL
SIGNIFICANT IND 70042: NORMAL
SITE SITE: NORMAL
SODIUM SERPL-SCNC: 129 MMOL/L (ref 135–145)
SOURCE SOURCE: NORMAL
SP GR UR STRIP.AUTO: ABNORMAL
UROBILINOGEN UR STRIP.AUTO-MCNC: ABNORMAL EU/DL
WBC # BLD AUTO: 10.7 K/UL (ref 4.8–10.8)
WBC #/AREA URNS HPF: ABNORMAL /HPF

## 2025-01-02 PROCEDURE — 84100 ASSAY OF PHOSPHORUS: CPT

## 2025-01-02 PROCEDURE — 80053 COMPREHEN METABOLIC PANEL: CPT

## 2025-01-02 PROCEDURE — 85055 RETICULATED PLATELET ASSAY: CPT

## 2025-01-02 PROCEDURE — 770001 HCHG ROOM/CARE - MED/SURG/GYN PRIV*

## 2025-01-02 PROCEDURE — 81001 URINALYSIS AUTO W/SCOPE: CPT

## 2025-01-02 PROCEDURE — 700102 HCHG RX REV CODE 250 W/ 637 OVERRIDE(OP): Performed by: STUDENT IN AN ORGANIZED HEALTH CARE EDUCATION/TRAINING PROGRAM

## 2025-01-02 PROCEDURE — 85025 COMPLETE CBC W/AUTO DIFF WBC: CPT

## 2025-01-02 PROCEDURE — 83735 ASSAY OF MAGNESIUM: CPT

## 2025-01-02 PROCEDURE — A9270 NON-COVERED ITEM OR SERVICE: HCPCS | Performed by: INTERNAL MEDICINE

## 2025-01-02 PROCEDURE — 700102 HCHG RX REV CODE 250 W/ 637 OVERRIDE(OP): Performed by: INTERNAL MEDICINE

## 2025-01-02 PROCEDURE — 82140 ASSAY OF AMMONIA: CPT

## 2025-01-02 PROCEDURE — 99233 SBSQ HOSP IP/OBS HIGH 50: CPT | Performed by: STUDENT IN AN ORGANIZED HEALTH CARE EDUCATION/TRAINING PROGRAM

## 2025-01-02 PROCEDURE — A9270 NON-COVERED ITEM OR SERVICE: HCPCS | Performed by: STUDENT IN AN ORGANIZED HEALTH CARE EDUCATION/TRAINING PROGRAM

## 2025-01-02 RX ADMIN — PREDNISOLONE SODIUM PHOSPHATE 39.9 MG: 15 SOLUTION ORAL at 04:40

## 2025-01-02 RX ADMIN — OMEPRAZOLE 20 MG: 20 CAPSULE, DELAYED RELEASE ORAL at 04:38

## 2025-01-02 RX ADMIN — DIBASIC SODIUM PHOSPHATE, MONOBASIC POTASSIUM PHOSPHATE AND MONOBASIC SODIUM PHOSPHATE 500 MG: 852; 155; 130 TABLET ORAL at 17:29

## 2025-01-02 RX ADMIN — SENNOSIDES AND DOCUSATE SODIUM 2 TABLET: 50; 8.6 TABLET ORAL at 16:32

## 2025-01-02 RX ADMIN — CARVEDILOL 3.12 MG: 3.12 TABLET, FILM COATED ORAL at 08:22

## 2025-01-02 ASSESSMENT — FIBROSIS 4 INDEX: FIB4 SCORE: 10.61

## 2025-01-02 ASSESSMENT — PAIN DESCRIPTION - PAIN TYPE
TYPE: ACUTE PAIN
TYPE: ACUTE PAIN

## 2025-01-02 NOTE — CARE PLAN
The patient is Watcher - Medium risk of patient condition declining or worsening    Shift Goals  Clinical Goals: Wean O2, monitor lung sounds  Patient Goals: DC    Progress made toward(s) clinical / shift goals:    Problem: Knowledge Deficit - Standard  Goal: Patient and family/care givers will demonstrate understanding of plan of care, disease process/condition, diagnostic tests and medications  Outcome: Progressing     Problem: Hemodynamics  Goal: Patient's hemodynamics, fluid balance and neurologic status will be stable or improve  Outcome: Progressing     Problem: Respiratory  Goal: Patient will achieve/maintain optimum respiratory ventilation and gas exchange  Outcome: Progressing  Note: Pt down to 1 L oxygen sating 94% with ambulation        Patient is not progressing towards the following goals:

## 2025-01-02 NOTE — PROGRESS NOTES
Bedside report received from off going RN/tech: Radha assumed care of patient. POC updated. Pt denies pain or any other needs at this time.     Fall Risk Score: NO RISK  Fall risk interventions in place: Place yellow fall risk ID band on patient, Provide patient/family education based on risk assessment, Educate patient/family to call staff for assistance when getting out of bed, Place fall precaution signage outside patient door, Place patient in room close to nursing station, Utilize bed/chair fall alarm, and Bed alarm connected correctly  Bed type: Regular (Kai Score less than 17 interventions in place)  Patient on cardiac monitor: Yes  IVF/IV medications: Not Applicable   Oxygen: How many liters 2L  Bedside sitter: Not Applicable   Isolation: Not applicable

## 2025-01-02 NOTE — PROGRESS NOTES
Pt is medical    Ventricular Rate : 103   Atrial Rate : 103   P-R Interval : 150   QRS Duration : 90   Q-T Interval : 376   QTC Calculation(Bezet) : 492   P Axis : 48   R Axis : 27   T Axis : 63   Diagnosis : Sinus tachycardia~Possible Left atrial enlargement~Minimal voltage criteria for LVH, may be normal variant~Borderline ECG~When compared with ECG of 19-NOV-2011 01:15,~Nonspecific T wave abnormality, improved in Lateral leads~Confirmed by MARTINE NUNES, EDD ([id number]) on 4/11/2018 5:33:04 PM

## 2025-01-02 NOTE — CARE PLAN
The patient is Stable - Low risk of patient condition declining or worsening    Shift Goals  Clinical Goals: VSS, WEAN O2  Patient Goals: rest    Progress made toward(s) clinical / shift goals:    Problem: Knowledge Deficit - Standard  Goal: Patient and family/care givers will demonstrate understanding of plan of care, disease process/condition, diagnostic tests and medications  Description: Target End Date:  1-3 days or as soon as patient condition allows    Document in Patient Education    1.  Patient and family/caregiver oriented to unit, equipment, visitation policy and means for communicating concern  2.  Complete/review Learning Assessment  3.  Assess knowledge level of disease process/condition, treatment plan, diagnostic tests and medications  4.  Explain disease process/condition, treatment plan, diagnostic tests and medications  Outcome: Progressing     Problem: Hemodynamics  Goal: Patient's hemodynamics, fluid balance and neurologic status will be stable or improve  Description: Target End Date:  Prior to discharge or change in level of care    Document on Assessment and I/O flowsheet templates    1.  Monitor vital signs, pulse oximetry and cardiac monitor per provider order and/or policy  2.  Maintain blood pressure per provider order  3.  Hemodynamic monitoring per provider order  4.  Manage IV fluids and IV infusions  5.  Monitor intake and output  6.  Daily weights per unit policy or provider order  7.  Assess peripheral pulses and capillary refill  8.  Assess color and body temperature  9.  Position patient for maximum circulation/cardiac output  10. Monitor for signs/symptoms of excessive bleeding  11. Assess mental status, restlessness and changes in level of consciousness  12. Monitor temperature and report fever or hypothermia to provider immediately. Consideration of targeted temperature management.  Outcome: Progressing     Problem: Respiratory  Goal: Patient will achieve/maintain optimum  respiratory ventilation and gas exchange  Description: Target End Date:  Prior to discharge or change in level of care    Document on Assessment flowsheet    1.  Assess and monitor rate, rhythm, depth and effort of respiration  2.  Breath sounds assessed qshift and/or as needed  3.  Assess O2 saturation, administer/titrate oxygen as ordered  4.  Position patient for maximum ventilatory efficiency  5.  Turn, cough, and deep breath with splinting to improve effectiveness  6.  Collaborate with RT to administer medication/treatments per order  7.  Encourage use of incentive spirometer and encourage patient to cough after use and utilize splinting techniques if applicable  8.  Airway suctioning  9.  Monitor sputum production for changes in color, consistency and frequency  10. Perform frequent oral hygiene  11. Alternate physical activity with rest periods  Outcome: Progressing       Patient is not progressing towards the following goals:

## 2025-01-02 NOTE — PROGRESS NOTES
Bedside report received from off going RN/tech: Gisele, assumed care of patient.     Fall Risk Score: LOW RISK  Fall risk interventions in place: Provide patient/family education based on risk assessment, Educate patient/family to call staff for assistance when getting out of bed, Place fall precaution signage outside patient door, Place patient in room close to nursing station, Utilize bed/chair fall alarm, Notify charge of high risk for huddle, and Bed alarm connected correctly  Bed type: Regular (Kai Score less than 17 interventions in place)  Patient on cardiac monitor: Yes  IVF/IV medications: Not Applicable   Oxygen: How many liters 3.5L, Traced the line to wall oxygen, and No oxygen tank in room  Bedside sitter: Not Applicable   Isolation: Not applicable

## 2025-01-02 NOTE — PROGRESS NOTES
Hospital Medicine Daily Progress Note    Date of Service  1/1/2025    Chief Complaint  Stacy Oliva is a 54 y.o. female admitted 12/31/2024 with jaundice    Hospital Course  Stacy Oliva is a 54 y.o. female with history of alcoholic liver cirrhosis with ascites, GI bleed, alcohol abuse  (last drink was 5 days ago) who presented 12/31/2024 with complaints of shortness of breath on exertion, cough with small sputum production, that started 1 week ago with some flulike symptoms.  She reported some chest discomfort and tightness with cough and deep inspiration.  She denies fever or chills.  Has been sleeping on her side.  Patient reported worsening of abdominal distention, but denies abdominal pain nausea vomiting or diarrhea.  She stated she has had paracentesis 5 times total in the past.  Upon evaluation she desaturated to 86% and was placed on 3 L nasal cannula.  Afebrile, blood pressure stable, not in respiratory distress, heart rate 105.  EKG showed diffuse ST depression QTc 546, but troponin is not elevated.  CTA of pulmonary arteries negative for PE, showed large right-sided pleural effusion with small area of groundglass opacities in the left upper lobe.  Blood work abnormalities include sodium 127, potassium 2.6, , ALT 69, total bilirubin 24.7, INR 2.29, lactic acid 2.7, proBNP 149  COVID-19/influenza/RSV screen negative.  Per discussion with ERP, patient preferred thoracentesis to be done tomorrow by interventional radiology    Interval Problem Update  1/1  Procalcitonin low, no significant evidence for infection, started steroids for alcohol liver hepatitis. Afebrile with normal HR, RR, SBP from  SpOw 90-96% weaned down to 1 L NC.  Stopped drinking on 12/25, no significant ongoing withdrawals.  Her Scr is rising and GFR dropping, developing ISRA, will need to trend function. Check ammonia.   Went for thora today and felt improved following, not enough fluid for para.   No abdominal  pain or tenderness, Collins's sign negative.    I have discussed this patient's plan of care and discharge plan at IDT rounds today with Case Management, Nursing, Nursing leadership, and other members of the IDT team.    Consultants/Specialty  None    Code Status  Full Code    Disposition  The patient is not medically cleared for discharge to home or a post-acute facility.      I have placed the appropriate orders for post-discharge needs.    Review of Systems  ROS   Review of Systems   Constitutional:  Negative for chills, fever and weight loss.   HENT:  Negative for ear pain, hearing loss and tinnitus.    Eyes:  Negative for blurred vision, double vision and photophobia.   Respiratory:  Positive for cough, sputum production and shortness of breath. Negative for hemoptysis.    Cardiovascular:  Positive for chest pain. Negative for palpitations and orthopnea.   Gastrointestinal:  Negative for heartburn, nausea and vomiting.   Genitourinary:  Negative for dysuria, flank pain, frequency and hematuria.   Musculoskeletal:  Positive for joint pain. Negative for back pain and neck pain.   Skin:  Negative for itching and rash.   Neurological:  Negative for tremors, speech change, focal weakness and headaches.   Endo/Heme/Allergies:  Negative for environmental allergies and polydipsia. Does not bruise/bleed easily.   Psychiatric/Behavioral:  Negative for hallucinations and substance abuse. The patient is not nervous/anxious.     Physical Exam  Temp:  [36 °C (96.8 °F)-36.5 °C (97.7 °F)] 36.5 °C (97.7 °F)  Pulse:  [77-87] 86  Resp:  [18-20] 18  BP: (89-99)/(52-65) 95/58  SpO2:  [90 %-96 %] 90 %    Physical Exam  Vitals and nursing note reviewed. Exam conducted with a chaperone present.   Constitutional:       General: She is not in acute distress.     Appearance: She is ill-appearing. She is not toxic-appearing.   HENT:      Head: Normocephalic and atraumatic.      Nose: Nose normal. No rhinorrhea.      Mouth/Throat:       Mouth: Mucous membranes are moist.      Pharynx: Oropharynx is clear.   Eyes:      General: Scleral icterus present.      Extraocular Movements: Extraocular movements intact.      Conjunctiva/sclera: Conjunctivae normal.   Cardiovascular:      Rate and Rhythm: Normal rate and regular rhythm.      Pulses: Normal pulses.   Pulmonary:      Effort: Pulmonary effort is normal. No respiratory distress.      Breath sounds: Rales present. No wheezing or rhonchi.   Abdominal:      General: There is distension.      Palpations: Abdomen is soft.      Tenderness: There is no abdominal tenderness. There is no guarding or rebound.   Musculoskeletal:         General: No tenderness or deformity. Normal range of motion.      Cervical back: Normal range of motion and neck supple. No rigidity.      Right lower leg: No edema.      Left lower leg: No edema.   Skin:     General: Skin is warm and dry.      Capillary Refill: Capillary refill takes less than 2 seconds.      Findings: No erythema or rash.   Neurological:      General: No focal deficit present.      Mental Status: She is alert and oriented to person, place, and time. Mental status is at baseline.      Cranial Nerves: No cranial nerve deficit.      Sensory: No sensory deficit.      Motor: No weakness.      Coordination: Coordination normal.   Psychiatric:         Mood and Affect: Mood normal.         Behavior: Behavior normal.         Thought Content: Thought content normal.         Judgment: Judgment normal.         Fluids    Intake/Output Summary (Last 24 hours) at 1/2/2025 0303  Last data filed at 1/1/2025 2200  Gross per 24 hour   Intake 480 ml   Output 350 ml   Net 130 ml        Laboratory  Recent Labs     12/31/24  1515 01/01/25  0649   WBC 9.0 7.4   RBC 3.98* 3.58*   HEMOGLOBIN 13.0 11.7*   HEMATOCRIT 38.5 35.1*   MCV 96.7 98.0*   MCH 32.7 32.7   MCHC 33.8 33.3   RDW 82.2* 84.7*   PLATELETCT 132* 89*   MPV 10.6 10.1     Recent Labs     12/31/24  1515 12/31/24  1937  01/01/25  0649   SODIUM 127* 128* 126*   POTASSIUM 2.6* 3.2* 4.2   CHLORIDE 86* 88* 95*   CO2 24 25 22   GLUCOSE 116* 123* 127*   BUN 14 14 15   CREATININE 0.28* <0.17* 0.88   CALCIUM 8.4* 8.0* 8.3*     Recent Labs     12/31/24  1515 01/01/25  0649   APTT 40.1*  --    INR 2.29* 2.33*               Imaging  US-THORACENTESIS PUNCTURE RIGHT   Final Result      1. Ultrasound guided right sided therapeutic and diagnostic thoracentesis.      2. 2000 mL of fluid withdrawn.      US-ABDOMEN LTD (SOFT TISSUE)   Final Result      DX-CHEST-PORTABLE (1 VIEW)   Final Result      Right effusion is much smaller following thoracentesis. Moderate to large effusion remains. No pneumothorax.      US-RUQ   Final Result         1. Distended gallbladder with wall thickening and gallstone. Therefore, correlate for potential acute cholecystitis. If clinical picture is unclear, recommend HIDA scan.   2. Cirrhotic appearing liver.      CT-CTA CHEST PULMONARY ARTERY W/ RECONS   Final Result      1.  Very large right-sided pleural effusion which collapses the right long and causes the mediastinum to shift from right to left.      2.  Cirrhosis with borderline splenomegaly.      3.  Small wedge-shaped area of groundglass opacity in the left upper lobe anteriorly consistent with atelectasis, pneumonitis, interstitial edema.      DX-CHEST-PORTABLE (1 VIEW)   Final Result      1.  Complete opacification of the right hemithorax without mediastinal shift consistent with pneumonitis, mass , atelectasis, and/or pleural effusion.      EC-ECHOCARDIOGRAM COMPLETE W/O CONT    (Results Pending)        Assessment/Plan  * Acute respiratory failure with hypoxia- (present on admission)  Assessment & Plan  54-year-old female with alcoholic liver cirrhosis with ascites, presented with hypoxia, cough and very large right-sided pleural effusion with mediastinal shift, as well as right upper lobe pneumonitis.  CTA negative for PE  COVID-19/influenza/RSV screen  negative.  No fever, no leukocytosis.  BNP elevated at 149.  Continuous pulse ox, supplemental oxygen  Will plan for thoracentesis tomorrow and see if we can wean her off oxygen      Lactic acid acidosis  Assessment & Plan  Lactic acid 2.7.  Probably secondary to liver cirrhosis and large pleural effusion.  Doubt sepsis.  Repeat lactic acid    Hyponatremia  Assessment & Plan  Sodium 127.  Probably secondary to alcoholic disease.  Clinically she has pleural effusion and probably ascites, but no lower extremity edema.  Repeat sodium  Consider holding spironolactone if sodium level worsens    Thrombocytopenia (HCC)  Assessment & Plan  Platelets count 132    Coagulopathy (HCC)  Assessment & Plan  INR 2.29.  Probably secondary to liver cirrhosis  Will give vitamin K p.o. 10 mg, repeat INR tomorrow considering plan for thoracentesis    Pleural effusion  Assessment & Plan  very large right-sided pleural effusion with mediastinal shift, as well as right upper lobe pneumonitis.  CTA negative for PE  Etiology may include pneumonitis with associated large pleural effusion, pleural effusion secondary to portal hypertension and CHF  Plan for diagnostic/therapeutic thoracentesis, diagnostic cardiac echo, respiratory PCR panel, procalcitonin    Alcohol abuse- (present on admission)  Assessment & Plan  Last drink 5 days ago.  Currently no evidence of withdrawal  Ordered thiamine p.o. supplementation  Alcohol cessation counseling    Alcoholic cirrhosis of liver with ascites (HCC)- (present on admission)  Assessment & Plan  MELD score 32 in the setting of continuous alcohol abuse  Maddrey discrimination score 76 indicating poor prognosis.  She may benefit from prednisolone for superimposed alcoholic hepatitis, however infectious etiology of pneumonitis needs to be ruled out first, follow-up with procalcitonin and diagnostic thoracentesis  CTA of the chest showed nodular liver compatible with liver cirrhosis  Ordered right upper  quadrant ultrasound to evaluate for biliary obstruction, portal vein thrombosis, masses  We will check ammonia  Continue spironolactone, hold Lasix due to hypokalemia    Hypokalemia- (present on admission)  Assessment & Plan  Potassium 2.6  Ordered p.o. and IV replacement of potassium  Monitor on telemetry for arrhythmia         VTE prophylaxis:   SCDs/TEDs      I have performed a physical exam and reviewed and updated ROS and Plan today (1/1/2025). In review of yesterday's note (12/31/2024), there are no changes except as documented above.  Total time spent 53 minutes. I spent greater than 50% of the time for patient care, counseling, and coordination on this date, including unit/floor time, and face-to-face time with the patient as per interval events, my own review of patient's imaging and lab analysis and developing my assessment and plan above.

## 2025-01-02 NOTE — PROCEDURES
Diagnostic RT Side Thoracentesis was performed bedside by CASSIE Oseguera, removing 2000 mL max of cloudy straw fluid. 1200 mL was sent to the lab. Pt tolerated the procedure well. Vitals were monitored by the floor. Post DX Chest was performed bedside.     Post procedure report was provided to the RN verbally.

## 2025-01-02 NOTE — DISCHARGE PLANNING
Care Transition Team Assessment  Patient is a 54 year-old female admitted for sob. Please see pt's H&P for prior medical history. RNCM met with pt at bedside to complete assessment. Pt A&Ox4 and able to verify the information on the face sheet. Pt lives with spouse in a 2-story in [place]. Emergency contact is spouse Willy Oliva [phone]. Prior to admission patient is independent with ADL's and IADL’s. 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 The patient's PCP is Dr. Saeid Bell. Patient's preferred pharmacy is [facility]. Patient denies a history of SNF/IPR nor HHC use in the past. Pt reports last alcohol use 1 week ago, declines resources on cessation when offered. Patients confirmed medical coverage with [employer].  Patient has means to transportation and spouse will provide transport once medically stable for discharge. RNCM discussed discharge planning. Patient reported pt's goal is to return home once medically stable.      Information Source  Orientation Level: Oriented X4  Information Given By: Patient  Who is responsible for making decisions for patient? : Patient    Readmission Evaluation  Is this a readmission?: No    Elopement Risk  Legal Hold: No  Ambulatory or Self Mobile in Wheelchair: Yes  Disoriented: No  Psychiatric Symptoms: None  History of Wandering: No  Elopement this Admit: No  Vocalizing Wanting to Leave: No  Displays Behaviors, Body Language Wanting to Leave: No-Not at Risk for Elopement  Elopement Risk: Not at Risk for Elopement    Interdisciplinary Discharge Planning  Lives with - Patient's Self Care Capacity: Significant Other  Patient or legal guardian wants to designate a caregiver: No  Support Systems: Family Member(s), Spouse / Significant Other, Friends / Neighbors  Housing / Facility: 1 Story House  Name of Care Facility: Home    Discharge Preparedness  What is your plan after discharge?: Home with help  What are your  discharge supports?: Spouse  Prior Functional Level: Ambulatory, Drives Self, Independent with Activities of Daily Living, Independent with Medication Management  Difficulity with ADLs: None  Difficulity with IADLs: None    Functional Assesment  Prior Functional Level: Ambulatory, Drives Self, Independent with Activities of Daily Living, Independent with Medication Management    Finances  Financial Barriers to Discharge: No    Vision / Hearing Impairment  Vision Impairment : No  Hearing Impairment : No    Advance Directive  Advance Directive?: None  Advance Directive offered?: AD Booklet refused (states she has the paperwork to complete)    Domestic Abuse  Have you ever been the victim of abuse or violence?: No  Possible Abuse/Neglect Reported to:: Not Applicable    Psychological Assessment  History of Substance Abuse: Alcohol  Date Last Used - Alcohol: 1 week ago  Substance Abuse Comments: declines resources  History of Psychiatric Problems: No  Non-compliant with Treatment: No  Newly Diagnosed Illness: No    Discharge Risks or Barriers  Discharge risks or barriers?: Substance abuse  Patient risk factors: Substance abuse    Anticipated Discharge Information  Discharge Disposition: Discharged to home/self care (01)  Discharge Address: [facility]  Discharge Contact Phone Number: [phone]  Case Management Discharge Planning    Admission Date: 12/31/2024  GMLOS: 4.9  ALOS: 2    6-Clicks ADL Score: 24  6-Clicks Mobility Score: 24    Anticipated Discharge Dispo: Discharge Disposition: Discharged to home/self care (01)  Discharge Address: [facility]  Discharge Contact Phone Number: [phone]    DME Needed: possible home O2 needs    Action(s) Taken: DC Assessment Complete (See below)    Escalations Completed: None    Medically Clear: No    Next Steps: RNCM to continue to follow patient for DCP needs.     Barriers to Discharge: Wean O2 vs home with home O2

## 2025-01-03 ENCOUNTER — APPOINTMENT (OUTPATIENT)
Dept: RADIOLOGY | Facility: MEDICAL CENTER | Age: 55
DRG: 432 | End: 2025-01-03
Attending: STUDENT IN AN ORGANIZED HEALTH CARE EDUCATION/TRAINING PROGRAM
Payer: COMMERCIAL

## 2025-01-03 ENCOUNTER — PHARMACY VISIT (OUTPATIENT)
Dept: PHARMACY | Facility: MEDICAL CENTER | Age: 55
End: 2025-01-03
Payer: COMMERCIAL

## 2025-01-03 ENCOUNTER — PATIENT OUTREACH (OUTPATIENT)
Dept: SCHEDULING | Facility: IMAGING CENTER | Age: 55
End: 2025-01-03
Payer: COMMERCIAL

## 2025-01-03 VITALS
HEART RATE: 80 BPM | WEIGHT: 155.42 LBS | RESPIRATION RATE: 16 BRPM | TEMPERATURE: 98.1 F | OXYGEN SATURATION: 90 % | BODY MASS INDEX: 25.9 KG/M2 | DIASTOLIC BLOOD PRESSURE: 58 MMHG | SYSTOLIC BLOOD PRESSURE: 94 MMHG | HEIGHT: 65 IN

## 2025-01-03 LAB
ALBUMIN SERPL BCP-MCNC: 2.4 G/DL (ref 3.2–4.9)
ALBUMIN/GLOB SERPL: 0.7 G/DL
ALP SERPL-CCNC: 118 U/L (ref 30–99)
ALT SERPL-CCNC: 58 U/L (ref 2–50)
ANION GAP SERPL CALC-SCNC: 10 MMOL/L (ref 7–16)
AST SERPL-CCNC: 102 U/L (ref 12–45)
BASOPHILS # BLD AUTO: 0.2 % (ref 0–1.8)
BASOPHILS # BLD: 0.02 K/UL (ref 0–0.12)
BILIRUB SERPL-MCNC: 15.5 MG/DL (ref 0.1–1.5)
BUN SERPL-MCNC: 12 MG/DL (ref 8–22)
CALCIUM ALBUM COR SERPL-MCNC: 9.5 MG/DL (ref 8.5–10.5)
CALCIUM SERPL-MCNC: 8.2 MG/DL (ref 8.5–10.5)
CHLORIDE SERPL-SCNC: 96 MMOL/L (ref 96–112)
CO2 SERPL-SCNC: 25 MMOL/L (ref 20–33)
CREAT SERPL-MCNC: 0.75 MG/DL (ref 0.5–1.4)
EOSINOPHIL # BLD AUTO: 0.06 K/UL (ref 0–0.51)
EOSINOPHIL NFR BLD: 0.6 % (ref 0–6.9)
ERYTHROCYTE [DISTWIDTH] IN BLOOD BY AUTOMATED COUNT: 80.3 FL (ref 35.9–50)
GFR SERPLBLD CREATININE-BSD FMLA CKD-EPI: 93 ML/MIN/1.73 M 2
GLOBULIN SER CALC-MCNC: 3.5 G/DL (ref 1.9–3.5)
GLUCOSE SERPL-MCNC: 142 MG/DL (ref 65–99)
HCT VFR BLD AUTO: 33 % (ref 37–47)
HGB BLD-MCNC: 11.1 G/DL (ref 12–16)
IMM GRANULOCYTES # BLD AUTO: 0.05 K/UL (ref 0–0.11)
IMM GRANULOCYTES NFR BLD AUTO: 0.5 % (ref 0–0.9)
LYMPHOCYTES # BLD AUTO: 1.61 K/UL (ref 1–4.8)
LYMPHOCYTES NFR BLD: 17 % (ref 22–41)
MAGNESIUM SERPL-MCNC: 2 MG/DL (ref 1.5–2.5)
MCH RBC QN AUTO: 33.1 PG (ref 27–33)
MCHC RBC AUTO-ENTMCNC: 33.6 G/DL (ref 32.2–35.5)
MCV RBC AUTO: 98.5 FL (ref 81.4–97.8)
MONOCYTES # BLD AUTO: 1.32 K/UL (ref 0–0.85)
MONOCYTES NFR BLD AUTO: 14 % (ref 0–13.4)
NEUTROPHILS # BLD AUTO: 6.39 K/UL (ref 1.82–7.42)
NEUTROPHILS NFR BLD: 67.7 % (ref 44–72)
NRBC # BLD AUTO: 0 K/UL
NRBC BLD-RTO: 0 /100 WBC (ref 0–0.2)
PLATELET # BLD AUTO: 113 K/UL (ref 164–446)
PMV BLD AUTO: 10.5 FL (ref 9–12.9)
POTASSIUM SERPL-SCNC: 3.7 MMOL/L (ref 3.6–5.5)
PROT SERPL-MCNC: 5.9 G/DL (ref 6–8.2)
RBC # BLD AUTO: 3.35 M/UL (ref 4.2–5.4)
SODIUM SERPL-SCNC: 131 MMOL/L (ref 135–145)
WBC # BLD AUTO: 9.5 K/UL (ref 4.8–10.8)

## 2025-01-03 PROCEDURE — 71045 X-RAY EXAM CHEST 1 VIEW: CPT

## 2025-01-03 PROCEDURE — 700102 HCHG RX REV CODE 250 W/ 637 OVERRIDE(OP): Performed by: INTERNAL MEDICINE

## 2025-01-03 PROCEDURE — 80053 COMPREHEN METABOLIC PANEL: CPT

## 2025-01-03 PROCEDURE — RXMED WILLOW AMBULATORY MEDICATION CHARGE: Performed by: STUDENT IN AN ORGANIZED HEALTH CARE EDUCATION/TRAINING PROGRAM

## 2025-01-03 PROCEDURE — A9270 NON-COVERED ITEM OR SERVICE: HCPCS | Performed by: STUDENT IN AN ORGANIZED HEALTH CARE EDUCATION/TRAINING PROGRAM

## 2025-01-03 PROCEDURE — A9270 NON-COVERED ITEM OR SERVICE: HCPCS | Performed by: INTERNAL MEDICINE

## 2025-01-03 PROCEDURE — 0W993ZX DRAINAGE OF RIGHT PLEURAL CAVITY, PERCUTANEOUS APPROACH, DIAGNOSTIC: ICD-10-PCS

## 2025-01-03 PROCEDURE — 83735 ASSAY OF MAGNESIUM: CPT

## 2025-01-03 PROCEDURE — 99239 HOSP IP/OBS DSCHRG MGMT >30: CPT | Performed by: STUDENT IN AN ORGANIZED HEALTH CARE EDUCATION/TRAINING PROGRAM

## 2025-01-03 PROCEDURE — 85025 COMPLETE CBC W/AUTO DIFF WBC: CPT

## 2025-01-03 PROCEDURE — C1729 CATH, DRAINAGE: HCPCS

## 2025-01-03 PROCEDURE — 700102 HCHG RX REV CODE 250 W/ 637 OVERRIDE(OP): Performed by: STUDENT IN AN ORGANIZED HEALTH CARE EDUCATION/TRAINING PROGRAM

## 2025-01-03 RX ORDER — PREDNISOLONE SODIUM PHOSPHATE 15 MG/5ML
40 SOLUTION ORAL DAILY
Qty: 320 ML | Refills: 0 | Status: SHIPPED | OUTPATIENT
Start: 2025-01-04 | End: 2025-01-28

## 2025-01-03 RX ORDER — CARVEDILOL 6.25 MG/1
3.12 TABLET ORAL 2 TIMES DAILY WITH MEALS
Qty: 60 TABLET | Refills: 2 | Status: ON HOLD | OUTPATIENT
Start: 2025-01-03

## 2025-01-03 RX ORDER — SPIRONOLACTONE 25 MG/1
25 TABLET ORAL DAILY
Qty: 30 TABLET | Refills: 2 | Status: ON HOLD | OUTPATIENT
Start: 2025-01-03 | End: 2025-01-13

## 2025-01-03 RX ADMIN — DIBASIC SODIUM PHOSPHATE, MONOBASIC POTASSIUM PHOSPHATE AND MONOBASIC SODIUM PHOSPHATE 500 MG: 852; 155; 130 TABLET ORAL at 05:18

## 2025-01-03 RX ADMIN — OMEPRAZOLE 20 MG: 20 CAPSULE, DELAYED RELEASE ORAL at 05:18

## 2025-01-03 RX ADMIN — PREDNISOLONE SODIUM PHOSPHATE 39.9 MG: 15 SOLUTION ORAL at 05:18

## 2025-01-03 ASSESSMENT — FIBROSIS 4 INDEX: FIB4 SCORE: 7.42

## 2025-01-03 NOTE — PROGRESS NOTES
Bedside report received from off going RN/tech: Gisele, assumed care of patient.     Fall Risk Score: NO RISK  Fall risk interventions in place: Provide patient/family education based on risk assessment, Educate patient/family to call staff for assistance when getting out of bed, Place fall precaution signage outside patient door, Place patient in room close to nursing station, Utilize bed/chair fall alarm, and Notify charge of high risk for huddle  Bed type: Regular (Kai Score less than 17 interventions in place)  Patient on cardiac monitor: No , medical  IVF/IV medications: Not Applicable   Oxygen: How many liters 1L, Traced the line to wall oxygen, and No oxygen tank in room  Bedside sitter: Not Applicable   Isolation: Not applicable

## 2025-01-03 NOTE — PROGRESS NOTES
Received report from previous shift RN, assumed care of patient. Patient is A&O4, vital signs are stable, on 1L NC. Patient denies any acute pain at this time, no signs of distress or discomfort. Sitting up in bed for breakfast. Call light and belongings within reach. Bed in lowest/locked position.       Fall Risk Score: NO RISK  Fall risk interventions in place: Provide patient/family education based on risk assessment  Bed type: Regular (Kai Score less than 17 interventions in place)  Patient on cardiac monitor: No   IVF/IV medications: Not Applicable   Oxygen: How many liters 1L  Bedside sitter: Not Applicable   Isolation: Not applicable

## 2025-01-03 NOTE — CARE PLAN
The patient is Stable - Low risk of patient condition declining or worsening    Shift Goals  Clinical Goals: VSS, wean O2  Patient Goals: go home    Progress made toward(s) clinical / shift goals:    Problem: Knowledge Deficit - Standard  Goal: Patient and family/care givers will demonstrate understanding of plan of care, disease process/condition, diagnostic tests and medications  Description: Target End Date:  1-3 days or as soon as patient condition allows    Document in Patient Education    1.  Patient and family/caregiver oriented to unit, equipment, visitation policy and means for communicating concern  2.  Complete/review Learning Assessment  3.  Assess knowledge level of disease process/condition, treatment plan, diagnostic tests and medications  4.  Explain disease process/condition, treatment plan, diagnostic tests and medications  Outcome: Progressing     Problem: Hemodynamics  Goal: Patient's hemodynamics, fluid balance and neurologic status will be stable or improve  Description: Target End Date:  Prior to discharge or change in level of care    Document on Assessment and I/O flowsheet templates    1.  Monitor vital signs, pulse oximetry and cardiac monitor per provider order and/or policy  2.  Maintain blood pressure per provider order  3.  Hemodynamic monitoring per provider order  4.  Manage IV fluids and IV infusions  5.  Monitor intake and output  6.  Daily weights per unit policy or provider order  7.  Assess peripheral pulses and capillary refill  8.  Assess color and body temperature  9.  Position patient for maximum circulation/cardiac output  10. Monitor for signs/symptoms of excessive bleeding  11. Assess mental status, restlessness and changes in level of consciousness  12. Monitor temperature and report fever or hypothermia to provider immediately. Consideration of targeted temperature management.  Outcome: Progressing     Problem: Respiratory  Goal: Patient will achieve/maintain optimum  respiratory ventilation and gas exchange  Description: Target End Date:  Prior to discharge or change in level of care    Document on Assessment flowsheet    1.  Assess and monitor rate, rhythm, depth and effort of respiration  2.  Breath sounds assessed qshift and/or as needed  3.  Assess O2 saturation, administer/titrate oxygen as ordered  4.  Position patient for maximum ventilatory efficiency  5.  Turn, cough, and deep breath with splinting to improve effectiveness  6.  Collaborate with RT to administer medication/treatments per order  7.  Encourage use of incentive spirometer and encourage patient to cough after use and utilize splinting techniques if applicable  8.  Airway suctioning  9.  Monitor sputum production for changes in color, consistency and frequency  10. Perform frequent oral hygiene  11. Alternate physical activity with rest periods  Outcome: Progressing       Patient is not progressing towards the following goals:

## 2025-01-03 NOTE — PROGRESS NOTES
Hospital Medicine Daily Progress Note    Date of Service  1/2/2025    Chief Complaint  Stacy Oliva is a 54 y.o. female admitted 12/31/2024 with jaundice    Hospital Course  Stacy Oliva is a 54 y.o. female with history of alcoholic liver cirrhosis with ascites, GI bleed, alcohol abuse  (last drink was 5 days ago) who presented 12/31/2024 with complaints of shortness of breath on exertion, cough with small sputum production, that started 1 week ago with some flulike symptoms.  She reported some chest discomfort and tightness with cough and deep inspiration.  She denies fever or chills.  Has been sleeping on her side.  Patient reported worsening of abdominal distention, but denies abdominal pain nausea vomiting or diarrhea.  She stated she has had paracentesis 5 times total in the past.  Upon evaluation she desaturated to 86% and was placed on 3 L nasal cannula.  Afebrile, blood pressure stable, not in respiratory distress, heart rate 105.  EKG showed diffuse ST depression QTc 546, but troponin is not elevated.  CTA of pulmonary arteries negative for PE, showed large right-sided pleural effusion with small area of groundglass opacities in the left upper lobe.  Blood work abnormalities include sodium 127, potassium 2.6, , ALT 69, total bilirubin 24.7, INR 2.29, lactic acid 2.7, proBNP 149  COVID-19/influenza/RSV screen negative.  Per discussion with ERP, patient preferred thoracentesis to be done tomorrow by interventional radiology    Interval Problem Update  1/1  Procalcitonin low, no significant evidence for infection, started steroids for alcohol liver hepatitis. Afebrile with normal HR, RR, SBP from  SpOw 90-96% weaned down to 1 L NC.  Stopped drinking on 12/25, no significant ongoing withdrawals.  Her Scr is rising and GFR dropping, developing ISRA, will need to trend function. Check ammonia.   Went for thora today and felt improved following, not enough fluid for para.   No abdominal  pain or tenderness, Collins's sign negative.    1/2  Afebrile with normal pulse respiratory rate, normal blood pressure today, pulse ox 90 to 93% on room air.  Tolerating p.o. intake, feeling improved.  HypoNa improved, LFT's and Tbili improving.  UA with few bacteria noted, not c/w infection.  She is anxious to go home, explained we need to see a bit more of a down trend for T. bili before we are comfortable discharging.    I have discussed this patient's plan of care and discharge plan at IDT rounds today with Case Management, Nursing, Nursing leadership, and other members of the IDT team.    Consultants/Specialty  None    Code Status  Full Code    Disposition  Medically Cleared  I have placed the appropriate orders for post-discharge needs.    Review of Systems  ROS   Review of Systems   Constitutional:  Negative for chills, fever and weight loss.   HENT:  Negative for ear pain, hearing loss and tinnitus.    Eyes:  Negative for blurred vision, double vision and photophobia.   Respiratory:  Positive for cough, sputum production and shortness of breath. Negative for hemoptysis.    Cardiovascular:  Positive for chest pain. Negative for palpitations and orthopnea.   Gastrointestinal:  Negative for heartburn, nausea and vomiting.   Genitourinary:  Negative for dysuria, flank pain, frequency and hematuria.   Musculoskeletal:  Positive for joint pain. Negative for back pain and neck pain.   Skin:  Negative for itching and rash.   Neurological:  Negative for tremors, speech change, focal weakness and headaches.   Endo/Heme/Allergies:  Negative for environmental allergies and polydipsia. Does not bruise/bleed easily.   Psychiatric/Behavioral:  Negative for hallucinations and substance abuse. The patient is not nervous/anxious.     Physical Exam  Temp:  [36.3 °C (97.3 °F)-36.7 °C (98.1 °F)] 36.7 °C (98 °F)  Pulse:  [71-82] 78  Resp:  [18-19] 18  BP: ()/(48-64) 84/48  SpO2:  [90 %-93 %] 90 %    Physical Exam  Vitals  and nursing note reviewed. Exam conducted with a chaperone present.   Constitutional:       General: She is not in acute distress.     Appearance: She is not ill-appearing or toxic-appearing.   HENT:      Head: Normocephalic and atraumatic.      Nose: Nose normal. No rhinorrhea.      Mouth/Throat:      Mouth: Mucous membranes are moist.      Pharynx: Oropharynx is clear.   Eyes:      General: Scleral icterus present.      Extraocular Movements: Extraocular movements intact.      Conjunctiva/sclera: Conjunctivae normal.   Cardiovascular:      Rate and Rhythm: Normal rate and regular rhythm.      Pulses: Normal pulses.   Pulmonary:      Effort: Pulmonary effort is normal. No respiratory distress.      Breath sounds: No wheezing, rhonchi or rales.   Abdominal:      General: There is distension.      Palpations: Abdomen is soft.      Tenderness: There is no abdominal tenderness. There is no guarding or rebound.   Musculoskeletal:         General: No tenderness or deformity. Normal range of motion.      Cervical back: Normal range of motion and neck supple. No rigidity.      Right lower leg: No edema.      Left lower leg: No edema.   Skin:     General: Skin is warm and dry.      Capillary Refill: Capillary refill takes less than 2 seconds.      Findings: No erythema or rash.   Neurological:      General: No focal deficit present.      Mental Status: She is alert and oriented to person, place, and time. Mental status is at baseline.      Cranial Nerves: No cranial nerve deficit.      Sensory: No sensory deficit.      Motor: No weakness.      Coordination: Coordination normal.   Psychiatric:         Mood and Affect: Mood normal.         Behavior: Behavior normal.         Thought Content: Thought content normal.         Judgment: Judgment normal.         Fluids    Intake/Output Summary (Last 24 hours) at 1/3/2025 0458  Last data filed at 1/2/2025 2000  Gross per 24 hour   Intake 1354 ml   Output 300 ml   Net 1054 ml         Laboratory  Recent Labs     12/31/24  1515 01/01/25  0649 01/02/25  0646   WBC 9.0 7.4 10.7   RBC 3.98* 3.58* 3.50*   HEMOGLOBIN 13.0 11.7* 11.7*   HEMATOCRIT 38.5 35.1* 34.5*   MCV 96.7 98.0* 98.6*   MCH 32.7 32.7 33.4*   MCHC 33.8 33.3 33.9   RDW 82.2* 84.7* 79.6*   PLATELETCT 132* 89* 105*   MPV 10.6 10.1 10.4     Recent Labs     12/31/24  1937 01/01/25  0649 01/02/25  0646   SODIUM 128* 126* 129*   POTASSIUM 3.2* 4.2 4.1   CHLORIDE 88* 95* 96   CO2 25 22 25   GLUCOSE 123* 127* 117*   BUN 14 15 13   CREATININE <0.17* 0.88 0.76   CALCIUM 8.0* 8.3* 8.4*     Recent Labs     12/31/24  1515 01/01/25  0649   APTT 40.1*  --    INR 2.29* 2.33*               Imaging  US-THORACENTESIS PUNCTURE RIGHT   Final Result      1. Ultrasound guided right sided therapeutic and diagnostic thoracentesis.      2. 2000 mL of fluid withdrawn.      US-ABDOMEN LTD (SOFT TISSUE)   Final Result      DX-CHEST-PORTABLE (1 VIEW)   Final Result      Right effusion is much smaller following thoracentesis. Moderate to large effusion remains. No pneumothorax.      US-RUQ   Final Result         1. Distended gallbladder with wall thickening and gallstone. Therefore, correlate for potential acute cholecystitis. If clinical picture is unclear, recommend HIDA scan.   2. Cirrhotic appearing liver.      CT-CTA CHEST PULMONARY ARTERY W/ RECONS   Final Result      1.  Very large right-sided pleural effusion which collapses the right long and causes the mediastinum to shift from right to left.      2.  Cirrhosis with borderline splenomegaly.      3.  Small wedge-shaped area of groundglass opacity in the left upper lobe anteriorly consistent with atelectasis, pneumonitis, interstitial edema.      DX-CHEST-PORTABLE (1 VIEW)   Final Result      1.  Complete opacification of the right hemithorax without mediastinal shift consistent with pneumonitis, mass , atelectasis, and/or pleural effusion.      EC-ECHOCARDIOGRAM COMPLETE W/O CONT    (Results Pending)    US-THORACENTESIS PUNCTURE RIGHT    (Results Pending)        Assessment/Plan  * Acute respiratory failure with hypoxia- (present on admission)  Assessment & Plan  54-year-old female with alcoholic liver cirrhosis with ascites, presented with hypoxia, cough and very large right-sided pleural effusion with mediastinal shift, as well as right upper lobe pneumonitis.  CTA negative for PE  COVID-19/influenza/RSV screen negative.  No fever, no leukocytosis.  BNP elevated at 149.  Continuous pulse ox, supplemental oxygen  Will plan for thoracentesis tomorrow and see if we can wean her off oxygen      Lactic acid acidosis  Assessment & Plan  Lactic acid 2.7.  Probably secondary to liver cirrhosis and large pleural effusion.  Doubt sepsis.  Repeat lactic acid    Hyponatremia  Assessment & Plan  Sodium 127.  Probably secondary to alcoholic disease.  Clinically she has pleural effusion and probably ascites, but no lower extremity edema.  Repeat sodium  Consider holding spironolactone if sodium level worsens    Thrombocytopenia (HCC)  Assessment & Plan  Platelets count 132    Coagulopathy (HCC)  Assessment & Plan  INR 2.29.  Probably secondary to liver cirrhosis  Will give vitamin K p.o. 10 mg, repeat INR tomorrow considering plan for thoracentesis    Pleural effusion  Assessment & Plan  very large right-sided pleural effusion with mediastinal shift, as well as right upper lobe pneumonitis.  CTA negative for PE  Etiology may include pneumonitis with associated large pleural effusion, pleural effusion secondary to portal hypertension and CHF  Plan for diagnostic/therapeutic thoracentesis, diagnostic cardiac echo, respiratory PCR panel, procalcitonin    Alcohol abuse- (present on admission)  Assessment & Plan  Last drink 5 days ago.  Currently no evidence of withdrawal  Ordered thiamine p.o. supplementation  Alcohol cessation counseling    Alcoholic cirrhosis of liver with ascites (HCC)- (present on admission)  Assessment &  Plan  MELD score 32 in the setting of continuous alcohol abuse  Maddrey discrimination score 76 indicating poor prognosis.  She may benefit from prednisolone for superimposed alcoholic hepatitis, however infectious etiology of pneumonitis needs to be ruled out first, follow-up with procalcitonin and diagnostic thoracentesis  CTA of the chest showed nodular liver compatible with liver cirrhosis  Ordered right upper quadrant ultrasound to evaluate for biliary obstruction, portal vein thrombosis, masses  We will check ammonia  Continue spironolactone, hold Lasix due to hypokalemia    Hypokalemia- (present on admission)  Assessment & Plan  Potassium 2.6  Ordered p.o. and IV replacement of potassium  Monitor on telemetry for arrhythmia         VTE prophylaxis:   SCDs/TEDs      I have performed a physical exam and reviewed and updated ROS and Plan today (1/2/2025). In review of yesterday's note (1/1/2025), there are no changes except as documented above.  Total time spent 56 minutes. I spent greater than 50% of the time for patient care, counseling, and coordination on this date, including unit/floor time, and face-to-face time with the patient as per interval events, my own review of patient's imaging and lab analysis and developing my assessment and plan above.

## 2025-01-03 NOTE — PROGRESS NOTES
Pt was brought down to discharge lounge. Pt states Ivs were prior removed. Pt did not have any questions regarding discharge. Pt got meds via meds to beds. Pt's  is taking pt home.  Pt states having all belongings

## 2025-01-03 NOTE — CARE PLAN
The patient is Stable - Low risk of patient condition declining or worsening    Shift Goals  Clinical Goals: Monitor labs, orders, and comfort  Patient Goals: rest    Progress made toward(s) clinical / shift goals:    Problem: Knowledge Deficit - Standard  Goal: Patient and family/care givers will demonstrate understanding of plan of care, disease process/condition, diagnostic tests and medications  Outcome: Progressing     Problem: Psychosocial  Goal: Patient's level of anxiety will decrease  Outcome: Progressing  Goal: Patient's ability to verbalize feelings about condition will improve  Outcome: Progressing     Problem: Hemodynamics  Goal: Patient's hemodynamics, fluid balance and neurologic status will be stable or improve  Outcome: Progressing     Problem: Respiratory  Goal: Patient will achieve/maintain optimum respiratory ventilation and gas exchange  Outcome: Progressing

## 2025-01-04 LAB
BACTERIA FLD AEROBE CULT: NORMAL
GRAM STN SPEC: NORMAL
SIGNIFICANT IND 70042: NORMAL
SITE SITE: NORMAL
SOURCE SOURCE: NORMAL

## 2025-01-05 LAB
BACTERIA BLD CULT: NORMAL
BACTERIA BLD CULT: NORMAL
SIGNIFICANT IND 70042: NORMAL
SIGNIFICANT IND 70042: NORMAL
SITE SITE: NORMAL
SITE SITE: NORMAL
SOURCE SOURCE: NORMAL
SOURCE SOURCE: NORMAL

## 2025-01-09 ENCOUNTER — HOSPITAL ENCOUNTER (INPATIENT)
Facility: MEDICAL CENTER | Age: 55
LOS: 4 days | DRG: 433 | End: 2025-01-13
Attending: EMERGENCY MEDICINE | Admitting: STUDENT IN AN ORGANIZED HEALTH CARE EDUCATION/TRAINING PROGRAM
Payer: COMMERCIAL

## 2025-01-09 ENCOUNTER — APPOINTMENT (OUTPATIENT)
Dept: RADIOLOGY | Facility: MEDICAL CENTER | Age: 55
DRG: 433 | End: 2025-01-09
Attending: STUDENT IN AN ORGANIZED HEALTH CARE EDUCATION/TRAINING PROGRAM
Payer: COMMERCIAL

## 2025-01-09 ENCOUNTER — APPOINTMENT (OUTPATIENT)
Dept: RADIOLOGY | Facility: MEDICAL CENTER | Age: 55
DRG: 433 | End: 2025-01-09
Attending: EMERGENCY MEDICINE
Payer: COMMERCIAL

## 2025-01-09 DIAGNOSIS — K70.30 ALCOHOLIC CIRRHOSIS OF LIVER WITHOUT ASCITES (HCC): ICD-10-CM

## 2025-01-09 DIAGNOSIS — E83.39 HYPOPHOSPHATEMIA: ICD-10-CM

## 2025-01-09 DIAGNOSIS — K92.2 UPPER GI BLEED: ICD-10-CM

## 2025-01-09 DIAGNOSIS — J90 PLEURAL EFFUSION: ICD-10-CM

## 2025-01-09 PROBLEM — D72.829 LEUKOCYTOSIS: Status: ACTIVE | Noted: 2025-01-09

## 2025-01-09 LAB
ALBUMIN SERPL BCP-MCNC: 2.8 G/DL (ref 3.2–4.9)
ALBUMIN/GLOB SERPL: 0.6 G/DL
ALP SERPL-CCNC: 147 U/L (ref 30–99)
ALT SERPL-CCNC: 95 U/L (ref 2–50)
ANION GAP SERPL CALC-SCNC: 12 MMOL/L (ref 7–16)
ANION GAP SERPL CALC-SCNC: 16 MMOL/L (ref 7–16)
APTT PPP: 31.3 SEC (ref 24.7–36)
AST SERPL-CCNC: 110 U/L (ref 12–45)
BASOPHILS # BLD AUTO: 0.1 % (ref 0–1.8)
BASOPHILS # BLD: 0.02 K/UL (ref 0–0.12)
BILIRUB SERPL-MCNC: 13.6 MG/DL (ref 0.1–1.5)
BUN SERPL-MCNC: 13 MG/DL (ref 8–22)
BUN SERPL-MCNC: 14 MG/DL (ref 8–22)
CALCIUM ALBUM COR SERPL-MCNC: 9.6 MG/DL (ref 8.5–10.5)
CALCIUM SERPL-MCNC: 8.1 MG/DL (ref 8.5–10.5)
CALCIUM SERPL-MCNC: 8.6 MG/DL (ref 8.5–10.5)
CHLORIDE SERPL-SCNC: 89 MMOL/L (ref 96–112)
CHLORIDE SERPL-SCNC: 91 MMOL/L (ref 96–112)
CO2 SERPL-SCNC: 23 MMOL/L (ref 20–33)
CO2 SERPL-SCNC: 23 MMOL/L (ref 20–33)
COMMENT 1642: NORMAL
CREAT SERPL-MCNC: 0.39 MG/DL (ref 0.5–1.4)
CREAT SERPL-MCNC: 0.7 MG/DL (ref 0.5–1.4)
EKG IMPRESSION: NORMAL
EOSINOPHIL # BLD AUTO: 0.08 K/UL (ref 0–0.51)
EOSINOPHIL NFR BLD: 0.5 % (ref 0–6.9)
ERYTHROCYTE [DISTWIDTH] IN BLOOD BY AUTOMATED COUNT: 75.8 FL (ref 35.9–50)
FLUAV RNA SPEC QL NAA+PROBE: NEGATIVE
FLUBV RNA SPEC QL NAA+PROBE: NEGATIVE
GFR SERPLBLD CREATININE-BSD FMLA CKD-EPI: 101 ML/MIN/1.73 M 2
GFR SERPLBLD CREATININE-BSD FMLA CKD-EPI: 117 ML/MIN/1.73 M 2
GLOBULIN SER CALC-MCNC: 4.6 G/DL (ref 1.9–3.5)
GLUCOSE SERPL-MCNC: 160 MG/DL (ref 65–99)
GLUCOSE SERPL-MCNC: 88 MG/DL (ref 65–99)
HCT VFR BLD AUTO: 43.6 % (ref 37–47)
HGB BLD-MCNC: 15.1 G/DL (ref 12–16)
IMM GRANULOCYTES # BLD AUTO: 0.12 K/UL (ref 0–0.11)
IMM GRANULOCYTES NFR BLD AUTO: 0.8 % (ref 0–0.9)
INR PPP: 1.65 (ref 0.87–1.13)
LYMPHOCYTES # BLD AUTO: 2.08 K/UL (ref 1–4.8)
LYMPHOCYTES NFR BLD: 14.1 % (ref 22–41)
MACROCYTES BLD QL SMEAR: ABNORMAL
MCH RBC QN AUTO: 34.7 PG (ref 27–33)
MCHC RBC AUTO-ENTMCNC: 34.6 G/DL (ref 32.2–35.5)
MCV RBC AUTO: 100.2 FL (ref 81.4–97.8)
MONOCYTES # BLD AUTO: 1.46 K/UL (ref 0–0.85)
MONOCYTES NFR BLD AUTO: 9.9 % (ref 0–13.4)
MORPHOLOGY BLD-IMP: NORMAL
NEUTROPHILS # BLD AUTO: 10.95 K/UL (ref 1.82–7.42)
NEUTROPHILS NFR BLD: 74.6 % (ref 44–72)
NRBC # BLD AUTO: 0.02 K/UL
NRBC BLD-RTO: 0.1 /100 WBC (ref 0–0.2)
NT-PROBNP SERPL IA-MCNC: 118 PG/ML (ref 0–125)
OSMOLALITY SERPL: 277 MOSM/KG H2O (ref 278–298)
OSMOLALITY UR: 260 MOSM/KG H2O (ref 300–900)
PLATELET # BLD AUTO: 154 K/UL (ref 164–446)
PLATELET BLD QL SMEAR: NORMAL
PMV BLD AUTO: 10.1 FL (ref 9–12.9)
POIKILOCYTOSIS BLD QL SMEAR: NORMAL
POLYCHROMASIA BLD QL SMEAR: NORMAL
POTASSIUM SERPL-SCNC: 3.1 MMOL/L (ref 3.6–5.5)
POTASSIUM SERPL-SCNC: 3.5 MMOL/L (ref 3.6–5.5)
PROCALCITONIN SERPL-MCNC: 0.12 NG/ML
PROT SERPL-MCNC: 7.4 G/DL (ref 6–8.2)
PROTHROMBIN TIME: 19.6 SEC (ref 12–14.6)
RBC # BLD AUTO: 4.35 M/UL (ref 4.2–5.4)
RBC BLD AUTO: PRESENT
RSV RNA SPEC QL NAA+PROBE: NEGATIVE
SARS-COV-2 RNA RESP QL NAA+PROBE: NOTDETECTED
SODIUM SERPL-SCNC: 126 MMOL/L (ref 135–145)
SODIUM SERPL-SCNC: 128 MMOL/L (ref 135–145)
SODIUM UR-SCNC: 84 MMOL/L
SPECIMEN SOURCE: NORMAL
TARGETS BLD QL SMEAR: NORMAL
TROPONIN T SERPL-MCNC: 7 NG/L (ref 6–19)
WBC # BLD AUTO: 14.7 K/UL (ref 4.8–10.8)

## 2025-01-09 PROCEDURE — A9270 NON-COVERED ITEM OR SERVICE: HCPCS | Performed by: STUDENT IN AN ORGANIZED HEALTH CARE EDUCATION/TRAINING PROGRAM

## 2025-01-09 PROCEDURE — 700111 HCHG RX REV CODE 636 W/ 250 OVERRIDE (IP): Performed by: STUDENT IN AN ORGANIZED HEALTH CARE EDUCATION/TRAINING PROGRAM

## 2025-01-09 PROCEDURE — 700102 HCHG RX REV CODE 250 W/ 637 OVERRIDE(OP): Performed by: STUDENT IN AN ORGANIZED HEALTH CARE EDUCATION/TRAINING PROGRAM

## 2025-01-09 PROCEDURE — 85025 COMPLETE CBC W/AUTO DIFF WBC: CPT

## 2025-01-09 PROCEDURE — 80053 COMPREHEN METABOLIC PANEL: CPT

## 2025-01-09 PROCEDURE — 80048 BASIC METABOLIC PNL TOTAL CA: CPT

## 2025-01-09 PROCEDURE — 85730 THROMBOPLASTIN TIME PARTIAL: CPT

## 2025-01-09 PROCEDURE — 71045 X-RAY EXAM CHEST 1 VIEW: CPT

## 2025-01-09 PROCEDURE — 85610 PROTHROMBIN TIME: CPT

## 2025-01-09 PROCEDURE — 99285 EMERGENCY DEPT VISIT HI MDM: CPT

## 2025-01-09 PROCEDURE — 0W993ZZ DRAINAGE OF RIGHT PLEURAL CAVITY, PERCUTANEOUS APPROACH: ICD-10-PCS | Performed by: STUDENT IN AN ORGANIZED HEALTH CARE EDUCATION/TRAINING PROGRAM

## 2025-01-09 PROCEDURE — 93005 ELECTROCARDIOGRAM TRACING: CPT | Mod: TC | Performed by: EMERGENCY MEDICINE

## 2025-01-09 PROCEDURE — 83930 ASSAY OF BLOOD OSMOLALITY: CPT

## 2025-01-09 PROCEDURE — 99223 1ST HOSP IP/OBS HIGH 75: CPT | Performed by: STUDENT IN AN ORGANIZED HEALTH CARE EDUCATION/TRAINING PROGRAM

## 2025-01-09 PROCEDURE — 84145 PROCALCITONIN (PCT): CPT

## 2025-01-09 PROCEDURE — 770006 HCHG ROOM/CARE - MED/SURG/GYN SEMI*

## 2025-01-09 PROCEDURE — 84300 ASSAY OF URINE SODIUM: CPT

## 2025-01-09 PROCEDURE — 36415 COLL VENOUS BLD VENIPUNCTURE: CPT

## 2025-01-09 PROCEDURE — 83935 ASSAY OF URINE OSMOLALITY: CPT

## 2025-01-09 PROCEDURE — 83880 ASSAY OF NATRIURETIC PEPTIDE: CPT

## 2025-01-09 PROCEDURE — 84484 ASSAY OF TROPONIN QUANT: CPT

## 2025-01-09 PROCEDURE — 0241U HCHG SARS-COV-2 COVID-19 NFCT DS RESP RNA 4 TRGT MIC: CPT

## 2025-01-09 RX ORDER — CARVEDILOL 3.12 MG/1
3.12 TABLET ORAL 2 TIMES DAILY WITH MEALS
Status: DISCONTINUED | OUTPATIENT
Start: 2025-01-09 | End: 2025-01-13 | Stop reason: HOSPADM

## 2025-01-09 RX ORDER — FUROSEMIDE 10 MG/ML
40 INJECTION INTRAMUSCULAR; INTRAVENOUS
Status: DISCONTINUED | OUTPATIENT
Start: 2025-01-09 | End: 2025-01-13 | Stop reason: HOSPADM

## 2025-01-09 RX ORDER — PREDNISOLONE SODIUM PHOSPHATE 15 MG/5ML
40 SOLUTION ORAL DAILY
Status: DISCONTINUED | OUTPATIENT
Start: 2025-01-09 | End: 2025-01-13 | Stop reason: HOSPADM

## 2025-01-09 RX ORDER — POTASSIUM CHLORIDE 1500 MG/1
20 TABLET, EXTENDED RELEASE ORAL DAILY
Status: DISCONTINUED | OUTPATIENT
Start: 2025-01-10 | End: 2025-01-10

## 2025-01-09 RX ORDER — SPIRONOLACTONE 25 MG/1
25 TABLET ORAL DAILY
Status: DISCONTINUED | OUTPATIENT
Start: 2025-01-09 | End: 2025-01-13

## 2025-01-09 RX ORDER — OMEPRAZOLE 20 MG/1
20 CAPSULE, DELAYED RELEASE ORAL DAILY
Status: DISCONTINUED | OUTPATIENT
Start: 2025-01-10 | End: 2025-01-13 | Stop reason: HOSPADM

## 2025-01-09 RX ADMIN — FUROSEMIDE 40 MG: 10 INJECTION, SOLUTION INTRAVENOUS at 18:02

## 2025-01-09 RX ADMIN — SPIRONOLACTONE 25 MG: 25 TABLET ORAL at 18:02

## 2025-01-09 RX ADMIN — PREDNISOLONE SODIUM PHOSPHATE 39.9 MG: 15 SOLUTION ORAL at 18:02

## 2025-01-09 RX ADMIN — DIBASIC SODIUM PHOSPHATE, MONOBASIC POTASSIUM PHOSPHATE AND MONOBASIC SODIUM PHOSPHATE 250 MG: 852; 155; 130 TABLET ORAL at 18:02

## 2025-01-09 ASSESSMENT — LIFESTYLE VARIABLES
HOW MANY TIMES IN THE PAST YEAR HAVE YOU HAD 5 OR MORE DRINKS IN A DAY: 0
EVER FELT BAD OR GUILTY ABOUT YOUR DRINKING: NO
CONSUMPTION TOTAL: NEGATIVE
AVERAGE NUMBER OF DAYS PER WEEK YOU HAVE A DRINK CONTAINING ALCOHOL: 0
ON A TYPICAL DAY WHEN YOU DRINK ALCOHOL HOW MANY DRINKS DO YOU HAVE: 0
ALCOHOL_USE: NO
TOTAL SCORE: 1
HAVE PEOPLE ANNOYED YOU BY CRITICIZING YOUR DRINKING: NO
TOTAL SCORE: 1
HAVE YOU EVER FELT YOU SHOULD CUT DOWN ON YOUR DRINKING: NO
TOTAL SCORE: 1
EVER HAD A DRINK FIRST THING IN THE MORNING TO STEADY YOUR NERVES TO GET RID OF A HANGOVER: YES

## 2025-01-09 ASSESSMENT — ENCOUNTER SYMPTOMS
COUGH: 0
ABDOMINAL PAIN: 0
HEARTBURN: 0
VOMITING: 0
CHILLS: 0
SHORTNESS OF BREATH: 1
FEVER: 0
DIZZINESS: 0
NAUSEA: 0
SORE THROAT: 0
WEAKNESS: 1

## 2025-01-09 ASSESSMENT — COGNITIVE AND FUNCTIONAL STATUS - GENERAL
TOILETING: A LITTLE
SUGGESTED CMS G CODE MODIFIER MOBILITY: CI
MOVING TO AND FROM BED TO CHAIR: A LITTLE
MOBILITY SCORE: 23
SUGGESTED CMS G CODE MODIFIER DAILY ACTIVITY: CI
DAILY ACTIVITIY SCORE: 23

## 2025-01-09 ASSESSMENT — SOCIAL DETERMINANTS OF HEALTH (SDOH)

## 2025-01-09 ASSESSMENT — PAIN DESCRIPTION - PAIN TYPE
TYPE: ACUTE PAIN

## 2025-01-09 ASSESSMENT — PATIENT HEALTH QUESTIONNAIRE - PHQ9
2. FEELING DOWN, DEPRESSED, IRRITABLE, OR HOPELESS: NOT AT ALL
1. LITTLE INTEREST OR PLEASURE IN DOING THINGS: NOT AT ALL
SUM OF ALL RESPONSES TO PHQ9 QUESTIONS 1 AND 2: 0

## 2025-01-09 ASSESSMENT — FIBROSIS 4 INDEX: FIB4 SCORE: 6.52

## 2025-01-09 NOTE — ED NOTES
Medication history reviewed with PT at bedside    [facility] rec is complete per PT reporting    Allergies reviewed.     Patient denies any outpatient antibiotics in the last 30 days.     Patient is not taking anticoagulants.    Preferred pharmacy for this visit - [facility] on [address] ([phone])

## 2025-01-09 NOTE — ED PROVIDER NOTES
ED Provider Note    CHIEF COMPLAINT  Chief Complaint   Patient presents with    Shortness of Breath     Recent admission with multiple thoracentesis,  on Sunday SOB started to return and concerned that fluid is increasing again.       HPI/ROS  Stacy Oliva is a 54 y.o. female who presents with shortness of breath.  The patient states that she was recently admitted and had a thoracentesis for pleural effusion.  She has a known history of alcohol induced hepatitis and cirrhosis and has had several paracentesis in the past.  She states he had a removed a large amount of fluid from the right chest done at 2 different times.  She did have some difficulty with breathing due to suspected atelectasis and she was admitted to the hospital.  She has not had any associated fevers.  She presents with increased work of breathing.  She does have a cough.  Her last drink was about 2 weeks ago.  She does have some jaundice.  She has some slight abdominal distention but no discomfort.    PAST MEDICAL HISTORY   has a past medical history of Liver disease and Patient denies medical problems.    SURGICAL HISTORY   has a past surgical history that includes upper gi endoscopy,diagnosis (N/A, 2/4/2024) and upper gi endoscopy,ligat varix (2/4/2024).    FAMILY HISTORY  History reviewed. No pertinent family history.    SOCIAL HISTORY  Social History     Tobacco Use    Smoking status: Never    Smokeless tobacco: Never   Vaping Use    Vaping status: Never Used   Substance and Sexual Activity    Alcohol use: Not Currently     Comment: quit august -- used to drink 1-2 glasses wine/night    Drug use: Yes     Comment: marijuana    Sexual activity: Not on file       CURRENT MEDICATIONS  Home Medications       Reviewed by Flornecia Valadez R.N. (Registered Nurse) on 01/09/25 at 1107  Med List Status: Partial     Medication Last Dose Status   carvedilol (COREG) 6.25 MG Tab  Active   omeprazole (PRILOSEC) 20 MG delayed-release capsule  Active  "  prednisoLONE sodium phosphate (PEDIAPRED) 15 mg/5mL oral solution  Active   spironolactone (ALDACTONE) 25 MG Tab  Active                  Audit from Redirected Encounters    **Home medications have not yet been reviewed for this encounter**         ALLERGIES  No Known Allergies    PHYSICAL EXAM  VITAL SIGNS: /62   Pulse 73   Temp 36.7 °C (98 °F) (Temporal)   Resp 16   Ht 1.651 m (5' 5\")   Wt 71.3 kg (157 lb 3 oz)   SpO2 91%   BMI 26.16 kg/m²    In general the patient appears ill    HEENT unremarkable    Pulmonary the patient has significant decrease in breath sounds on the right compared to the left with some splinting.    Cardiovascular S1-S2 with a regular rate and rhythm    GI slight distention with no tenderness and hepatomegaly    Skin diffuse jaundice    Extremities no concerning distal edema    Neurologic examination is grossly intact    EKG/LABS  Results for orders placed or performed during the hospital encounter of 01/09/25   CBC with Differential    Collection Time: 01/09/25 11:14 AM   Result Value Ref Range    WBC 14.7 (H) 4.8 - 10.8 K/uL    RBC 4.35 4.20 - 5.40 M/uL    Hemoglobin 15.1 12.0 - 16.0 g/dL    Hematocrit 43.6 37.0 - 47.0 %    .2 (H) 81.4 - 97.8 fL    MCH 34.7 (H) 27.0 - 33.0 pg    MCHC 34.6 32.2 - 35.5 g/dL    RDW 75.8 (H) 35.9 - 50.0 fL    Platelet Count 154 (L) 164 - 446 K/uL    MPV 10.1 9.0 - 12.9 fL    Neutrophils-Polys 74.60 (H) 44.00 - 72.00 %    Lymphocytes 14.10 (L) 22.00 - 41.00 %    Monocytes 9.90 0.00 - 13.40 %    Eosinophils 0.50 0.00 - 6.90 %    Basophils 0.10 0.00 - 1.80 %    Immature Granulocytes 0.80 0.00 - 0.90 %    Nucleated RBC 0.10 0.00 - 0.20 /100 WBC    Neutrophils (Absolute) 10.95 (H) 1.82 - 7.42 K/uL    Lymphs (Absolute) 2.08 1.00 - 4.80 K/uL    Monos (Absolute) 1.46 (H) 0.00 - 0.85 K/uL    Eos (Absolute) 0.08 0.00 - 0.51 K/uL    Baso (Absolute) 0.02 0.00 - 0.12 K/uL    Immature Granulocytes (abs) 0.12 (H) 0.00 - 0.11 K/uL    NRBC (Absolute) 0.02 " K/uL    Macrocytosis 3+ (A)    Comp Metabolic Panel    Collection Time: 01/09/25 11:14 AM   Result Value Ref Range    Sodium 126 (L) 135 - 145 mmol/L    Potassium 3.5 (L) 3.6 - 5.5 mmol/L    Chloride 91 (L) 96 - 112 mmol/L    Co2 23 20 - 33 mmol/L    Anion Gap 12.0 7.0 - 16.0    Glucose 88 65 - 99 mg/dL    Bun 13 8 - 22 mg/dL    Creatinine 0.70 0.50 - 1.40 mg/dL    Calcium 8.6 8.5 - 10.5 mg/dL    Correct Calcium 9.6 8.5 - 10.5 mg/dL    AST(SGOT) 110 (H) 12 - 45 U/L    ALT(SGPT) 95 (H) 2 - 50 U/L    Alkaline Phosphatase 147 (H) 30 - 99 U/L    Total Bilirubin 13.6 (H) 0.1 - 1.5 mg/dL    Albumin 2.8 (L) 3.2 - 4.9 g/dL    Total Protein 7.4 6.0 - 8.2 g/dL    Globulin 4.6 (H) 1.9 - 3.5 g/dL    A-G Ratio 0.6 g/dL   proBrain Natriuretic Peptide, NT    Collection Time: 01/09/25 11:14 AM   Result Value Ref Range    NT-proBNP 118 0 - 125 pg/mL   Troponin    Collection Time: 01/09/25 11:14 AM   Result Value Ref Range    Troponin T 7 6 - 19 ng/L   Prothrombin Time    Collection Time: 01/09/25 11:14 AM   Result Value Ref Range    PT 19.6 (H) 12.0 - 14.6 sec    INR 1.65 (H) 0.87 - 1.13   PTT    Collection Time: 01/09/25 11:14 AM   Result Value Ref Range    APTT 31.3 24.7 - 36.0 sec   ESTIMATED GFR    Collection Time: 01/09/25 11:14 AM   Result Value Ref Range    GFR (CKD-EPI) 101 >60 mL/min/1.73 m 2   PLATELET ESTIMATE    Collection Time: 01/09/25 11:14 AM   Result Value Ref Range    Plt Estimation Decreased    MORPHOLOGY    Collection Time: 01/09/25 11:14 AM   Result Value Ref Range    RBC Morphology Present     Polychromia 1+     Poikilocytosis 1+     Target Cells 1+    PERIPHERAL SMEAR REVIEW    Collection Time: 01/09/25 11:14 AM   Result Value Ref Range    Peripheral Smear Review see below    DIFFERENTIAL COMMENT    Collection Time: 01/09/25 11:14 AM   Result Value Ref Range    Comments-Diff see below    EKG    Collection Time: 01/09/25  1:07 PM   Result Value Ref Range    Report       [facility]  "Dept.    Test Date:  2025  Pt Name:    CARMEN CUADRA                 Department: ER  MRN:        [id number]                      Room:  Gender:     Female                       Technician: [id number]  :        [id number]                   Requested By:ER TRIAGE PROTOCOL  Order #:    [id number]                    Reading MD: YOVANNY ZUNIGA MD    Measurements  Intervals                                Axis  Rate:       64                           P:          15  FL:         108                          QRS:        71  QRSD:       93                           T:          269  QT:         397  QTc:        410    Interpretive Statements  Twelve-lead EKG shows a normal sinus rhythm with a ventricular to 64, normal  QRS, short FL interval, no ST segment elevation or depression, normal T  waves.  Electronically Signed On 2025 13:07:08 PST by YOVANNY ZUNIGA MD             RADIOLOGY/PROCEDURES   I have independently interpreted the diagnostic imaging associated with this visit and am waiting the final reading from the radiologist.   My preliminary interpretation is as follows: Chest x-ray is reviewed and the patient does have a white out of the right side of the chest consistent with a large pleural effusion    Radiologist interpretation:  DX-CHEST-PORTABLE (1 VIEW)   Final Result      1.  Marked progressive opacification of the right hemithorax which now shows a complete \"whiteout\". Findings consistent with large right pleural effusion and underlying compressive atelectasis.   2.  The left lung field/hemithorax remains clear.          COURSE & MEDICAL DECISION MAKING    This a 54-year-old female who presents acutely ill in appearance with difficulty with breathing.  I was concern for recurrence of the pleural effusion and the chest x-ray does show right out of the right hemithorax.  Fortunately the patient is tolerating the large pleural effusion and she is not hypoxic nor does she have any hemodynamic " instability.  I suspect this is from her cirrhosis although it is concerning due to the quick recurrence and leukocytosis this could be from infectious versus another source.  The patient will be admitted to the hospitalist for IR consultation and drainage.    FINAL DIAGNOSIS  1.  Dyspnea  2.  Large right pleural effusion  3.  Alcohol-induced cirrhosis    Disposition  Patient will be admitted she is currently hemodynamically stable     Electronically signed by: Viet Mathews M.D., 1/9/2025 12:01 PM

## 2025-01-09 NOTE — PROGRESS NOTES
4 Eyes Skin Assessment Completed by Maria Del Rosario Phoenix, RN and GUS Jalloh.    Head WDL  Ears WDL  Nose WDL  Mouth WDL  Neck WDL  Breast/Chest WDL  Shoulder Blades WDL  Spine Bruising R side of upper back  (R) Arm/Elbow/Hand WDL  (L) Arm/Elbow/Hand WDL  Abdomen WDL  Groin WDL  Scrotum/Coccyx/Buttocks WDL  (R) Leg WDL  (L) Leg WDL  (R) Heel/Foot/Toe Blanching  (L) Heel/Foot/Toe Blanching          Devices In Places- N/A      Interventions In Place Pillows    Possible Skin Injury No    Pictures Uploaded Into Epic N/A  Wound Consult Placed N/A  RN Wound Prevention Protocol Ordered No

## 2025-01-09 NOTE — H&P
"Hospital Medicine History & Physical Note    Date of Service  1/9/2025    Primary Care Physician  Saeid Bell M.D.    Consultants  IR consult     Code Status  Full Code    Chief Complaint  Chief Complaint   Patient presents with    Shortness of Breath     Recent admission with multiple thoracentesis,  on Sunday SOB started to return and concerned that fluid is increasing again.         History of Presenting Illness  Stacy Oliva is a 54 y.o. female who presented 1/9/2025 with past medical history of alcoholic liver cirrhosis, ascites, GI bleed, alcohol abuse last drink was 2 weeks ago, pleural effusion, who was recently admitted to the hospital and had thoracentesis now presented to the ED complaining of shortness of breath. Denies cough or congestion. Denies fever or chills. In ED patient is on room air. Hemodynamically stable. CBC showed WBC 14.7. platelet 154, CMP panel showed Na of 126, K 3.5, , ALT 95, Alp 147, total bilirubin 13.6. CXR showed 1.  Marked progressive opacification of the right hemithorax which now shows a complete \"whiteout\". Findings consistent with large right pleural effusion and underlying compressive atelectasis. 2.  The left lung field/hemithorax remains clear. I was requested to admit patient to the hospital for further management.       I discussed the plan of care with patient.    Review of Systems  Review of Systems   Constitutional:  Negative for chills and fever.   HENT:  Negative for congestion and sore throat.    Respiratory:  Positive for shortness of breath. Negative for cough.    Cardiovascular:  Negative for chest pain and leg swelling.   Gastrointestinal:  Negative for abdominal pain, heartburn, nausea and vomiting.   Neurological:  Positive for weakness. Negative for dizziness.       Past Medical History   has a past medical history of Liver disease and Patient denies medical problems.    Surgical History   has a past surgical history that includes pr upper gi " endoscopy,diagnosis (N/A, 2/4/2024) and pr upper gi endoscopy,ligat varix (2/4/2024).     Family History  family history is not on file.   Family history reviewed with patient. There is no family history that is pertinent to the chief complaint.     Social History   reports that she has never smoked. She has never used smokeless tobacco. She reports that she does not currently use alcohol. She reports current drug use.    Allergies  No Known Allergies    Medications  Prior to Admission Medications   Prescriptions Last Dose Informant Patient Reported? Taking?   carvedilol (COREG) 6.25 MG Tab   No No   Sig: Take one-half (0.5) Tablet by mouth 2 times a day with meals. Hold for SBP < 90 or HR <55   omeprazole (PRILOSEC) 20 MG delayed-release capsule   No No   Sig: Take 1 Capsule by mouth every day.   prednisoLONE sodium phosphate (PEDIAPRED) 15 mg/5mL oral solution   No No   Sig: Take 13.3 mL by mouth every day for 24 days.   spironolactone (ALDACTONE) 25 MG Tab   No No   Sig: Take 1 Tablet by mouth every day.      Facility-Administered Medications: None       Physical Exam  Temp:  [36.2 °C (97.1 °F)-36.7 °C (98 °F)] 36.5 °C (97.7 °F)  Pulse:  [65-88] 88  Resp:  [14-16] 16  BP: ()/(55-74) 98/74  SpO2:  [90 %-94 %] 93 %  Blood Pressure: 102/62   Temperature: 36.7 °C (98 °F)   Pulse: 73   Respiration: 16   Pulse Oximetry: 91 %       Physical Exam  Constitutional:       Appearance: She is ill-appearing.   HENT:      Head: Normocephalic.      Nose: Nose normal.      Mouth/Throat:      Mouth: Mucous membranes are moist.   Cardiovascular:      Rate and Rhythm: Normal rate.   Pulmonary:      Effort: Pulmonary effort is normal.      Breath sounds: Rales present.   Abdominal:      General: Abdomen is flat.   Musculoskeletal:         General: Normal range of motion.   Skin:     General: Skin is warm.   Neurological:      General: No focal deficit present.      Mental Status: She is alert and oriented to person, place, and  "time.         Laboratory:  Recent Labs     01/09/25  1114   WBC 14.7*   RBC 4.35   HEMOGLOBIN 15.1   HEMATOCRIT 43.6   .2*   MCH 34.7*   MCHC 34.6   RDW 75.8*   PLATELETCT 154*   MPV 10.1     Recent Labs     01/09/25  1114   SODIUM 126*   POTASSIUM 3.5*   CHLORIDE 91*   CO2 23   GLUCOSE 88   BUN 13   CREATININE 0.70   CALCIUM 8.6     Recent Labs     01/09/25  1114   ALTSGPT 95*   ASTSGOT 110*   ALKPHOSPHAT 147*   TBILIRUBIN 13.6*   GLUCOSE 88     Recent Labs     01/09/25  1114   APTT 31.3   INR 1.65*     Recent Labs     01/09/25  1114   NTPROBNP 118         Recent Labs     01/09/25  1114   TROPONINT 7       Imaging:  DX-CHEST-PORTABLE (1 VIEW)   Final Result      1.  Marked progressive opacification of the right hemithorax which now shows a complete \"whiteout\". Findings consistent with large right pleural effusion and underlying compressive atelectasis.   2.  The left lung field/hemithorax remains clear.      US-THORACENTESIS PUNCTURE RIGHT    (Results Pending)   CT-CHEST (THORAX) WITH    (Results Pending)   EC-ECHOCARDIOGRAM COMPLETE W/O CONT    (Results Pending)         Assessment/Plan:  Justification for Admission Status  I anticipate this patient will require at least two midnights for appropriate medical management, necessitating inpatient admission because of recurrent pleural effusion requiring further workup.     Patient will need a Med/Surg bed on EMERGENCY service .  The need is secondary to above.    * Recurrent pleural effusion- (present on admission)  Assessment & Plan  Now w/ ecurrent pleural effusion and  complaining of shortness of breath, likely related to alcoholic liver cirrohsis    CXR in ED showed 1.  Marked progressive opacification of the right hemithorax which now shows a complete \"whiteout\". Findings consistent with large right pleural effusion and underlying compressive atelectasis.  2.  The left lung field/hemithorax remains clear  I will obtain CT thorax w/o to further evaluate "   US guided thoracentesis ordered.  IR consult placed  IV lasix 40 mg daily   Will order obtain an Echo   Consider obtaining pulmonary consult     Leukocytosis  Assessment & Plan  Likely reactive   Will hold on antibiotics for now  Check procalcitonin   Check COVID 19   Repeat CBC in am     Hyponatremia- (present on admission)  Assessment & Plan  Likely related to fluids overload from pleural effusion and ascites and liver cirrohsis  IV lasix ordered  Frequent BMP check   Check serum osmolarity, urine osmolarity, and urine sodium   Goal correction 6-8 meq in 24 hours     Advance care planning- (present on admission)  Assessment & Plan  Full code per patient wishes     Alcoholic cirrhosis of liver with ascites (HCC)- (present on admission)  Assessment & Plan  Meld score 28 with 27-32% mortality in 90 day   Maddrey's discrimination score 41.2   Continue steroid   Continue spiranolactone  IV lasix added         Hypokalemia- (present on admission)  Assessment & Plan  Replete and monitor         VTE prophylaxis: SCDs/TEDs chemical Dvt ppx holding for thoracentesis

## 2025-01-09 NOTE — ED TRIAGE NOTES
Pt  to triage with   Chief Complaint   Patient presents with    Shortness of Breath     Recent admission with multiple thoracentesis,  on Sunday SOB started to return and concerned that fluid is increasing again.       EKG completed.   Protocol ordered.  Pt Informed regarding triage process and verbalized understanding to inform triage tech or RN for any changes in condition. Placed in lobby.

## 2025-01-10 ENCOUNTER — HOSPITAL ENCOUNTER (OUTPATIENT)
Dept: RADIOLOGY | Facility: MEDICAL CENTER | Age: 55
End: 2025-01-10
Attending: STUDENT IN AN ORGANIZED HEALTH CARE EDUCATION/TRAINING PROGRAM
Payer: COMMERCIAL

## 2025-01-10 ENCOUNTER — APPOINTMENT (OUTPATIENT)
Dept: RADIOLOGY | Facility: MEDICAL CENTER | Age: 55
DRG: 433 | End: 2025-01-10
Attending: STUDENT IN AN ORGANIZED HEALTH CARE EDUCATION/TRAINING PROGRAM
Payer: COMMERCIAL

## 2025-01-10 ENCOUNTER — PATIENT OUTREACH (OUTPATIENT)
Dept: HEALTH INFORMATION MANAGEMENT | Facility: OTHER | Age: 55
End: 2025-01-10

## 2025-01-10 LAB
ALBUMIN SERPL BCP-MCNC: 2.6 G/DL (ref 3.2–4.9)
ALBUMIN/GLOB SERPL: 0.7 G/DL
ALP SERPL-CCNC: 123 U/L (ref 30–99)
ALT SERPL-CCNC: 85 U/L (ref 2–50)
AMYLASE FLD-CCNC: 29 U/L
ANION GAP SERPL CALC-SCNC: 11 MMOL/L (ref 7–16)
ANION GAP SERPL CALC-SCNC: 12 MMOL/L (ref 7–16)
APPEARANCE FLD: CLEAR
AST SERPL-CCNC: 100 U/L (ref 12–45)
BILIRUB SERPL-MCNC: 12.7 MG/DL (ref 0.1–1.5)
BODY FLD TYPE: NORMAL
BUN SERPL-MCNC: 14 MG/DL (ref 8–22)
BUN SERPL-MCNC: 14 MG/DL (ref 8–22)
CALCIUM ALBUM COR SERPL-MCNC: 9.2 MG/DL (ref 8.5–10.5)
CALCIUM SERPL-MCNC: 8.1 MG/DL (ref 8.5–10.5)
CALCIUM SERPL-MCNC: 8.4 MG/DL (ref 8.5–10.5)
CELLS FLD: 6
CHLORIDE SERPL-SCNC: 87 MMOL/L (ref 96–112)
CHLORIDE SERPL-SCNC: 91 MMOL/L (ref 96–112)
CO2 SERPL-SCNC: 24 MMOL/L (ref 20–33)
CO2 SERPL-SCNC: 25 MMOL/L (ref 20–33)
COLOR FLD: YELLOW
CREAT SERPL-MCNC: 0.81 MG/DL (ref 0.5–1.4)
CREAT SERPL-MCNC: 0.84 MG/DL (ref 0.5–1.4)
CYTOLOGY REG CYTOL: NORMAL
ERYTHROCYTE [DISTWIDTH] IN BLOOD BY AUTOMATED COUNT: 76.3 FL (ref 35.9–50)
FUNGUS SPEC FUNGUS STN: NORMAL
GFR SERPLBLD CREATININE-BSD FMLA CKD-EPI: 82 ML/MIN/1.73 M 2
GFR SERPLBLD CREATININE-BSD FMLA CKD-EPI: 85 ML/MIN/1.73 M 2
GLOBULIN SER CALC-MCNC: 3.8 G/DL (ref 1.9–3.5)
GLUCOSE FLD-MCNC: 149 MG/DL
GLUCOSE SERPL-MCNC: 138 MG/DL (ref 65–99)
GLUCOSE SERPL-MCNC: 139 MG/DL (ref 65–99)
GRAM STN SPEC: NORMAL
HCT VFR BLD AUTO: 39.5 % (ref 37–47)
HGB BLD-MCNC: 13.7 G/DL (ref 12–16)
LDH FLD L TO P-CCNC: 29 U/L
LDH SERPL L TO P-CCNC: 414 U/L (ref 107–266)
LYMPHOCYTES NFR FLD: 4 %
MAGNESIUM SERPL-MCNC: 1.8 MG/DL (ref 1.5–2.5)
MCH RBC QN AUTO: 35.1 PG (ref 27–33)
MCHC RBC AUTO-ENTMCNC: 34.7 G/DL (ref 32.2–35.5)
MCV RBC AUTO: 101.3 FL (ref 81.4–97.8)
MONOS+MACROS NFR FLD MANUAL: 82 %
NEUTROPHILS NFR FLD: 8 %
NUC CELL # FLD: 62 CELLS/UL
PH FLD: 8 [PH]
PLATELET # BLD AUTO: 144 K/UL (ref 164–446)
PMV BLD AUTO: 10.3 FL (ref 9–12.9)
POTASSIUM SERPL-SCNC: 3.3 MMOL/L (ref 3.6–5.5)
POTASSIUM SERPL-SCNC: 3.6 MMOL/L (ref 3.6–5.5)
PROT FLD-MCNC: 0.5 G/DL
PROT SERPL-MCNC: 6.4 G/DL (ref 6–8.2)
RBC # BLD AUTO: 3.9 M/UL (ref 4.2–5.4)
RBC # FLD: <2000 CELLS/UL
SIGNIFICANT IND 70042: NORMAL
SIGNIFICANT IND 70042: NORMAL
SITE SITE: NORMAL
SITE SITE: NORMAL
SODIUM SERPL-SCNC: 124 MMOL/L (ref 135–145)
SODIUM SERPL-SCNC: 126 MMOL/L (ref 135–145)
SODIUM SERPL-SCNC: 128 MMOL/L (ref 135–145)
SOURCE SOURCE: NORMAL
SOURCE SOURCE: NORMAL
WBC # BLD AUTO: 10.4 K/UL (ref 4.8–10.8)

## 2025-01-10 PROCEDURE — 700102 HCHG RX REV CODE 250 W/ 637 OVERRIDE(OP): Performed by: STUDENT IN AN ORGANIZED HEALTH CARE EDUCATION/TRAINING PROGRAM

## 2025-01-10 PROCEDURE — 87206 SMEAR FLUORESCENT/ACID STAI: CPT

## 2025-01-10 PROCEDURE — 700111 HCHG RX REV CODE 636 W/ 250 OVERRIDE (IP): Mod: JZ | Performed by: STUDENT IN AN ORGANIZED HEALTH CARE EDUCATION/TRAINING PROGRAM

## 2025-01-10 PROCEDURE — 84295 ASSAY OF SERUM SODIUM: CPT

## 2025-01-10 PROCEDURE — 87205 SMEAR GRAM STAIN: CPT | Mod: 91

## 2025-01-10 PROCEDURE — 83735 ASSAY OF MAGNESIUM: CPT

## 2025-01-10 PROCEDURE — 99233 SBSQ HOSP IP/OBS HIGH 50: CPT | Performed by: INTERNAL MEDICINE

## 2025-01-10 PROCEDURE — 700117 HCHG RX CONTRAST REV CODE 255: Performed by: STUDENT IN AN ORGANIZED HEALTH CARE EDUCATION/TRAINING PROGRAM

## 2025-01-10 PROCEDURE — 88112 CYTOPATH CELL ENHANCE TECH: CPT | Mod: 26 | Performed by: STUDENT IN AN ORGANIZED HEALTH CARE EDUCATION/TRAINING PROGRAM

## 2025-01-10 PROCEDURE — 85027 COMPLETE CBC AUTOMATED: CPT

## 2025-01-10 PROCEDURE — 700102 HCHG RX REV CODE 250 W/ 637 OVERRIDE(OP): Performed by: INTERNAL MEDICINE

## 2025-01-10 PROCEDURE — 88112 CYTOPATH CELL ENHANCE TECH: CPT | Performed by: STUDENT IN AN ORGANIZED HEALTH CARE EDUCATION/TRAINING PROGRAM

## 2025-01-10 PROCEDURE — 88342 IMHCHEM/IMCYTCHM 1ST ANTB: CPT | Performed by: STUDENT IN AN ORGANIZED HEALTH CARE EDUCATION/TRAINING PROGRAM

## 2025-01-10 PROCEDURE — 83615 LACTATE (LD) (LDH) ENZYME: CPT

## 2025-01-10 PROCEDURE — 71045 X-RAY EXAM CHEST 1 VIEW: CPT

## 2025-01-10 PROCEDURE — 80048 BASIC METABOLIC PNL TOTAL CA: CPT

## 2025-01-10 PROCEDURE — 87015 SPECIMEN INFECT AGNT CONCNTJ: CPT

## 2025-01-10 PROCEDURE — 82945 GLUCOSE OTHER FLUID: CPT

## 2025-01-10 PROCEDURE — 88305 TISSUE EXAM BY PATHOLOGIST: CPT | Performed by: STUDENT IN AN ORGANIZED HEALTH CARE EDUCATION/TRAINING PROGRAM

## 2025-01-10 PROCEDURE — 89051 BODY FLUID CELL COUNT: CPT

## 2025-01-10 PROCEDURE — 71260 CT THORAX DX C+: CPT

## 2025-01-10 PROCEDURE — C1729 CATH, DRAINAGE: HCPCS

## 2025-01-10 PROCEDURE — 87102 FUNGUS ISOLATION CULTURE: CPT

## 2025-01-10 PROCEDURE — 36415 COLL VENOUS BLD VENIPUNCTURE: CPT

## 2025-01-10 PROCEDURE — 700111 HCHG RX REV CODE 636 W/ 250 OVERRIDE (IP): Mod: JZ | Performed by: INTERNAL MEDICINE

## 2025-01-10 PROCEDURE — 87116 MYCOBACTERIA CULTURE: CPT

## 2025-01-10 PROCEDURE — A9270 NON-COVERED ITEM OR SERVICE: HCPCS | Performed by: INTERNAL MEDICINE

## 2025-01-10 PROCEDURE — 82150 ASSAY OF AMYLASE: CPT

## 2025-01-10 PROCEDURE — 83986 ASSAY PH BODY FLUID NOS: CPT

## 2025-01-10 PROCEDURE — 88341 IMHCHEM/IMCYTCHM EA ADD ANTB: CPT | Mod: 26 | Performed by: STUDENT IN AN ORGANIZED HEALTH CARE EDUCATION/TRAINING PROGRAM

## 2025-01-10 PROCEDURE — A9270 NON-COVERED ITEM OR SERVICE: HCPCS | Performed by: STUDENT IN AN ORGANIZED HEALTH CARE EDUCATION/TRAINING PROGRAM

## 2025-01-10 PROCEDURE — 80053 COMPREHEN METABOLIC PANEL: CPT

## 2025-01-10 PROCEDURE — 84157 ASSAY OF PROTEIN OTHER: CPT

## 2025-01-10 PROCEDURE — 88305 TISSUE EXAM BY PATHOLOGIST: CPT | Mod: 26 | Performed by: STUDENT IN AN ORGANIZED HEALTH CARE EDUCATION/TRAINING PROGRAM

## 2025-01-10 PROCEDURE — 87070 CULTURE OTHR SPECIMN AEROBIC: CPT

## 2025-01-10 PROCEDURE — 770006 HCHG ROOM/CARE - MED/SURG/GYN SEMI*

## 2025-01-10 PROCEDURE — 88342 IMHCHEM/IMCYTCHM 1ST ANTB: CPT | Mod: 26 | Performed by: STUDENT IN AN ORGANIZED HEALTH CARE EDUCATION/TRAINING PROGRAM

## 2025-01-10 PROCEDURE — 88341 IMHCHEM/IMCYTCHM EA ADD ANTB: CPT | Mod: 91 | Performed by: STUDENT IN AN ORGANIZED HEALTH CARE EDUCATION/TRAINING PROGRAM

## 2025-01-10 RX ORDER — POTASSIUM CHLORIDE 1500 MG/1
40 TABLET, EXTENDED RELEASE ORAL DAILY
Status: DISCONTINUED | OUTPATIENT
Start: 2025-01-11 | End: 2025-01-10

## 2025-01-10 RX ORDER — POTASSIUM CHLORIDE 1500 MG/1
40 TABLET, EXTENDED RELEASE ORAL 2 TIMES DAILY
Status: DISCONTINUED | OUTPATIENT
Start: 2025-01-10 | End: 2025-01-13 | Stop reason: HOSPADM

## 2025-01-10 RX ORDER — POTASSIUM CHLORIDE 1500 MG/1
40 TABLET, EXTENDED RELEASE ORAL DAILY
Status: COMPLETED | OUTPATIENT
Start: 2025-01-10 | End: 2025-01-10

## 2025-01-10 RX ORDER — LACTULOSE 10 G/15ML
30 SOLUTION ORAL 3 TIMES DAILY
Status: DISCONTINUED | OUTPATIENT
Start: 2025-01-10 | End: 2025-01-13 | Stop reason: HOSPADM

## 2025-01-10 RX ORDER — ENOXAPARIN SODIUM 100 MG/ML
40 INJECTION SUBCUTANEOUS DAILY
Status: DISCONTINUED | OUTPATIENT
Start: 2025-01-10 | End: 2025-01-13 | Stop reason: HOSPADM

## 2025-01-10 RX ADMIN — POTASSIUM CHLORIDE 40 MEQ: 1500 TABLET, EXTENDED RELEASE ORAL at 08:36

## 2025-01-10 RX ADMIN — POTASSIUM CHLORIDE 40 MEQ: 1500 TABLET, EXTENDED RELEASE ORAL at 18:04

## 2025-01-10 RX ADMIN — OMEPRAZOLE 20 MG: 20 CAPSULE, DELAYED RELEASE ORAL at 05:27

## 2025-01-10 RX ADMIN — DIBASIC SODIUM PHOSPHATE, MONOBASIC POTASSIUM PHOSPHATE AND MONOBASIC SODIUM PHOSPHATE 250 MG: 852; 155; 130 TABLET ORAL at 05:27

## 2025-01-10 RX ADMIN — CARVEDILOL 3.12 MG: 3.12 TABLET, FILM COATED ORAL at 08:35

## 2025-01-10 RX ADMIN — SPIRONOLACTONE 25 MG: 25 TABLET ORAL at 05:27

## 2025-01-10 RX ADMIN — FUROSEMIDE 40 MG: 10 INJECTION, SOLUTION INTRAVENOUS at 05:43

## 2025-01-10 RX ADMIN — IOHEXOL 75 ML: 350 INJECTION, SOLUTION INTRAVENOUS at 09:15

## 2025-01-10 RX ADMIN — POTASSIUM CHLORIDE 20 MEQ: 1500 TABLET, EXTENDED RELEASE ORAL at 05:27

## 2025-01-10 RX ADMIN — LACTULOSE 30 ML: 10 SOLUTION ORAL at 18:05

## 2025-01-10 RX ADMIN — ENOXAPARIN SODIUM 40 MG: 100 INJECTION SUBCUTANEOUS at 18:05

## 2025-01-10 RX ADMIN — PREDNISOLONE SODIUM PHOSPHATE 39.9 MG: 15 SOLUTION ORAL at 05:43

## 2025-01-10 RX ADMIN — LACTULOSE 30 ML: 10 SOLUTION ORAL at 11:57

## 2025-01-10 SDOH — ECONOMIC STABILITY: HOUSING INSECURITY: IN THE LAST 12 MONTHS, WAS THERE A TIME WHEN YOU WERE NOT ABLE TO PAY THE MORTGAGE OR RENT ON TIME?: NO

## 2025-01-10 SDOH — SOCIAL STABILITY: SOCIAL INSECURITY: WITHIN THE LAST YEAR, HAVE YOU BEEN HUMILIATED OR EMOTIONALLY ABUSED IN OTHER WAYS BY YOUR PARTNER OR EX-PARTNER?: NO

## 2025-01-10 SDOH — HEALTH STABILITY: PHYSICAL HEALTH
HOW OFTEN DO YOU NEED TO HAVE SOMEONE HELP YOU WHEN YOU READ INSTRUCTIONS, PAMPHLETS, OR OTHER WRITTEN MATERIAL FROM YOUR DOCTOR OR PHARMACY?: RARELY

## 2025-01-10 SDOH — SOCIAL STABILITY: SOCIAL INSECURITY
WITHIN THE LAST YEAR, HAVE YOU BEEN KICKED, HIT, SLAPPED, OR OTHERWISE PHYSICALLY HURT BY YOUR PARTNER OR EX-PARTNER?: NO

## 2025-01-10 SDOH — SOCIAL STABILITY: SOCIAL INSECURITY: WITHIN THE LAST YEAR, HAVE YOU BEEN AFRAID OF YOUR PARTNER OR EX-PARTNER?: NO

## 2025-01-10 SDOH — ECONOMIC STABILITY: HOUSING INSECURITY: AT ANY TIME IN THE PAST 12 MONTHS, WERE YOU HOMELESS OR LIVING IN A SHELTER (INCLUDING NOW)?: NO

## 2025-01-10 SDOH — ECONOMIC STABILITY: FOOD INSECURITY: WITHIN THE PAST 12 MONTHS, YOU WORRIED THAT YOUR FOOD WOULD RUN OUT BEFORE YOU GOT THE MONEY TO BUY MORE.: NEVER TRUE

## 2025-01-10 SDOH — ECONOMIC STABILITY: INCOME INSECURITY: IN THE PAST 12 MONTHS HAS THE ELECTRIC, GAS, OIL, OR WATER COMPANY THREATENED TO SHUT OFF SERVICES IN YOUR HOME?: NO

## 2025-01-10 SDOH — HEALTH STABILITY: MENTAL HEALTH
DO YOU FEEL STRESS - TENSE, RESTLESS, NERVOUS, OR ANXIOUS, OR UNABLE TO SLEEP AT NIGHT BECAUSE YOUR MIND IS TROUBLED ALL THE TIME - THESE DAYS?: ONLY A LITTLE

## 2025-01-10 SDOH — ECONOMIC STABILITY: FOOD INSECURITY: HOW HARD IS IT FOR YOU TO PAY FOR THE VERY BASICS LIKE FOOD, HOUSING, MEDICAL CARE, AND HEATING?: NOT HARD AT ALL

## 2025-01-10 SDOH — ECONOMIC STABILITY: FOOD INSECURITY: WITHIN THE PAST 12 MONTHS, THE FOOD YOU BOUGHT JUST DIDN'T LAST AND YOU DIDN'T HAVE MONEY TO GET MORE.: NEVER TRUE

## 2025-01-10 SDOH — SOCIAL STABILITY: SOCIAL INSECURITY
WITHIN THE LAST YEAR, HAVE YOU BEEN RAPED OR FORCED TO HAVE ANY KIND OF SEXUAL ACTIVITY BY YOUR PARTNER OR EX-PARTNER?: NO

## 2025-01-10 SDOH — ECONOMIC STABILITY: TRANSPORTATION INSECURITY: IN THE PAST 12 MONTHS, HAS LACK OF TRANSPORTATION KEPT YOU FROM MEDICAL APPOINTMENTS OR FROM GETTING MEDICATIONS?: NO

## 2025-01-10 ASSESSMENT — PAIN DESCRIPTION - PAIN TYPE
TYPE: ACUTE PAIN

## 2025-01-10 ASSESSMENT — ENCOUNTER SYMPTOMS
ABDOMINAL PAIN: 0
VOMITING: 0
CONSTIPATION: 1
FEVER: 0
DIZZINESS: 0
NAUSEA: 0
SHORTNESS OF BREATH: 1
CHILLS: 0

## 2025-01-10 ASSESSMENT — ACTIVITIES OF DAILY LIVING (ADL): LACK_OF_TRANSPORTATION: NO

## 2025-01-10 ASSESSMENT — LIFESTYLE VARIABLES: SUBSTANCE_ABUSE: 1

## 2025-01-10 NOTE — ASSESSMENT & PLAN NOTE
"Now w/ ecurrent pleural effusion and  complaining of shortness of breath, likely related to alcoholic liver cirrohsis    CXR in ED showed 1.  Marked progressive opacification of the right hemithorax which now shows a complete \"whiteout\". Findings consistent with large right pleural effusion and underlying compressive atelectasis.  2.  The left lung field/hemithorax remains clear  I will obtain CT thorax w/o to further evaluate   US guided thoracentesis ordered.  IR consult placed  IV lasix 40 mg daily   Will order obtain an Echo   Consider obtaining pulmonary consult     1/10: likely from not getting lasix at discharge  - lasix IV and spironolactone  - thora today  - f/u CT chest    1/11: persistent effusion, repeat thora  - pleural fluid looks transudative  - cx NGTD  - continue lasix/spironolactone    1/12: CXR reviewed, much better aeration   "

## 2025-01-10 NOTE — PROGRESS NOTES
"Hospital Medicine Daily Progress Note    Date of Service  1/10/2025    Chief Complaint  Stacy Oliva is a 54 y.o. female admitted 1/9/2025 with shortness of breath    Hospital Course  Stacy Oliva is a 54 y.o. female who presented 1/9/2025 with past medical history of alcoholic liver cirrhosis, ascites, GI bleed, alcohol abuse last drink was 2 weeks ago, pleural effusion, who was recently admitted to the hospital and had thoracentesis now presented to the ED complaining of shortness of breath. Denies cough or congestion. Denies fever or chills. In ED patient is on room air. Hemodynamically stable. CBC showed WBC 14.7. platelet 154, CMP panel showed Na of 126, K 3.5, , ALT 95, Alp 147, total bilirubin 13.6. CXR showed 1.  Marked progressive opacification of the right hemithorax which now shows a complete \"whiteout\". Findings consistent with large right pleural effusion and underlying compressive atelectasis. 2.  The left lung field/hemithorax remains clear. I was requested to admit patient to the hospital for further management.     Interval Problem Update  - overall feeling a little better, still SOB  - thora today  - sodium downtrending, likely from fluid overload, on lasix and spironolactone  - no BM for a few days, starting lactulose  - no etoh since Marbin kimberly      I have discussed this patient's plan of care and discharge plan at IDT rounds today with Case Management, Nursing, Nursing leadership, and other members of the IDT team.    Consultants/Specialty  None    Code Status  Full Code    Disposition  The patient is not medically cleared for discharge to home or a post-acute facility.  Anticipate discharge to: home with close outpatient follow-up    I have placed the appropriate orders for post-discharge needs.    Review of Systems  Review of Systems   Constitutional:  Negative for chills and fever.   Respiratory:  Positive for shortness of breath.    Cardiovascular:  Negative for leg " swelling.   Gastrointestinal:  Positive for constipation. Negative for abdominal pain, nausea and vomiting.        Abdominal distention (slightly improved today)   Neurological:  Negative for dizziness.   Psychiatric/Behavioral:  Positive for substance abuse (in past (etoh)).         Physical Exam  Temp:  [36.2 °C (97.1 °F)-36.7 °C (98 °F)] 36.6 °C (97.8 °F)  Pulse:  [60-88] 71  Resp:  [14-16] 15  BP: ()/(53-74) 122/73  SpO2:  [90 %-94 %] 93 %    Physical Exam  Constitutional:       General: She is not in acute distress.     Appearance: She is not ill-appearing, toxic-appearing or diaphoretic.   HENT:      Mouth/Throat:      Mouth: Mucous membranes are moist.   Eyes:      General: Scleral icterus present.   Cardiovascular:      Rate and Rhythm: Normal rate and regular rhythm.      Heart sounds: No murmur heard.     No friction rub. No gallop.   Pulmonary:      Effort: Pulmonary effort is normal.      Breath sounds: No wheezing or rhonchi.      Comments: Decreased breath sounds on the whole right side  Abdominal:      General: Bowel sounds are normal. There is distension (mild).      Palpations: Abdomen is soft.      Tenderness: There is no abdominal tenderness.   Musculoskeletal:      Right lower leg: No edema.      Left lower leg: No edema.   Skin:     Coloration: Skin is jaundiced.   Neurological:      Mental Status: She is alert and oriented to person, place, and time. Mental status is at baseline.      Comments: No asterixis    Psychiatric:         Mood and Affect: Mood normal.         Behavior: Behavior normal.         Fluids    Intake/Output Summary (Last 24 hours) at 1/10/2025 1034  Last data filed at 1/10/2025 1000  Gross per 24 hour   Intake 300 ml   Output 100 ml   Net 200 ml        Laboratory  Recent Labs     01/09/25  1114 01/10/25  0108   WBC 14.7* 10.4   RBC 4.35 3.90*   HEMOGLOBIN 15.1 13.7   HEMATOCRIT 43.6 39.5   .2* 101.3*   MCH 34.7* 35.1*   MCHC 34.6 34.7   RDW 75.8* 76.3*  "  PLATELETCT 154* 144*   MPV 10.1 10.3     Recent Labs     01/09/25  1948 01/10/25  0108 01/10/25  0904   SODIUM 128* 126* 124*   POTASSIUM 3.1* 3.3* 3.6   CHLORIDE 89* 91* 87*   CO2 23 24 25   GLUCOSE 160* 139* 138*   BUN 14 14 14   CREATININE 0.39* 0.81 0.84   CALCIUM 8.1* 8.1* 8.4*     Recent Labs     01/09/25  1114   APTT 31.3   INR 1.65*             Recent Labs     01/09/25  1114 01/10/25  0108   ASTSGOT 110* 100*   ALTSGPT 95* 85*   TBILIRUBIN 13.6* 12.7*   GLOBULIN 4.6* 3.8*   INR 1.65*  --          Imaging  DX-CHEST-PORTABLE (1 VIEW)   Final Result      1.  Marked progressive opacification of the right hemithorax which now shows a complete \"whiteout\". Findings consistent with large right pleural effusion and underlying compressive atelectasis.   2.  The left lung field/hemithorax remains clear.      US-THORACENTESIS PUNCTURE RIGHT    (Results Pending)   CT-CHEST (THORAX) WITH    (Results Pending)   EC-ECHOCARDIOGRAM COMPLETE W/O CONT    (Results Pending)        Assessment/Plan  * Recurrent pleural effusion- (present on admission)  Assessment & Plan  Now w/ ecurrent pleural effusion and  complaining of shortness of breath, likely related to alcoholic liver cirrohsis    CXR in ED showed 1.  Marked progressive opacification of the right hemithorax which now shows a complete \"whiteout\". Findings consistent with large right pleural effusion and underlying compressive atelectasis.  2.  The left lung field/hemithorax remains clear  I will obtain CT thorax w/o to further evaluate   US guided thoracentesis ordered.  IR consult placed  IV lasix 40 mg daily   Will order obtain an Echo   Consider obtaining pulmonary consult     1/10: likely from not getting lasix at discharge  - lasix IV and spironolactone  - thora today  - f/u CT chest    Leukocytosis  Assessment & Plan  Resolved    Hyponatremia- (present on admission)  Assessment & Plan  Likely related to fluids overload from pleural effusion and ascites and liver " cirrohsis  IV lasix ordered  Frequent BMP check   Check serum osmolarity, urine osmolarity, and urine sodium   Goal correction 6-8 meq in 24 hours     1/10: worsening hyponatremia although asymptomatic  - likely from fluid overload/cirrhosis  - conrad ruano today    Advance care planning- (present on admission)  Assessment & Plan  Full code per patient wishes     Alcoholic cirrhosis of liver with ascites (HCC)- (present on admission)  Assessment & Plan  Meld score 28 with 27-32% mortality in 90 day   Maddrey's discrimination score 41.2   Continue steroid   Continue spiranolactone  IV lasix added     1/10: Bili and AST/ALT downtrending  CMP in am        Hypokalemia- (present on admission)  Assessment & Plan  Improving with PO supplementation  BMP in am         VTE prophylaxis:    enoxaparin ppx      I have performed a physical exam and reviewed and updated ROS and Plan today (1/10/2025). In review of yesterday's note (1/9/2025), there are no changes except as documented above.    Patient is medically complex and at high risk of deterioration and death.    Greater than 53 minutes spent preparing to see patient (e.g. review of tests) obtaining and/or reviewing separately obtained history. Performing a medically appropriate examination and/ evaluation.  Counseling and educating the patient/family/caregiver.  Ordering medications, tests, or procedures.  Referring and communicating with other health care professionals.  Documenting clinical information in EPIC.  Independently interpreting results and communicating results to patient/family/caregiver.  Care coordination.

## 2025-01-10 NOTE — DISCHARGE PLANNING
Community Health Worker Intake    Identified no barriers.  No resources provided.  Contact information provided to Stacy Oliva.   Has PCP appointment scheduled for 1/17/2025  Inpatient assessment completed.    CHW met with pt at bedside to introduce Community Care Management because of LACE score and readmission. Pt states that she lives in a 2 story home with her . Pt states that she owns the home. Pt is an active  with access to a reliable vehicle. Pt states that if she isn't feeling well enough, her  can  her. Pt states that she has an appointment with her PCP on January 17, 2025. [facility]. Pt also states that hs e called and got a GI appointment for mid-march, per doctor referral. Pt is also on a cancellation list, so may get in sooner. Pt states that there is no food insecurity for her family. Pt states that she doesn't use any DME, and never has trouble accessing her prescriptions. Pt works from home. Pt is agreeable to CHW following up with pt after discharge. Pts phone number is [phone].    Plan:  CHW will follow-up with pt post-discharge to ensure all needs have been met.

## 2025-01-10 NOTE — PROGRESS NOTES
Assumed patient care and received bedside report from Day RN. Patient is A&Ox4 and reports 0/10 pain at this time. Patient on RA, awake, and alert. Patient is up self to the bathroom, tolerates well and walks steadily.    Patient educated on the use of the call light and verbalized understanding. Bed in the lowest and locked position with personal belongings and call light within reach. Standard precautions and hourly rounding in place. Safety education provided. POC discussed and patient declines any further needs at this time. Orders carried out.

## 2025-01-10 NOTE — HOSPITAL COURSE
"Stacy Oliva is a 54 y.o. female who presented 1/9/2025 with past medical history of alcoholic liver cirrhosis, ascites, GI bleed, alcohol abuse last drink was 2 weeks ago, pleural effusion, who was recently admitted to the hospital and had thoracentesis now presented to the ED complaining of shortness of breath. Denies cough or congestion. Denies fever or chills. In ED patient is on room air. Hemodynamically stable. CBC showed WBC 14.7. platelet 154, CMP panel showed Na of 126, K 3.5, , ALT 95, Alp 147, total bilirubin 13.6. CXR showed 1.  Marked progressive opacification of the right hemithorax which now shows a complete \"whiteout\". Findings consistent with large right pleural effusion and underlying compressive atelectasis. 2.  The left lung field/hemithorax remains clear. I was requested to admit patient to the hospital for further management.     She underwent thoracentesis x 2 (1/10, 1/11).  Her hyponatremia improved with IV diuresis.  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   "

## 2025-01-10 NOTE — ASSESSMENT & PLAN NOTE
Likely related to fluids overload from pleural effusion and ascites and liver cirrohsis  IV lasix ordered  Frequent BMP check   Check serum osmolarity, urine osmolarity, and urine sodium   Goal correction 6-8 meq in 24 hours     1/11: Na improving  1/12: slowly improving with diuresis and thora but ideally improve to closer to 128/129 prior to discharge

## 2025-01-10 NOTE — ASSESSMENT & PLAN NOTE
Meld score 28 with 27-32% mortality in 90 day   Maddrey's discrimination score 41.2   Continue steroid   Continue spiranolactone  IV lasix added     1/11: LFTs stable  -repeat CMP in am    1/11: para today? See if enough fluid  1/12: doesn't look like she has enough fluid for para, continue lasix/spironolactone

## 2025-01-10 NOTE — CARE PLAN
The patient is Stable - Low risk of patient condition declining or worsening    Shift Goals  Clinical Goals: Patient will report a decrease in SOBOE  Patient Goals: Rest, comfort  Family Goals: ALEXANDRU    Progress made toward(s) clinical / shift goals:  Focused respiratory assessment completed for this patient (see Flowsheets). Breath sounds show diminished sound in BL lower lobes. Turn, cough, and deep breath encouraged this shift. Patient encouraged changing of positions to more effective ventilatory efficiency. Thoracentesis scheduled for 0900, 1/10.     Problem: Knowledge Deficit - Standard  Goal: Patient and family/care givers will demonstrate understanding of plan of care, disease process/condition, diagnostic tests and medications  Outcome: Progressing     Problem: Respiratory  Goal: Patient will achieve/maintain optimum respiratory ventilation and gas exchange  Description: Target End Date:  Prior to discharge or change in level of care    Document on Assessment flowsheet    1.  Assess and monitor rate, rhythm, depth and effort of respiration  2.  Breath sounds assessed qshift and/or as needed  3.  Assess O2 saturation, administer/titrate oxygen as ordered  4.  Position patient for maximum ventilatory efficiency  5.  Turn, cough, and deep breath with splinting to improve effectiveness  6.  Collaborate with RT to administer medication/treatments per order  7.  Encourage use of incentive spirometer and encourage patient to cough after use and utilize splinting techniques if applicable  8.  Airway suctioning  9.  Monitor sputum production for changes in color, consistency and frequency  10. Perform frequent oral hygiene  11. Alternate physical activity with rest periods  Outcome: Progressing       Patient is not progressing towards the following goals:

## 2025-01-10 NOTE — CARE PLAN
The patient is Stable - Low risk of patient condition declining or worsening    Shift Goals  Clinical Goals: pt will recieve POC updates  Patient Goals: thoracentesis  Family Goals: ALEXANDRU    Progress made toward(s) clinical / shift goals:    Pt on 0% oxygen    Patient is not progressing towards the following goals:

## 2025-01-10 NOTE — PROGRESS NOTES
Report received from NOC RN and assumed patient care at 0700. Patient is A&Ox 4, on RA, and awake and alert . Patient reporting a pain level of 0/10. Call light within reach and bed in lowest position. Reinforced the need to call for assistance. Plan of care discussed and patient does not have any further needs at this time.     0845 pt transferred to CT in  with transport staff. AO4, stable    0900 pt back to floor from CT    1200 pt transferred to ultrasound for thoracentesis in  with transport staff. AO4 stable    1310 pt back to floor from US/thoracentesis

## 2025-01-11 ENCOUNTER — APPOINTMENT (OUTPATIENT)
Dept: RADIOLOGY | Facility: MEDICAL CENTER | Age: 55
DRG: 433 | End: 2025-01-11
Attending: INTERNAL MEDICINE
Payer: COMMERCIAL

## 2025-01-11 LAB
ALBUMIN SERPL BCP-MCNC: 2.6 G/DL (ref 3.2–4.9)
ALBUMIN/GLOB SERPL: 0.7 G/DL
ALP SERPL-CCNC: 118 U/L (ref 30–99)
ALT SERPL-CCNC: 85 U/L (ref 2–50)
ANION GAP SERPL CALC-SCNC: 10 MMOL/L (ref 7–16)
AST SERPL-CCNC: 94 U/L (ref 12–45)
BILIRUB SERPL-MCNC: 12.7 MG/DL (ref 0.1–1.5)
BUN SERPL-MCNC: 12 MG/DL (ref 8–22)
CALCIUM ALBUM COR SERPL-MCNC: 9.6 MG/DL (ref 8.5–10.5)
CALCIUM SERPL-MCNC: 8.5 MG/DL (ref 8.5–10.5)
CHLORIDE SERPL-SCNC: 95 MMOL/L (ref 96–112)
CO2 SERPL-SCNC: 25 MMOL/L (ref 20–33)
CREAT SERPL-MCNC: 0.84 MG/DL (ref 0.5–1.4)
ERYTHROCYTE [DISTWIDTH] IN BLOOD BY AUTOMATED COUNT: 77.7 FL (ref 35.9–50)
GFR SERPLBLD CREATININE-BSD FMLA CKD-EPI: 82 ML/MIN/1.73 M 2
GLOBULIN SER CALC-MCNC: 3.8 G/DL (ref 1.9–3.5)
GLUCOSE SERPL-MCNC: 101 MG/DL (ref 65–99)
HCT VFR BLD AUTO: 40.1 % (ref 37–47)
HGB BLD-MCNC: 13.8 G/DL (ref 12–16)
MCH RBC QN AUTO: 35.6 PG (ref 27–33)
MCHC RBC AUTO-ENTMCNC: 34.4 G/DL (ref 32.2–35.5)
MCV RBC AUTO: 103.4 FL (ref 81.4–97.8)
MYCOBACTERIUM SPEC CULT: NORMAL
PLATELET # BLD AUTO: 119 K/UL (ref 164–446)
PMV BLD AUTO: 9.6 FL (ref 9–12.9)
POTASSIUM SERPL-SCNC: 3.9 MMOL/L (ref 3.6–5.5)
PROT SERPL-MCNC: 6.4 G/DL (ref 6–8.2)
RBC # BLD AUTO: 3.88 M/UL (ref 4.2–5.4)
RHODAMINE-AURAMINE STN SPEC: NORMAL
RHODAMINE-AURAMINE STN SPEC: NORMAL
SIGNIFICANT IND 70042: NORMAL
SIGNIFICANT IND 70042: NORMAL
SITE SITE: NORMAL
SITE SITE: NORMAL
SODIUM SERPL-SCNC: 130 MMOL/L (ref 135–145)
SOURCE SOURCE: NORMAL
SOURCE SOURCE: NORMAL
WBC # BLD AUTO: 14.9 K/UL (ref 4.8–10.8)

## 2025-01-11 PROCEDURE — 80053 COMPREHEN METABOLIC PANEL: CPT

## 2025-01-11 PROCEDURE — 700102 HCHG RX REV CODE 250 W/ 637 OVERRIDE(OP): Performed by: INTERNAL MEDICINE

## 2025-01-11 PROCEDURE — 0W993ZZ DRAINAGE OF RIGHT PLEURAL CAVITY, PERCUTANEOUS APPROACH: ICD-10-PCS | Performed by: INTERNAL MEDICINE

## 2025-01-11 PROCEDURE — A9270 NON-COVERED ITEM OR SERVICE: HCPCS | Performed by: INTERNAL MEDICINE

## 2025-01-11 PROCEDURE — 71045 X-RAY EXAM CHEST 1 VIEW: CPT

## 2025-01-11 PROCEDURE — 700111 HCHG RX REV CODE 636 W/ 250 OVERRIDE (IP): Mod: JZ | Performed by: INTERNAL MEDICINE

## 2025-01-11 PROCEDURE — 700102 HCHG RX REV CODE 250 W/ 637 OVERRIDE(OP): Performed by: STUDENT IN AN ORGANIZED HEALTH CARE EDUCATION/TRAINING PROGRAM

## 2025-01-11 PROCEDURE — 99233 SBSQ HOSP IP/OBS HIGH 50: CPT | Performed by: INTERNAL MEDICINE

## 2025-01-11 PROCEDURE — 36415 COLL VENOUS BLD VENIPUNCTURE: CPT

## 2025-01-11 PROCEDURE — C1729 CATH, DRAINAGE: HCPCS

## 2025-01-11 PROCEDURE — 700111 HCHG RX REV CODE 636 W/ 250 OVERRIDE (IP): Performed by: INTERNAL MEDICINE

## 2025-01-11 PROCEDURE — A9270 NON-COVERED ITEM OR SERVICE: HCPCS | Performed by: STUDENT IN AN ORGANIZED HEALTH CARE EDUCATION/TRAINING PROGRAM

## 2025-01-11 PROCEDURE — 85027 COMPLETE CBC AUTOMATED: CPT

## 2025-01-11 PROCEDURE — 770006 HCHG ROOM/CARE - MED/SURG/GYN SEMI*

## 2025-01-11 RX ADMIN — OMEPRAZOLE 20 MG: 20 CAPSULE, DELAYED RELEASE ORAL at 05:21

## 2025-01-11 RX ADMIN — ENOXAPARIN SODIUM 40 MG: 100 INJECTION SUBCUTANEOUS at 19:35

## 2025-01-11 RX ADMIN — FUROSEMIDE 40 MG: 10 INJECTION, SOLUTION INTRAVENOUS at 05:24

## 2025-01-11 RX ADMIN — POTASSIUM CHLORIDE 40 MEQ: 1500 TABLET, EXTENDED RELEASE ORAL at 05:20

## 2025-01-11 RX ADMIN — SPIRONOLACTONE 25 MG: 25 TABLET ORAL at 05:20

## 2025-01-11 RX ADMIN — POTASSIUM CHLORIDE 40 MEQ: 1500 TABLET, EXTENDED RELEASE ORAL at 19:36

## 2025-01-11 RX ADMIN — PREDNISOLONE SODIUM PHOSPHATE 39.9 MG: 15 SOLUTION ORAL at 05:23

## 2025-01-11 ASSESSMENT — LIFESTYLE VARIABLES: SUBSTANCE_ABUSE: 1

## 2025-01-11 ASSESSMENT — ENCOUNTER SYMPTOMS
SHORTNESS OF BREATH: 1
NAUSEA: 0
CHILLS: 0
FEVER: 0
ABDOMINAL PAIN: 0
ROS GI COMMENTS: ABDOMINAL DISTENTION
DIZZINESS: 0
VOMITING: 0
CONSTIPATION: 1

## 2025-01-11 ASSESSMENT — FIBROSIS 4 INDEX: FIB4 SCORE: 4.71

## 2025-01-11 ASSESSMENT — PAIN DESCRIPTION - PAIN TYPE
TYPE: ACUTE PAIN

## 2025-01-11 NOTE — PROGRESS NOTES
Assumed patient care and received bedside report from Day RN. Patient is A&Ox4 and reports 0/10 pain at this time. Patient on RA, awake, and alert. Patient is up self to the bathroom, tolerates well, and no assistance required.    Patient educated on the use of the call light and verbalized understanding. Bed in the lowest and locked position with personal belongings and call light within reach. Standard precautions and hourly rounding in place. Safety education provided. POC discussed and patient declines any further needs at this time. Orders carried out.

## 2025-01-11 NOTE — PROGRESS NOTES
"Hospital Medicine Daily Progress Note    Date of Service  1/11/2025    Chief Complaint  Stacy Oliva is a 54 y.o. female admitted 1/9/2025 with shortness of breath    Hospital Course  Stacy Oliva is a 54 y.o. female who presented 1/9/2025 with past medical history of alcoholic liver cirrhosis, ascites, GI bleed, alcohol abuse last drink was 2 weeks ago, pleural effusion, who was recently admitted to the hospital and had thoracentesis now presented to the ED complaining of shortness of breath. Denies cough or congestion. Denies fever or chills. In ED patient is on room air. Hemodynamically stable. CBC showed WBC 14.7. platelet 154, CMP panel showed Na of 126, K 3.5, , ALT 95, Alp 147, total bilirubin 13.6. CXR showed 1.  Marked progressive opacification of the right hemithorax which now shows a complete \"whiteout\". Findings consistent with large right pleural effusion and underlying compressive atelectasis. 2.  The left lung field/hemithorax remains clear. I was requested to admit patient to the hospital for further management.     She underwent thoracentesis x 2 (1/10, 1/11).  Her hyponatremia improved with IV diuresis.  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     Interval Problem Update  - Feeling a little better, still with some SOB, on RA  - Na improved  - Bili stable  - denies n/v, abd pain  - WBC increased, likely from thora yesterday  - CXR with persistent large R pleural effusion, likely needs another thora      I have discussed this patient's plan of care and discharge plan at IDT rounds today with Case Management, Nursing, Nursing leadership, and other members of the IDT team.    Consultants/Specialty  None    Code Status  Full Code    Disposition  The patient is not medically cleared for discharge to home or a post-acute facility.  Anticipate discharge to: home with close outpatient follow-up    I have placed the appropriate orders for post-discharge needs.    Review of " Systems  Review of Systems   Constitutional:  Negative for chills and fever.   Respiratory:  Positive for shortness of breath.    Cardiovascular:  Negative for leg swelling.   Gastrointestinal:  Positive for constipation. Negative for abdominal pain, nausea and vomiting.        Abdominal distention    Neurological:  Negative for dizziness.   Psychiatric/Behavioral:  Positive for substance abuse (in past (etoh)).         Physical Exam  Temp:  [36.1 °C (96.9 °F)-36.3 °C (97.3 °F)] 36.1 °C (96.9 °F)  Pulse:  [71-99] 71  Resp:  [15-17] 15  BP: ()/(51-69) 97/60  SpO2:  [90 %-94 %] 94 %    Physical Exam  Constitutional:       General: She is not in acute distress.     Appearance: She is not ill-appearing, toxic-appearing or diaphoretic.   HENT:      Mouth/Throat:      Mouth: Mucous membranes are moist.   Eyes:      General: Scleral icterus present.   Cardiovascular:      Rate and Rhythm: Normal rate and regular rhythm.      Heart sounds: No murmur heard.     No friction rub. No gallop.   Pulmonary:      Effort: Pulmonary effort is normal.      Breath sounds: No wheezing or rhonchi.      Comments: Decreased breath sounds on the lower right lobe  Abdominal:      General: Bowel sounds are normal. There is distension (mild).      Palpations: Abdomen is soft.      Tenderness: There is no abdominal tenderness.   Musculoskeletal:      Right lower leg: No edema.      Left lower leg: No edema.   Skin:     Coloration: Skin is jaundiced.   Neurological:      Mental Status: She is alert and oriented to person, place, and time. Mental status is at baseline.   Psychiatric:         Mood and Affect: Mood normal.         Behavior: Behavior normal.         Fluids    Intake/Output Summary (Last 24 hours) at 1/11/2025 1123  Last data filed at 1/10/2025 1400  Gross per 24 hour   Intake 200 ml   Output --   Net 200 ml        Laboratory  Recent Labs     01/09/25  1114 01/10/25  0108 01/11/25  0120   WBC 14.7* 10.4 14.9*   RBC 4.35 3.90*  "3.88*   HEMOGLOBIN 15.1 13.7 13.8   HEMATOCRIT 43.6 39.5 40.1   .2* 101.3* 103.4*   MCH 34.7* 35.1* 35.6*   MCHC 34.6 34.7 34.4   RDW 75.8* 76.3* 77.7*   PLATELETCT 154* 144* 119*   MPV 10.1 10.3 9.6     Recent Labs     01/10/25  0108 01/10/25  0904 01/10/25  1818 01/11/25  0120   SODIUM 126* 124* 128* 130*   POTASSIUM 3.3* 3.6  --  3.9   CHLORIDE 91* 87*  --  95*   CO2 24 25  --  25   GLUCOSE 139* 138*  --  101*   BUN 14 14  --  12   CREATININE 0.81 0.84  --  0.84   CALCIUM 8.1* 8.4*  --  8.5     Recent Labs     01/09/25  1114   APTT 31.3   INR 1.65*             Recent Labs     01/09/25  1114 01/10/25  0108 01/11/25  0120   ASTSGOT 110* 100* 94*   ALTSGPT 95* 85* 85*   TBILIRUBIN 13.6* 12.7* 12.7*   GLOBULIN 4.6* 3.8* 3.8*   INR 1.65*  --   --          Imaging  DX-CHEST-PORTABLE (1 VIEW)   Final Result      Stable large right pleural effusion and right-sided airspace disease.      US-THORACENTESIS PUNCTURE RIGHT   Final Result      1. Ultrasound guided right sided diagnostic and therapeutic thoracentesis.      2. 2000 mL of fluid withdrawn.      DX-CHEST-PORTABLE (1 VIEW)   Final Result      1.  Improvement of RIGHT pleural effusion with residual consolidation and fluid at the lung base.   2.  No pneumothorax.      CT-CHEST (THORAX) WITH   Final Result      Complete opacification of the right hemithorax is due to large pleural effusion and complete collapse of the right lung.      DX-CHEST-PORTABLE (1 VIEW)   Final Result      1.  Marked progressive opacification of the right hemithorax which now shows a complete \"whiteout\". Findings consistent with large right pleural effusion and underlying compressive atelectasis.   2.  The left lung field/hemithorax remains clear.      EC-ECHOCARDIOGRAM COMPLETE W/O CONT    (Results Pending)   US-PARACENTESIS, ABD WITH IMAGING    (Results Pending)   US-THORACENTESIS PUNCTURE RIGHT    (Results Pending)        Assessment/Plan  * Recurrent pleural effusion- (present on " "admission)  Assessment & Plan  Now w/ ecurrent pleural effusion and  complaining of shortness of breath, likely related to alcoholic liver cirrohsis    CXR in ED showed 1.  Marked progressive opacification of the right hemithorax which now shows a complete \"whiteout\". Findings consistent with large right pleural effusion and underlying compressive atelectasis.  2.  The left lung field/hemithorax remains clear  I will obtain CT thorax w/o to further evaluate   US guided thoracentesis ordered.  IR consult placed  IV lasix 40 mg daily   Will order obtain an Echo   Consider obtaining pulmonary consult     1/10: likely from not getting lasix at discharge  - lasix IV and spironolactone  - thora today  - f/u CT chest    1/11: persistent effusion, repeat thora  - pleural fluid looks transudative  - cx NGTD  - continue lasix/spironolactone    Leukocytosis  Assessment & Plan  Increased likely from procedure yesterday  Repeat CBC tomorrow    Hyponatremia- (present on admission)  Assessment & Plan  Likely related to fluids overload from pleural effusion and ascites and liver cirrohsis  IV lasix ordered  Frequent BMP check   Check serum osmolarity, urine osmolarity, and urine sodium   Goal correction 6-8 meq in 24 hours     1/11: Na improving    Advance care planning- (present on admission)  Assessment & Plan  Full code per patient wishes     Alcoholic cirrhosis of liver with ascites (HCC)- (present on admission)  Assessment & Plan  Meld score 28 with 27-32% mortality in 90 day   Maddrey's discrimination score 41.2   Continue steroid   Continue spiranolactone  IV lasix added     1/11: LFTs stable  -repeat CMP in am    1/11: para today? See if enough fluid        Hypokalemia- (present on admission)  Assessment & Plan  Resolved         VTE prophylaxis:    enoxaparin ppx      I have performed a physical exam and reviewed and updated ROS and Plan today (1/11/2025). In review of yesterday's note (1/10/2025), there are no changes " except as documented above.      Greater than 52 minutes spent preparing to see patient (e.g. review of tests) obtaining and/or reviewing separately obtained history. Performing a medically appropriate examination and/ evaluation.  Counseling and educating the patient/family/caregiver.  Ordering medications, tests, or procedures.  Referring and communicating with other health care professionals.  Documenting clinical information in EPIC.  Independently interpreting results and communicating results to patient/family/caregiver.  Care coordination.

## 2025-01-11 NOTE — CARE PLAN
The patient is Stable - Low risk of patient condition declining or worsening    Shift Goals  Clinical Goals: Monitor electrolytes and diures  Patient Goals: DC home  Family Goals: ALEXANDRU    Progress made toward(s) clinical / shift goals:  Patient labs monitored this shift. Patient had low sodium and potassium during this hospital stay. Sodium and potassium trending up since interventions. Patient I&Os monitored this shift. Lasix continued.    Problem: Knowledge Deficit - Standard  Goal: Patient and family/care givers will demonstrate understanding of plan of care, disease process/condition, diagnostic tests and medications  1/11/2025 0126 by Everette Reese R.N.  Outcome: Progressing     Problem: Fluid Volume  Goal: Fluid volume balance will be maintained  Description: Target End Date:  Prior to discharge or change in level of care    Document on I/O flowsheet    1.  Monitor intake and output as ordered  2.  Promote oral intake as appropriate  3.  Report inadequate intake or output to physician  4.  Administer IV therapy as ordered  5.  Weights per provider order  6.  Assess for signs and symptoms of bleeding  7.  Monitor for signs of fluid overload (respiratory changes, edema, weight gain, increased abdominal girth)  8.  Monitor of signs for inadequate fluid volume (poor skin turgor, dry mucous membranes)  9.  Instruct patient on adherence to fluid restrictions  Outcome: Progressing       Patient is not progressing towards the following goals:

## 2025-01-11 NOTE — PROGRESS NOTES
Assumed care of pt 01/11/2025  AM assessment complete  Pt is doing all her own ADL's  Education was provided to the pt all questions were answered. Teach back method was used.   Call light left within reach

## 2025-01-12 ENCOUNTER — APPOINTMENT (OUTPATIENT)
Dept: RADIOLOGY | Facility: MEDICAL CENTER | Age: 55
DRG: 433 | End: 2025-01-12
Attending: INTERNAL MEDICINE
Payer: COMMERCIAL

## 2025-01-12 LAB
ALBUMIN SERPL BCP-MCNC: 2.5 G/DL (ref 3.2–4.9)
ALBUMIN/GLOB SERPL: 0.7 G/DL
ALP SERPL-CCNC: 124 U/L (ref 30–99)
ALT SERPL-CCNC: 79 U/L (ref 2–50)
ANION GAP SERPL CALC-SCNC: 8 MMOL/L (ref 7–16)
AST SERPL-CCNC: 82 U/L (ref 12–45)
BILIRUB SERPL-MCNC: 11.4 MG/DL (ref 0.1–1.5)
BUN SERPL-MCNC: 13 MG/DL (ref 8–22)
CALCIUM ALBUM COR SERPL-MCNC: 9.5 MG/DL (ref 8.5–10.5)
CALCIUM SERPL-MCNC: 8.3 MG/DL (ref 8.5–10.5)
CHLORIDE SERPL-SCNC: 92 MMOL/L (ref 96–112)
CO2 SERPL-SCNC: 25 MMOL/L (ref 20–33)
CREAT SERPL-MCNC: 0.67 MG/DL (ref 0.5–1.4)
ERYTHROCYTE [DISTWIDTH] IN BLOOD BY AUTOMATED COUNT: 74.4 FL (ref 35.9–50)
GFR SERPLBLD CREATININE-BSD FMLA CKD-EPI: 103 ML/MIN/1.73 M 2
GLOBULIN SER CALC-MCNC: 3.6 G/DL (ref 1.9–3.5)
GLUCOSE SERPL-MCNC: 101 MG/DL (ref 65–99)
HCT VFR BLD AUTO: 37.8 % (ref 37–47)
HGB BLD-MCNC: 13.2 G/DL (ref 12–16)
MCH RBC QN AUTO: 35.8 PG (ref 27–33)
MCHC RBC AUTO-ENTMCNC: 34.9 G/DL (ref 32.2–35.5)
MCV RBC AUTO: 102.4 FL (ref 81.4–97.8)
PLATELET # BLD AUTO: 93 K/UL (ref 164–446)
PLATELETS.RETICULATED NFR BLD AUTO: 5.5 % (ref 0.6–13.1)
PMV BLD AUTO: 10.2 FL (ref 9–12.9)
POTASSIUM SERPL-SCNC: 3.8 MMOL/L (ref 3.6–5.5)
PROT SERPL-MCNC: 6.1 G/DL (ref 6–8.2)
RBC # BLD AUTO: 3.69 M/UL (ref 4.2–5.4)
SODIUM SERPL-SCNC: 125 MMOL/L (ref 135–145)
SODIUM SERPL-SCNC: 126 MMOL/L (ref 135–145)
WBC # BLD AUTO: 17.4 K/UL (ref 4.8–10.8)

## 2025-01-12 PROCEDURE — 770006 HCHG ROOM/CARE - MED/SURG/GYN SEMI*

## 2025-01-12 PROCEDURE — 85027 COMPLETE CBC AUTOMATED: CPT

## 2025-01-12 PROCEDURE — 700102 HCHG RX REV CODE 250 W/ 637 OVERRIDE(OP): Performed by: INTERNAL MEDICINE

## 2025-01-12 PROCEDURE — 99233 SBSQ HOSP IP/OBS HIGH 50: CPT | Performed by: INTERNAL MEDICINE

## 2025-01-12 PROCEDURE — 700102 HCHG RX REV CODE 250 W/ 637 OVERRIDE(OP): Performed by: STUDENT IN AN ORGANIZED HEALTH CARE EDUCATION/TRAINING PROGRAM

## 2025-01-12 PROCEDURE — 84295 ASSAY OF SERUM SODIUM: CPT

## 2025-01-12 PROCEDURE — 700111 HCHG RX REV CODE 636 W/ 250 OVERRIDE (IP): Mod: JZ | Performed by: INTERNAL MEDICINE

## 2025-01-12 PROCEDURE — A9270 NON-COVERED ITEM OR SERVICE: HCPCS | Performed by: STUDENT IN AN ORGANIZED HEALTH CARE EDUCATION/TRAINING PROGRAM

## 2025-01-12 PROCEDURE — A9270 NON-COVERED ITEM OR SERVICE: HCPCS | Performed by: INTERNAL MEDICINE

## 2025-01-12 PROCEDURE — 85055 RETICULATED PLATELET ASSAY: CPT

## 2025-01-12 PROCEDURE — 700111 HCHG RX REV CODE 636 W/ 250 OVERRIDE (IP): Performed by: INTERNAL MEDICINE

## 2025-01-12 PROCEDURE — 80053 COMPREHEN METABOLIC PANEL: CPT

## 2025-01-12 PROCEDURE — 76705 ECHO EXAM OF ABDOMEN: CPT

## 2025-01-12 RX ADMIN — ENOXAPARIN SODIUM 40 MG: 100 INJECTION SUBCUTANEOUS at 18:19

## 2025-01-12 RX ADMIN — PREDNISOLONE SODIUM PHOSPHATE 39.9 MG: 15 SOLUTION ORAL at 05:06

## 2025-01-12 RX ADMIN — CARVEDILOL 3.12 MG: 3.12 TABLET, FILM COATED ORAL at 18:19

## 2025-01-12 RX ADMIN — FUROSEMIDE 40 MG: 10 INJECTION, SOLUTION INTRAVENOUS at 05:06

## 2025-01-12 RX ADMIN — LACTULOSE 30 ML: 10 SOLUTION ORAL at 09:19

## 2025-01-12 RX ADMIN — OMEPRAZOLE 20 MG: 20 CAPSULE, DELAYED RELEASE ORAL at 05:06

## 2025-01-12 RX ADMIN — LACTULOSE 30 ML: 10 SOLUTION ORAL at 18:19

## 2025-01-12 RX ADMIN — LACTULOSE 30 ML: 10 SOLUTION ORAL at 13:05

## 2025-01-12 RX ADMIN — POTASSIUM CHLORIDE 40 MEQ: 1500 TABLET, EXTENDED RELEASE ORAL at 18:19

## 2025-01-12 RX ADMIN — POTASSIUM CHLORIDE 40 MEQ: 1500 TABLET, EXTENDED RELEASE ORAL at 05:06

## 2025-01-12 ASSESSMENT — ENCOUNTER SYMPTOMS
VOMITING: 0
CHILLS: 0
DIZZINESS: 0
ROS GI COMMENTS: ABDOMINAL DISTENTION
FEVER: 0
CONSTIPATION: 0
ABDOMINAL PAIN: 0
NAUSEA: 0
SHORTNESS OF BREATH: 0

## 2025-01-12 ASSESSMENT — PAIN DESCRIPTION - PAIN TYPE
TYPE: ACUTE PAIN

## 2025-01-12 ASSESSMENT — FIBROSIS 4 INDEX: FIB4 SCORE: 5.46

## 2025-01-12 ASSESSMENT — LIFESTYLE VARIABLES: SUBSTANCE_ABUSE: 1

## 2025-01-12 NOTE — THERAPY
Physical Therapy Contact Note    Patient Name: Stacy Oliva  Age:  54 y.o., Sex:  female  Medical Record #: [id number]  Today's Date: 1/11/2025 01/11/25 5768   Initial Contact Note    Initial Contact Note Order Received and Verified. Physical Therapy Evaluation NOT Completed Because Patient Does Not Require Acute Physical Therapy at this Time.   Interdisciplinary Plan of Care Collaboration   IDT Collaboration with  Nursing;Occupational Therapist   Collaboration Comments Chart reviewed. SPoke with RN who stated that pt does not have therapy needs at this time. She is amb halls and doing well with ADLs. Notified OT. WIll complete current PT order. Please re-order prn.   Session Information   Date / Session Number  1/11: no needs

## 2025-01-12 NOTE — CARE PLAN
Problem: Knowledge Deficit - Standard  Goal: Patient and family/care givers will demonstrate understanding of plan of care, disease process/condition, diagnostic tests and medications  Outcome: Progressing  Note: Education provided to the pt all questions were answered. Teach back method was used.      Problem: Pain - Standard  Goal: Alleviation of pain or a reduction in pain to the patient’s comfort goal  Outcome: Met  Note: Pt's pain has been managed at comfort level throughout my shift. Extra pillows and blankets given to the pt for comfort   The patient is Stable - Low risk of patient condition declining or worsening    Shift Goals  Clinical Goals: Pt will ambulate up to bathroom and hallway, Pain will remain controlled throughout my shift  Patient Goals: To go home  Family Goals: To have pt D/C home, Rest    Progress made toward(s) clinical / shift goals: Pt has been ambulating to the bathroom, hallway and participating in ADL's.

## 2025-01-12 NOTE — CARE PLAN
The patient is Stable - Low risk of patient condition declining or worsening    Shift Goals  Clinical Goals: Pt will ambulate aprox 200 feet, Pt will remain within her pain comfort level this shift  Patient Goals: get out of here  Family Goals: comfort    Progress made toward(s) clinical / shift goals: Pt has mobilized approx 300ft this shift. Will continue to encourage movement to aid pt healing. Pt 's pain has been controlled with monitoring and medication per MAR this shift.     Patient is not progressing towards the following goals:

## 2025-01-12 NOTE — CARE PLAN
Problem: Knowledge Deficit - Standard  Goal: Patient and family/care givers will demonstrate understanding of plan of care, disease process/condition, diagnostic tests and medications  Outcome: Progressing  Note: Education was given to the pt, all questions were answered. Teach back method was used   The patient is Stable - Low risk of patient condition declining or worsening    Shift Goals  Clinical Goals: Pt will remain free from falls, skin will remain intact, Pt will have a BM  Patient Goals: To go home  Family Goals: ALEXANDRU    Progress made toward(s) clinical / shift goals:  Education was provided to the pt all questions were answered. Teach back method was used. Pt was able to participate in ADL's today this AM.

## 2025-01-12 NOTE — PROGRESS NOTES
Assumed care of pt 01/12/2025  AM assessment complete  Education provided to the pt all questions were answered.  Pt was able to ambulate to the bathroom and participate in ADL's  Hourly rounding in place

## 2025-01-12 NOTE — THERAPY
Occupational Therapy Contact Note    Patient Name: Stacy Oliva  Age:  54 y.o., Sex:  female  Medical Record #: [id number]  Today's Date: 1/12/2025    OT orders received. Per chart review pt with 6 clicks score of 23. Following discussion with PT and RN, pt has no acute OT needs. Will complete OT orders at this, please re-consult should the pt have a change in status.

## 2025-01-12 NOTE — PROGRESS NOTES
"Hospital Medicine Daily Progress Note    Date of Service  1/12/2025    Chief Complaint  Stacy Oliva is a 54 y.o. female admitted 1/9/2025 with shortness of breath    Hospital Course  Stacy Oliva is a 54 y.o. female who presented 1/9/2025 with past medical history of alcoholic liver cirrhosis, ascites, GI bleed, alcohol abuse last drink was 2 weeks ago, pleural effusion, who was recently admitted to the hospital and had thoracentesis now presented to the ED complaining of shortness of breath. Denies cough or congestion. Denies fever or chills. In ED patient is on room air. Hemodynamically stable. CBC showed WBC 14.7. platelet 154, CMP panel showed Na of 126, K 3.5, , ALT 95, Alp 147, total bilirubin 13.6. CXR showed 1.  Marked progressive opacification of the right hemithorax which now shows a complete \"whiteout\". Findings consistent with large right pleural effusion and underlying compressive atelectasis. 2.  The left lung field/hemithorax remains clear. I was requested to admit patient to the hospital for further management.     She underwent thoracentesis x 2 (1/10, 1/11).  Her hyponatremia improved with IV diuresis.  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     Interval Problem Update  - breathing much better, CXR much improved  - abdominal distention, will see if she has enough for para  - Na slowly improving, continue IV diuresis  - bili also slowly improving      I have discussed this patient's plan of care and discharge plan at IDT rounds today with Case Management, Nursing, Nursing leadership, and other members of the IDT team.    Consultants/Specialty  None    Code Status  Full Code    Disposition  The patient is not medically cleared for discharge to home or a post-acute facility.  Anticipate discharge to: home with close outpatient follow-up    I have placed the appropriate orders for post-discharge needs.    Review of Systems  Review of Systems   Constitutional:  Negative " for chills and fever.   Respiratory:  Negative for shortness of breath.    Cardiovascular:  Negative for leg swelling.   Gastrointestinal:  Negative for abdominal pain, constipation, nausea and vomiting.        Abdominal distention    Neurological:  Negative for dizziness.   Psychiatric/Behavioral:  Positive for substance abuse (in past (etoh)).         Physical Exam  Temp:  [35.9 °C (96.7 °F)-36.2 °C (97.1 °F)] 36.1 °C (97 °F)  Pulse:  [81-95] 81  Resp:  [14-18] 16  BP: ()/(60-65) 96/63  SpO2:  [95 %-97 %] 96 %    Physical Exam  Constitutional:       General: She is not in acute distress.     Appearance: She is not ill-appearing, toxic-appearing or diaphoretic.   HENT:      Mouth/Throat:      Mouth: Mucous membranes are moist.   Eyes:      General: Scleral icterus present.   Cardiovascular:      Rate and Rhythm: Normal rate and regular rhythm.      Heart sounds: No murmur heard.     No friction rub. No gallop.   Pulmonary:      Effort: Pulmonary effort is normal.      Breath sounds: No wheezing, rhonchi or rales.   Abdominal:      General: Bowel sounds are normal. There is distension (mild).      Palpations: Abdomen is soft.      Tenderness: There is no abdominal tenderness.   Musculoskeletal:      Right lower leg: No edema.      Left lower leg: No edema.   Skin:     Coloration: Skin is jaundiced.   Neurological:      Mental Status: She is alert and oriented to person, place, and time. Mental status is at baseline.   Psychiatric:         Mood and Affect: Mood normal.         Behavior: Behavior normal.         Fluids    Intake/Output Summary (Last 24 hours) at 1/12/2025 1353  Last data filed at 1/12/2025 0900  Gross per 24 hour   Intake 550 ml   Output 300 ml   Net 250 ml        Laboratory  Recent Labs     01/10/25  0108 01/11/25  0120 01/12/25  0114   WBC 10.4 14.9* 17.4*   RBC 3.90* 3.88* 3.69*   HEMOGLOBIN 13.7 13.8 13.2   HEMATOCRIT 39.5 40.1 37.8   .3* 103.4* 102.4*   MCH 35.1* 35.6* 35.8*   MCHC  34.7 34.4 34.9   RDW 76.3* 77.7* 74.4*   PLATELETCT 144* 119* 93*   MPV 10.3 9.6 10.2     Recent Labs     01/10/25  0904 01/10/25  1818 01/11/25  0120 01/12/25  0114 01/12/25  1151   SODIUM 124*   < > 130* 125* 126*   POTASSIUM 3.6  --  3.9 3.8  --    CHLORIDE 87*  --  95* 92*  --    CO2 25 -- 25 25  --    GLUCOSE 138*  --  101* 101*  --    BUN 14 -- 12 13  --    CREATININE 0.84  --  0.84 0.67  --    CALCIUM 8.4*  --  8.5 8.3*  --     < > = values in this interval not displayed.                   Recent Labs     01/09/25  1114 01/10/25  0108 01/11/25  0120 01/12/25  0114   ASTSGOT 110* 100* 94* 82*   ALTSGPT 95* 85* 85* 79*   TBILIRUBIN 13.6* 12.7* 12.7* 11.4*   GLOBULIN 4.6* 3.8* 3.8* 3.6*   INR 1.65*  --   --   --          Imaging  US-ABDOMEN LTD (SOFT TISSUE)   Final Result      Moderate amount of ascites in the abdomen as detailed above. The patient did not consent to paracentesis.      US-THORACENTESIS PUNCTURE RIGHT   Final Result      1. Ultrasound guided right sided therapeutic thoracentesis.      2. 2000 mL of fluid withdrawn.      DX-CHEST-PORTABLE (1 VIEW)   Final Result      1. Interval decrease in size of the right pleural effusion after right thoracentesis. No postprocedure visible pneumothorax.   2. Right upper lobe subsegmental atelectasis.      DX-CHEST-PORTABLE (1 VIEW)   Final Result      Stable large right pleural effusion and right-sided airspace disease.      US-THORACENTESIS PUNCTURE RIGHT   Final Result      1. Ultrasound guided right sided diagnostic and therapeutic thoracentesis.      2. 2000 mL of fluid withdrawn.      DX-CHEST-PORTABLE (1 VIEW)   Final Result      1.  Improvement of RIGHT pleural effusion with residual consolidation and fluid at the lung base.   2.  No pneumothorax.      CT-CHEST (THORAX) WITH   Final Result      Complete opacification of the right hemithorax is due to large pleural effusion and complete collapse of the right lung.      DX-CHEST-PORTABLE (1 VIEW)  "  Final Result      1.  Marked progressive opacification of the right hemithorax which now shows a complete \"whiteout\". Findings consistent with large right pleural effusion and underlying compressive atelectasis.   2.  The left lung field/hemithorax remains clear.      EC-ECHOCARDIOGRAM COMPLETE W/O CONT    (Results Pending)        Assessment/Plan  * Recurrent pleural effusion- (present on admission)  Assessment & Plan  Now w/ ecurrent pleural effusion and  complaining of shortness of breath, likely related to alcoholic liver cirrohsis    CXR in ED showed 1.  Marked progressive opacification of the right hemithorax which now shows a complete \"whiteout\". Findings consistent with large right pleural effusion and underlying compressive atelectasis.  2.  The left lung field/hemithorax remains clear  I will obtain CT thorax w/o to further evaluate   US guided thoracentesis ordered.  IR consult placed  IV lasix 40 mg daily   Will order obtain an Echo   Consider obtaining pulmonary consult     1/10: likely from not getting lasix at discharge  - lasix IV and spironolactone  - thora today  - f/u CT chest    1/11: persistent effusion, repeat thora  - pleural fluid looks transudative  - cx NGTD  - continue lasix/spironolactone    1/12: CXR reviewed, much better aeration     Leukocytosis  Assessment & Plan  Increasing but again likely from thora  Fluid looks transudative on thora  Repeat CBC in am    Hyponatremia- (present on admission)  Assessment & Plan  Likely related to fluids overload from pleural effusion and ascites and liver cirrohsis  IV lasix ordered  Frequent BMP check   Check serum osmolarity, urine osmolarity, and urine sodium   Goal correction 6-8 meq in 24 hours     1/11: Na improving  1/12: slowly improving with diuresis and thora but ideally improve to closer to 128/129 prior to discharge    Advance care planning- (present on admission)  Assessment & Plan  Full code per patient wishes     Alcoholic cirrhosis " of liver with ascites (HCC)- (present on admission)  Assessment & Plan  Meld score 28 with 27-32% mortality in 90 day   Maddrey's discrimination score 41.2   Continue steroid   Continue spiranolactone  IV lasix added     1/11: LFTs stable  -repeat CMP in am    1/11: para today? See if enough fluid  1/12: doesn't look like she has enough fluid for para, continue lasix/spironolactone        Hypokalemia- (present on admission)  Assessment & Plan  Resolved         VTE prophylaxis:    enoxaparin ppx      I have performed a physical exam and reviewed and updated ROS and Plan today (1/12/2025). In review of yesterday's note (1/11/2025), there are no changes except as documented above.      Greater than 52 minutes spent preparing to see patient (e.g. review of tests) obtaining and/or reviewing separately obtained history. Performing a medically appropriate examination and/ evaluation.  Counseling and educating the patient/family/caregiver.  Ordering medications, tests, or procedures.  Referring and communicating with other health care professionals.  Documenting clinical information in EPIC.  Independently interpreting results and communicating results to patient/family/caregiver.  Care coordination.

## 2025-01-12 NOTE — PROGRESS NOTES
Assumed care of patient at 1900 from day RN. Patient is A&O x 4. Bed locked in the lowest position, 2 side rails up, call light is within reach, belongings at bedside. Pt spouse at side of bed. Pt reports pain level of 0 /10. Mobility standby assist. Explained plan of care and safety precautions to pt, pt has no questions at this time. Hourly rounding is in place.

## 2025-01-13 ENCOUNTER — PHARMACY VISIT (OUTPATIENT)
Dept: PHARMACY | Facility: MEDICAL CENTER | Age: 55
End: 2025-01-13
Payer: COMMERCIAL

## 2025-01-13 ENCOUNTER — APPOINTMENT (OUTPATIENT)
Dept: RADIOLOGY | Facility: MEDICAL CENTER | Age: 55
DRG: 433 | End: 2025-01-13
Attending: INTERNAL MEDICINE
Payer: COMMERCIAL

## 2025-01-13 VITALS
WEIGHT: 155.42 LBS | RESPIRATION RATE: 16 BRPM | TEMPERATURE: 96.7 F | BODY MASS INDEX: 25.9 KG/M2 | DIASTOLIC BLOOD PRESSURE: 69 MMHG | HEIGHT: 65 IN | HEART RATE: 75 BPM | SYSTOLIC BLOOD PRESSURE: 108 MMHG | OXYGEN SATURATION: 98 %

## 2025-01-13 PROBLEM — E87.6 HYPOKALEMIA: Status: RESOLVED | Noted: 2024-02-04 | Resolved: 2025-01-13

## 2025-01-13 LAB
ALBUMIN SERPL BCP-MCNC: 2.4 G/DL (ref 3.2–4.9)
ALBUMIN/GLOB SERPL: 0.7 G/DL
ALP SERPL-CCNC: 108 U/L (ref 30–99)
ALT SERPL-CCNC: 77 U/L (ref 2–50)
ANION GAP SERPL CALC-SCNC: 8 MMOL/L (ref 7–16)
AST SERPL-CCNC: 73 U/L (ref 12–45)
BACTERIA FLD AEROBE CULT: NORMAL
BILIRUB SERPL-MCNC: 11.4 MG/DL (ref 0.1–1.5)
BUN SERPL-MCNC: 12 MG/DL (ref 8–22)
CALCIUM ALBUM COR SERPL-MCNC: 9.4 MG/DL (ref 8.5–10.5)
CALCIUM SERPL-MCNC: 8.1 MG/DL (ref 8.5–10.5)
CHLORIDE SERPL-SCNC: 95 MMOL/L (ref 96–112)
CO2 SERPL-SCNC: 23 MMOL/L (ref 20–33)
CREAT SERPL-MCNC: 0.59 MG/DL (ref 0.5–1.4)
ERYTHROCYTE [DISTWIDTH] IN BLOOD BY AUTOMATED COUNT: 76.1 FL (ref 35.9–50)
GFR SERPLBLD CREATININE-BSD FMLA CKD-EPI: 106 ML/MIN/1.73 M 2
GLOBULIN SER CALC-MCNC: 3.3 G/DL (ref 1.9–3.5)
GLUCOSE SERPL-MCNC: 82 MG/DL (ref 65–99)
GRAM STN SPEC: NORMAL
HCT VFR BLD AUTO: 36.1 % (ref 37–47)
HGB BLD-MCNC: 12.4 G/DL (ref 12–16)
MCH RBC QN AUTO: 35.8 PG (ref 27–33)
MCHC RBC AUTO-ENTMCNC: 34.3 G/DL (ref 32.2–35.5)
MCV RBC AUTO: 104.3 FL (ref 81.4–97.8)
PHOSPHATE SERPL-MCNC: 2 MG/DL (ref 2.5–4.5)
PLATELET # BLD AUTO: 73 K/UL (ref 164–446)
PLATELETS.RETICULATED NFR BLD AUTO: 3.6 % (ref 0.6–13.1)
PMV BLD AUTO: 10.2 FL (ref 9–12.9)
POTASSIUM SERPL-SCNC: 4 MMOL/L (ref 3.6–5.5)
PROT SERPL-MCNC: 5.7 G/DL (ref 6–8.2)
RBC # BLD AUTO: 3.46 M/UL (ref 4.2–5.4)
SIGNIFICANT IND 70042: NORMAL
SITE SITE: NORMAL
SODIUM SERPL-SCNC: 126 MMOL/L (ref 135–145)
SOURCE SOURCE: NORMAL
WBC # BLD AUTO: 13.4 K/UL (ref 4.8–10.8)

## 2025-01-13 PROCEDURE — 700102 HCHG RX REV CODE 250 W/ 637 OVERRIDE(OP): Performed by: INTERNAL MEDICINE

## 2025-01-13 PROCEDURE — RXMED WILLOW AMBULATORY MEDICATION CHARGE: Performed by: INTERNAL MEDICINE

## 2025-01-13 PROCEDURE — 700102 HCHG RX REV CODE 250 W/ 637 OVERRIDE(OP): Performed by: STUDENT IN AN ORGANIZED HEALTH CARE EDUCATION/TRAINING PROGRAM

## 2025-01-13 PROCEDURE — A9270 NON-COVERED ITEM OR SERVICE: HCPCS | Performed by: INTERNAL MEDICINE

## 2025-01-13 PROCEDURE — 84100 ASSAY OF PHOSPHORUS: CPT

## 2025-01-13 PROCEDURE — 71045 X-RAY EXAM CHEST 1 VIEW: CPT

## 2025-01-13 PROCEDURE — 85055 RETICULATED PLATELET ASSAY: CPT

## 2025-01-13 PROCEDURE — 85027 COMPLETE CBC AUTOMATED: CPT

## 2025-01-13 PROCEDURE — 80053 COMPREHEN METABOLIC PANEL: CPT

## 2025-01-13 PROCEDURE — 700111 HCHG RX REV CODE 636 W/ 250 OVERRIDE (IP): Performed by: INTERNAL MEDICINE

## 2025-01-13 PROCEDURE — A9270 NON-COVERED ITEM OR SERVICE: HCPCS | Performed by: STUDENT IN AN ORGANIZED HEALTH CARE EDUCATION/TRAINING PROGRAM

## 2025-01-13 PROCEDURE — 99239 HOSP IP/OBS DSCHRG MGMT >30: CPT | Performed by: INTERNAL MEDICINE

## 2025-01-13 RX ORDER — OMEPRAZOLE 20 MG/1
20 CAPSULE, DELAYED RELEASE ORAL DAILY
Qty: 60 CAPSULE | Refills: 2 | Status: SHIPPED | OUTPATIENT
Start: 2025-01-13 | End: 2025-02-14 | Stop reason: SDUPTHER

## 2025-01-13 RX ORDER — SPIRONOLACTONE 25 MG/1
50 TABLET ORAL ONCE
Status: DISCONTINUED | OUTPATIENT
Start: 2025-01-13 | End: 2025-01-13

## 2025-01-13 RX ORDER — FUROSEMIDE 40 MG/1
40 TABLET ORAL DAILY
Qty: 60 TABLET | Refills: 0 | Status: ON HOLD | OUTPATIENT
Start: 2025-01-13 | End: 2025-02-05

## 2025-01-13 RX ORDER — SPIRONOLACTONE 50 MG/1
50 TABLET, FILM COATED ORAL DAILY
Qty: 60 TABLET | Refills: 0 | Status: ON HOLD | OUTPATIENT
Start: 2025-01-13 | End: 2025-02-06

## 2025-01-13 RX ORDER — SPIRONOLACTONE 25 MG/1
50 TABLET ORAL DAILY
Status: DISCONTINUED | OUTPATIENT
Start: 2025-01-14 | End: 2025-01-13 | Stop reason: HOSPADM

## 2025-01-13 RX ORDER — SPIRONOLACTONE 100 MG/1
100 TABLET, FILM COATED ORAL DAILY
Status: DISCONTINUED | OUTPATIENT
Start: 2025-01-14 | End: 2025-01-13

## 2025-01-13 RX ORDER — SPIRONOLACTONE 25 MG/1
25 TABLET ORAL ONCE
Status: COMPLETED | OUTPATIENT
Start: 2025-01-13 | End: 2025-01-13

## 2025-01-13 RX ORDER — POTASSIUM CHLORIDE 1500 MG/1
20 TABLET, EXTENDED RELEASE ORAL 2 TIMES DAILY
Qty: 60 TABLET | Refills: 0 | Status: ON HOLD | OUTPATIENT
Start: 2025-01-13 | End: 2025-02-06

## 2025-01-13 RX ORDER — LACTULOSE 10 G/15ML
15 SOLUTION ORAL 3 TIMES DAILY
Qty: 946 ML | Refills: 0 | Status: SHIPPED | OUTPATIENT
Start: 2025-01-13 | End: 2025-02-07

## 2025-01-13 RX ADMIN — LACTULOSE 30 ML: 10 SOLUTION ORAL at 08:33

## 2025-01-13 RX ADMIN — FUROSEMIDE 40 MG: 10 INJECTION, SOLUTION INTRAVENOUS at 06:09

## 2025-01-13 RX ADMIN — OMEPRAZOLE 20 MG: 20 CAPSULE, DELAYED RELEASE ORAL at 06:09

## 2025-01-13 RX ADMIN — POTASSIUM CHLORIDE 40 MEQ: 1500 TABLET, EXTENDED RELEASE ORAL at 06:09

## 2025-01-13 RX ADMIN — PREDNISOLONE SODIUM PHOSPHATE 39.9 MG: 15 SOLUTION ORAL at 06:09

## 2025-01-13 RX ADMIN — CARVEDILOL 3.12 MG: 3.12 TABLET, FILM COATED ORAL at 08:33

## 2025-01-13 RX ADMIN — SPIRONOLACTONE 25 MG: 25 TABLET ORAL at 06:09

## 2025-01-13 RX ADMIN — SPIRONOLACTONE 25 MG: 25 TABLET ORAL at 08:33

## 2025-01-13 ASSESSMENT — PAIN DESCRIPTION - PAIN TYPE: TYPE: ACUTE PAIN

## 2025-01-13 NOTE — PROGRESS NOTES
Discharge order received from MD. [place] [facility] in personal clothing; all personal belongings present and accounted for.  to provide ride home.

## 2025-01-13 NOTE — CARE PLAN
The patient is Stable - Low risk of patient condition declining or worsening    Shift Goals  Clinical Goals: Pt will remain within her pain comfort level this shift, Pt will ambulate to the restroom on her own this shift  Patient Goals: shower and then sleep  Family Goals: ALEXANDRU    Progress made toward(s) clinical / shift goals:   Pt 's pain has been controlled with monitoring and medication per MAR this shift. Non-pharmacological methods used: shower. Pt ambulated to and from shower and bathroom multiple times this shift.    Problem: Pain - Standard  Goal: Alleviation of pain or a reduction in pain to the patient’s comfort goal  1/13/2025 0219 by Satish Torrez R.N.  Reactivated     Problem: Knowledge Deficit - Standard  Goal: Patient and family/care givers will demonstrate understanding of plan of care, disease process/condition, diagnostic tests and medications  1/13/2025 0219 by Satish Torrez R.N.  Outcome: Progressing     Patient is not progressing towards the following goals:

## 2025-01-13 NOTE — PROGRESS NOTES
Stacy Oliva has been provided discharge instructions, to include follow up care, home medications, and activity/diet reviewed. Copy of discharge instructions in patient chart, signed and reviewed. Patient verbalizes the understanding of the discharge instructions. PIV removed, tip intact, pressure dressing applied. Arm band removed. Patient did not have home meds during admit. Meds to Beds given. Questions and concerns addressed prior to leaving the discharge lounge. Transported via car by . Patient discharge to home.

## 2025-01-14 ENCOUNTER — PATIENT OUTREACH (OUTPATIENT)
Dept: HEALTH INFORMATION MANAGEMENT | Facility: OTHER | Age: 55
End: 2025-01-14
Payer: COMMERCIAL

## 2025-01-14 LAB
FUNGUS SPEC CULT: NORMAL
FUNGUS SPEC FUNGUS STN: NORMAL
SIGNIFICANT IND 70042: NORMAL
SITE SITE: NORMAL
SOURCE SOURCE: NORMAL

## 2025-01-14 NOTE — DISCHARGE SUMMARY
"Discharge Summary    CHIEF COMPLAINT ON ADMISSION  Chief Complaint   Patient presents with    Shortness of Breath     Recent admission with multiple thoracentesis,  on Sunday SOB started to return and concerned that fluid is increasing again.         Reason for Admission  Shortness of Breath     Admission Date  1/9/2025    CODE STATUS  FULL    HPI & HOSPITAL COURSE    Stacy Oliva is a 54 y.o. female who presented 1/9/2025 with past medical history of alcoholic liver cirrhosis, ascites, GI bleed, alcohol abuse last drink was 2 weeks ago, pleural effusion, who was recently admitted to the hospital and had thoracentesis now presented to the ED complaining of shortness of breath. Denies cough or congestion. Denies fever or chills. In ED patient is on room air. Hemodynamically stable. CBC showed WBC 14.7. platelet 154, CMP panel showed Na of 126, K 3.5, , ALT 95, Alp 147, total bilirubin 13.6. CXR showed 1.  Marked progressive opacification of the right hemithorax which now shows a complete \"whiteout\". Findings consistent with large right pleural effusion and underlying compressive atelectasis. 2.  The left lung field/hemithorax remains clear. I was requested to admit patient to the hospital for further management.     She remained on RA throughout the hospitalization.  She underwent thoracentesis x 2 (1/10, 1/11).  She did not have enough fluid for para. Her hyponatremia improved with IV diuresis.  She was started on prednisolone for etoh hepatitis with initial improvement in bili then plateaued.  Her sodium also plateaued at 126.  Patient was very anxious to go back to work had lots of things to do.  I recommended that patient stay for continued sodium monitoring as well as well as monitoring her bilirubin however she reported that she can have her PCP follow-up labs tomorrow.  I am worried that she is going to have return of that pleural effusion and recommended she get a referral for possible outpatient " thoracentesis with her PCP ASAP.  She was asymptomatic on day of discharge, on RA, afebrile, tolerating PO without evidence of encephalopathy.  Gave strict return precautions.     Therefore, she is discharged in fair and stable condition to home with close outpatient follow-up.    The patient met 2-midnight criteria for an inpatient stay at the time of discharge.    Discharge Date  1/13/2025    FOLLOW UP ITEMS POST DISCHARGE  PCP follow up in 1-2 days, monitor sodium levels  Monitor LFTs, calculate Lille score at follow up to decide about continued steroid use  Outpatient IR referral for thora?  GI follow up ASAP    DISCHARGE DIAGNOSES  Principal Problem:    Recurrent pleural effusion (POA: Yes)  Active Problems:    Alcoholic cirrhosis of liver with ascites (HCC) (POA: Yes)    Advance care planning (POA: Yes)    Hyponatremia (POA: Yes)    Leukocytosis (POA: Unknown)  Resolved Problems:    Hypokalemia (POA: Yes)      FOLLOW UP  No future appointments.  Saeid Bell M.D.  [id number] [address] [address]  [phone]    Follow up in 1 week(s)        MEDICATIONS ON DISCHARGE     Medication List        START taking these medications        Instructions   furosemide 40 MG Tabs  Commonly known as: Lasix   Take 1 Tablet by mouth every day.  Dose: 40 mg     lactulose 20 GM/30ML Soln   Take 15 mL by mouth 3 times a day. 15-30mL one to three times per day. Titrate to ~2 bowel movements per day  Dose: 15 mL     potassium chloride SA 20 MEQ Tbcr  Commonly known as: Kdur   Take 1 Tablet by mouth 2 times a day.  Dose: 20 mEq            CHANGE how you take these medications        Instructions   carvedilol 6.25 MG Tabs  What changed: additional instructions  Commonly known as: Coreg   Take one-half (0.5) Tablet by mouth 2 times a day with meals. Hold for SBP < 90 or HR <55  Dose: 3.125 mg     Phospha 250 Neutral 155-852-130 MG tablet  What changed: additional instructions  Generic drug: phosphorus   Take 1 Tablet by  mouth 2 times a day.  Dose: 1 Tablet     spironolactone 50 MG Tabs  What changed:   medication strength  how much to take  Commonly known as: Aldactone   Take 1 Tablet by mouth every day.  Dose: 50 mg            CONTINUE taking these medications        Instructions   omeprazole 20 MG delayed-release capsule  Commonly known as: PriLOSEC   Take 1 Capsule by mouth every day.  Dose: 20 mg     prednisoLONE sodium phosphate 15 mg/5mL oral solution  Commonly known as: Pediapred   Doctor's comments: Can round/change quantity dispensed to whatever is required to fill the 24 days based on our supplies  Take 13.3 mL by mouth every day for 24 days.  Dose: 39.9 mg              Allergies  No Known Allergies    DIET  No orders of the defined types were placed in this encounter.      ACTIVITY  As tolerated.  Weight bearing as tolerated    CONSULTATIONS  None    PROCEDURES  Thora 1/11  Thora 1/12    Discharge exam:  Physical Exam  Constitutional:       General: She is not in acute distress.     Appearance: She is not ill-appearing, toxic-appearing or diaphoretic.   HENT:      Head: Normocephalic and atraumatic.      Nose: Nose normal.      Mouth/Throat:      Mouth: Mucous membranes are moist.   Eyes:      General: Scleral icterus present.   Cardiovascular:      Rate and Rhythm: Normal rate and regular rhythm.      Heart sounds: No murmur heard.     No friction rub. No gallop.   Pulmonary:      Effort: Pulmonary effort is normal.      Comments: Decreased breath sounds on the right lower lobe  Abdominal:      General: Bowel sounds are normal. There is distension (mildly).      Palpations: Abdomen is soft.      Tenderness: There is no abdominal tenderness.   Musculoskeletal:      Right lower leg: No edema.      Left lower leg: No edema.   Skin:     Coloration: Skin is jaundiced.   Neurological:      Mental Status: She is alert and oriented to person, place, and time. Mental status is at baseline.      Gait: Gait normal.   Psychiatric:          Mood and Affect: Mood normal.         Behavior: Behavior normal.           LABORATORY  Lab Results   Component Value Date    SODIUM 126 (L) 01/13/2025    POTASSIUM 4.0 01/13/2025    CHLORIDE 95 (L) 01/13/2025    CO2 23 01/13/2025    GLUCOSE 82 01/13/2025    BUN 12 01/13/2025    CREATININE 0.59 01/13/2025        Lab Results   Component Value Date    WBC 13.4 (H) 01/13/2025    HEMOGLOBIN 12.4 01/13/2025    HEMATOCRIT 36.1 (L) 01/13/2025    PLATELETCT 73 (L) 01/13/2025      DX-CHEST-PORTABLE (1 VIEW)  Narrative: 1/13/2025 10:00 AM    HISTORY/REASON FOR EXAM:  Plural Effusion.    TECHNIQUE/EXAM DESCRIPTION AND NUMBER OF VIEWS:  Single portable view of the chest.    COMPARISON:  1/11/2025    FINDINGS:    LUNGS: Persistent right pleural effusion and associated right basilar opacity. The effusion has slightly increased in size.  PNEUMOTHORAX: None.  LINES AND TUBES: None.  MEDIASTINUM: No cardiomegaly.  MUSCULOSKELETAL STRUCTURES: No acute displaced fracture.  Impression: Slight increase in size in right pleural effusion. Persistent right basilar opacity, likely atelectasis.        Total time of the discharge process exceeds 38 minutes.

## 2025-01-14 NOTE — PROGRESS NOTES
Community Health Worker Intake    Identified barriers.  No resources provided.  Contact information provided to Stacy Oliva.  Has PCP appointment scheduled for 1/17/2025  Outpatient assessment completed.  Did the patient receive medications post discharge: Yes    CHW called pt over the phone to follow-up postdischarge. Pt states that she is doing well, and got all of her medications she needed. Pt states that she was putting them away in their containers as we spoke. Pt has her PCP appointment on 1/17/25 and has adequate transportation to get there. Pt states she does not need anything else at this time, but will reach out if anything comes up.     Plan:  CHW will graduate pt from program, as pt no longer has any needs. Pt given contact information, and will reach out if any needs arise.     If pt reaches out with needs, CHW will reopen program.

## 2025-01-17 NOTE — DISCHARGE SUMMARY
Discharge Summary    CHIEF COMPLAINT ON ADMISSION  Chief Complaint   Patient presents with    Shortness of Breath       Reason for Admission  Sob     Admission Date  12/31/2024    CODE STATUS  Prior    HPI & HOSPITAL COURSE  Stacy Oliva is a 54 y.o. female with history of alcoholic liver cirrhosis with ascites, GI bleed, alcohol abuse  (last drink was 5 days ago) who presented 12/31/2024 with complaints of shortness of breath on exertion, cough with small sputum production, that started 1 week ago with some flulike symptoms.  She reported some chest discomfort and tightness with cough and deep inspiration.  She denied fever or chills.  Has been sleeping on her side.  Patient reported worsening of abdominal distention, but denies abdominal pain nausea vomiting or diarrhea.  She stated she has had paracentesis 5 times total in the past.  Upon evaluation she desaturated to 86% and was placed on 3 L nasal cannula.  Afebrile, blood pressure stable, not in respiratory distress, heart rate 105.  EKG showed diffuse ST depression QTc 546, but troponin is not elevated.  CTA of pulmonary arteries negative for PE, showed large right-sided pleural effusion with small area of groundglass opacities in the left upper lobe.  Blood work abnormalities include sodium 127, potassium 2.6, , ALT 69, total bilirubin 24.7, INR 2.29, lactic acid 2.7, proBNP 149  COVID-19/influenza/RSV screen negative.  Per discussion with ERP, patient preferred thoracentesis to be done tomorrow by interventional radiology     1/1  Procalcitonin low, no significant evidence for infection, started steroids for alcohol liver hepatitis.  SpOw 90-96% weaned down to 1 L NC.  Stopped drinking on 12/25, no significant ongoing withdrawals.  Her Scr is rising and GFR dropping, developing ISRA.   Went for thora and felt improved following, not enough fluid for para.   No abdominal pain or tenderness, Collins's sign negative.     1/2  Afebrile with normal  pulse respiratory rate, normal blood pressure , pulse ox 90 to 93% on room air.  Tolerating p.o. intake, feeling improved.  HypoNa improved, LFT's and Tbili improving.  UA with few bacteria noted, not c/w infection.  She is anxious to go home, explained we need to see a bit more of a down trend for T. bili before we are comfortable discharging.    1/3 T bili continues to trend down, she is requesting to be discharged, will follow with PCP and GI, discussed importance of remaining ETOH free particularly now and she expressed understanding.    Therefore, she is discharged in fair and stable condition to home with close outpatient follow-up.    The patient recovered much more quickly than anticipated on admission.    Discharge Date  1/3/2025    FOLLOW UP ITEMS POST DISCHARGE  PCP/GI follow up for post discharge care    DISCHARGE DIAGNOSES  Principal Problem:    Acute respiratory failure with hypoxia (POA: Yes)  Active Problems:    Alcoholic cirrhosis of liver with ascites (HCC) (POA: Yes)    Alcohol abuse (POA: Yes)    Pleural effusion (POA: Unknown)    Coagulopathy (HCC) (POA: Unknown)    Thrombocytopenia (HCC) (POA: Unknown)    Hyponatremia (POA: Unknown)    Lactic acid acidosis (POA: Unknown)  Resolved Problems:    Hypokalemia (POA: Yes)      FOLLOW UP  No future appointments.  Genaro Campoverde Jr., M.D.  [address] [facility] [address]  [facility] [id number] [phone]    Go on 1/17/2025  Go to your appointment with Genaro Campoverde Jr., MD on Friday January 17th 2025 at 10:40am    Saeid Bell M.D.  [address] [address] [facility] [address]  [facility] [address]  [phone]            MEDICATIONS ON DISCHARGE     Medication List        START taking these medications        Instructions   prednisoLONE sodium phosphate 15 mg/5mL oral solution  Commonly known as: Pediapred   Doctor's comments: Can round/change quantity dispensed to whatever is required to fill the 24 days based on our supplies  Take 13.3 mL by mouth every day for 24  days.  Dose: 39.9 mg            CONTINUE taking these medications        Instructions   carvedilol 6.25 MG Tabs  Commonly known as: Coreg   Take one-half (0.5) Tablet by mouth 2 times a day with meals. Hold for SBP < 90 or HR <55  Dose: 3.125 mg            STOP taking these medications      omeprazole 20 MG delayed-release capsule  Commonly known as: PriLOSEC     spironolactone 25 MG Tabs  Commonly known as: Aldactone            ASK your doctor about these medications        Instructions   Phospha 250 Neutral 155-852-130 MG tablet  Generic drug: phosphorus  Ask about: Should I take this medication?   Take 1 Tablet by mouth 2 times a day for 2 days.  Dose: 1 Tablet              Allergies  No Known Allergies    DIET  No orders of the defined types were placed in this encounter.      ACTIVITY  As tolerated.  Weight bearing as tolerated    CONSULTATIONS  NA    PROCEDURES  Thoracentesis    LABORATORY  Lab Results   Component Value Date    SODIUM 126 (L) 01/13/2025    POTASSIUM 4.0 01/13/2025    CHLORIDE 95 (L) 01/13/2025    CO2 23 01/13/2025    GLUCOSE 82 01/13/2025    BUN 12 01/13/2025    CREATININE 0.59 01/13/2025        Lab Results   Component Value Date    WBC 13.4 (H) 01/13/2025    HEMOGLOBIN 12.4 01/13/2025    HEMATOCRIT 36.1 (L) 01/13/2025    PLATELETCT 73 (L) 01/13/2025        Total time of the discharge process exceeds 35 minutes.

## 2025-01-31 ENCOUNTER — HOSPITAL ENCOUNTER (INPATIENT)
Facility: MEDICAL CENTER | Age: 55
End: 2025-01-31
Attending: INTERNAL MEDICINE | Admitting: EMERGENCY MEDICINE
Payer: COMMERCIAL

## 2025-01-31 ENCOUNTER — APPOINTMENT (OUTPATIENT)
Dept: RADIOLOGY | Facility: MEDICAL CENTER | Age: 55
DRG: 441 | End: 2025-01-31
Attending: INTERNAL MEDICINE
Payer: COMMERCIAL

## 2025-01-31 ENCOUNTER — APPOINTMENT (OUTPATIENT)
Dept: RADIOLOGY | Facility: MEDICAL CENTER | Age: 55
DRG: 441 | End: 2025-01-31
Payer: COMMERCIAL

## 2025-01-31 ENCOUNTER — HOSPITAL ENCOUNTER (OUTPATIENT)
Dept: RADIOLOGY | Facility: MEDICAL CENTER | Age: 55
End: 2025-01-31
Payer: COMMERCIAL

## 2025-01-31 ENCOUNTER — APPOINTMENT (OUTPATIENT)
Dept: RADIOLOGY | Facility: MEDICAL CENTER | Age: 55
DRG: 441 | End: 2025-01-31
Attending: EMERGENCY MEDICINE
Payer: COMMERCIAL

## 2025-01-31 DIAGNOSIS — K72.90 DECOMPENSATED CIRRHOSIS (HCC): Chronic | ICD-10-CM

## 2025-01-31 DIAGNOSIS — K70.30 ALCOHOLIC CIRRHOSIS OF LIVER WITHOUT ASCITES (HCC): ICD-10-CM

## 2025-01-31 DIAGNOSIS — D68.9 COAGULOPATHY (HCC): ICD-10-CM

## 2025-01-31 DIAGNOSIS — J90 RECURRENT PLEURAL EFFUSION ON RIGHT: ICD-10-CM

## 2025-01-31 DIAGNOSIS — K74.60 DECOMPENSATED CIRRHOSIS (HCC): Chronic | ICD-10-CM

## 2025-01-31 DIAGNOSIS — E87.1 HYPONATREMIA: ICD-10-CM

## 2025-01-31 DIAGNOSIS — D62 ACUTE BLOOD LOSS ANEMIA: ICD-10-CM

## 2025-01-31 DIAGNOSIS — J96.01 ACUTE HYPOXIC RESPIRATORY FAILURE (HCC): ICD-10-CM

## 2025-01-31 DIAGNOSIS — F10.10 ALCOHOL ABUSE: ICD-10-CM

## 2025-01-31 PROBLEM — I95.9 HYPOTENSION: Status: ACTIVE | Noted: 2025-01-31

## 2025-01-31 LAB
ALBUMIN SERPL BCP-MCNC: 2.4 G/DL (ref 3.2–4.9)
ALBUMIN/GLOB SERPL: 0.7 G/DL
ALP SERPL-CCNC: 150 U/L (ref 30–99)
ALT SERPL-CCNC: 49 U/L (ref 2–50)
AMMONIA PLAS-SCNC: 44 UMOL/L (ref 11–45)
AMYLASE FLD-CCNC: 29 U/L
ANION GAP SERPL CALC-SCNC: 13 MMOL/L (ref 7–16)
ANISOCYTOSIS BLD QL SMEAR: ABNORMAL
APPEARANCE FLD: CLEAR
APTT PPP: 29.8 SEC (ref 24.7–36)
AST SERPL-CCNC: 59 U/L (ref 12–45)
BACTERIA FLD AEROBE CULT: NORMAL
BACTERIA SPEC ANAEROBE CULT: NORMAL
BASE EXCESS BLDV CALC-SCNC: 9 MMOL/L (ref -2–3)
BASOPHILS # BLD AUTO: 0.3 % (ref 0–1.8)
BASOPHILS # BLD: 0.04 K/UL (ref 0–0.12)
BILIRUB SERPL-MCNC: 12.2 MG/DL (ref 0.1–1.5)
BODY FLD TYPE: NORMAL
BODY TEMPERATURE: ABNORMAL DEGREES
BUN SERPL-MCNC: 22 MG/DL (ref 8–22)
BURR CELLS BLD QL SMEAR: NORMAL
CALCIUM ALBUM COR SERPL-MCNC: 9.6 MG/DL (ref 8.5–10.5)
CALCIUM SERPL-MCNC: 8.3 MG/DL (ref 8.5–10.5)
CELLS FLD: 2
CHLORIDE SERPL-SCNC: 82 MMOL/L (ref 96–112)
CO2 BLDV-SCNC: 35 MMOL/L (ref 20–33)
CO2 SERPL-SCNC: 24 MMOL/L (ref 20–33)
COLOR FLD: YELLOW
COMMENT 1642: NORMAL
CREAT SERPL-MCNC: 0.46 MG/DL (ref 0.5–1.4)
CYTOLOGY REG CYTOL: NORMAL
DELSYS IDSYS: ABNORMAL
EOSINOPHIL # BLD AUTO: 0.45 K/UL (ref 0–0.51)
EOSINOPHIL NFR BLD: 3.7 % (ref 0–6.9)
ERYTHROCYTE [DISTWIDTH] IN BLOOD BY AUTOMATED COUNT: 65.2 FL (ref 35.9–50)
GFR SERPLBLD CREATININE-BSD FMLA CKD-EPI: 112 ML/MIN/1.73 M 2
GLOBULIN SER CALC-MCNC: 3.6 G/DL (ref 1.9–3.5)
GLUCOSE FLD-MCNC: 155 MG/DL
GLUCOSE SERPL-MCNC: 167 MG/DL (ref 65–99)
GRAM STN SPEC: NORMAL
GRAM STN SPEC: NORMAL
HAV IGM SERPL QL IA: NORMAL
HBV CORE IGM SER QL: NORMAL
HBV SURFACE AG SER QL: NORMAL
HCO3 BLDV-SCNC: 33.7 MMOL/L (ref 22–29)
HCT VFR BLD AUTO: 44.2 % (ref 37–47)
HCV AB SER QL: NORMAL
HGB BLD-MCNC: 16.3 G/DL (ref 12–16)
IMM GRANULOCYTES # BLD AUTO: 0.11 K/UL (ref 0–0.11)
IMM GRANULOCYTES NFR BLD AUTO: 0.9 % (ref 0–0.9)
INR PPP: 1.73 (ref 0.87–1.13)
LACTATE BLD-SCNC: 2.8 MMOL/L (ref 0.5–2)
LDH FLD L TO P-CCNC: 42 U/L
LDH SERPL L TO P-CCNC: 474 U/L (ref 107–266)
LPM ILPM: 15 LPM
LYMPHOCYTES # BLD AUTO: 0.61 K/UL (ref 1–4.8)
LYMPHOCYTES NFR BLD: 5.1 % (ref 22–41)
LYMPHOCYTES NFR FLD: 7 %
MACROCYTES BLD QL SMEAR: ABNORMAL
MAGNESIUM SERPL-MCNC: 1.6 MG/DL (ref 1.5–2.5)
MCH RBC QN AUTO: 38.3 PG (ref 27–33)
MCHC RBC AUTO-ENTMCNC: 36.9 G/DL (ref 32.2–35.5)
MCV RBC AUTO: 103.8 FL (ref 81.4–97.8)
MONOCYTES # BLD AUTO: 0.28 K/UL (ref 0–0.85)
MONOCYTES NFR BLD AUTO: 2.3 % (ref 0–13.4)
MONOS+MACROS NFR FLD MANUAL: 64 %
MORPHOLOGY BLD-IMP: NORMAL
NEUTROPHILS # BLD AUTO: 10.52 K/UL (ref 1.82–7.42)
NEUTROPHILS NFR BLD: 87.7 % (ref 44–72)
NEUTROPHILS NFR FLD: 27 %
NRBC # BLD AUTO: 0 K/UL
NRBC BLD-RTO: 0 /100 WBC (ref 0–0.2)
NUC CELL # FLD: 119 CELLS/UL
PCO2 BLDV: 43.7 MMHG (ref 38–54)
PCO2 TEMP ADJ BLDV: 42.2 MMHG (ref 38–54)
PH BLDV: 7.5 [PH] (ref 7.31–7.45)
PH FLD: 8 [PH]
PH TEMP ADJ BLDV: 7.51 [PH] (ref 7.31–7.45)
PLATELET # BLD AUTO: 46 K/UL (ref 164–446)
PLATELET BLD QL SMEAR: NORMAL
PLATELETS.RETICULATED NFR BLD AUTO: 6.8 % (ref 0.6–13.1)
PMV BLD AUTO: 9.3 FL (ref 9–12.9)
PO2 BLDV: 17 MMHG (ref 23–48)
PO2 TEMP ADJ BLDV: 16 MMHG (ref 23–48)
POIKILOCYTOSIS BLD QL SMEAR: NORMAL
POTASSIUM SERPL-SCNC: 4.1 MMOL/L (ref 3.6–5.5)
PROT FLD-MCNC: 0.2 G/DL
PROT SERPL-MCNC: 6 G/DL (ref 6–8.2)
PROTHROMBIN TIME: 20.3 SEC (ref 12–14.6)
RBC # BLD AUTO: 4.26 M/UL (ref 4.2–5.4)
RBC # FLD: <2000 CELLS/UL
RBC BLD AUTO: PRESENT
SAO2 % BLDV: 27 % (ref 60–85)
SIGNIFICANT IND 70042: NORMAL
SITE SITE: NORMAL
SODIUM SERPL-SCNC: 119 MMOL/L (ref 135–145)
SODIUM SERPL-SCNC: 119 MMOL/L (ref 135–145)
SODIUM SERPL-SCNC: 120 MMOL/L (ref 135–145)
SOURCE SOURCE: NORMAL
SPECIMEN DRAWN FROM PATIENT: ABNORMAL
TRIGL FLD-MCNC: 30 MG/DL
WBC # BLD AUTO: 12 K/UL (ref 4.8–10.8)

## 2025-01-31 PROCEDURE — A9270 NON-COVERED ITEM OR SERVICE: HCPCS

## 2025-01-31 PROCEDURE — 84157 ASSAY OF PROTEIN OTHER: CPT

## 2025-01-31 PROCEDURE — 82945 GLUCOSE OTHER FLUID: CPT

## 2025-01-31 PROCEDURE — 80074 ACUTE HEPATITIS PANEL: CPT

## 2025-01-31 PROCEDURE — 82803 BLOOD GASES ANY COMBINATION: CPT

## 2025-01-31 PROCEDURE — 84295 ASSAY OF SERUM SODIUM: CPT | Mod: 91

## 2025-01-31 PROCEDURE — 84478 ASSAY OF TRIGLYCERIDES: CPT

## 2025-01-31 PROCEDURE — 85610 PROTHROMBIN TIME: CPT

## 2025-01-31 PROCEDURE — 99222 1ST HOSP IP/OBS MODERATE 55: CPT | Performed by: SPECIALIST

## 2025-01-31 PROCEDURE — 82103 ALPHA-1-ANTITRYPSIN TOTAL: CPT

## 2025-01-31 PROCEDURE — 99291 CRITICAL CARE FIRST HOUR: CPT | Mod: 25,GC | Performed by: EMERGENCY MEDICINE

## 2025-01-31 PROCEDURE — 71045 X-RAY EXAM CHEST 1 VIEW: CPT

## 2025-01-31 PROCEDURE — 700105 HCHG RX REV CODE 258

## 2025-01-31 PROCEDURE — 99292 CRITICAL CARE ADDL 30 MIN: CPT | Mod: 25,GC | Performed by: EMERGENCY MEDICINE

## 2025-01-31 PROCEDURE — 700111 HCHG RX REV CODE 636 W/ 250 OVERRIDE (IP): Mod: JZ

## 2025-01-31 PROCEDURE — 0W9930Z DRAINAGE OF RIGHT PLEURAL CAVITY WITH DRAINAGE DEVICE, PERCUTANEOUS APPROACH: ICD-10-PCS | Performed by: EMERGENCY MEDICINE

## 2025-01-31 PROCEDURE — 83986 ASSAY PH BODY FLUID NOS: CPT

## 2025-01-31 PROCEDURE — 83605 ASSAY OF LACTIC ACID: CPT

## 2025-01-31 PROCEDURE — 700111 HCHG RX REV CODE 636 W/ 250 OVERRIDE (IP)

## 2025-01-31 PROCEDURE — G0480 DRUG TEST DEF 1-7 CLASSES: HCPCS

## 2025-01-31 PROCEDURE — 32556 INSERT CATH PLEURA W/O IMAGE: CPT | Mod: GC | Performed by: EMERGENCY MEDICINE

## 2025-01-31 PROCEDURE — 700102 HCHG RX REV CODE 250 W/ 637 OVERRIDE(OP): Performed by: EMERGENCY MEDICINE

## 2025-01-31 PROCEDURE — 80053 COMPREHEN METABOLIC PANEL: CPT

## 2025-01-31 PROCEDURE — 83735 ASSAY OF MAGNESIUM: CPT

## 2025-01-31 PROCEDURE — 82150 ASSAY OF AMYLASE: CPT

## 2025-01-31 PROCEDURE — 82784 ASSAY IGA/IGD/IGG/IGM EACH: CPT

## 2025-01-31 PROCEDURE — 88112 CYTOPATH CELL ENHANCE TECH: CPT | Mod: 26 | Performed by: PATHOLOGY

## 2025-01-31 PROCEDURE — A9270 NON-COVERED ITEM OR SERVICE: HCPCS | Performed by: EMERGENCY MEDICINE

## 2025-01-31 PROCEDURE — 87075 CULTR BACTERIA EXCEPT BLOOD: CPT

## 2025-01-31 PROCEDURE — 85025 COMPLETE CBC W/AUTO DIFF WBC: CPT

## 2025-01-31 PROCEDURE — 88305 TISSUE EXAM BY PATHOLOGIST: CPT | Performed by: PATHOLOGY

## 2025-01-31 PROCEDURE — 85055 RETICULATED PLATELET ASSAY: CPT

## 2025-01-31 PROCEDURE — 83615 LACTATE (LD) (LDH) ENZYME: CPT

## 2025-01-31 PROCEDURE — 86038 ANTINUCLEAR ANTIBODIES: CPT

## 2025-01-31 PROCEDURE — 86015 ACTIN ANTIBODY EACH: CPT

## 2025-01-31 PROCEDURE — 94669 MECHANICAL CHEST WALL OSCILL: CPT

## 2025-01-31 PROCEDURE — 84155 ASSAY OF PROTEIN SERUM: CPT

## 2025-01-31 PROCEDURE — 87205 SMEAR GRAM STAIN: CPT

## 2025-01-31 PROCEDURE — 85730 THROMBOPLASTIN TIME PARTIAL: CPT

## 2025-01-31 PROCEDURE — 700111 HCHG RX REV CODE 636 W/ 250 OVERRIDE (IP): Performed by: EMERGENCY MEDICINE

## 2025-01-31 PROCEDURE — 89051 BODY FLUID CELL COUNT: CPT

## 2025-01-31 PROCEDURE — 700102 HCHG RX REV CODE 250 W/ 637 OVERRIDE(OP)

## 2025-01-31 PROCEDURE — 700105 HCHG RX REV CODE 258: Performed by: INTERNAL MEDICINE

## 2025-01-31 PROCEDURE — 83516 IMMUNOASSAY NONANTIBODY: CPT

## 2025-01-31 PROCEDURE — 82140 ASSAY OF AMMONIA: CPT

## 2025-01-31 PROCEDURE — 84165 PROTEIN E-PHORESIS SERUM: CPT

## 2025-01-31 PROCEDURE — 770022 HCHG ROOM/CARE - ICU (200)

## 2025-01-31 PROCEDURE — 700101 HCHG RX REV CODE 250: Performed by: EMERGENCY MEDICINE

## 2025-01-31 PROCEDURE — 88305 TISSUE EXAM BY PATHOLOGIST: CPT | Mod: 26 | Performed by: PATHOLOGY

## 2025-01-31 PROCEDURE — 87070 CULTURE OTHR SPECIMN AEROBIC: CPT

## 2025-01-31 PROCEDURE — 88112 CYTOPATH CELL ENHANCE TECH: CPT | Performed by: PATHOLOGY

## 2025-01-31 PROCEDURE — 700101 HCHG RX REV CODE 250

## 2025-01-31 RX ORDER — LIDOCAINE HYDROCHLORIDE AND EPINEPHRINE 10; 10 MG/ML; UG/ML
20 INJECTION, SOLUTION INFILTRATION; PERINEURAL ONCE
Status: COMPLETED | OUTPATIENT
Start: 2025-01-31 | End: 2025-01-31

## 2025-01-31 RX ORDER — LACTULOSE 10 G/15ML
15 SOLUTION ORAL 3 TIMES DAILY
Status: DISCONTINUED | OUTPATIENT
Start: 2025-01-31 | End: 2025-01-31

## 2025-01-31 RX ORDER — NOREPINEPHRINE BITARTRATE 0.03 MG/ML
0-1 INJECTION, SOLUTION INTRAVENOUS CONTINUOUS
Status: DISCONTINUED | OUTPATIENT
Start: 2025-01-31 | End: 2025-02-01

## 2025-01-31 RX ORDER — SPIRONOLACTONE 25 MG/1
50 TABLET ORAL ONCE
Status: DISCONTINUED | OUTPATIENT
Start: 2025-01-31 | End: 2025-01-31

## 2025-01-31 RX ORDER — OMEPRAZOLE 20 MG/1
20 CAPSULE, DELAYED RELEASE ORAL DAILY
Status: DISCONTINUED | OUTPATIENT
Start: 2025-01-31 | End: 2025-02-06 | Stop reason: HOSPADM

## 2025-01-31 RX ORDER — SPIRONOLACTONE 25 MG/1
50 TABLET ORAL DAILY
Status: DISCONTINUED | OUTPATIENT
Start: 2025-02-01 | End: 2025-02-01

## 2025-01-31 RX ORDER — FUROSEMIDE 10 MG/ML
40 INJECTION INTRAMUSCULAR; INTRAVENOUS DAILY
Status: DISCONTINUED | OUTPATIENT
Start: 2025-01-31 | End: 2025-02-01

## 2025-01-31 RX ORDER — LACTULOSE 10 G/15ML
15 SOLUTION ORAL 3 TIMES DAILY
Status: DISCONTINUED | OUTPATIENT
Start: 2025-01-31 | End: 2025-02-05

## 2025-01-31 RX ORDER — 3% SODIUM CHLORIDE 3 G/100ML
INJECTION, SOLUTION INTRAVENOUS CONTINUOUS
Status: DISCONTINUED | OUTPATIENT
Start: 2025-01-31 | End: 2025-02-01

## 2025-01-31 RX ORDER — SPIRONOLACTONE 25 MG/1
100 TABLET ORAL DAILY
Status: DISCONTINUED | OUTPATIENT
Start: 2025-02-01 | End: 2025-01-31

## 2025-01-31 RX ORDER — THIAMINE HYDROCHLORIDE 100 MG/ML
100 INJECTION, SOLUTION INTRAMUSCULAR; INTRAVENOUS DAILY
Status: DISCONTINUED | OUTPATIENT
Start: 2025-01-31 | End: 2025-02-06 | Stop reason: HOSPADM

## 2025-01-31 RX ORDER — MAGNESIUM SULFATE HEPTAHYDRATE 40 MG/ML
4 INJECTION, SOLUTION INTRAVENOUS ONCE
Status: COMPLETED | OUTPATIENT
Start: 2025-01-31 | End: 2025-01-31

## 2025-01-31 RX ORDER — NOREPINEPHRINE BITARTRATE 0.03 MG/ML
INJECTION, SOLUTION INTRAVENOUS
Status: ACTIVE
Start: 2025-01-31 | End: 2025-01-31

## 2025-01-31 RX ORDER — SPIRONOLACTONE 25 MG/1
50 TABLET ORAL DAILY
Status: DISCONTINUED | OUTPATIENT
Start: 2025-01-31 | End: 2025-01-31

## 2025-01-31 RX ADMIN — THERA TABS 1 TABLET: TAB at 11:43

## 2025-01-31 RX ADMIN — FUROSEMIDE 40 MG: 10 INJECTION INTRAMUSCULAR; INTRAVENOUS at 08:56

## 2025-01-31 RX ADMIN — MAGNESIUM SULFATE HEPTAHYDRATE 4 G: 4 INJECTION, SOLUTION INTRAVENOUS at 17:47

## 2025-01-31 RX ADMIN — LACTULOSE 15 ML: 10 SOLUTION ORAL; RECTAL at 17:45

## 2025-01-31 RX ADMIN — SODIUM CHLORIDE: 3 INJECTION, SOLUTION INTRAVENOUS at 14:32

## 2025-01-31 RX ADMIN — LIDOCAINE HYDROCHLORIDE AND EPINEPHRINE 20 ML: 10; 10 INJECTION, SOLUTION INFILTRATION; PERINEURAL at 07:00

## 2025-01-31 RX ADMIN — LACTULOSE 15 ML: 10 SOLUTION ORAL; RECTAL at 11:43

## 2025-01-31 RX ADMIN — FENTANYL CITRATE 50 MCG: 50 INJECTION, SOLUTION INTRAMUSCULAR; INTRAVENOUS at 07:06

## 2025-01-31 RX ADMIN — NOREPINEPHRINE BITARTRATE 0.1 MCG/KG/MIN: 1 INJECTION, SOLUTION, CONCENTRATE INTRAVENOUS at 09:14

## 2025-01-31 RX ADMIN — SPIRONOLACTONE 50 MG: 25 TABLET, FILM COATED ORAL at 11:43

## 2025-01-31 RX ADMIN — THIAMINE HYDROCHLORIDE 100 MG: 100 INJECTION, SOLUTION INTRAMUSCULAR; INTRAVENOUS at 11:43

## 2025-01-31 RX ADMIN — OMEPRAZOLE 20 MG: 20 CAPSULE, DELAYED RELEASE ORAL at 08:58

## 2025-01-31 ASSESSMENT — COPD QUESTIONNAIRES
DO YOU EVER COUGH UP ANY MUCUS OR PHLEGM?: NO/ONLY WITH OCCASIONAL COLDS OR INFECTIONS
DURING THE PAST 4 WEEKS HOW MUCH DID YOU FEEL SHORT OF BREATH: NONE/LITTLE OF THE TIME
COPD SCREENING SCORE: 1
HAVE YOU SMOKED AT LEAST 100 CIGARETTES IN YOUR ENTIRE LIFE: NO/DON'T KNOW

## 2025-01-31 ASSESSMENT — PAIN DESCRIPTION - PAIN TYPE
TYPE: ACUTE PAIN

## 2025-01-31 ASSESSMENT — ENCOUNTER SYMPTOMS
HEARTBURN: 0
FEVER: 0
WEAKNESS: 1
PALPITATIONS: 0
ORTHOPNEA: 0
COUGH: 0
SHORTNESS OF BREATH: 1
NAUSEA: 0
HEMOPTYSIS: 0
MYALGIAS: 0
CHILLS: 0
BLURRED VISION: 1

## 2025-01-31 ASSESSMENT — FIBROSIS 4 INDEX: FIB4 SCORE: 6.95

## 2025-01-31 NOTE — PROGRESS NOTES
Dr. Robert and Dr. Bear to bedside for CT insertion. Time out called at 0700. Pt premedicated with 50mcg fent.   Pt tolerated procedure well. VSS throughout.

## 2025-01-31 NOTE — PROGRESS NOTES
[facility] DIRECT ADMISSION REPORT  Transferring facility: [facility]  Transferring physician: Harry Reynoso    Chief complaint: Shortness of breath  Pertinent history & patient course: 54-year-old female with history of end-stage liver disease secondary to alcohol cirrhosis with recurrent ascites and pleural effusion, GI bleed, alcohol abuse, multiple hospitalizations (5x since 11/2024) for pleural effusions requiring thoracocentesis who presented to ER with shortness of breath.  While in ER, again found to have markedly large pleural effusion by CXR, requiring 10 L nasal cannula support, sodium 118, MELD score 30    Advised to transfer to liver transplant center, difficult or unable to transfer due to persistent alcohol abuse  ICU agreeable for IMCU    Pertinent imaging & lab results: Sodium 118, bilateral pleural effusion CXR, on 10 L  Consultants called prior to transfer and pertinent input from consultants: ICU - to IMCU  Code Status:  per transferring provider, I personally verified with the transferring provider patient's code status and the transferring provider has confirmed this with the patient.  Reason for Transfer: Higher level of care  Further work up or recommendations requested prior to transfer: Diuresis, thoracocentesis    Patient accepted for transfer: Yes  Accepting [facility] Facility: [facility] Nursing to notify the Triage Coordinator in the RTOC via Voalte or Phone ext. [phone] when patient arrives to the unit. The Triage Coordinator will assign the admitting provider.    Consultants to be called upon arrival: NA  Admission status: Inpatient.   Floor requested: IMCU  If ICU transfer, name of intensivist case discussed with and pertinent input from critical care:     The admitting provider is the point of contact for questions or concerns regarding patient's care.

## 2025-01-31 NOTE — ASSESSMENT & PLAN NOTE
S/p chest tube insertion this morning at 0700, 3L fluid drained  Likely dehydration/orthostatic hypotension  Levophed gtt for MAP > 55  Chest tube clamped @ 9am  Consider midodrine after chest tube d/c

## 2025-01-31 NOTE — CARE PLAN
The patient is Watcher - Medium risk of patient condition declining or worsening    Shift Goals  Clinical Goals: monitor chest tube output, labs  Patient Goals: get fluid off her lungs  Family Goals: updates    Progress made toward(s) clinical / shift goals:    Problem: Knowledge Deficit - Standard  Goal: Patient and family/care givers will demonstrate understanding of plan of care, disease process/condition, diagnostic tests and medications  Description: Target End Date:  1-3 days or as soon as patient condition allows    Document in Patient Education    1.  Patient and family/caregiver oriented to unit, equipment, visitation policy and means for communicating concern  2.  Complete/review Learning Assessment  3.  Assess knowledge level of disease process/condition, treatment plan, diagnostic tests and medications  4.  Explain disease process/condition, treatment plan, diagnostic tests and medications  Outcome: Progressing     Problem: Skin Integrity  Goal: Skin integrity is maintained or improved  Description: Target End Date:  Prior to discharge or change in level of care    Document interventions on Skin Risk/Kai flowsheet groups and corresponding LDA    1.  Assess and monitor skin integrity, appearance and/or temperature  2.  Assess risk factors for impaired skin integrity and/or pressures ulcers  3.  Implement precautions to protect skin integrity in collaboration with interdisciplinary team  4.  Implement pressure ulcer prevention protocol if at risk for skin breakdown  5.  Confirm wound care consult if at risk for skin breakdown  6.  Ensure patient use of pressure relieving devices  (Low air loss bed, waffle overlay, heel protectors, ROHO cushion, etc)  Outcome: Progressing     Problem: Fall Risk  Goal: Patient will remain free from falls  Description: Target End Date:  Prior to discharge or change in level of care    Document interventions on the Nidhi Sanchez Fall Risk Assessment    1.  Assess for fall  risk factors  2.  Implement fall precautions  Outcome: Progressing       Patient is not progressing towards the following goals:

## 2025-01-31 NOTE — ASSESSMENT & PLAN NOTE
Likely secondary to alcoholic cirrhosis  Plt 46 as of this morning, hold dvt ppx  Monitor for signs of bleeding

## 2025-01-31 NOTE — ASSESSMENT & PLAN NOTE
Alcoholic cirrhosis, MELD score 30 per most recent lab results.  Hx of ascites, none on this hospitalization  Pending repeat labs this morning  GI consult this afternoon after tube pulled  Continue home lactulose, spirinolactone 50 daily  Pending phosphotidyl ethanol

## 2025-01-31 NOTE — PROGRESS NOTES
Patient arrived to T622 via EMS. On 10 L oxymask, tachycardic, tachypneic. AAOx4. Dr. Robert and Dr. Bear at bedside, admit order upgraded from IMCU to ICU. MD to place chest tube at bedside.

## 2025-01-31 NOTE — PROGRESS NOTES
55 y.o. female with history of alcoholic and/or familial cirrhosis who presented as transfer from [facility] for further management of decompensated cirrhosis.  She presented there overnight for dyspnea.  She was found to have complete opacification of the right hemithorax.  She was transferred to [facility] for further management.    On arrival she is tachypneic to the 40s with saturations 90% on 10L facemask.  She reports she has been requiring thoracentesis every She reports she quit drinking around 1 year ago, but her dog  2024 and she had two drinks per day for ~4 days.  She denies alcohol use since then, however it is documented on 2024 emergency department note that her lat drink was two weeks ago.    On chart review it appears she has undergone thoracentesis , 1/3, 1/10, , , .      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    Assessment/Plan:  55 y.o. 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  MELD-Na 28.  PETH  Ammonia  Multivitamin, thiamine  GI consultation for consideration of TIPS for diuretic resistant hepatic hydrothorax  R pleural space evacuation for respiratory failure, plan to remove this afternoon     Critical care time: 105 minutes, excluding procedure.    Jose Robert MD, -[facility]  Critical Care Medicine  11:13 AM

## 2025-01-31 NOTE — PROGRESS NOTES
[facility] ICU Progress Note      Admit Date: 1/31/2025    Resident: Seda Bear M.D.   Attending:  Dr. Jose Robert M.D.    Hospital Course (carried forward and updated):  Hospital Day: 0    Stacy Oliva is a 55 y.o. female with PMHx significant for ESLD (MELD 30) secondary to alcoholic cirrhosis with hx of recurrent ascites and R pleural effusions s/p multiple thoracocentesis and thoracentesis (seen at [facility] x3 in past week for therapeutic thoracenteses), hx of GI bleed, alcohol dependence who presented on 1/31/2025 with shortness of breath. Initially presented to [facility] ER; CXR with recurrent large R pleural effusion, hyponatremic to 118, hypoxic to 80s requiring 10L NC (none at baseline). Transferred to [facility] for higher level of care requiring diuresis, thoracocentesis.     Interval Events:    Transferred from [facility] thi morning. Chest tube placed 0700, confirmed placement and absence of pneumothorax after procedure. Fluid studies sent. Start IV lasix 40 daily.     ~9am MAP in low 50s with SBP in 70s, pleuravac with >2L; chest tube clamped, levophed started for MAP >55 given hx of liver failure. Plan on repeat CXR around noon, pull chest tube and consult GI.    Review of Systems   Constitutional:  Positive for malaise/fatigue. Negative for chills and fever.   HENT:  Negative for congestion, ear pain and hearing loss.    Eyes:  Positive for blurred vision.   Respiratory:  Positive for shortness of breath. Negative for cough and hemoptysis.    Cardiovascular:  Negative for chest pain, palpitations and orthopnea.   Gastrointestinal:  Negative for heartburn and nausea.   Genitourinary:  Negative for dysuria.   Musculoskeletal:  Negative for myalgias.   Neurological:  Positive for weakness.        Vitals:  Temp:  [37 °C (98.6 °F)] 37 °C (98.6 °F)  Pulse:  [117-124] 117  Resp:  [13-40] 13  BP: ()/(50-69) 104/52  SpO2:  [93 %] 93 %  O2 therapy: Pulse Oximetry: 93 %, O2 (LPM): 10, O2  Delivery Device: Oxymask          I/O's:  Intake/Output       None             Physical Exam:  Physical Exam  Vitals and nursing note reviewed.   Constitutional:       Appearance: She is normal weight. She is ill-appearing.   HENT:      Head: Normocephalic and atraumatic.      Nose: Congestion present. No rhinorrhea.      Mouth/Throat:      Mouth: Mucous membranes are dry.      Pharynx: Oropharynx is clear.   Eyes:      General: Scleral icterus present.   Cardiovascular:      Rate and Rhythm: Regular rhythm. Tachycardia present.   Pulmonary:      Effort: No respiratory distress.      Breath sounds: No wheezing.      Comments: Decreased breath sounds R > L  Abdominal:      General: There is distension.      Tenderness: There is no abdominal tenderness. There is no guarding or rebound.   Musculoskeletal:      Cervical back: Normal range of motion and neck supple.      Comments: +Asterixis (mild, b/l)   Skin:     Coloration: Skin is jaundiced.   Neurological:      Mental Status: She is alert.         Labs:  Recent Labs     01/31/25  0843   ISTATTEMP 97.2 F   ISTATSPEC Venous     Recent Labs     01/31/25 0213 01/31/25  0830   RBC 4.65 4.26   HEMOGLOBIN 17.6* 16.3*   HEMATOCRIT 47.6* 44.2   PLATELETCT 67* 46*   PROTHROMBTM  --  20.3*   APTT  --  29.8   INR  --  1.73*     Recent Labs     01/31/25 0213 01/31/25  0830   WBC 12.7* 12.0*   NEUTSPOLYS  --  87.70*   LYMPHOCYTES  --  5.10*   MONOCYTES  --  2.30   EOSINOPHILS  --  3.70   BASOPHILS  --  0.30     Lab Results   Component Value Date/Time    SODIUM 126 (L) 01/13/2025 03:05 AM    POTASSIUM 4.0 01/13/2025 03:05 AM    CHLORIDE 95 (L) 01/13/2025 03:05 AM    CO2 23 01/13/2025 03:05 AM    GLUCOSE 82 01/13/2025 03:05 AM    BUN 12 01/13/2025 03:05 AM    CREATININE 0.59 01/13/2025 03:05 AM     Lab Results   Component Value Date/Time    PROTHROMBTM 20.3 (H) 01/31/2025 08:30 AM    INR 1.73 (H) 01/31/2025 08:30 AM          Imaging:  I have reviewed the relevant diagnostic  imaging.    Microbiology:  I have reviewed the relevant microbiology.    Problem List:  Principal Problem:    Acute hypoxic respiratory failure (HCC) (POA: Yes)  Active Problems:    Thrombocytopenia (HCC) (POA: Yes)    Hyponatremia (POA: Yes)    Recurrent pleural effusion on right (POA: Yes)    Decompensated cirrhosis (HCC) (POA: Yes)    Hypotension (POA: Yes)  Resolved Problems:    * No resolved hospital problems. *      # ICU Checklist  -DVT ppx: no chemical ppx for now, plt < 46  -GI ppx: PPI  -Nutrition: regular  -Lines/drains: PIVs, hurtado    ASSESSEMENT and PLAN:    Recurrent pleural effusion on right  Assessment & Plan  Likely in setting of alcoholic cirrhosis  Hx of multiple thoracenteses and chest tubes  Pending fluid studies s/p chest tube morning of 1/31; clamped around 9am due to hypotension and lightheadedness/dizziness  Pending repeat CXR at noon, likely will pull tube afterwards    * Decompensated cirrhosis (HCC)  Assessment & Plan  Alcoholic cirrhosis, MELD score 30 per most recent lab results.  Hx of ascites, none on this hospitalization  Pending repeat labs this morning  GI consult this afternoon after tube pulled  Continue home lactulose, spirinolactone 50 daily  Pending phosphotidyl ethanol     Hyponatremia  Assessment & Plan  Likely secondary to alcoholic cirrhosis  Na noted to be 118 at OSH  Pending repeat CMP this morning  S/p IV lasix 40 x1 this morning  Consider fluid restriction    Hypotension  Assessment & Plan  S/p chest tube insertion this morning at 0700, 3L fluid drained  Likely dehydration/orthostatic hypotension  Levophed gtt for MAP > 55  Chest tube clamped @ 9am  Consider midodrine after chest tube d/c    Acute hypoxic respiratory failure (HCC)  Assessment & Plan  Resolved.   Initially required 10L NC at OSH, currently on RA as of this morning    Thrombocytopenia (HCC)  Assessment & Plan  Likely secondary to alcoholic cirrhosis  Plt 46 as of this morning, hold dvt ppx  Monitor for  signs of bleeding        Seda Bear MD  PGY-2 Internal Medicine Resident  [facility]

## 2025-01-31 NOTE — PROGRESS NOTES
[facility] DIRECT ADMIT ACCEPTANCE NOTE    Transferring facility: [facility]  Transferring provider: Dr. Reynoso    Chief complaint: Shortness of breath  Pertinent history & patient course: Ms. Oliva is a 55-year-old female with a past medical history significant for alcoholic cirrhosis with recurrent right pleural effusions who presented to the emergency department today with increased shortness of breath.  The patient was hypoxic on room air in the 80s.  She was started on supplemental oxygen at 10 L.  A chest x-ray revealed a right-sided pleural effusion.  Other abnormalities show a sodium level of 118.  The patient is neurologically intact.  The patient has been seen at [facility] 3 times in the last week for therapeutic thoracenteses.  [facility] would like the patient transferred for subspecialty care of her pleural effusions.    Of note, both the hospitalist as well as myself have recommended that the patient be seen at a tertiary center with liver specialist.  Pertinent imaging & lab results: As above  Consultants called prior to transfer and pertinent input from consultants: Hospitalist and critical care  Code Status: Full code per transferring provider, I personally verified with the transferring provider patient's code status and the transferring provider has confirmed this with the patient.  Reason for Transfer: Subspecialty care for pleural effusions  Further work up or recommendations requested prior to transfer: No further work up    Patient accepted for transfer: Yes  Accepting [facility] Facility: [facility] - Nursing to notify the Triage Coordinator in the RTOC via Voalte or Phone ext. [phone] when patient arrives to the unit. The Triage Coordinator will assign the admitting provider.    Consultants to be called upon arrival: the admitting team  Admission status: Inpatient.   Floor requested: IMCU    The admitting provider is the point of contact for questions  or concerns regarding the patient's care.

## 2025-01-31 NOTE — ASSESSMENT & PLAN NOTE
Likely secondary to alcoholic cirrhosis  Na noted to be 118 at OSH  Pending repeat CMP this morning  S/p IV lasix 40 x1 this morning  Consider fluid restriction

## 2025-01-31 NOTE — ASSESSMENT & PLAN NOTE
Likely in setting of alcoholic cirrhosis  Hx of multiple thoracenteses and chest tubes  Pending fluid studies s/p chest tube morning of 1/31; clamped around 9am due to hypotension and lightheadedness/dizziness  Pending repeat CXR at noon, likely will pull tube afterwards

## 2025-01-31 NOTE — PROCEDURES
Date: 1/31/2025    Procedure: Tube thoracostomy    Indication: R pleural effusion    Physician:  Jose Robert MD, Seda Bear MD    Consent:  Obtained by patient at bedside    Procedure:  A time out occurred with the patient name, medical record number, allergies, and consent with right procedure, right side and indication with bedside nurse and if able the patient. The patient was connected to monitor and had oxygen applied. The patient had the right hemithorax prepped and draped using sterile barrier precautions.  Anesthetic was applied with 20 ml of 2% lidocaine. Using ultrasound guided seldinger technique a 8.5F size tube was placed at 4-5th rib space. The tube was connected to a pleuravac at 20cm H20. 60ml of straw color fluid was removed. These were sent for appropriate laboratory studies if indicated. The tube had sterile and vaseline dressing applied. The patient tolerated the procedure with no immediate complications with 0mL of blood loss.     Seda Bear MD  PGY-2 Internal Medicine Resident  [facility]

## 2025-02-01 ENCOUNTER — APPOINTMENT (OUTPATIENT)
Dept: RADIOLOGY | Facility: MEDICAL CENTER | Age: 55
DRG: 441 | End: 2025-02-01
Payer: COMMERCIAL

## 2025-02-01 ENCOUNTER — APPOINTMENT (OUTPATIENT)
Dept: RADIOLOGY | Facility: MEDICAL CENTER | Age: 55
DRG: 441 | End: 2025-02-01
Attending: INTERNAL MEDICINE
Payer: COMMERCIAL

## 2025-02-01 VITALS
BODY MASS INDEX: 23.84 KG/M2 | HEIGHT: 65 IN | HEART RATE: 105 BPM | WEIGHT: 143.08 LBS | OXYGEN SATURATION: 93 % | RESPIRATION RATE: 16 BRPM | DIASTOLIC BLOOD PRESSURE: 60 MMHG | SYSTOLIC BLOOD PRESSURE: 93 MMHG | TEMPERATURE: 97.8 F

## 2025-02-01 PROBLEM — K70.30 ALCOHOLIC CIRRHOSIS OF LIVER WITHOUT ASCITES (HCC): Status: RESOLVED | Noted: 2024-02-04 | Resolved: 2025-02-01

## 2025-02-01 PROBLEM — E87.20 LACTIC ACID ACIDOSIS: Status: RESOLVED | Noted: 2024-12-31 | Resolved: 2025-02-01

## 2025-02-01 PROBLEM — R57.9 SHOCK (HCC): Status: ACTIVE | Noted: 2025-01-31

## 2025-02-01 PROBLEM — J96.90 RESPIRATORY FAILURE (HCC): Status: RESOLVED | Noted: 2024-12-31 | Resolved: 2025-02-01

## 2025-02-01 PROBLEM — J90 PLEURAL EFFUSION: Status: RESOLVED | Noted: 2024-12-31 | Resolved: 2025-02-01

## 2025-02-01 PROBLEM — D72.829 LEUKOCYTOSIS: Status: RESOLVED | Noted: 2025-01-09 | Resolved: 2025-02-01

## 2025-02-01 LAB
ALBUMIN SERPL BCP-MCNC: 2.3 G/DL (ref 3.2–4.9)
ALP SERPL-CCNC: 123 U/L (ref 30–99)
ALT SERPL-CCNC: 50 U/L (ref 2–50)
ANION GAP SERPL CALC-SCNC: 9 MMOL/L (ref 7–16)
AST SERPL-CCNC: 54 U/L (ref 12–45)
BASOPHILS # BLD AUTO: 0.1 % (ref 0–1.8)
BASOPHILS # BLD: 0.01 K/UL (ref 0–0.12)
BILIRUB CONJ SERPL-MCNC: 4.3 MG/DL (ref 0.1–0.5)
BILIRUB INDIRECT SERPL-MCNC: 5.1 MG/DL (ref 0–1)
BILIRUB SERPL-MCNC: 9.4 MG/DL (ref 0.1–1.5)
BUN SERPL-MCNC: 18 MG/DL (ref 8–22)
CALCIUM SERPL-MCNC: 7.4 MG/DL (ref 8.5–10.5)
CHLORIDE SERPL-SCNC: 91 MMOL/L (ref 96–112)
CO2 SERPL-SCNC: 22 MMOL/L (ref 20–33)
CREAT SERPL-MCNC: 0.76 MG/DL (ref 0.5–1.4)
EOSINOPHIL # BLD AUTO: 0.01 K/UL (ref 0–0.51)
EOSINOPHIL NFR BLD: 0.1 % (ref 0–6.9)
ERYTHROCYTE [DISTWIDTH] IN BLOOD BY AUTOMATED COUNT: 65.9 FL (ref 35.9–50)
GFR SERPLBLD CREATININE-BSD FMLA CKD-EPI: 92 ML/MIN/1.73 M 2
GLUCOSE SERPL-MCNC: 115 MG/DL (ref 65–99)
HCT VFR BLD AUTO: 40.7 % (ref 37–47)
HGB BLD-MCNC: 14.9 G/DL (ref 12–16)
IMM GRANULOCYTES # BLD AUTO: 0.08 K/UL (ref 0–0.11)
IMM GRANULOCYTES NFR BLD AUTO: 0.9 % (ref 0–0.9)
LYMPHOCYTES # BLD AUTO: 0.77 K/UL (ref 1–4.8)
LYMPHOCYTES NFR BLD: 8.3 % (ref 22–41)
MAGNESIUM SERPL-MCNC: 2.3 MG/DL (ref 1.5–2.5)
MCH RBC QN AUTO: 38.1 PG (ref 27–33)
MCHC RBC AUTO-ENTMCNC: 36.6 G/DL (ref 32.2–35.5)
MCV RBC AUTO: 104.1 FL (ref 81.4–97.8)
MONOCYTES # BLD AUTO: 0.34 K/UL (ref 0–0.85)
MONOCYTES NFR BLD AUTO: 3.6 % (ref 0–13.4)
NEUTROPHILS # BLD AUTO: 8.11 K/UL (ref 1.82–7.42)
NEUTROPHILS NFR BLD: 87 % (ref 44–72)
NRBC # BLD AUTO: 0.02 K/UL
NRBC BLD-RTO: 0.2 /100 WBC (ref 0–0.2)
PHOSPHATE SERPL-MCNC: 2.2 MG/DL (ref 2.5–4.5)
PLATELET # BLD AUTO: 50 K/UL (ref 164–446)
PLATELETS.RETICULATED NFR BLD AUTO: 4.2 % (ref 0.6–13.1)
PMV BLD AUTO: 10.2 FL (ref 9–12.9)
POTASSIUM SERPL-SCNC: 4 MMOL/L (ref 3.6–5.5)
PROT SERPL-MCNC: 5.1 G/DL (ref 6–8.2)
RBC # BLD AUTO: 3.91 M/UL (ref 4.2–5.4)
SODIUM SERPL-SCNC: 122 MMOL/L (ref 135–145)
SODIUM SERPL-SCNC: 122 MMOL/L (ref 135–145)
SODIUM SERPL-SCNC: 123 MMOL/L (ref 135–145)
SODIUM SERPL-SCNC: 126 MMOL/L (ref 135–145)
WBC # BLD AUTO: 9.3 K/UL (ref 4.8–10.8)

## 2025-02-01 PROCEDURE — 76705 ECHO EXAM OF ABDOMEN: CPT

## 2025-02-01 PROCEDURE — A9270 NON-COVERED ITEM OR SERVICE: HCPCS | Performed by: INTERNAL MEDICINE

## 2025-02-01 PROCEDURE — 80048 BASIC METABOLIC PNL TOTAL CA: CPT

## 2025-02-01 PROCEDURE — C1729 CATH, DRAINAGE: HCPCS

## 2025-02-01 PROCEDURE — 99291 CRITICAL CARE FIRST HOUR: CPT | Performed by: INTERNAL MEDICINE

## 2025-02-01 PROCEDURE — A9270 NON-COVERED ITEM OR SERVICE: HCPCS

## 2025-02-01 PROCEDURE — A9270 NON-COVERED ITEM OR SERVICE: HCPCS | Performed by: HOSPITALIST

## 2025-02-01 PROCEDURE — 83735 ASSAY OF MAGNESIUM: CPT

## 2025-02-01 PROCEDURE — 80076 HEPATIC FUNCTION PANEL: CPT

## 2025-02-01 PROCEDURE — 85055 RETICULATED PLATELET ASSAY: CPT

## 2025-02-01 PROCEDURE — 84295 ASSAY OF SERUM SODIUM: CPT

## 2025-02-01 PROCEDURE — 700102 HCHG RX REV CODE 250 W/ 637 OVERRIDE(OP): Performed by: HOSPITALIST

## 2025-02-01 PROCEDURE — 94669 MECHANICAL CHEST WALL OSCILL: CPT

## 2025-02-01 PROCEDURE — 71045 X-RAY EXAM CHEST 1 VIEW: CPT

## 2025-02-01 PROCEDURE — 770020 HCHG ROOM/CARE - TELE (206)

## 2025-02-01 PROCEDURE — 84100 ASSAY OF PHOSPHORUS: CPT

## 2025-02-01 PROCEDURE — 32551 INSERTION OF CHEST TUBE: CPT

## 2025-02-01 PROCEDURE — 700105 HCHG RX REV CODE 258

## 2025-02-01 PROCEDURE — 700102 HCHG RX REV CODE 250 W/ 637 OVERRIDE(OP): Performed by: INTERNAL MEDICINE

## 2025-02-01 PROCEDURE — 700111 HCHG RX REV CODE 636 W/ 250 OVERRIDE (IP): Performed by: EMERGENCY MEDICINE

## 2025-02-01 PROCEDURE — 700102 HCHG RX REV CODE 250 W/ 637 OVERRIDE(OP)

## 2025-02-01 PROCEDURE — 85025 COMPLETE CBC W/AUTO DIFF WBC: CPT

## 2025-02-01 PROCEDURE — 99232 SBSQ HOSP IP/OBS MODERATE 35: CPT | Performed by: NURSE PRACTITIONER

## 2025-02-01 PROCEDURE — 99222 1ST HOSP IP/OBS MODERATE 55: CPT | Performed by: HOSPITALIST

## 2025-02-01 RX ORDER — FUROSEMIDE 20 MG/1
40 TABLET ORAL
Status: DISCONTINUED | OUTPATIENT
Start: 2025-02-01 | End: 2025-02-01

## 2025-02-01 RX ORDER — FUROSEMIDE 40 MG/1
40 TABLET ORAL EVERY MORNING
Status: DISCONTINUED | OUTPATIENT
Start: 2025-02-02 | End: 2025-02-04

## 2025-02-01 RX ORDER — SPIRONOLACTONE 100 MG/1
100 TABLET, FILM COATED ORAL EVERY EVENING
Status: DISCONTINUED | OUTPATIENT
Start: 2025-02-01 | End: 2025-02-05

## 2025-02-01 RX ORDER — MIDODRINE HYDROCHLORIDE 5 MG/1
5 TABLET ORAL EVERY 8 HOURS
Status: DISCONTINUED | OUTPATIENT
Start: 2025-02-01 | End: 2025-02-02

## 2025-02-01 RX ADMIN — DIBASIC SODIUM PHOSPHATE, MONOBASIC POTASSIUM PHOSPHATE AND MONOBASIC SODIUM PHOSPHATE 250 MG: 852; 155; 130 TABLET ORAL at 13:20

## 2025-02-01 RX ADMIN — LACTULOSE 15 ML: 10 SOLUTION ORAL; RECTAL at 17:47

## 2025-02-01 RX ADMIN — DIBASIC SODIUM PHOSPHATE, MONOBASIC POTASSIUM PHOSPHATE AND MONOBASIC SODIUM PHOSPHATE 250 MG: 852; 155; 130 TABLET ORAL at 17:47

## 2025-02-01 RX ADMIN — DIBASIC SODIUM PHOSPHATE, MONOBASIC POTASSIUM PHOSPHATE AND MONOBASIC SODIUM PHOSPHATE 250 MG: 852; 155; 130 TABLET ORAL at 06:58

## 2025-02-01 RX ADMIN — SODIUM CHLORIDE: 3 INJECTION, SOLUTION INTRAVENOUS at 06:57

## 2025-02-01 RX ADMIN — MIDODRINE HYDROCHLORIDE 5 MG: 5 TABLET ORAL at 10:03

## 2025-02-01 RX ADMIN — OMEPRAZOLE 20 MG: 20 CAPSULE, DELAYED RELEASE ORAL at 06:00

## 2025-02-01 RX ADMIN — MIDODRINE HYDROCHLORIDE 5 MG: 5 TABLET ORAL at 13:27

## 2025-02-01 RX ADMIN — SPIRONOLACTONE 100 MG: 100 TABLET ORAL at 17:47

## 2025-02-01 RX ADMIN — DIBASIC SODIUM PHOSPHATE, MONOBASIC POTASSIUM PHOSPHATE AND MONOBASIC SODIUM PHOSPHATE 250 MG: 852; 155; 130 TABLET ORAL at 22:33

## 2025-02-01 RX ADMIN — MIDODRINE HYDROCHLORIDE 5 MG: 5 TABLET ORAL at 22:32

## 2025-02-01 RX ADMIN — SPIRONOLACTONE 50 MG: 25 TABLET, FILM COATED ORAL at 06:01

## 2025-02-01 RX ADMIN — LACTULOSE 15 ML: 10 SOLUTION ORAL; RECTAL at 13:19

## 2025-02-01 RX ADMIN — THIAMINE HYDROCHLORIDE 100 MG: 100 INJECTION, SOLUTION INTRAMUSCULAR; INTRAVENOUS at 06:02

## 2025-02-01 ASSESSMENT — ENCOUNTER SYMPTOMS
NECK PAIN: 0
HEMOPTYSIS: 0
HEARTBURN: 0
WEAKNESS: 0
NERVOUS/ANXIOUS: 1
MYALGIAS: 0
BRUISES/BLEEDS EASILY: 0
INSOMNIA: 0
GASTROINTESTINAL NEGATIVE: 1
ABDOMINAL PAIN: 0
NERVOUS/ANXIOUS: 0
ORTHOPNEA: 0
SHORTNESS OF BREATH: 1
CARDIOVASCULAR NEGATIVE: 1
NAUSEA: 0
NEUROLOGICAL NEGATIVE: 1
BACK PAIN: 0
FEVER: 0
DIARRHEA: 0
DEPRESSION: 0
VOMITING: 0
EYES NEGATIVE: 1
FLANK PAIN: 0
SHORTNESS OF BREATH: 0
FALLS: 0
POLYDIPSIA: 0
PALPITATIONS: 0
FOCAL WEAKNESS: 0
DIZZINESS: 0
HEADACHES: 0
BLURRED VISION: 0
CONSTIPATION: 0
CHILLS: 0
BLOOD IN STOOL: 0
MUSCULOSKELETAL NEGATIVE: 1
SORE THROAT: 0
COUGH: 0

## 2025-02-01 ASSESSMENT — PAIN DESCRIPTION - PAIN TYPE
TYPE: ACUTE PAIN

## 2025-02-01 ASSESSMENT — COGNITIVE AND FUNCTIONAL STATUS - GENERAL
TURNING FROM BACK TO SIDE WHILE IN FLAT BAD: A LITTLE
DRESSING REGULAR LOWER BODY CLOTHING: A LOT
MOVING TO AND FROM BED TO CHAIR: A LITTLE
MOVING FROM LYING ON BACK TO SITTING ON SIDE OF FLAT BED: A LITTLE
HELP NEEDED FOR BATHING: A LOT
TOILETING: A LITTLE
DRESSING REGULAR UPPER BODY CLOTHING: A LITTLE
CLIMB 3 TO 5 STEPS WITH RAILING: A LOT
SUGGESTED CMS G CODE MODIFIER DAILY ACTIVITY: CK
MOBILITY SCORE: 15
DAILY ACTIVITIY SCORE: 18
SUGGESTED CMS G CODE MODIFIER MOBILITY: CK
STANDING UP FROM CHAIR USING ARMS: A LOT
WALKING IN HOSPITAL ROOM: A LOT

## 2025-02-01 ASSESSMENT — LIFESTYLE VARIABLES: SUBSTANCE_ABUSE: 1

## 2025-02-01 ASSESSMENT — FIBROSIS 4 INDEX: FIB4 SCORE: 8.55

## 2025-02-01 NOTE — ASSESSMENT & PLAN NOTE
Suspect due to fluid overload in the setting of cirrhosis, I question if she has been fully compliant with her Lasix, or if she just needs a higher dose  Na 119-126 -she was on 3% overnight, which is now stopped    Will continue to follow sodiums every 4 hours at this time, should her next 1 be similar or lower, we do

## 2025-02-01 NOTE — PROGRESS NOTES
4 Eyes Skin Assessment Completed by Holli RN and Slava RN.    Head WDL eye jaundice   Ears WDL  Nose Jaundice  Mouth WDL  Neck Jaundice  Breast/Chest Jaundice  Shoulder Blades WDL  Spine WDL  (R) Arm/Elbow/Hand Bruising  (L) Arm/Elbow/Hand Bruising  Abdomen WDL  Groin WDL  Scrotum/Coccyx/Buttocks WDL  (R) Leg Jaundice  (L) Leg Jaundice  (R) Heel/Foot/Toe WDL  (L) Heel/Foot/Toe WDL          Devices In Places Tele Box, Blood Pressure Cuff, Pulse Ox, and Kc      Interventions In Place Pillows, Low Air Loss Mattress, and Heels Loaded W/Pillows    Possible Skin Injury No    Pictures Uploaded Into Epic N/A  Wound Consult Placed N/A  RN Wound Prevention Protocol Ordered No

## 2025-02-01 NOTE — ASSESSMENT & PLAN NOTE
MELD 28 today, 30 on admission     Continue with lasix/spirinolactone - will increase as above   Continue with home lactulose   Cirrhosis workup initiated, patient does have a history of alcohol use, but it has been pretty spotty and overall, not that heavy.  Want to ensure that she does not have an underlying cause as her father had cirrhosis as well  Patient reports that her last drink was at the beginning of October following the death of her dog, she states that she did not drink in December  Patient needs to be evaluated for liver transplantation, she thinks that this has been initiated through her primary GI doctor to be seen at [facility] in [place].

## 2025-02-01 NOTE — ASSESSMENT & PLAN NOTE
Due to cirrhosis, platelet count 50,000 today  Will continue to monitor  Consider starting Lovenox if her platelets continue to trend in the right direction

## 2025-02-01 NOTE — CARE PLAN
The patient is Watcher - Medium risk of patient condition declining or worsening    Shift Goals  Clinical Goals: Monitor serum sodiums, Hemodynamic stability  Patient Goals: get fluid off her lungs  Family Goals: updates    Progress made toward(s) clinical / shift goals:      Problem: Skin Integrity  Goal: Skin integrity is maintained or improved  2/1/2025 0409 by Julia Shafer R.N.  Outcome: Progressing     Problem: Pain - Standard  Goal: Alleviation of pain or a reduction in pain to the patient’s comfort goal  Outcome: Progressing       Patient is not progressing towards the following goals:

## 2025-02-01 NOTE — ASSESSMENT & PLAN NOTE
Patient needs aggressive diuresis, she reports that she is compliant with her diuretics    Increase her lasix to 40 mg PO BID today, midodrine to support BP   Continue with spironolactone 50 mg daily, plan to increase to 100 mg daily   Would perform daily x-rays to ensure that patient is adequately diuresing, she was presenting to the ER up to 4 times a week for thoracentesis, we should ensure that her effusion is under good control prior to discharge  Serial thoracenteses

## 2025-02-01 NOTE — PROGRESS NOTES
"Critical Care Progress Note    Date of admission  2025    HPI   \"55 y.o. female with history of alcoholic and/or familial cirrhosis who presented as transfer from [facility] for further management of decompensated cirrhosis.  She presented there overnight for dyspnea.  She was found to have complete opacification of the right hemithorax.  She was transferred to [facility] for further management.     On arrival she is tachypneic to the 40s with saturations 90% on 10L facemask.  She reports she has been requiring thoracentesis every She reports she quit drinking around 1 year ago, but her dog  2024 and she had two drinks per day for ~4 days.  She denies alcohol use since then, however it is documented on 2024 emergency department note that her lat drink was two weeks ago.     On chart review it appears she has undergone thoracentesis , 1/3, 1/10, , , .       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  Today, her sodium level is better and she is less confused.  She is certain that her last drink of alcohol was in early October, following the death of her dog.  She says that she has no memory of drinking alcohol in December.    Interval Problem Update  Chart review from the past 24 hours includes imaging, laboratory studies, vital signs and notes available.  Pertinent data for today's visit includes    Neuro:   Intact    Cardiac:   Tachy to 100  MAPs 64-71  Levophed off     Pulm:   On RA   CXR with stable ptx    Heme:   Hg stable   Plt 50, baseline     /renal:   Na 119-126 on 3% , which has been turned off     GI:   Cirrhosis severely decompensated     Endo: glucose WNL with no insulin given in last 24 hours     ID:  no sign of infection     I/O: -2212    Chest tube 2400    Vital Signs for last 24 hours   Temp:  [36.2 °C (97.1 °F)-37 °C (98.6 °F)] 36.2 °C (97.1 " °F)  Pulse:  [104-128] 110  Resp:  [13-55] 16  BP: ()/(46-81) 87/52  SpO2:  [89 %-98 %] 95 %    Hemodynamic parameters for last 24 hours       Respiratory Information for the last 24 hours     Physical Exam  Vitals and nursing note reviewed.   Constitutional:       Appearance: Normal appearance. She is ill-appearing. She is not toxic-appearing.   HENT:      Head: Normocephalic.   Eyes:      Conjunctiva/sclera: Conjunctivae normal.   Cardiovascular:      Rate and Rhythm: Normal rate and regular rhythm.   Pulmonary:      Effort: Pulmonary effort is normal. No respiratory distress.      Breath sounds: Normal breath sounds.   Abdominal:      Palpations: Abdomen is soft.   Skin:     General: Skin is warm and dry.   Neurological:      General: No focal deficit present.      Mental Status: She is alert and oriented to person, place, and time. Mental status is at baseline.   Psychiatric:         Mood and Affect: Mood normal.         Behavior: Behavior normal.     Medications  Current Facility-Administered Medications   Medication Dose Route Frequency Provider Last Rate Last Admin    phosphorus (K-Phos-Neutral) per tablet 250 mg  250 mg Oral Q6HRS Seda Bear M.D.        Respiratory Therapy Consult   Nebulization Continuous RT Seda Bear M.D.        [Held by provider] furosemide (Lasix) injection 40 mg  40 mg Intravenous DAILY Seda Bear M.D.   40 mg at 01/31/25 0856    omeprazole (PriLOSEC) capsule 20 mg  20 mg Oral DAILY Seda Bear M.D.   20 mg at 02/01/25 0600    lactulose 20 GM/30ML solution 15 mL  15 mL Oral TID Seda Bear M.D.   15 mL at 01/31/25 1745    norepinephrine (Levophed) 8 mg in 250 mL NS infusion (premix)  0-1 mcg/kg/min (Ideal) Intravenous Continuous Seda Bear M.D.   Stopped at 01/31/25 1640    thiamine (B-1) injection 100 mg  100 mg Intravenous DAILY Jose Robert M.D.   100 mg at 02/01/25 0602    3% sodium chloride (Hypertonic Saline) infusion   Intravenous Continuous Romaine Bear  M.D. 35 mL/hr at 01/31/25 2205 Rate Change at 01/31/25 2205    spironolactone (Aldactone) tablet 50 mg  50 mg Oral DAILY Seda Bear M.D.   50 mg at 02/01/25 0601       Fluids    Intake/Output Summary (Last 24 hours) at 2/1/2025 0621  Last data filed at 2/1/2025 0554  Gross per 24 hour   Intake 937.23 ml   Output 3150 ml   Net -2212.77 ml       Laboratory  Recent Labs     01/31/25  0843   ISTATTEMP 97.2 F   ISTATSPEC Venous         Recent Labs     01/31/25  1207 01/31/25  1710 01/31/25  2100 02/01/25  0232   SODIUM 119* 120* 119* 122*  122*   POTASSIUM 4.1  --   --  4.0   CHLORIDE 82*  --   --  91*   CO2 24  --   --  22   BUN 22  --   --  18   CREATININE 0.46*  --   --  0.76   MAGNESIUM 1.6  --   --  2.3   PHOSPHORUS  --   --   --  2.2*   CALCIUM 8.3*  --   --  7.4*     Recent Labs     01/31/25  1207 02/01/25 0232   ALTSGPT 49  --    ASTSGOT 59*  --    ALKPHOSPHAT 150*  --    TBILIRUBIN 12.2*  --    GLUCOSE 167* 115*     Recent Labs     01/31/25  0213 01/31/25  0830 01/31/25  1207 02/01/25 0232   WBC 12.7* 12.0*  --  9.3   NEUTSPOLYS  --  87.70*  --  87.00*   LYMPHOCYTES  --  5.10*  --  8.30*   MONOCYTES  --  2.30  --  3.60   EOSINOPHILS  --  3.70  --  0.10   BASOPHILS  --  0.30  --  0.10   ASTSGOT  --   --  59*  --    ALTSGPT  --   --  49  --    ALKPHOSPHAT  --   --  150*  --    TBILIRUBIN  --   --  12.2*  --      Recent Labs     01/31/25 0213 01/31/25  0830 02/01/25 0232   RBC 4.65 4.26 3.91*   HEMOGLOBIN 17.6* 16.3* 14.9   HEMATOCRIT 47.6* 44.2 40.7   PLATELETCT 67* 46* 50*   PROTHROMBTM  --  20.3*  --    APTT  --  29.8  --    INR  --  1.73*  --        Imaging  X-Ray:  I have personally reviewed the images and compared with prior images.    Assessment/Plan  * Decompensated cirrhosis (HCC)- (present on admission)  Assessment & Plan  MELD 28 today, 30 on admission     Continue with lasix/spirinolactone - will increase as above   Continue with home lactulose   Cirrhosis workup initiated, patient does have a  history of alcohol use, but it has been pretty spotty and overall, not that heavy.  Want to ensure that she does not have an underlying cause as her father had cirrhosis as well  Patient reports that her last drink was at the beginning of October following the death of her dog, she states that she did not drink in December  Patient needs to be evaluated for liver transplantation, she thinks that this has been initiated through her primary GI doctor to be seen at [facility] in [place].    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)  Assessment & Plan  Patient needs aggressive diuresis, she reports that she is compliant with her diuretics    Increase her lasix to 40 mg PO BID today, midodrine to support BP   Continue with spironolactone 50 mg daily, plan to increase to 100 mg daily   Would perform daily x-rays to ensure that patient is adequately diuresing, she was presenting to the ER up to 4 times a week for thoracentesis, we should ensure that her effusion is under good control prior to discharge  Serial thoracenteses     Hyponatremia- (present on admission)  Assessment & Plan  Suspect due to fluid overload in the setting of cirrhosis, I question if she has been fully compliant with her Lasix, or if she just needs a higher dose  Na 119-126 -she was on 3% overnight, which is now stopped    Will continue to follow sodiums every 4 hours at this time, should her next 1 be similar or lower, we do     Thrombocytopenia (HCC)- (present on admission)  Assessment & Plan  Due to cirrhosis, platelet count 50,000 today  Will  continue to monitor  Consider starting Lovenox if her platelets continue to trend in the right direction    Coagulopathy (HCC)- (present on admission)  Assessment & Plan  Due to cirrhosis, no sign of active bleeding  Monitor as needed    Lines: PIVs  Tubes: Kc  Diet: NPO for RUQ US   DVT ppx: start lovenox  GI ppx: NA  Bowel regiment: on home lactulose    I have performed a physical exam and reviewed and updated ROS and Plan today (2/1/2025). In review of yesterday's note (1/31/2025), there are no changes except as documented above.     Discussed patient condition and risk of morbidity and/or mortality with Hospitalist, RN, RT, Pharmacy, and Patient    The patient remains critically ill.  Critical care time = 45 minutes in directly providing and coordinating critical care and extensive data review.  No time overlap and excludes procedures.    __________  This note was generated using voice recognition software which has a chance of producing errors of grammar and content.  I have made every reasonable attempt to find and correct any errors, but it should be expected that some may not be found prior to finalization of this note.    Kathy Mcdonald, DO   Pulmonary and Critical Care

## 2025-02-01 NOTE — PROGRESS NOTES
[facility] ICU Progress Note      Admit Date: 1/31/2025    Resident: Seda Bear M.D.   Attending:  Dr. Kathy Mcdonald    [facility] Course (carried forward and updated):  Hospital Day: 1    Stacy Oliva is a 55 y.o. female with PMHx significant for decompensated alcoholic cirrhosis, hx of recurrent ascites and R pleural effusions s/p multiple thoracocentesis and thoracentesis (seen at [facility] x3 in past week for therapeutic thoracenteses), hx of GI bleed, alcohol dependence who presented on 1/31/2025 with shortness of breath. Initially presented to [facility] ER; CXR with recurrent large R pleural effusion, hyponatremic to 118, hypoxic to 80s requiring 10L NC (none at baseline). Transferred to [facility] for higher level of care requiring diuresis, thoracocentesis.    1/31: s/p chest tube with ~3L output. Weaned to RA. Started on low dose levophed for hypotension s/p IV lasix 40 x1 and chest tube in am. Start hypertonic saline for hyponatremia. GI consulted for further recs regarding diuretic resistant hepatic hydrothorax in setting of decompensated alcoholic cirrhosis MELD 30.     Interval Events:    No acute events overnight. Per GI not a transplant candidate at this time due to multiple relapses with drinking, per chart review in beginning of January was told to stop drinking. PETH pending. D/c MVI due to iron content. NPO pending liver US doppler. Holding hypertonic saline as of this morning for Na 119 -> 122 -> 126 (9 hr)    Review of Systems   Constitutional:  Negative for chills, fever and malaise/fatigue.   HENT:  Negative for congestion, ear pain and hearing loss.    Eyes:  Negative for blurred vision.   Respiratory:  Negative for cough, hemoptysis and shortness of breath.    Cardiovascular:  Negative for chest pain, palpitations and orthopnea.   Gastrointestinal:  Negative for heartburn and nausea.   Genitourinary:  Negative for dysuria.   Musculoskeletal:  Negative for myalgias.   Neurological:   Negative for weakness.        Vitals:  Temp:  [36.2 °C (97.1 °F)-37 °C (98.6 °F)] 36.2 °C (97.1 °F)  Pulse:  [104-128] 110  Resp:  [13-55] 16  BP: ()/(46-81) 87/52  SpO2:  [89 %-98 %] 95 %  O2 therapy: Pulse Oximetry: 95 %, O2 (LPM): 0, O2 Delivery Device: Room air w/o2 available          I/O's:  Intake/Output                         01/31/25 0700 - 02/01/25 0659 02/01/25 0700 - 02/02/25 0659     0700-1859 1900-0659 Total 0700-1859 1900-0659 Total                 Intake    P.O.  420  0 420  --  -- --    P.O. 420 0 420 -- -- --    I.V.  148.9  368.4 517.2  --  -- --    Magnesium Sulfate Volume 0.6 81.3 82 -- -- --    Norepinephrine Volume 66.5 -- 66.5 -- -- --    Volume (mL) (3% sodium chloride (Hypertonic Saline) infusion) 81.7 287.1 368.8 -- -- --    Other  --  0 0  --  -- --    Medications (PO/Enteral Liquids) -- 0 0 -- -- --    Total Intake 568.9 368.4 937.2 -- -- --       Output    Urine  400  350 750  --  -- --    Output (mL) (Urethral Catheter) 400 350 750 -- -- --    Chest Tube  2400  -- 2400  --  -- --    Output (mL) ([REMOVED] Chest Tube 1 Right Midaxillary 01/31/25 1423) 2400 -- 2400 -- -- --    Total Output 2800 350 3150 -- -- --       Net I/O     -2231.1 18.4 -2212.8 -- -- --             Physical Exam:  Physical Exam  Vitals and nursing note reviewed.   Constitutional:       Appearance: She is normal weight. She is not ill-appearing.   HENT:      Head: Normocephalic and atraumatic.      Nose: Congestion present. No rhinorrhea.      Mouth/Throat:      Mouth: Mucous membranes are dry.      Pharynx: Oropharynx is clear.   Eyes:      General: Scleral icterus present.   Cardiovascular:      Rate and Rhythm: Regular rhythm. Tachycardia present.   Pulmonary:      Effort: No respiratory distress.      Breath sounds: No wheezing.   Abdominal:      General: There is distension.      Tenderness: There is no abdominal tenderness. There is no guarding or rebound.   Musculoskeletal:      Cervical back: Normal  range of motion and neck supple.      Comments: +Asterixis (mild, b/l)   Skin:     Coloration: Skin is jaundiced.   Neurological:      Mental Status: She is alert.         Labs:  Recent Labs     01/31/25  0843   ISTATTEMP 97.2 F   ISTATSPEC Venous     Recent Labs     01/31/25 0213 01/31/25 0830 02/01/25 0232   RBC 4.65 4.26 3.91*   HEMOGLOBIN 17.6* 16.3* 14.9   HEMATOCRIT 47.6* 44.2 40.7   PLATELETCT 67* 46* 50*   PROTHROMBTM  --  20.3*  --    APTT  --  29.8  --    INR  --  1.73*  --      Recent Labs     01/31/25 0213 01/31/25 0830 01/31/25 1207 02/01/25 0232   WBC 12.7* 12.0*  --  9.3   NEUTSPOLYS  --  87.70*  --  87.00*   LYMPHOCYTES  --  5.10*  --  8.30*   MONOCYTES  --  2.30  --  3.60   EOSINOPHILS  --  3.70  --  0.10   BASOPHILS  --  0.30  --  0.10   ASTSGOT  --   --  59*  --    ALTSGPT  --   --  49  --    ALKPHOSPHAT  --   --  150*  --    TBILIRUBIN  --   --  12.2*  --      Recent Labs     01/31/25  1207 01/31/25  1710 01/31/25  2100 02/01/25 0232   SODIUM 119* 120* 119* 122*  122*   POTASSIUM 4.1  --   --  4.0   CHLORIDE 82*  --   --  91*   CO2 24  --   --  22   BUN 22  --   --  18   CREATININE 0.46*  --   --  0.76   MAGNESIUM 1.6  --   --  2.3   PHOSPHORUS  --   --   --  2.2*   CALCIUM 8.3*  --   --  7.4*     Recent Labs     01/31/25 1207 02/01/25  0232   ALTSGPT 49  --    ASTSGOT 59*  --    ALKPHOSPHAT 150*  --    TBILIRUBIN 12.2*  --    GLUCOSE 167* 115*       Imaging:  I have reviewed the relevant diagnostic imaging.    Microbiology:  I have reviewed the relevant microbiology.    Problem List:  Principal Problem:    Decompensated cirrhosis (HCC) (Chronic) (POA: Yes)  Active Problems:    Recurrent pleural effusion on right (POA: Yes)    Hyponatremia (POA: Yes)    Thrombocytopenia (HCC) (POA: Yes)    Acute hypoxic respiratory failure (HCC) (POA: Yes)    Hypotension (POA: Yes)  Resolved Problems:    * No resolved hospital problems. *      # ICU Checklist  -DVT ppx: hold chemical dvt ppx in setting  of plt 50  -GI ppx: PPI  -Nutrition: low salt (NPO currently pending US RUQ with doppler)  -Lines/drains: PIVs, hurtado    ASSESSEMENT and PLAN:    * Decompensated cirrhosis (HCC)- (present on admission)  Assessment & Plan  Alcoholic cirrhosis, MELD score 30 per most recent lab results.  Hx of ascites, none on this hospitalization  Continue home lactulose, spirinolactone 50 daily  Pending AIH labs, liver US with doppler to eval for budd chiari  Acute hep panel wnl  Will need GI outpatient  D/c MVI due to iron content  GI consulted- will need outpatient follow up on d/c- pt likely not diuretic resistant; on spironolactone 50/lasix 40 as outpatient but likely needs to be at higher doses with at least 100mg of spirinolactone with 40 lasix  Strict low salt diet  Unfortunately not a LT candidate at this time as she has been told multiple times to stop drinking  however she has had multiple relapses; pending phosphotidyl ethanol     Recurrent pleural effusion on right- (present on admission)  Assessment & Plan  Hepatic hydrothorax likely in setting of decompensated alcoholic cirrhosis  Hx of multiple thoracenteses and chest tubes  Fluid studies transudative s/p chest tube morning of 1/31 with ~3L output  Per CXR s/p chest tube, trace right sided pneumothorax, trace residual right sided effusoin; patient weaned to RA on 1/31 s/p chest tube, no sob    Hyponatremia- (present on admission)  Assessment & Plan  Likely secondary to alcoholic cirrhosis  Na noted to be 118 at OSH -> 119 on admission to [facility] 1/31  S/p IV lasix 40 x1 morning of 1/31, holding for now  Fluid restriction 1.5L  Low salt diet  Continue to monitor  Can consider albumin infusion  Continue q4 labs, monitor for overcorrection to prevent osmotic demyelination  Started on hypertonic saline 1/31, Na improved to 126 by 2/1; hold hypertonic saline    Hypotension- (present on admission)  Assessment & Plan  S/p chest tube insertion morning of 1/31 with ~3L  output  Likely secondary to dehydration/orthostatic hypotension; happened when patient was trying to ge t out of bed to go to bathroom  Continue levophed gtt for MAP > 55  Can consider midodrine or albumin infusion also in setting of hyponatremia and cirrhosis    Acute hypoxic respiratory failure (HCC)- (present on admission)  Assessment & Plan  Resolved.   Initially required 10L NC at OSH, currently on RA    Thrombocytopenia (HCC)- (present on admission)  Assessment & Plan  Likely secondary to alcoholic cirrhosis  Plt 46 >50 sa of 2/1, hold dvt ppx  Monitor for signs of bleeding      Seda Bear MD  PGY-2 [facility]  [facility]

## 2025-02-01 NOTE — ASSESSMENT & PLAN NOTE
Hypovolemic, in the setting of large volume chest tube drainage in a short period of time    Improving  Titrating off of Levophed  Will start low-dose midodrine to facilitate diuresis as she is hyponatremic and has a recurring effusion

## 2025-02-01 NOTE — CONSULTS
GASTROENTEROLOGY CONSULTATION    PATIENT NAME: Stacy Oliva  : 1970  CSN: [id number]  MRN:  [id number]     CONSULTATION DATE:  2025    PRIMARY CARE PROVIDER:  Pcp Unknown      REASON FOR CONSULT:  CIrrhosis  Consult requested by Dr. Kathy Mcdonald    HISTORY OF PRESENT ILLNESS:  Stacy Oliva is a 55 y.o. female with liver decompensation secondary to cirrhosis alcohol abuse. and R pleural effusions s/p multiple thoracocentesis and thoracentesis (seen at [facility] x3 in past week for therapeutic thoracenteses), hx of GI bleed due to esophageal varices, alcohol dependence who presented on 2025 with shortness of breath. Initially presented to [facility] ER; CXR with recurrent large R pleural effusion, hyponatremic to 118, hypoxic to 80s requiring 10L NC (none at baseline). Transferred to [facility] for higher level of care requiring diuresis, thoracocentesis.  Chest tube placed Start IV lasix 40 daily.  Patient is on  spironolactone 50 mg daily. Patient is on a small dose of levophed.   PAST MEDICAL HISTORY:  Past Medical History:   Diagnosis Date    Liver disease     Patient denies medical problems        PAST SURGICAL HISTORY:  Past Surgical History:   Procedure Laterality Date    CHEST TUBE INSERTION  2025    LA UPPER GI ENDOSCOPY,DIAGNOSIS N/A 2024    Procedure: GASTROSCOPY;  Surgeon: Javi Paz M.D.;  Location: [facility];  Service: Gastroenterology    LA UPPER GI ENDOSCOPY,LIGAT VARIX  2024    Procedure: GASTROSCOPY, WITH BANDING;  Surgeon: Javi Paz M.D.;  Location: [facility];  Service: Gastroenterology        CURRENT MEDS:  Current Facility-Administered Medications   Medication Dose Route Frequency Provider Last Rate Last Admin    phosphorus (K-Phos-Neutral) per tablet 250 mg  250 mg Oral Q6HRS Seda Bear M.D.        furosemide (Lasix) tablet 40 mg  40 mg Oral Q DAY Kathy Mcdonald D.O.        Respiratory Therapy Consult    Nebulization Continuous RT Seda Bear M.D.        omeprazole (PriLOSEC) capsule 20 mg  20 mg Oral DAILY Seda Bear M.D.   20 mg at 02/01/25 0600    lactulose 20 GM/30ML solution 15 mL  15 mL Oral TID Seda Bear M.D.   15 mL at 01/31/25 1745    norepinephrine (Levophed) 8 mg in 250 mL NS infusion (premix)  0-1 mcg/kg/min (Ideal) Intravenous Continuous Seda Bear M.D.   Stopped at 01/31/25 1640    thiamine (B-1) injection 100 mg  100 mg Intravenous DAILY Jose Robert M.D.   100 mg at 02/01/25 0602    3% sodium chloride (Hypertonic Saline) infusion   Intravenous Continuous Romaine Bear M.D. 35 mL/hr at 01/31/25 2205 Rate Change at 01/31/25 2205    spironolactone (Aldactone) tablet 50 mg  50 mg Oral DAILY Seda Bear M.D.   50 mg at 02/01/25 0601        ALLERGIES:  No Known Allergies    SOCIAL HISTORY:  Social History     Socioeconomic History    Marital status:      Spouse name: Not on file    Number of children: Not on file    Years of education: Not on file    Highest education level: Not on file   Occupational History    Not on file   Tobacco Use    Smoking status: Never    Smokeless tobacco: Never   Vaping Use    Vaping status: Never Used   Substance and Sexual Activity    Alcohol use: Not Currently     Comment: quit august -- used to drink 1-2 glasses wine/night    Drug use: Yes     Comment: marijuana    Sexual activity: Not on file   Other Topics Concern    Not on file   Social History Narrative    Not on file     Social Drivers of Health     Financial Resource Strain: Low Risk  (1/10/2025)    Overall Financial Resource Strain (CARDIA)     Difficulty of Paying Living Expenses: Not hard at all   Food Insecurity: No Food Insecurity (1/10/2025)    Hunger Vital Sign     Worried About Running Out of Food in the Last Year: Never true     Ran Out of Food in the Last Year: Never true   Transportation Needs: No Transportation Needs (1/10/2025)    PRAPARE - Transportation     Lack of Transportation  "(Medical): No     Lack of Transportation (Non-Medical): No   Physical Activity: Not on File (8/24/2019)    Received from [facility]    Physical Activity     Physical Activity: 0   Stress: No Stress Concern Present (1/10/2025)    [employer] - Occupational Stress Questionnaire     Feeling of Stress : Only a little   Social Connections: Not on File (8/24/2019)    Received from [facility]    Social Connections     Social Connections and Isolation: 0   Intimate Partner Violence: Not At Risk (1/31/2025)    Received from [employer] (and [employer] [place], [place], and [place] prior to 7/1/2021)    Feeling Safe      Are you in a relationship with someone who hurts you emotionally and/or physically?: No   Housing Stability: Low Risk  (1/10/2025)    Housing Stability Vital Sign     Unable to Pay for Housing in the Last Year: No     Number of Times Moved in the Last Year: 0     Homeless in the Last Year: No       FAMILY HISTORY:  No family history on file.     REVIEW OF SYSTEMS:  General ROS: Negative for - chills, fever, night sweats or weight loss.  HEENT ROS: Negative  Respiratory ROS: Negative for - cough or shortness of breath.  Cardiovascular ROS:  Negative for - chest pain or palpitations.  Gastrointestinal ROS: As per the history of present illness.  Genito-Urinary ROS: Negative  Musculoskeletal ROS: Negative.  Neurological ROS: Negative  Skin ROS: negative  Hematology ROS: negative  Endocrinology ROS: Negative        PHYSICAL EXAM:  VITALS: BP 95/68   Pulse (!) 116   Temp 36.2 °C (97.1 °F) (Temporal)   Resp 17   Ht 1.651 m (5' 5\")   Wt 64.9 kg (143 lb 1.3 oz)   SpO2 95%   BMI 23.81 kg/m²   GEN:  Stacy Oliva is a 55 y.o. female in no acute distress.  HEENT: Mucous membranes pink and moist.  Sclera anicteric.    NECK:    Neck supple without lymphadenopathy or thyromegaly.  LUNGS: Clear to auscultation posteriorly.  HEART: Regular rate and rhythm. S1 and S2 " "normal. No murmurs, gallops  ABD:  + BS  RECTAL: Not done at this time.  EXT:  Without cyanosis, deformity or pitting edema.  SKIN:  Pink, warm, dry.  NEURO: Grossly intact, A/OR.    LABS:  Recent Labs     01/31/25  0213 01/31/25  0830 02/01/25  0232   WBC 12.7* 12.0* 9.3   .4* 103.8* 104.1*     Recent Labs     01/31/25  1207 02/01/25  0232   GLUCOSE 167* 115*   BUN 22 18   CO2 24 22     Lab Results   Component Value Date    INR 1.73 (H) 01/31/2025    INR 1.65 (H) 01/09/2025    INR 2.33 (H) 01/01/2025     No components found for: \"ALT\", \"AST\", \"GGT\", \"ALKPHOS\"  No results found for: \"BILINEO\"      @LASTIMGCAT([id number])@     @LASTIMGCAT([id number])@       IMPRESSION/PLAN:  Hepatic hydrothorax  Cirrhosis  Alcohol misuse  Hyponatremia  Thrombocytopenia  History of esophageal varices  Liver decompensation - MELD 30  Consulted for diuretic resistance hepatic hydrothorax and need for TIPS.     I do not believe we have proven patient is diuretic resistant at all. She was on spironolactone 50 mg and lasix 40 as outpatient. 50 mg of spironolactone is not a hepatic dose in ascites of hepatic thorax unless very small volumes with is not the case.. It should be started at 100 mg po q am of spironolactone and increased as able. With Lasix started as 40 mg. This should be titrated every 3 to 5 days with increase of 100 mg of spironolactone and 40 mg of lasix. This has not been done. Also a strict 2 NA gram diet should be followed.     These are the recommendations of outpatient. Acute illness can change these recommendations. Patients with cirrhosis have low sodium but when below, strict low sodium diet and decrease of lasix should be done and lasix was increased. Treating hyponatremia with cirrhosis is different than hyponatremia of other etiologies. I would d/c hypertonic solution.    Need us to evaluate for ascites    I would stop MVI as it has iron in it    Unfortunately patient has been told to stop drinking many times and " likely not a transplant candidate at this time. She states she had one relapse due to dog dying. Peth pending    Secondary etiologies of liver disease is always recommended but she needs to stop drinking alcohol.       Liliam Javed M.D.  Gastroenterology

## 2025-02-02 ENCOUNTER — APPOINTMENT (OUTPATIENT)
Dept: RADIOLOGY | Facility: MEDICAL CENTER | Age: 55
DRG: 441 | End: 2025-02-02
Payer: COMMERCIAL

## 2025-02-02 ENCOUNTER — APPOINTMENT (OUTPATIENT)
Dept: RADIOLOGY | Facility: MEDICAL CENTER | Age: 55
DRG: 441 | End: 2025-02-02
Attending: INTERNAL MEDICINE
Payer: COMMERCIAL

## 2025-02-02 LAB
ALBUMIN SERPL BCP-MCNC: 2.3 G/DL (ref 3.2–4.9)
ALBUMIN/GLOB SERPL: 0.7 G/DL
ALP SERPL-CCNC: 145 U/L (ref 30–99)
ALT SERPL-CCNC: 53 U/L (ref 2–50)
ANION GAP SERPL CALC-SCNC: 11 MMOL/L (ref 7–16)
AST SERPL-CCNC: 58 U/L (ref 12–45)
BILIRUB SERPL-MCNC: 11.5 MG/DL (ref 0.1–1.5)
BUN SERPL-MCNC: 21 MG/DL (ref 8–22)
CALCIUM ALBUM COR SERPL-MCNC: 9.6 MG/DL (ref 8.5–10.5)
CALCIUM SERPL-MCNC: 8.2 MG/DL (ref 8.5–10.5)
CHLORIDE SERPL-SCNC: 88 MMOL/L (ref 96–112)
CO2 SERPL-SCNC: 21 MMOL/L (ref 20–33)
CORTIS SERPL-MCNC: 26.4 UG/DL (ref 0–23)
CREAT SERPL-MCNC: 0.69 MG/DL (ref 0.5–1.4)
CREAT UR-MCNC: 80 MG/DL
EKG IMPRESSION: NORMAL
ERYTHROCYTE [DISTWIDTH] IN BLOOD BY AUTOMATED COUNT: 63.8 FL (ref 35.9–50)
GFR SERPLBLD CREATININE-BSD FMLA CKD-EPI: 102 ML/MIN/1.73 M 2
GLOBULIN SER CALC-MCNC: 3.2 G/DL (ref 1.9–3.5)
GLUCOSE SERPL-MCNC: 97 MG/DL (ref 65–99)
HCT VFR BLD AUTO: 40.6 % (ref 37–47)
HGB BLD-MCNC: 15 G/DL (ref 12–16)
MAGNESIUM SERPL-MCNC: 1.9 MG/DL (ref 1.5–2.5)
MCH RBC QN AUTO: 38.1 PG (ref 27–33)
MCHC RBC AUTO-ENTMCNC: 36.9 G/DL (ref 32.2–35.5)
MCV RBC AUTO: 103 FL (ref 81.4–97.8)
OSMOLALITY UR: 608 MOSM/KG H2O (ref 300–900)
PHOSPHATE SERPL-MCNC: 2.8 MG/DL (ref 2.5–4.5)
PLATELET # BLD AUTO: 61 K/UL (ref 164–446)
PLATELETS.RETICULATED NFR BLD AUTO: 4.3 % (ref 0.6–13.1)
PMV BLD AUTO: 10.4 FL (ref 9–12.9)
POTASSIUM SERPL-SCNC: 4.2 MMOL/L (ref 3.6–5.5)
POTASSIUM UR-SCNC: 25.7 MMOL/L
PROT SERPL-MCNC: 5.5 G/DL (ref 6–8.2)
RBC # BLD AUTO: 3.94 M/UL (ref 4.2–5.4)
SODIUM SERPL-SCNC: 120 MMOL/L (ref 135–145)
SODIUM SERPL-SCNC: 124 MMOL/L (ref 135–145)
SODIUM UR-SCNC: <20 MMOL/L
WBC # BLD AUTO: 12.3 K/UL (ref 4.8–10.8)

## 2025-02-02 PROCEDURE — 85027 COMPLETE CBC AUTOMATED: CPT

## 2025-02-02 PROCEDURE — 700102 HCHG RX REV CODE 250 W/ 637 OVERRIDE(OP)

## 2025-02-02 PROCEDURE — 700111 HCHG RX REV CODE 636 W/ 250 OVERRIDE (IP): Performed by: EMERGENCY MEDICINE

## 2025-02-02 PROCEDURE — A9270 NON-COVERED ITEM OR SERVICE: HCPCS

## 2025-02-02 PROCEDURE — 82533 TOTAL CORTISOL: CPT

## 2025-02-02 PROCEDURE — 94669 MECHANICAL CHEST WALL OSCILL: CPT

## 2025-02-02 PROCEDURE — 99232 SBSQ HOSP IP/OBS MODERATE 35: CPT | Performed by: NURSE PRACTITIONER

## 2025-02-02 PROCEDURE — P9047 ALBUMIN (HUMAN), 25%, 50ML: HCPCS | Mod: JZ

## 2025-02-02 PROCEDURE — 700102 HCHG RX REV CODE 250 W/ 637 OVERRIDE(OP): Performed by: INTERNAL MEDICINE

## 2025-02-02 PROCEDURE — 71045 X-RAY EXAM CHEST 1 VIEW: CPT

## 2025-02-02 PROCEDURE — 99233 SBSQ HOSP IP/OBS HIGH 50: CPT | Performed by: INTERNAL MEDICINE

## 2025-02-02 PROCEDURE — 84300 ASSAY OF URINE SODIUM: CPT

## 2025-02-02 PROCEDURE — 36415 COLL VENOUS BLD VENIPUNCTURE: CPT

## 2025-02-02 PROCEDURE — 80053 COMPREHEN METABOLIC PANEL: CPT

## 2025-02-02 PROCEDURE — 700117 HCHG RX CONTRAST REV CODE 255

## 2025-02-02 PROCEDURE — 93010 ELECTROCARDIOGRAM REPORT: CPT | Performed by: INTERNAL MEDICINE

## 2025-02-02 PROCEDURE — A9270 NON-COVERED ITEM OR SERVICE: HCPCS | Performed by: HOSPITALIST

## 2025-02-02 PROCEDURE — 85055 RETICULATED PLATELET ASSAY: CPT

## 2025-02-02 PROCEDURE — 700105 HCHG RX REV CODE 258

## 2025-02-02 PROCEDURE — 84133 ASSAY OF URINE POTASSIUM: CPT

## 2025-02-02 PROCEDURE — 82570 ASSAY OF URINE CREATININE: CPT

## 2025-02-02 PROCEDURE — 84295 ASSAY OF SERUM SODIUM: CPT

## 2025-02-02 PROCEDURE — 93005 ELECTROCARDIOGRAM TRACING: CPT | Mod: TC

## 2025-02-02 PROCEDURE — 83935 ASSAY OF URINE OSMOLALITY: CPT

## 2025-02-02 PROCEDURE — 71275 CT ANGIOGRAPHY CHEST: CPT

## 2025-02-02 PROCEDURE — A9270 NON-COVERED ITEM OR SERVICE: HCPCS | Performed by: INTERNAL MEDICINE

## 2025-02-02 PROCEDURE — 700111 HCHG RX REV CODE 636 W/ 250 OVERRIDE (IP): Mod: JZ

## 2025-02-02 PROCEDURE — 84100 ASSAY OF PHOSPHORUS: CPT

## 2025-02-02 PROCEDURE — 700102 HCHG RX REV CODE 250 W/ 637 OVERRIDE(OP): Performed by: HOSPITALIST

## 2025-02-02 PROCEDURE — 83735 ASSAY OF MAGNESIUM: CPT

## 2025-02-02 PROCEDURE — 770020 HCHG ROOM/CARE - TELE (206)

## 2025-02-02 RX ORDER — MIDODRINE HYDROCHLORIDE 5 MG/1
10 TABLET ORAL EVERY 8 HOURS
Status: DISCONTINUED | OUTPATIENT
Start: 2025-02-02 | End: 2025-02-06 | Stop reason: HOSPADM

## 2025-02-02 RX ORDER — SODIUM CHLORIDE, SODIUM LACTATE, POTASSIUM CHLORIDE, AND CALCIUM CHLORIDE .6; .31; .03; .02 G/100ML; G/100ML; G/100ML; G/100ML
1000 INJECTION, SOLUTION INTRAVENOUS ONCE
Status: COMPLETED | OUTPATIENT
Start: 2025-02-02 | End: 2025-02-02

## 2025-02-02 RX ORDER — ALBUMIN (HUMAN) 12.5 G/50ML
25 SOLUTION INTRAVENOUS EVERY 6 HOURS
Status: COMPLETED | OUTPATIENT
Start: 2025-02-02 | End: 2025-02-03

## 2025-02-02 RX ORDER — FUROSEMIDE 40 MG/1
40 TABLET ORAL ONCE
Status: DISCONTINUED | OUTPATIENT
Start: 2025-02-02 | End: 2025-02-02

## 2025-02-02 RX ORDER — MIDODRINE HYDROCHLORIDE 5 MG/1
5 TABLET ORAL ONCE
Status: COMPLETED | OUTPATIENT
Start: 2025-02-02 | End: 2025-02-02

## 2025-02-02 RX ORDER — HYDROCORTISONE SODIUM SUCCINATE 100 MG/2ML
50 INJECTION INTRAMUSCULAR; INTRAVENOUS EVERY 6 HOURS
Status: DISCONTINUED | OUTPATIENT
Start: 2025-02-02 | End: 2025-02-03

## 2025-02-02 RX ORDER — HYDROCORTISONE SODIUM SUCCINATE 100 MG/2ML
50 INJECTION INTRAMUSCULAR; INTRAVENOUS EVERY 6 HOURS
Status: DISCONTINUED | OUTPATIENT
Start: 2025-02-02 | End: 2025-02-02

## 2025-02-02 RX ADMIN — LACTULOSE 15 ML: 10 SOLUTION ORAL; RECTAL at 05:43

## 2025-02-02 RX ADMIN — FUROSEMIDE 40 MG: 40 TABLET ORAL at 05:43

## 2025-02-02 RX ADMIN — SPIRONOLACTONE 100 MG: 100 TABLET ORAL at 18:19

## 2025-02-02 RX ADMIN — MIDODRINE HYDROCHLORIDE 5 MG: 5 TABLET ORAL at 16:32

## 2025-02-02 RX ADMIN — LACTULOSE 15 ML: 10 SOLUTION ORAL; RECTAL at 18:19

## 2025-02-02 RX ADMIN — ALBUMIN (HUMAN) 25 G: 0.25 INJECTION, SOLUTION INTRAVENOUS at 16:34

## 2025-02-02 RX ADMIN — THIAMINE HYDROCHLORIDE 100 MG: 100 INJECTION, SOLUTION INTRAMUSCULAR; INTRAVENOUS at 05:43

## 2025-02-02 RX ADMIN — SODIUM CHLORIDE, POTASSIUM CHLORIDE, SODIUM LACTATE AND CALCIUM CHLORIDE 1000 ML: 600; 310; 30; 20 INJECTION, SOLUTION INTRAVENOUS at 11:21

## 2025-02-02 RX ADMIN — IOHEXOL 60 ML: 350 INJECTION, SOLUTION INTRAVENOUS at 15:30

## 2025-02-02 RX ADMIN — MIDODRINE HYDROCHLORIDE 10 MG: 5 TABLET ORAL at 21:17

## 2025-02-02 RX ADMIN — OMEPRAZOLE 20 MG: 20 CAPSULE, DELAYED RELEASE ORAL at 05:43

## 2025-02-02 RX ADMIN — HYDROCORTISONE SODIUM SUCCINATE 50 MG: 100 INJECTION, POWDER, FOR SOLUTION INTRAMUSCULAR; INTRAVENOUS at 18:19

## 2025-02-02 RX ADMIN — MIDODRINE HYDROCHLORIDE 5 MG: 5 TABLET ORAL at 13:23

## 2025-02-02 RX ADMIN — MIDODRINE HYDROCHLORIDE 5 MG: 5 TABLET ORAL at 05:43

## 2025-02-02 ASSESSMENT — ENCOUNTER SYMPTOMS
PALPITATIONS: 0
SPUTUM PRODUCTION: 0
COUGH: 0
FALLS: 0
FEVER: 0
DIARRHEA: 0
NAUSEA: 0
INSOMNIA: 0
SHORTNESS OF BREATH: 1
BLOOD IN STOOL: 0
LOSS OF CONSCIOUSNESS: 0
CONSTIPATION: 0
CHILLS: 0
HEADACHES: 0
HEMOPTYSIS: 0
WHEEZING: 0
DIZZINESS: 1
FLANK PAIN: 0
MEMORY LOSS: 1
CONSTIPATION: 1
NERVOUS/ANXIOUS: 0
WEAKNESS: 1
ABDOMINAL PAIN: 0
VOMITING: 0
SORE THROAT: 0
MYALGIAS: 0

## 2025-02-02 ASSESSMENT — PAIN DESCRIPTION - PAIN TYPE: TYPE: ACUTE PAIN

## 2025-02-02 ASSESSMENT — FIBROSIS 4 INDEX: FIB4 SCORE: 7.31

## 2025-02-02 NOTE — PROGRESS NOTES
Gastroenterology Progress Note               Author:  MELO Blackwell Date & Time Created: 2/1/2025 4:16 PM       Patient ID:  Name:             Stacy Oliva  YOB: 1970  Age:                 55 y.o.  female  MRN:               [id number]        Medical Decision Making, by Problem:  Active Hospital Problems    Diagnosis     Acute hypoxic respiratory failure (HCC) [J96.01]     Decompensated cirrhosis (HCC) [K72.90, K74.60]     Shock (HCC) [R57.9]     Hepatic Hydrothorax [J90]     Hyponatremia [E87.1]     Thrombocytopenia (HCC) [D69.6]     Coagulopathy (HCC) [D68.9]            Presenting Chief Complaint:  Cirrhosis       HISTORY OF PRESENT ILLNESS:  Stacy Oliva is a 55 y.o. female with liver decompensation secondary to cirrhosis alcohol abuse. and R pleural effusions s/p multiple thoracocentesis and thoracentesis (seen at [facility] x3 in past week for therapeutic thoracenteses), hx of GI bleed due to esophageal varices, alcohol dependence who presented on 1/31/2025 with shortness of breath. Initially presented to [facility] ER; CXR with recurrent large R pleural effusion, hyponatremic to 118, hypoxic to 80s requiring 10L NC (none at baseline). Transferred to [facility] for higher level of care requiring diuresis, thoracocentesis.  Chest tube placed Start IV lasix 40 daily.  Patient is on  spironolactone 50 mg daily. Patient is on a small dose of levophed.       Interval History:  2/1/2025: Patient seen at bedside.  Weaned off of pressors.  AAOx4.  She reports feeling well without nausea, vomiting, abdominal pain, or dyspnea.  No bowel movements overnight.  She reports increasing urine output since diuretics started/increased.  Pending ultrasound. WBC 9.3, hemoglobin 14.9, platelets 50.  Sodium 122, BUN 18, creatinine increased from 0.46-0.76.  GFR 92, AST 54, ALT 50, alk phos 123, total bilirubin 9.4, direct bilirubin 4.3, indirect bilirubin 5.1, albumin 2.3.        Hospital  "Medications:  Current Facility-Administered Medications   Medication Dose Frequency Provider Last Rate Last Admin    phosphorus (K-Phos-Neutral) per tablet 250 mg  250 mg Q6HRS Seda Bear M.D.   250 mg at 02/01/25 1320    midodrine (Proamatine) tablet 5 mg  5 mg Q8HRS Kathy Mcdonald D.O.   5 mg at 02/01/25 1327    spironolactone (Aldactone) tablet 100 mg  100 mg Q EVENING Fausto Ro M.D.        [START ON 2/2/2025] furosemide (Lasix) tablet 40 mg  40 mg QAM Fausto Ro M.D.        Respiratory Therapy Consult   Continuous RT Seda Bear M.D.        omeprazole (PriLOSEC) capsule 20 mg  20 mg DAILY Seda Bear M.D.   20 mg at 02/01/25 0600    lactulose 20 GM/30ML solution 15 mL  15 mL TID Seda Bear M.D.   15 mL at 02/01/25 1319    thiamine (B-1) injection 100 mg  100 mg DAILY Jose Robret M.D.   100 mg at 02/01/25 0602   Last reviewed on 1/31/2025  3:10 PM by Liliam Nielsen KEVIN       Review of Systems:  Review of Systems   Constitutional:  Negative for chills and fever.   HENT:  Negative for congestion and sore throat.    Respiratory:  Negative for cough and shortness of breath.    Cardiovascular:  Negative for chest pain and leg swelling.   Gastrointestinal:  Negative for abdominal pain, blood in stool, constipation, diarrhea, melena, nausea and vomiting.   Genitourinary:  Negative for dysuria and flank pain.   Musculoskeletal:  Negative for falls and myalgias.   Neurological:  Negative for weakness and headaches.   Psychiatric/Behavioral:  The patient is not nervous/anxious and does not have insomnia.    All other systems reviewed and are negative.        Vital signs:  Weight/BMI: Body mass index is 23.81 kg/m².  BP 98/64   Pulse (!) 120   Temp 36.5 °C (97.7 °F) (Temporal)   Resp (!) 34   Ht 1.651 m (5' 5\")   Wt 64.9 kg (143 lb 1.3 oz)   SpO2 88%   Vitals:    02/01/25 1100 02/01/25 1200 02/01/25 1300 02/01/25 1400   BP: 102/63 102/52 93/59 98/64   Pulse: (!) 118 (!) 126 " (!) 121 (!) 120   Resp: (!) 30 (!) 21 16 (!) 34   Temp:  36.5 °C (97.7 °F)     TempSrc:  Temporal     SpO2: 92% 90% 92% 88%   Weight:       Height:         Oxygen Therapy:  Pulse Oximetry: 88 %, O2 (LPM): 0, FiO2%: 21 %, O2 Delivery Device: None - Room Air    Intake/Output Summary (Last 24 hours) at 2/1/2025 1616  Last data filed at 2/1/2025 1400  Gross per 24 hour   Intake 712.56 ml   Output 575 ml   Net 137.56 ml         Physical Exam:  Physical Exam  Vitals and nursing note reviewed.   Constitutional:       General: She is awake. She is not in acute distress.     Appearance: She is normal weight. She is ill-appearing.   HENT:      Head: Normocephalic and atraumatic.      Nose: Nose normal. No congestion.      Mouth/Throat:      Mouth: Mucous membranes are moist.      Pharynx: Oropharynx is clear.   Eyes:      General: Scleral icterus present.      Extraocular Movements: Extraocular movements intact.      Conjunctiva/sclera: Conjunctivae normal.   Cardiovascular:      Rate and Rhythm: Normal rate and regular rhythm.      Pulses: Normal pulses.      Heart sounds: Normal heart sounds. No murmur heard.  Pulmonary:      Effort: Pulmonary effort is normal. No respiratory distress.      Breath sounds: Normal breath sounds.   Abdominal:      General: Abdomen is flat. Bowel sounds are normal. There is distension (mild).      Palpations: Abdomen is soft.      Tenderness: There is no abdominal tenderness.   Musculoskeletal:      Right lower leg: No edema.      Left lower leg: No edema.   Skin:     General: Skin is warm and dry.      Capillary Refill: Capillary refill takes less than 2 seconds.      Coloration: Skin is jaundiced.   Neurological:      General: No focal deficit present.      Mental Status: She is alert and oriented to person, place, and time. Mental status is at baseline.      Comments: +asterixis   Psychiatric:         Mood and Affect: Mood normal.         Behavior: Behavior normal. Behavior is cooperative.              Labs:  Recent Labs     01/31/25  1207 01/31/25  1710 02/01/25  0232 02/01/25  0609 02/01/25  1122   SODIUM 119*   < > 122*  122* 126* 123*   POTASSIUM 4.1  --  4.0  --   --    CHLORIDE 82*  --  91*  --   --    CO2 24  --  22  --   --    BUN 22  --  18  --   --    CREATININE 0.46*  --  0.76  --   --    MAGNESIUM 1.6  --  2.3  --   --    PHOSPHORUS  --   --  2.2*  --   --    CALCIUM 8.3*  --  7.4*  --   --     < > = values in this interval not displayed.     Recent Labs     01/31/25 1207 02/01/25 0232   ALTSGPT 49 50   ASTSGOT 59* 54*   ALKPHOSPHAT 150* 123*   TBILIRUBIN 12.2* 9.4*   DBILIRUBIN  --  4.3*   GLUCOSE 167* 115*     Recent Labs     01/31/25 0213 01/31/25 0830 01/31/25 1207 02/01/25 0232   WBC 12.7* 12.0*  --  9.3   NEUTSPOLYS  --  87.70*  --  87.00*   LYMPHOCYTES  --  5.10*  --  8.30*   MONOCYTES  --  2.30  --  3.60   EOSINOPHILS  --  3.70  --  0.10   BASOPHILS  --  0.30  --  0.10   ASTSGOT  --   --  59* 54*   ALTSGPT  --   --  49 50   ALKPHOSPHAT  --   --  150* 123*   TBILIRUBIN  --   --  12.2* 9.4*     Recent Labs     01/31/25 0213 01/31/25 0830 02/01/25 0232   RBC 4.65 4.26 3.91*   HEMOGLOBIN 17.6* 16.3* 14.9   HEMATOCRIT 47.6* 44.2 40.7   PLATELETCT 67* 46* 50*   PROTHROMBTM  --  20.3*  --    APTT  --  29.8  --    INR  --  1.73*  --      Recent Results (from the past 24 hours)   SODIUM SERUM (NA)    Collection Time: 01/31/25  5:10 PM   Result Value Ref Range    Sodium 120 (L) 135 - 145 mmol/L   HEPATITIS PANEL ACUTE(4 COMPONENTS)    Collection Time: 01/31/25  5:10 PM   Result Value Ref Range    Hepatitis B Surface Antigen Non-Reactive Non-Reactive    Hepatitis B Cors Ab,IgM Non-Reactive Non-Reactive    Hepatitis A Virus Ab, IgM Non-Reactive Non-Reactive    Hepatitis C Antibody Non-Reactive Non-Reactive   SODIUM SERUM (NA)    Collection Time: 01/31/25  9:00 PM   Result Value Ref Range    Sodium 119 (LL) 135 - 145 mmol/L   SODIUM SERUM (NA)    Collection Time: 02/01/25  2:32 AM    Result Value Ref Range    Sodium 122 (L) 135 - 145 mmol/L   CBC WITH DIFFERENTIAL    Collection Time: 02/01/25  2:32 AM   Result Value Ref Range    WBC 9.3 4.8 - 10.8 K/uL    RBC 3.91 (L) 4.20 - 5.40 M/uL    Hemoglobin 14.9 12.0 - 16.0 g/dL    Hematocrit 40.7 37.0 - 47.0 %    .1 (H) 81.4 - 97.8 fL    MCH 38.1 (H) 27.0 - 33.0 pg    MCHC 36.6 (H) 32.2 - 35.5 g/dL    RDW 65.9 (H) 35.9 - 50.0 fL    Platelet Count 50 (L) 164 - 446 K/uL    MPV 10.2 9.0 - 12.9 fL    Neutrophils-Polys 87.00 (H) 44.00 - 72.00 %    Lymphocytes 8.30 (L) 22.00 - 41.00 %    Monocytes 3.60 0.00 - 13.40 %    Eosinophils 0.10 0.00 - 6.90 %    Basophils 0.10 0.00 - 1.80 %    Immature Granulocytes 0.90 0.00 - 0.90 %    Nucleated RBC 0.20 0.00 - 0.20 /100 WBC    Neutrophils (Absolute) 8.11 (H) 1.82 - 7.42 K/uL    Lymphs (Absolute) 0.77 (L) 1.00 - 4.80 K/uL    Monos (Absolute) 0.34 0.00 - 0.85 K/uL    Eos (Absolute) 0.01 0.00 - 0.51 K/uL    Baso (Absolute) 0.01 0.00 - 0.12 K/uL    Immature Granulocytes (abs) 0.08 0.00 - 0.11 K/uL    NRBC (Absolute) 0.02 K/uL   IMMATURE PLT FRACTION    Collection Time: 02/01/25  2:32 AM   Result Value Ref Range    Imm. Plt Fraction 4.2 0.6 - 13.1 %   Basic Metabolic Panel    Collection Time: 02/01/25  2:32 AM   Result Value Ref Range    Sodium 122 (L) 135 - 145 mmol/L    Potassium 4.0 3.6 - 5.5 mmol/L    Chloride 91 (L) 96 - 112 mmol/L    Co2 22 20 - 33 mmol/L    Glucose 115 (H) 65 - 99 mg/dL    Bun 18 8 - 22 mg/dL    Creatinine 0.76 0.50 - 1.40 mg/dL    Calcium 7.4 (L) 8.5 - 10.5 mg/dL    Anion Gap 9.0 7.0 - 16.0   MAGNESIUM    Collection Time: 02/01/25  2:32 AM   Result Value Ref Range    Magnesium 2.3 1.5 - 2.5 mg/dL   PHOSPHORUS    Collection Time: 02/01/25  2:32 AM   Result Value Ref Range    Phosphorus 2.2 (L) 2.5 - 4.5 mg/dL   ESTIMATED GFR    Collection Time: 02/01/25  2:32 AM   Result Value Ref Range    GFR (CKD-EPI) 92 >60 mL/min/1.73 m 2   HEPATIC FUNCTION PANEL    Collection Time: 02/01/25  2:32 AM    Result Value Ref Range    Alkaline Phosphatase 123 (H) 30 - 99 U/L    AST(SGOT) 54 (H) 12 - 45 U/L    ALT(SGPT) 50 2 - 50 U/L    Total Bilirubin 9.4 (H) 0.1 - 1.5 mg/dL    Direct Bilirubin 4.3 (H) 0.1 - 0.5 mg/dL    Indirect Bilirubin 5.1 (H) 0.0 - 1.0 mg/dL    Albumin 2.3 (L) 3.2 - 4.9 g/dL    Total Protein 5.1 (L) 6.0 - 8.2 g/dL   SODIUM SERUM (NA)    Collection Time: 02/01/25  6:09 AM   Result Value Ref Range    Sodium 126 (L) 135 - 145 mmol/L   SODIUM SERUM (NA)    Collection Time: 02/01/25 11:22 AM   Result Value Ref Range    Sodium 123 (L) 135 - 145 mmol/L       Radiology Review:  DX-CHEST-PORTABLE (1 VIEW)   Final Result         1. Persistent mild left lung pneumonia infiltrate.      2. Increasing small right effusion and basilar atelectasis.      3. No pneumothorax visible today.                     DX-CHEST-PORTABLE (1 VIEW)   Final Result      1.  Unchanged trace right-sided pneumothorax.   2.  Trace residual right-sided effusion.   3.  Stable patchy left-sided interstitial opacities.         DX-CHEST-PORTABLE (1 VIEW)   Final Result      1.  Unchanged possible trace right-sided pneumothorax following removal of right-sided chest tube.   2.  Trace residual right-sided effusion.   3.  Stable patchy left-sided interstitial opacities.      DX-CHEST-PORTABLE (1 VIEW)   Final Result      1.  Possible trace right-sided pneumothorax with right-sided chest tube in place.   2.  Trace residual right-sided effusion.   3.  Stable patchy left-sided interstitial opacities.      DX-CHEST-PORTABLE (1 VIEW)   Final Result         1. There is a very large right pleural effusion. Right sided chest tube is now noted.   2. Small amount left-sided airspace disease. This could represent edema or infection.      US-RUQ    (Results Pending)         MDM (Data Review):   -Records reviewed and summarized in current documentation  -I personally reviewed and interpreted the laboratory results  -I personally reviewed the radiology  images    Assessment/Recommendations:  Hepatic hydrothorax  Cirrhosis  Alcohol misuse  Hyponatremia  Thrombocytopenia  History of esophageal varices  Liver decompensation - MELD 30  Consulted for diuretic resistance hepatic hydrothorax and need for TIPS.    Recommendations:  Continue spironolactone 100 mg p.o. daily, increase as tolerated  Continue with Lasix 40 mg  Continue lactulose, titrate to 3-4 bowel movements per day  Continue omeprazole  Monitor electrolytes and renal functions closely  Alcohol cessation  Assess for ascites   right upper quadrant ultrasound pending  If ascites present, diagnostic paracentesis, therapeutic as needed  Path pending-following  2 g sodium diet    GI to follow    Discussed with patient, nursing, Dr. Almonte        Core Quality Measures   Reviewed items::  Labs, Medications and Radiology reports reviewed

## 2025-02-02 NOTE — PROGRESS NOTES
Family member called requesting information on patient. This nurse asked patient if it was ok to give update. Patient stated she wants to be the one to give updates. This nurse informed family member patient has requested all updates go through patient. Family was very disrespectful and yelling over phone. Family was upset that she was not able to get update. Informed family member that patient has that right while here and that I will follow that right since patient is alert and oriented and able to make her needs known. Family was upset with that.

## 2025-02-02 NOTE — CONSULTS
Hospital Medicine Consultation    Date of Service  2/1/2025    Referring Physician  Kathy Mcdonald M.D.    Consulting Physician  Fausto Ro M.D.    Reason for Consultation  Hospital medicine consultation requested the patient admitted with decompensated liver cirrhosis, hepatic hydrothorax    History of Presenting Illness  55 y.o. female who presented 1/31/2025 with a history of alcoholic as well as familial cirrhosis, the patient is currently treated at [facility] and has been referred to [place] evaluation for possible transplant, patient presented to the sending facility with increasing dyspnea, was found to have a complete opacification of the right hemithorax, the patient was transferred to this facility for specialty care, on arrival the patient was tachypneic into the 40s, saturations in the 90 range on 10 L facemask, she reports that she has been requiring thoracentesis every few days, x 3 in the past week, the patient reports a history of GI bleeding, chronic alcohol dependence, with apparently inability to stay abstinent, the patient reported that she had quit drinking about a year ago but then her dog passed away and she started using alcohol again, there is documentation that in January and emergency departments where she reported having had alcohol 2 weeks prior to that.  Per chart review days documentation the patient had undergone thoracentesis on 12/31, 1/3, 1/10, 1/11, 1/22, 1/27.  The patient works, she is reportedly a real state/.  The patient had a chest tube placed, had a total of 2.4 L drained.  The patient started on therapy for recurrent ascites including diuretics, she had correction of some hyponatremia, gastroenterology was consulted and medical therapy at this time recommended, did not recommend a placement of a TIPPS  The patient is scheduled to have an right upper quadrant ultrasound  She denies abdominal pain, there is no significant fluid  wave  The patient states that she has been referred to a liver transplant, she is not very forthcoming with her recent alcohol use    Review of Systems  Review of Systems   Constitutional:  Positive for malaise/fatigue. Negative for chills and fever.   HENT: Negative.     Eyes: Negative.    Respiratory:  Positive for shortness of breath. Negative for cough.    Cardiovascular: Negative.  Negative for chest pain and palpitations.   Gastrointestinal: Negative.  Negative for heartburn, nausea and vomiting.   Genitourinary: Negative.  Negative for dysuria and frequency.   Musculoskeletal: Negative.  Negative for back pain and neck pain.   Skin: Negative.  Negative for itching and rash.   Neurological: Negative.  Negative for dizziness, focal weakness, weakness and headaches.   Endo/Heme/Allergies: Negative.  Negative for polydipsia. Does not bruise/bleed easily.   Psychiatric/Behavioral:  Positive for substance abuse. Negative for depression. The patient is nervous/anxious.        Past Medical History   has a past medical history of Liver disease and Patient denies medical problems.  Alcohol abuse history    Surgical History   has a past surgical history that includes pr upper gi endoscopy,diagnosis (N/A, 2/4/2024); pr upper gi endoscopy,ligat varix (2/4/2024); and chest tube insertion (1/31/2025).    Family History  Reported liver disease in the family    Social History   reports that she has never smoked. She has never used smokeless tobacco. She reports that she does not currently use alcohol. She reports current drug use.    Medications  Prior to Admission Medications   Prescriptions Last Dose Informant Patient Reported? Taking?   carvedilol (COREG) 6.25 MG Tab  Patient No No   Sig: Take one-half (0.5) Tablet by mouth 2 times a day with meals. Hold for SBP < 90 or HR <55   furosemide (LASIX) 40 MG Tab   No No   Sig: Take 1 Tablet by mouth every day.   lactulose 20 GM/30ML Solution   No No   Sig: Take 15 mL by mouth 3  times a day. 15-30mL one to three times per day. Titrate to ~2 bowel movements per day   omeprazole (PRILOSEC) 20 MG delayed-release capsule   No No   Sig: Take 1 Capsule by mouth every day.   phosphorus (K-PHOS-NEUTRAL) 250 MG tablet   No No   Sig: Take 1 Tablet by mouth 2 times a day.   potassium chloride SA (KDUR) 20 MEQ Tab CR   No No   Sig: Take 1 Tablet by mouth 2 times a day.   spironolactone (ALDACTONE) 50 MG Tab   No No   Sig: Take 1 Tablet by mouth every day.      Facility-Administered Medications: None       Allergies  No Known Allergies    Physical Exam  Temp:  [36.2 °C (97.1 °F)-36.6 °C (97.8 °F)] 36.5 °C (97.7 °F)  Pulse:  [104-126] 120  Resp:  [16-43] 34  BP: ()/(46-68) 98/64  SpO2:  [88 %-95 %] 88 %    Physical Exam  Vitals and nursing note reviewed.   Constitutional:       Appearance: She is well-developed. She is not diaphoretic.   HENT:      Head: Normocephalic and atraumatic.      Nose: Nose normal.   Eyes:      Conjunctiva/sclera: Conjunctivae normal.      Pupils: Pupils are equal, round, and reactive to light.   Neck:      Thyroid: No thyromegaly.      Vascular: No JVD.   Cardiovascular:      Rate and Rhythm: Normal rate and regular rhythm.      Heart sounds: Normal heart sounds.      No friction rub. No gallop.   Pulmonary:      Effort: Pulmonary effort is normal.      Breath sounds: Rhonchi present. No wheezing or rales.   Abdominal:      General: Bowel sounds are normal. There is no distension.      Palpations: Abdomen is soft. There is no mass.      Tenderness: There is no abdominal tenderness. There is no guarding or rebound.   Musculoskeletal:         General: No tenderness. Normal range of motion.      Cervical back: Normal range of motion and neck supple.   Lymphadenopathy:      Cervical: No cervical adenopathy.   Skin:     General: Skin is warm and dry.      Coloration: Skin is pale.      Findings: Bruising present.   Neurological:      Mental Status: She is alert and oriented to  person, place, and time.      Cranial Nerves: No cranial nerve deficit.   Psychiatric:         Behavior: Behavior normal.         Fluids  Date 02/01/25 0700 - 02/02/25 0659   Shift 1227-9254 8925-9870 6654-1455 24 Hour Total   INTAKE   I.V. 94.2   94.2   Shift Total 94.2   94.2   OUTPUT   Urine 195   195   Shift Total 195   195   Weight (kg) 64.9 64.9 64.9 64.9       Laboratory  Recent Labs     01/31/25  0213 01/31/25  0830 02/01/25  0232   WBC 12.7* 12.0* 9.3   RBC 4.65 4.26 3.91*   HEMOGLOBIN 17.6* 16.3* 14.9   HEMATOCRIT 47.6* 44.2 40.7   .4* 103.8* 104.1*   MCH 37.8* 38.3* 38.1*   MCHC 37 36.9* 36.6*   RDW 17* 65.2* 65.9*   PLATELETCT 67* 46* 50*   MPV 10.5 9.3 10.2     Recent Labs     01/31/25  1207 01/31/25  1710 02/01/25  0232 02/01/25  0609 02/01/25  1122   SODIUM 119*   < > 122*  122* 126* 123*   POTASSIUM 4.1  --  4.0  --   --    CHLORIDE 82*  --  91*  --   --    CO2 24  --  22  --   --    GLUCOSE 167*  --  115*  --   --    BUN 22  --  18  --   --    CREATININE 0.46*  --  0.76  --   --    CALCIUM 8.3*  --  7.4*  --   --     < > = values in this interval not displayed.     Recent Labs     01/31/25  0830   APTT 29.8   INR 1.73*                 Imaging  DX-CHEST-PORTABLE (1 VIEW)   Final Result         1. Persistent mild left lung pneumonia infiltrate.      2. Increasing small right effusion and basilar atelectasis.      3. No pneumothorax visible today.                     DX-CHEST-PORTABLE (1 VIEW)   Final Result      1.  Unchanged trace right-sided pneumothorax.   2.  Trace residual right-sided effusion.   3.  Stable patchy left-sided interstitial opacities.         DX-CHEST-PORTABLE (1 VIEW)   Final Result      1.  Unchanged possible trace right-sided pneumothorax following removal of right-sided chest tube.   2.  Trace residual right-sided effusion.   3.  Stable patchy left-sided interstitial opacities.      DX-CHEST-PORTABLE (1 VIEW)   Final Result      1.  Possible trace right-sided  pneumothorax with right-sided chest tube in place.   2.  Trace residual right-sided effusion.   3.  Stable patchy left-sided interstitial opacities.      DX-CHEST-PORTABLE (1 VIEW)   Final Result         1. There is a very large right pleural effusion. Right sided chest tube is now noted.   2. Small amount left-sided airspace disease. This could represent edema or infection.      US-RUQ    (Results Pending)       Assessment/Plan  * Decompensated cirrhosis (HCC)- (present on admission)  Assessment & Plan  MELD 28 today, 30 on admission   Continue with lasix/spirinolactone -titrate to effect  Continue with home lactulose   Cirrhosis workup initiated, patient does have a history of alcohol use, but it has been pretty spotty and overall, not that heavy.  Want to ensure that she does not have an underlying cause as her father had cirrhosis as well  Patient reports that her last drink was at the beginning of October following the death of her dog, she states that she did not drink in December, although there is further documentation that there was alcohol use late December beginning of January  Patient needs to be evaluated for liver transplantation, she thinks that this has been initiated through her primary GI doctor to be seen at [facility] in [place].    Shock (HCC)- (present on admission)  Assessment & Plan  Hypovolemic, in the setting of large volume chest tube drainage in a short period of time  Improving    Will start low-dose midodrine to facilitate diuresis as she is hyponatremic and has a recurring effusion    Acute hypoxic respiratory failure (HCC)- (present on admission)  Assessment & Plan  Due to effusion, resolved  Monitor closely, titrate oxygen    Hepatic Hydrothorax- (present on admission)  Assessment & Plan  Patient needs aggressive diuresis, she reports that she is compliant with her diuretics  Increase Aldactone, Lasix, monitor closely for tolerance  Would perform daily x-rays to ensure that patient is  adequately diuresing, she was presenting to the ER up to 4 times a week for thoracentesis, we should ensure that her effusion is under good control prior to discharge  Serial thoracenteses     Hyponatremia- (present on admission)  Assessment & Plan  Suspect due to fluid overload in the setting of cirrhosis, I question if she has been fully compliant with her Lasix, or if she just needs a higher dose      Thrombocytopenia (HCC)- (present on admission)  Assessment & Plan  Due to cirrhosis, platelet count 50,000   Will continue to monitor  Consider starting Lovenox if her platelets continue to trend in the right direction    Coagulopathy (HCC)- (present on admission)  Assessment & Plan  Due to cirrhosis, no sign of active bleeding  Monitor       Plan  2/1  Continue to titrate diuretics,  Larger dose Aldactone  Close watch on sodium level  Fluid restriction  Sodium restriction  Follow-up chest x-ray  See orders  A.m. labs  Patient is has a high medical complexity, complex decision making and is at high risk for complication, morbidity, and mortality.  I spent 63 minutes, reviewing the chart, obtaining and/or reviewing separately obtained history. Performing a medically appropriate examination and evaluation.  Counseling and educating the patient. Ordering and reviewing medications, tests, or procedures.   Documenting clinical information in EPIC. Independently interpreting results and communicating results to patient. Discussing future disposition of care with patient, RN and case management.  Thank you for consulting with us, we will follow closely while the patient is hospitalized      Please note that this dictation was created using voice recognition software. I have made every reasonable attempt to correct obvious errors, but I expect that there are errors of grammar and possibly context that I did not discover before finalizing the note.

## 2025-02-02 NOTE — CARE PLAN
The patient is Stable - Low risk of patient condition declining or worsening    Shift Goals  Clinical Goals: Monitor Serum sodium, SBP > 90  Patient Goals: Get better  Family Goals: updates    Progress made toward(s) clinical / shift goals:  yes    Patient is not progressing towards the following goals:

## 2025-02-02 NOTE — ASSESSMENT & PLAN NOTE
Hypovolemic, in the setting of large volume chest tube drainage in a short period of time  Improving    Midodrine increased from 5 mg to 10 mg tid.  Hydrocortisone 50 mg qid. Pending random cortisol.  Albumin total 75 g.

## 2025-02-02 NOTE — PROGRESS NOTES
Gastroenterology Progress Note               Author:  Tricia MATTHEWS Date & Time Created: 2/2/2025 7:25 AM       Patient ID:  Name:             Stacy Oliva  YOB: 1970  Age:                 55 y.o.  female  MRN:               [id number]    Medical Decision Making, by Problem:  Active Hospital Problems    Diagnosis     Acute hypoxic respiratory failure (HCC) [J96.01]     Decompensated cirrhosis (HCC) [K72.90, K74.60]     Shock (HCC) [R57.9]     Hepatic Hydrothorax [J90]     Hyponatremia [E87.1]     Thrombocytopenia (HCC) [D69.6]     Coagulopathy (HCC) [D68.9]      Presenting Chief Complaint:  Cirrhosis    HISTORY OF PRESENT ILLNESS:  Stacy Oliva is a 55 y.o. female with liver decompensation secondary to cirrhosis alcohol abuse. and R pleural effusions s/p multiple thoracocentesis and thoracentesis (seen at [facility] x3 in past week for therapeutic thoracenteses), hx of GI bleed due to esophageal varices, alcohol dependence who presented on 1/31/2025 with shortness of breath. Initially presented to [facility] ER; CXR with recurrent large R pleural effusion, hyponatremic to 118, hypoxic to 80s requiring 10L NC (none at baseline). Transferred to [facility] for higher level of care requiring diuresis, thoracocentesis.  Chest tube placed. Start IV lasix 40 daily.  Patient is on  spironolactone 50 mg daily. Patient is on a small dose of levophed.     Interval History:  2/1/2025: Patient seen at bedside.  Weaned off of pressors.  AAOx4.  She reports feeling well without nausea, vomiting, abdominal pain, or dyspnea.  No bowel movements overnight.  She reports increasing urine output since diuretics started/increased.  Pending ultrasound. WBC 9.3, hemoglobin 14.9, platelets 50.  Sodium 122, BUN 18, creatinine increased from 0.46-0.76.  GFR 92, AST 54, ALT 50, alk phos 123, total bilirubin 9.4, direct bilirubin 4.3, indirect bilirubin 5.1, albumin 2.3.    2/2/2025: Patient seen,  downgraded to medical telemetry.  Worsening shortness of breath today, pending CT scan of the chest. Endorses feeling foggy, last BM yesterday which was small.  Also with minimal urine output, hypotension, tachycardia.  Status post 1 L bolus. Labs this morning: Na 120, cl 88, liver enzymes uptrending, T. Bili 11.5>9.4. Ordered urine sodium, potassium and osmolality, specimen sent when I was bedside    Hospital Medications:  Current Facility-Administered Medications   Medication Dose Frequency Provider Last Rate Last Admin    midodrine (Proamatine) tablet 5 mg  5 mg Q8HRS Kathy Mcodnald D.O.   5 mg at 02/02/25 0543    spironolactone (Aldactone) tablet 100 mg  100 mg Q EVENING Fausto Ro M.D.   100 mg at 02/01/25 1747    furosemide (Lasix) tablet 40 mg  40 mg QAM Fausto Ro M.D.   40 mg at 02/02/25 0543    Respiratory Therapy Consult   Continuous RT Seda Bear M.D.        omeprazole (PriLOSEC) capsule 20 mg  20 mg DAILY Seda Bear M.D.   20 mg at 02/02/25 0543    lactulose 20 GM/30ML solution 15 mL  15 mL TID Seda Bear M.D.   15 mL at 02/02/25 0543    thiamine (B-1) injection 100 mg  100 mg DAILY Jose Robert M.D.   100 mg at 02/02/25 0543   Last reviewed on 1/31/2025  3:10 PM by Cami Real       Review of Systems:  Review of Systems   Constitutional:  Negative for chills and fever.   HENT:  Negative for congestion and sore throat.    Respiratory:  Positive for shortness of breath. Negative for cough.    Cardiovascular:  Negative for chest pain and leg swelling.   Gastrointestinal:  Positive for constipation. Negative for abdominal pain, blood in stool, diarrhea, melena, nausea and vomiting.   Genitourinary:  Negative for dysuria and flank pain.   Musculoskeletal:  Negative for falls and myalgias.   Neurological:  Positive for weakness. Negative for headaches.   Psychiatric/Behavioral:  Positive for memory loss. The patient is not nervous/anxious and does not have  "insomnia.    All other systems reviewed and are negative.    Vital signs:  Weight/BMI: Body mass index is 24.03 kg/m².  BP 95/65   Pulse (!) 130   Temp 36 °C (96.8 °F) (Temporal)   Resp 20   Ht 1.651 m (5' 5\")   Wt 65.5 kg (144 lb 6.4 oz)   SpO2 95%   Vitals:    02/01/25 1932 02/01/25 2000 02/02/25 0015 02/02/25 0426   BP:   (!) 128/99 95/65   Pulse: (!) 117  (!) 116 (!) 130   Resp: 20  20 20   Temp: 36.2 °C (97.2 °F)  (!) 35.5 °C (95.9 °F) 36 °C (96.8 °F)   TempSrc: Temporal  Temporal Temporal   SpO2: 92%  95% 95%   Weight:  63.2 kg (139 lb 5.3 oz)  65.5 kg (144 lb 6.4 oz)   Height:         Oxygen Therapy:  Pulse Oximetry: 95 %, O2 (LPM): 0, FiO2%: 21 %, O2 Delivery Device: None - Room Air    Intake/Output Summary (Last 24 hours) at 2/2/2025 0725  Last data filed at 2/2/2025 0400  Gross per 24 hour   Intake 344.22 ml   Output 360 ml   Net -15.78 ml       Physical Exam  Vitals and nursing note reviewed.   Constitutional:       General: She is awake. She is not in acute distress.     Appearance: She is normal weight. She is ill-appearing.   HENT:      Head: Normocephalic and atraumatic.      Nose: Nose normal. No congestion.      Mouth/Throat:      Mouth: Mucous membranes are moist.      Pharynx: Oropharynx is clear.   Eyes:      General: Scleral icterus present.      Extraocular Movements: Extraocular movements intact.      Conjunctiva/sclera: Conjunctivae normal.   Cardiovascular:      Rate and Rhythm: Regular rhythm. Tachycardia present.      Pulses: Normal pulses.      Heart sounds: Normal heart sounds. No murmur heard.  Pulmonary:      Comments: Decreased breath sounds right lobe  Abdominal:      General: Abdomen is flat. Bowel sounds are normal. There is distension (mild).      Palpations: Abdomen is soft.      Tenderness: There is no abdominal tenderness.   Musculoskeletal:      Right lower leg: No edema.      Left lower leg: No edema.   Skin:     General: Skin is warm and dry.      Capillary Refill: " Capillary refill takes less than 2 seconds.      Coloration: Skin is jaundiced.   Neurological:      General: No focal deficit present.      Mental Status: She is alert and oriented to person, place, and time. Mental status is at baseline.      Comments: +asterixis   Psychiatric:         Mood and Affect: Mood normal.         Behavior: Behavior is cooperative.         Labs:  Recent Labs     01/31/25  1207 01/31/25  1710 02/01/25  0232 02/01/25  0609 02/01/25  1122 02/02/25  0257   SODIUM 119*   < > 122*  122* 126* 123* 120*   POTASSIUM 4.1  --  4.0  --   --  4.2   CHLORIDE 82*  --  91*  --   --  88*   CO2 24  --  22  --   --  21   BUN 22  --  18  --   --  21   CREATININE 0.46*  --  0.76  --   --  0.69   MAGNESIUM 1.6  --  2.3  --   --  1.9   PHOSPHORUS  --   --  2.2*  --   --  2.8   CALCIUM 8.3*  --  7.4*  --   --  8.2*    < > = values in this interval not displayed.     Recent Labs     01/31/25  1207 02/01/25 0232 02/02/25  0257   ALTSGPT 49 50 53*   ASTSGOT 59* 54* 58*   ALKPHOSPHAT 150* 123* 145*   TBILIRUBIN 12.2* 9.4* 11.5*   DBILIRUBIN  --  4.3*  --    GLUCOSE 167* 115* 97     Recent Labs     01/31/25  0830 01/31/25  1207 02/01/25 0232 02/02/25  0257   WBC 12.0*  --  9.3 12.3*   NEUTSPOLYS 87.70*  --  87.00*  --    LYMPHOCYTES 5.10*  --  8.30*  --    MONOCYTES 2.30  --  3.60  --    EOSINOPHILS 3.70  --  0.10  --    BASOPHILS 0.30  --  0.10  --    ASTSGOT  --  59* 54* 58*   ALTSGPT  --  49 50 53*   ALKPHOSPHAT  --  150* 123* 145*   TBILIRUBIN  --  12.2* 9.4* 11.5*     Recent Labs     01/31/25  0830 02/01/25  0232 02/02/25  0257   RBC 4.26 3.91* 3.94*   HEMOGLOBIN 16.3* 14.9 15.0   HEMATOCRIT 44.2 40.7 40.6   PLATELETCT 46* 50* 61*   PROTHROMBTM 20.3*  --   --    APTT 29.8  --   --    INR 1.73*  --   --      Recent Results (from the past 24 hours)   SODIUM SERUM (NA)    Collection Time: 02/01/25 11:22 AM   Result Value Ref Range    Sodium 123 (L) 135 - 145 mmol/L   CBC WITHOUT DIFFERENTIAL    Collection Time:  02/02/25  2:57 AM   Result Value Ref Range    WBC 12.3 (H) 4.8 - 10.8 K/uL    RBC 3.94 (L) 4.20 - 5.40 M/uL    Hemoglobin 15.0 12.0 - 16.0 g/dL    Hematocrit 40.6 37.0 - 47.0 %    .0 (H) 81.4 - 97.8 fL    MCH 38.1 (H) 27.0 - 33.0 pg    MCHC 36.9 (H) 32.2 - 35.5 g/dL    RDW 63.8 (H) 35.9 - 50.0 fL    Platelet Count 61 (L) 164 - 446 K/uL    MPV 10.4 9.0 - 12.9 fL   Comp Metabolic Panel    Collection Time: 02/02/25  2:57 AM   Result Value Ref Range    Sodium 120 (L) 135 - 145 mmol/L    Potassium 4.2 3.6 - 5.5 mmol/L    Chloride 88 (L) 96 - 112 mmol/L    Co2 21 20 - 33 mmol/L    Anion Gap 11.0 7.0 - 16.0    Glucose 97 65 - 99 mg/dL    Bun 21 8 - 22 mg/dL    Creatinine 0.69 0.50 - 1.40 mg/dL    Calcium 8.2 (L) 8.5 - 10.5 mg/dL    Correct Calcium 9.6 8.5 - 10.5 mg/dL    AST(SGOT) 58 (H) 12 - 45 U/L    ALT(SGPT) 53 (H) 2 - 50 U/L    Alkaline Phosphatase 145 (H) 30 - 99 U/L    Total Bilirubin 11.5 (H) 0.1 - 1.5 mg/dL    Albumin 2.3 (L) 3.2 - 4.9 g/dL    Total Protein 5.5 (L) 6.0 - 8.2 g/dL    Globulin 3.2 1.9 - 3.5 g/dL    A-G Ratio 0.7 g/dL   MAGNESIUM    Collection Time: 02/02/25  2:57 AM   Result Value Ref Range    Magnesium 1.9 1.5 - 2.5 mg/dL   PHOSPHORUS    Collection Time: 02/02/25  2:57 AM   Result Value Ref Range    Phosphorus 2.8 2.5 - 4.5 mg/dL   IMMATURE PLT FRACTION    Collection Time: 02/02/25  2:57 AM   Result Value Ref Range    Imm. Plt Fraction 4.3 0.6 - 13.1 %   ESTIMATED GFR    Collection Time: 02/02/25  2:57 AM   Result Value Ref Range    GFR (CKD-EPI) 102 >60 mL/min/1.73 m 2       Radiology Review:  US-RUQ   Final Result         1. Cirrhosis.   2. Right pleural effusion.   3. Cholelithiasis. There is mild gallbladder wall thickening and pericholecystic fluid but no biliary ductal dilatation.      DX-CHEST-PORTABLE (1 VIEW)   Final Result         1. Persistent mild left lung pneumonia infiltrate.      2. Increasing small right effusion and basilar atelectasis.      3. No pneumothorax visible  today.                     DX-CHEST-PORTABLE (1 VIEW)   Final Result      1.  Unchanged trace right-sided pneumothorax.   2.  Trace residual right-sided effusion.   3.  Stable patchy left-sided interstitial opacities.         DX-CHEST-PORTABLE (1 VIEW)   Final Result      1.  Unchanged possible trace right-sided pneumothorax following removal of right-sided chest tube.   2.  Trace residual right-sided effusion.   3.  Stable patchy left-sided interstitial opacities.      DX-CHEST-PORTABLE (1 VIEW)   Final Result      1.  Possible trace right-sided pneumothorax with right-sided chest tube in place.   2.  Trace residual right-sided effusion.   3.  Stable patchy left-sided interstitial opacities.      DX-CHEST-PORTABLE (1 VIEW)   Final Result         1. There is a very large right pleural effusion. Right sided chest tube is now noted.   2. Small amount left-sided airspace disease. This could represent edema or infection.      DX-CHEST-PORTABLE (1 VIEW)    (Results Pending)         MDM (Data Review):   -Records reviewed and summarized in current documentation  -I personally reviewed and interpreted the laboratory results  -I personally reviewed the radiology images    Assessment/Recommendations:  Hepatic hydrothorax  Cirrhosis  Alcohol misuse  Hyponatremia  Thrombocytopenia  History of esophageal varices  Liver decompensation - MELD 30  Consulted for diuretic resistance hepatic hydrothorax and need for TIPS.  MELD 3.0 - 28    Recommendations:  Discussed current clinical condition with Dr. Boyd and Dr. Escobar  Hepatic hydrothorax  RUQ US negative for ascites, continued right pleural effusion  Appears that her right pleural effusion has increased on CT scan.  Diuretics are critical in managing her hydrothorax.   Our team recommends continuing with diuretics and starting albumin with close monitoring of kidney function  Thora fluid negative for malignancy  She has a MELD of 28 today so not a candidate for TIPS at this  time  We still have a lot of room to increase her diuresis        Hyponatremia  1.   Recommend continuing diuretics, IV albumin  2.   Low-sodium diet  3.   We do not generally recommend fluid restriction for hyponatremia in cirrhosis unless sodium is below 119    Decreased urine output  Thankfully her creatinine has remained stable  Status post 1 L fluid bolus  Follow urine osmolality, sodium, creatinine, potassium studies  Closely monitor for development of HRS    Hypotension  Cirrhotics are always going to have lower blood pressure  Keep MAP is above 65 and ensure that she is asymptomatic  Would consider adding midodrine for blood pressure support    Hepatic encephalopathy  Patient endorses feeling foggy - continue lactulose and rifaximin    Macrocytic anemia  History of GI bleed secondary to esophageal varices  Continue omeprazole  Monitor for bleeding   Check folate and B12    Alcohol misuse disorder  Alcohol cessation  PETH pending    Leukocytosis  Monitor closely for worsening infection    I will attempt to discuss case with her local GI team in [place] tomorrow.  I am unable to find evidence that she has been referred for transplant evaluation.      Discussed with patient, nursing, Dr. Escobar, Dr. Boyd    ..Tricia Pike, DNP,  APRN    Core Quality Measures   Reviewed items::  Labs, Medications and Radiology reports reviewed

## 2025-02-02 NOTE — ASSESSMENT & PLAN NOTE
Due to cirrhosis, platelet count 50,000   Will continue to monitor  Consider starting Lovenox if her platelets continue to trend in the right direction

## 2025-02-02 NOTE — PROGRESS NOTES
Dr Ro notified of the patients NPO status, patient has been feeling weak and dizzy stated that she needs to eat, NPO status due to US of RUQ. Dr. Ro gave the OK to give the patient a meal.

## 2025-02-02 NOTE — PROGRESS NOTES
Yohana (mother) called requesting updates. Confirmed with Stacy (patient) that it was OK to give information. Yohana was updated on the phone about Stacy's plan of care.

## 2025-02-02 NOTE — ASSESSMENT & PLAN NOTE
Suspect initially due to fluid overload in the setting of cirrhosis, I question if she has been fully compliant with her Lasix, or if she just needs a higher dose.    Now concern for hypovolemic hyponatremia as intravascularly dry due to third spacing due to hypoalbuminemia.

## 2025-02-02 NOTE — CARE PLAN
The patient is Watcher - Medium risk of patient condition declining or worsening    Shift Goals  Clinical Goals: Monitor Serum sodium, SBP > 90  Patient Goals: Get better  Family Goals: updates    Progress made toward(s) clinical / shift goals:    Problem: Knowledge Deficit - Standard  Goal: Patient and family/care givers will demonstrate understanding of plan of care, disease process/condition, diagnostic tests and medications  Outcome: Progressing     Problem: Skin Integrity  Goal: Skin integrity is maintained or improved  Outcome: Progressing     Problem: Fall Risk  Goal: Patient will remain free from falls  Outcome: Progressing     Problem: Pain - Standard  Goal: Alleviation of pain or a reduction in pain to the patient’s comfort goal  Outcome: Progressing       Patient is not progressing towards the following goals:

## 2025-02-02 NOTE — PROGRESS NOTES
Primary team notified of tachycardia and low urine output.     Primary team at bedside     New order placed and initiated     Orthostatic blood pressure completed

## 2025-02-02 NOTE — PROGRESS NOTES
4 Eyes Skin Assessment Completed by Adi Haskins, RN and Marc Fuentes, RN.    Head WDL  Ears WDL  Nose WDL  Mouth WDL  Neck WDL  Breast/Chest Scab on right chest  Shoulder Blades WDL  Spine WDL  (R) Arm/Elbow/Hand WDL  (L) Arm/Elbow/Hand WDL  Abdomen WDL  Groin Redness and Blanching  Scrotum/Coccyx/Buttocks WDL  (R) Leg WDL  (L) Leg WDL  (R) Heel/Foot/Toe WDL  (L) Heel/Foot/Toe WDL          Devices In Places Tele Box and SCD's      Interventions In Place Pillows    Possible Skin Injury No    Pictures Uploaded Into Epic N/A  Wound Consult Placed N/A  RN Wound Prevention Protocol Ordered No

## 2025-02-02 NOTE — PROGRESS NOTES
Brief attending note.  Stacy was transferred to our unit from the [facility] [place].  She has a history of cirrhosis and hepatic hydrothorax.  She has been admitted her 5 times since 11/2024 and was discharged 2 weeks ago.  This time she was transferred from [facility] with volume overload.  She underwent a chest tube to drain the hydrothorax and was given diuretics. The chest tube was pulled prior to transfer.  On our exam this morning she had sinus tachycardia and hypotension.  Her upright systolic pressure was 68 systolic and lying was 90.    There was trace edema and minimal crackles in the left base with decreased BS on the Right.    The ECG had a rightward axis.  Our assessment was that she was volume depleted intravascularly and we gave a small bolus but it did not dramatically improve either the tachycardia or the blood pressure.  As she has some hypoxia we entertained a PE and did a CTA of the chest.   This was negative for PE but showed the large hepatic hydrothorax.    She does not have a lot of urine output.  I continue to think this is consistent with hypovolemic shock due to intravascular depletion despite the large pleural effusion.  We will give midodrine and albumin.  I would like to hold more lasix at this time.  One year ago her LV fxn was normal so I doubt this is cardiogenic shock.  She does not appear to be infected so I do not believe this is septic shock.  She did receive prednisolone recently so she could easily have an adrenal insufficiency and we will draw a random cortisol and give a dose of hydrocortisone.    Discussed with resident.  Resident note to follow.

## 2025-02-02 NOTE — CARE PLAN
Problem: Hyperinflation  Goal: Prevent or improve atelectasis  Description: Target End Date:  3 to 4 days    1. Instruct incentive spirometry usage  2.  Perform hyperinflation therapy as indicated  2/2/2025 0832 by Mikey German, RRT  Outcome: Not Met  2/2/2025 0706 by Mikey German, RRT  Outcome: Not Met   PEP

## 2025-02-02 NOTE — ASSESSMENT & PLAN NOTE
MELD 28 today, 30 on admission   Continue with lasix/spirinolactone -titrate to effect  Continue with home lactulose   Cirrhosis workup initiated, patient does have a history of alcohol use, but it has been pretty spotty and overall, not that heavy.  Want to ensure that she does not have an underlying cause as her father had cirrhosis as well  Patient reports that her last drink was at the beginning of October following the death of her dog, she states that she did not drink in December, although there is further documentation that there was alcohol use late December beginning of January  Patient needs to be evaluated for liver transplantation, she thinks that this has been initiated through her primary GI doctor to be seen at [facility] in [place].  Pending PEth.

## 2025-02-02 NOTE — ASSESSMENT & PLAN NOTE
Patient needs aggressive diuresis, she reports that she is compliant with her diuretics  Increase Aldactone, Lasix, monitor closely for tolerance  Would perform daily x-rays to ensure that patient is adequately diuresing, she was presenting to the ER up to 4 times a week for thoracentesis, we should ensure that her effusion is under good control prior to discharge  Serial CXR.  Consider repeat thoracentesis if no improvement with spironolactone and albumin.

## 2025-02-02 NOTE — PROGRESS NOTES
[facility] Daily Progress Note    Date of Service  2/2/2025    [facility] Team: [facility] Team   Attending: Varun Montero M.d.  Senior Resident: Dr. Escobar  Contact Number: [phone]    Chief Complaint  Stacy Oliva is a 55 y.o. female admitted 1/31/2025 with acute hypoxic respiratory failure secondary to hepatic hydrothorax secondary to decompensated liver cirrhosis.    Hospital Course  55 y.o. female who presented 1/31/2025 with a history of alcoholic as well as familial cirrhosis, the patient is currently treated at [facility] and has been referred to [place] evaluation for possible transplant, patient presented to the sending facility with increasing dyspnea, was found to have a complete opacification of the right hemithorax, the patient was transferred to this facility for specialty care, on arrival the patient was tachypneic into the 40s, saturations in the 90 range on 10 L facemask, she reports that she has been requiring thoracentesis every few days, x 3 in the past week, the patient reports a history of GI bleeding, chronic alcohol dependence, with apparently inability to stay abstinent, the patient reported that she had quit drinking about a year ago but then her dog passed away and she started using alcohol again, there is documentation that in January and emergency departments where she reported having had alcohol 2 weeks prior to that.  Per chart review days documentation the patient had undergone thoracentesis on 12/31, 1/3, 1/10, 1/11, 1/22, 1/27.  The patient works, she is reportedly a real state/.  The patient had a chest tube placed, had a total of 2.4 L drained.  The patient started on therapy for recurrent ascites including diuretics, she had correction of some hyponatremia, gastroenterology was consulted and medical therapy at this time recommended, did not recommend a placement of a TIPPS  The patient is scheduled to have an right upper quadrant  ultrasound  She denies abdominal pain, there is no significant fluid wave  The patient states that she has been referred to a liver transplant, she is not very forthcoming with her recent alcohol use    Interval Problem Update  2/2/2025: No acute overnight events.  However, this morning, patient was dizzy during evaluation.  BP was checked and patient was in the 80s systolic while supine.  Orthostatics completed and they were positive.  Patient has been in sinus tachycardic in the 120s 130s.  ECG confirmed this and there was concern for early right axis deviation.  Patient also was found to be hypoxemic at 84 to 86% RA and therefore was started on 1.5 L NC.    On exam, patient had dry mucous membranes and patient's sodium has been trending down over the past several days.  Recently, her diuresis is increased to current regimen of spironolactone 100 mg and Lasix 40 mg.  Therefore, given tachycardia, hypotension, dry mucous membranes, and hypovolemia in the setting of diuretic use, there was concern for hypovolemia.  Therefore, patient was given fluid bolus and diuretics were held.    However, this did not appear to improve the patient's oxygen requirements or tachycardia.  There was some improvement of blood pressure.  Therefore, given early right axis deviation and lack of pharmacologic DVT prophylaxis due to thrombocytopenia, CT PE was ordered.  This did not show any signs of PE, but did show significant recurrence of a large right pleural effusion, likely hepatic hydrothorax.  He was also noted to have was worsening of the left upper and lower lobe infiltrate and a small right pneumothorax.  Therefore, patient will be continued on oxygen.  Discussed with GI who recommended albumin.  Patient will receive albumin 25 mg x 3 for total of 75 mg.  Random cortisol will be obtained to evaluate for relative adrenal insufficiency.  Midodrine was increased from 5 mg to 10 mg and a 5 mg now dose was given to supplement  previous dose.  Lastly, spironolactone was restarted, but furosemide will be held for now given patient does appear to be hypovolemic, especially in the setting of decreased urine output and furosemide can make the hypovolemia worse.    I have discussed this patient's plan of care and discharge plan at IDT rounds today with Case Management, Nursing, Nursing leadership, and other members of the IDT team.    Consultants/Specialty  critical care    Code Status  Full Code    Disposition  The patient is not medically cleared for discharge to home or a post-acute facility.      I have placed the appropriate orders for post-discharge needs.    Review of Systems  Review of Systems   Constitutional:  Positive for malaise/fatigue. Negative for chills and fever.   HENT:  Negative for congestion and sore throat.    Respiratory:  Positive for shortness of breath. Negative for cough, hemoptysis, sputum production and wheezing.    Cardiovascular:  Negative for chest pain and palpitations.   Gastrointestinal:  Negative for abdominal pain, constipation, diarrhea, nausea and vomiting.   Neurological:  Positive for dizziness. Negative for loss of consciousness.   All other systems reviewed and are negative.       Physical Exam  Temp:  [35.5 °C (95.9 °F)-36.6 °C (97.8 °F)] 36 °C (96.8 °F)  Pulse:  [114-130] 130  Resp:  [16-34] 20  BP: ()/(52-99) 95/65  SpO2:  [88 %-95 %] 95 %    Physical Exam  Constitutional:       General: She is not in acute distress.     Appearance: She is ill-appearing. She is not toxic-appearing or diaphoretic.   HENT:      Head: Normocephalic and atraumatic.      Nose: Nose normal. No rhinorrhea.   Eyes:      General: No scleral icterus.        Right eye: No discharge.         Left eye: No discharge.      Extraocular Movements: Extraocular movements intact.      Conjunctiva/sclera: Conjunctivae normal.   Cardiovascular:      Rate and Rhythm: Regular rhythm. Tachycardia present.      Pulses: Normal pulses.       Heart sounds: Normal heart sounds. No murmur heard.     No friction rub. No gallop.   Pulmonary:      Effort: Pulmonary effort is normal. No respiratory distress.      Breath sounds: No stridor. Rales present. No wheezing or rhonchi.      Comments: Diminished right lower lung field breath sounds.  Abdominal:      General: Bowel sounds are normal. There is no distension.      Palpations: Abdomen is soft. There is no mass.      Tenderness: There is no abdominal tenderness. There is no guarding or rebound.   Musculoskeletal:         General: Normal range of motion.      Right lower leg: No edema.      Left lower leg: No edema.   Skin:     General: Skin is warm and dry.   Neurological:      General: No focal deficit present.      Mental Status: She is alert and oriented to person, place, and time. Mental status is at baseline.   Psychiatric:         Mood and Affect: Mood normal.         Behavior: Behavior normal.         Fluids    Intake/Output Summary (Last 24 hours) at 2/2/2025 0732  Last data filed at 2/2/2025 0400  Gross per 24 hour   Intake 344.22 ml   Output 360 ml   Net -15.78 ml       Laboratory  Recent Labs     01/31/25  0830 02/01/25  0232 02/02/25  0257   WBC 12.0* 9.3 12.3*   RBC 4.26 3.91* 3.94*   HEMOGLOBIN 16.3* 14.9 15.0   HEMATOCRIT 44.2 40.7 40.6   .8* 104.1* 103.0*   MCH 38.3* 38.1* 38.1*   MCHC 36.9* 36.6* 36.9*   RDW 65.2* 65.9* 63.8*   PLATELETCT 46* 50* 61*   MPV 9.3 10.2 10.4     Recent Labs     01/31/25  1207 01/31/25  1710 02/01/25  0232 02/01/25  0609 02/01/25  1122 02/02/25  0257   SODIUM 119*   < > 122*  122* 126* 123* 120*   POTASSIUM 4.1  --  4.0  --   --  4.2   CHLORIDE 82*  --  91*  --   --  88*   CO2 24  --  22  --   --  21   GLUCOSE 167*  --  115*  --   --  97   BUN 22  --  18  --   --  21   CREATININE 0.46*  --  0.76  --   --  0.69   CALCIUM 8.3*  --  7.4*  --   --  8.2*    < > = values in this interval not displayed.     Recent Labs     01/31/25  0830   APTT 29.8   INR  1.73*               Imaging  CT-CTA CHEST PULMONARY ARTERY W/ RECONS   Final Result         1. Large right-sided pleural effusion although there has been some reexpansion of the right lung when compared with the prior exam.   2. There is new extensive left lung infiltrate.   3. Tiny right-sided pneumothorax.   4. No evidence for pulmonary embolism.   5. Cirrhotic appearance of the liver.      Findings were communicated to the ordering provider at the time of dictation via [employer].      DX-CHEST-PORTABLE (1 VIEW)   Final Result         1. Increasing moderate right pleural effusion and basilar atelectasis.      2. Increasing mild pulmonary edema. Persistent left lower lung alveolar infiltrate.                     US-RUQ   Final Result         1. Cirrhosis.   2. Right pleural effusion.   3. Cholelithiasis. There is mild gallbladder wall thickening and pericholecystic fluid but no biliary ductal dilatation.      DX-CHEST-PORTABLE (1 VIEW)   Final Result         1. Persistent mild left lung pneumonia infiltrate.      2. Increasing small right effusion and basilar atelectasis.      3. No pneumothorax visible today.                     DX-CHEST-PORTABLE (1 VIEW)   Final Result      1.  Unchanged trace right-sided pneumothorax.   2.  Trace residual right-sided effusion.   3.  Stable patchy left-sided interstitial opacities.         DX-CHEST-PORTABLE (1 VIEW)   Final Result      1.  Unchanged possible trace right-sided pneumothorax following removal of right-sided chest tube.   2.  Trace residual right-sided effusion.   3.  Stable patchy left-sided interstitial opacities.      DX-CHEST-PORTABLE (1 VIEW)   Final Result      1.  Possible trace right-sided pneumothorax with right-sided chest tube in place.   2.  Trace residual right-sided effusion.   3.  Stable patchy left-sided interstitial opacities.      DX-CHEST-PORTABLE (1 VIEW)   Final Result         1. There is a very large right pleural effusion. Right sided chest tube  is now noted.   2. Small amount left-sided airspace disease. This could represent edema or infection.           Assessment/Plan  Problem Representation:    * Decompensated cirrhosis (HCC)- (present on admission)  Assessment & Plan  MELD 28 today, 30 on admission   Continue with lasix/spirinolactone -titrate to effect  Continue with home lactulose   Cirrhosis workup initiated, patient does have a history of alcohol use, but it has been pretty spotty and overall, not that heavy.  Want to ensure that she does not have an underlying cause as her father had cirrhosis as well  Patient reports that her last drink was at the beginning of October following the death of her dog, she states that she did not drink in December, although there is further documentation that there was alcohol use late December beginning of January  Patient needs to be evaluated for liver transplantation, she thinks that this has been initiated through her primary GI doctor to be seen at [facility] in [place].  Pending PEt.    Shock (HCC)- (present on admission)  Assessment & Plan  Hypovolemic, in the setting of large volume chest tube drainage in a short period of time  Improving    Midodrine increased from 5 mg to 10 mg tid.  Hydrocortisone 50 mg qid. Pending random cortisol.  Albumin total 75 g.    Acute hypoxic respiratory failure (HCC)- (present on admission)  Assessment & Plan  Due to effusion  Monitor closely, titrate oxygen    Hepatic Hydrothorax- (present on admission)  Assessment & Plan  Patient needs aggressive diuresis, she reports that she is compliant with her diuretics  Increase Aldactone, Lasix, monitor closely for tolerance  Would perform daily x-rays to ensure that patient is adequately diuresing, she was presenting to the ER up to 4 times a week for thoracentesis, we should ensure that her effusion is under good control prior to discharge  Serial CXR.  Consider repeat thoracentesis if no improvement with spironolactone and  albumin.    Hyponatremia- (present on admission)  Assessment & Plan  Suspect initially due to fluid overload in the setting of cirrhosis, I question if she has been fully compliant with her Lasix, or if she just needs a higher dose.    Now concern for hypovolemic hyponatremia as intravascularly dry due to third spacing due to hypoalbuminemia.    Thrombocytopenia (HCC)- (present on admission)  Assessment & Plan  Due to cirrhosis, platelet count 50,000   Will continue to monitor  Consider starting Lovenox if her platelets continue to trend in the right direction    Coagulopathy (HCC)- (present on admission)  Assessment & Plan  Due to cirrhosis, no sign of active bleeding  Monitor          VTE prophylaxis: SCDs/TEDs    I have performed a physical exam and reviewed and updated ROS and Plan today (2/2/2025). In review of yesterday's note (2/1/2025), there are no changes except as documented above.

## 2025-02-03 ENCOUNTER — APPOINTMENT (OUTPATIENT)
Dept: RADIOLOGY | Facility: MEDICAL CENTER | Age: 55
DRG: 441 | End: 2025-02-03
Payer: COMMERCIAL

## 2025-02-03 LAB
A1AT SERPL-MCNC: 133 MG/DL (ref 90–200)
ALBUMIN SERPL BCP-MCNC: 3.1 G/DL (ref 3.2–4.9)
ALBUMIN/GLOB SERPL: 1.6 G/DL
ALP SERPL-CCNC: 108 U/L (ref 30–99)
ALT SERPL-CCNC: 35 U/L (ref 2–50)
ANION GAP SERPL CALC-SCNC: 9 MMOL/L (ref 7–16)
AST SERPL-CCNC: 46 U/L (ref 12–45)
BACTERIA FLD AEROBE CULT: NORMAL
BASOPHILS # BLD AUTO: 0.1 % (ref 0–1.8)
BASOPHILS # BLD AUTO: 0.2 % (ref 0–1.8)
BASOPHILS # BLD: 0.01 K/UL (ref 0–0.12)
BASOPHILS # BLD: 0.02 K/UL (ref 0–0.12)
BILIRUB SERPL-MCNC: 14.1 MG/DL (ref 0.1–1.5)
BUN SERPL-MCNC: 20 MG/DL (ref 8–22)
CALCIUM ALBUM COR SERPL-MCNC: 9.5 MG/DL (ref 8.5–10.5)
CALCIUM SERPL-MCNC: 8.8 MG/DL (ref 8.5–10.5)
CHLORIDE SERPL-SCNC: 88 MMOL/L (ref 96–112)
CO2 SERPL-SCNC: 25 MMOL/L (ref 20–33)
CREAT SERPL-MCNC: 0.78 MG/DL (ref 0.5–1.4)
EOSINOPHIL # BLD AUTO: 0 K/UL (ref 0–0.51)
EOSINOPHIL # BLD AUTO: 0 K/UL (ref 0–0.51)
EOSINOPHIL NFR BLD: 0 % (ref 0–6.9)
EOSINOPHIL NFR BLD: 0 % (ref 0–6.9)
ERYTHROCYTE [DISTWIDTH] IN BLOOD BY AUTOMATED COUNT: 66.6 FL (ref 35.9–50)
ERYTHROCYTE [DISTWIDTH] IN BLOOD BY AUTOMATED COUNT: 67.4 FL (ref 35.9–50)
FOLATE SERPL-MCNC: 6 NG/ML
GFR SERPLBLD CREATININE-BSD FMLA CKD-EPI: 89 ML/MIN/1.73 M 2
GLOBULIN SER CALC-MCNC: 2 G/DL (ref 1.9–3.5)
GLUCOSE SERPL-MCNC: 108 MG/DL (ref 65–99)
GRAM STN SPEC: NORMAL
HCT VFR BLD AUTO: 31.7 % (ref 37–47)
HCT VFR BLD AUTO: 32.6 % (ref 37–47)
HGB BLD-MCNC: 11.5 G/DL (ref 12–16)
HGB BLD-MCNC: 11.6 G/DL (ref 12–16)
IMM GRANULOCYTES # BLD AUTO: 0.09 K/UL (ref 0–0.11)
IMM GRANULOCYTES # BLD AUTO: 0.1 K/UL (ref 0–0.11)
IMM GRANULOCYTES NFR BLD AUTO: 0.8 % (ref 0–0.9)
IMM GRANULOCYTES NFR BLD AUTO: 0.9 % (ref 0–0.9)
INR PPP: 2.31 (ref 0.87–1.13)
LYMPHOCYTES # BLD AUTO: 0.47 K/UL (ref 1–4.8)
LYMPHOCYTES # BLD AUTO: 0.49 K/UL (ref 1–4.8)
LYMPHOCYTES NFR BLD: 4.1 % (ref 22–41)
LYMPHOCYTES NFR BLD: 4.3 % (ref 22–41)
MAGNESIUM SERPL-MCNC: 1.9 MG/DL (ref 1.5–2.5)
MCH RBC QN AUTO: 38.4 PG (ref 27–33)
MCH RBC QN AUTO: 38.7 PG (ref 27–33)
MCHC RBC AUTO-ENTMCNC: 35.6 G/DL (ref 32.2–35.5)
MCHC RBC AUTO-ENTMCNC: 36.3 G/DL (ref 32.2–35.5)
MCV RBC AUTO: 106.7 FL (ref 81.4–97.8)
MCV RBC AUTO: 107.9 FL (ref 81.4–97.8)
MONOCYTES # BLD AUTO: 0.32 K/UL (ref 0–0.85)
MONOCYTES # BLD AUTO: 0.35 K/UL (ref 0–0.85)
MONOCYTES NFR BLD AUTO: 2.8 % (ref 0–13.4)
MONOCYTES NFR BLD AUTO: 3 % (ref 0–13.4)
NEUTROPHILS # BLD AUTO: 10.4 K/UL (ref 1.82–7.42)
NEUTROPHILS # BLD AUTO: 10.6 K/UL (ref 1.82–7.42)
NEUTROPHILS NFR BLD: 91.9 % (ref 44–72)
NEUTROPHILS NFR BLD: 91.9 % (ref 44–72)
NRBC # BLD AUTO: 0 K/UL
NRBC # BLD AUTO: 0 K/UL
NRBC BLD-RTO: 0 /100 WBC (ref 0–0.2)
NRBC BLD-RTO: 0 /100 WBC (ref 0–0.2)
NUCLEAR IGG SER QL IA: NORMAL
PHOSPHATE SERPL-MCNC: 2.7 MG/DL (ref 2.5–4.5)
PLATELET # BLD AUTO: 34 K/UL (ref 164–446)
PLATELET # BLD AUTO: 35 K/UL (ref 164–446)
PLATELETS.RETICULATED NFR BLD AUTO: 5.2 % (ref 0.6–13.1)
PLATELETS.RETICULATED NFR BLD AUTO: 6.5 % (ref 0.6–13.1)
PMV BLD AUTO: 10.2 FL (ref 9–12.9)
PMV BLD AUTO: 10.4 FL (ref 9–12.9)
POTASSIUM SERPL-SCNC: 4.5 MMOL/L (ref 3.6–5.5)
PROT SERPL-MCNC: 5.1 G/DL (ref 6–8.2)
PROTHROMBIN TIME: 25.5 SEC (ref 12–14.6)
RBC # BLD AUTO: 2.97 M/UL (ref 4.2–5.4)
RBC # BLD AUTO: 3.02 M/UL (ref 4.2–5.4)
SIGNIFICANT IND 70042: NORMAL
SITE SITE: NORMAL
SMA IGG SER-ACNC: 5 UNITS (ref 0–19)
SODIUM SERPL-SCNC: 122 MMOL/L (ref 135–145)
SODIUM SERPL-SCNC: 124 MMOL/L (ref 135–145)
SOURCE SOURCE: NORMAL
VIT B12 SERPL-MCNC: 2755 PG/ML (ref 211–911)
WBC # BLD AUTO: 11.3 K/UL (ref 4.8–10.8)
WBC # BLD AUTO: 11.5 K/UL (ref 4.8–10.8)

## 2025-02-03 PROCEDURE — 32554 ASPIRATE PLEURA W/O IMAGING: CPT | Performed by: INTERNAL MEDICINE

## 2025-02-03 PROCEDURE — 770001 HCHG ROOM/CARE - MED/SURG/GYN PRIV*

## 2025-02-03 PROCEDURE — 700111 HCHG RX REV CODE 636 W/ 250 OVERRIDE (IP): Mod: JZ

## 2025-02-03 PROCEDURE — 71045 X-RAY EXAM CHEST 1 VIEW: CPT

## 2025-02-03 PROCEDURE — 85610 PROTHROMBIN TIME: CPT

## 2025-02-03 PROCEDURE — 94669 MECHANICAL CHEST WALL OSCILL: CPT

## 2025-02-03 PROCEDURE — A9270 NON-COVERED ITEM OR SERVICE: HCPCS

## 2025-02-03 PROCEDURE — 700102 HCHG RX REV CODE 250 W/ 637 OVERRIDE(OP)

## 2025-02-03 PROCEDURE — 80053 COMPREHEN METABOLIC PANEL: CPT

## 2025-02-03 PROCEDURE — 85025 COMPLETE CBC W/AUTO DIFF WBC: CPT

## 2025-02-03 PROCEDURE — 82746 ASSAY OF FOLIC ACID SERUM: CPT

## 2025-02-03 PROCEDURE — 82607 VITAMIN B-12: CPT

## 2025-02-03 PROCEDURE — 0W993ZX DRAINAGE OF RIGHT PLEURAL CAVITY, PERCUTANEOUS APPROACH, DIAGNOSTIC: ICD-10-PCS | Performed by: INTERNAL MEDICINE

## 2025-02-03 PROCEDURE — P9047 ALBUMIN (HUMAN), 25%, 50ML: HCPCS | Mod: JZ

## 2025-02-03 PROCEDURE — 700111 HCHG RX REV CODE 636 W/ 250 OVERRIDE (IP): Performed by: EMERGENCY MEDICINE

## 2025-02-03 PROCEDURE — 36415 COLL VENOUS BLD VENIPUNCTURE: CPT

## 2025-02-03 PROCEDURE — 99232 SBSQ HOSP IP/OBS MODERATE 35: CPT | Performed by: NURSE PRACTITIONER

## 2025-02-03 PROCEDURE — 84295 ASSAY OF SERUM SODIUM: CPT

## 2025-02-03 PROCEDURE — 99233 SBSQ HOSP IP/OBS HIGH 50: CPT | Performed by: INTERNAL MEDICINE

## 2025-02-03 PROCEDURE — 83735 ASSAY OF MAGNESIUM: CPT

## 2025-02-03 PROCEDURE — 85055 RETICULATED PLATELET ASSAY: CPT

## 2025-02-03 PROCEDURE — 84100 ASSAY OF PHOSPHORUS: CPT

## 2025-02-03 RX ORDER — ALBUMIN (HUMAN) 12.5 G/50ML
12.5 SOLUTION INTRAVENOUS ONCE
Status: COMPLETED | OUTPATIENT
Start: 2025-02-03 | End: 2025-02-03

## 2025-02-03 RX ORDER — FUROSEMIDE 40 MG/1
40 TABLET ORAL ONCE
Status: COMPLETED | OUTPATIENT
Start: 2025-02-03 | End: 2025-02-03

## 2025-02-03 RX ADMIN — HYDROCORTISONE SODIUM SUCCINATE 50 MG: 100 INJECTION, POWDER, FOR SOLUTION INTRAMUSCULAR; INTRAVENOUS at 00:19

## 2025-02-03 RX ADMIN — LACTULOSE 15 ML: 10 SOLUTION ORAL; RECTAL at 06:24

## 2025-02-03 RX ADMIN — ALBUMIN (HUMAN) 25 G: 0.25 INJECTION, SOLUTION INTRAVENOUS at 00:23

## 2025-02-03 RX ADMIN — MIDODRINE HYDROCHLORIDE 10 MG: 5 TABLET ORAL at 06:24

## 2025-02-03 RX ADMIN — FUROSEMIDE 40 MG: 40 TABLET ORAL at 18:33

## 2025-02-03 RX ADMIN — HYDROCORTISONE SODIUM SUCCINATE 50 MG: 100 INJECTION, POWDER, FOR SOLUTION INTRAMUSCULAR; INTRAVENOUS at 06:25

## 2025-02-03 RX ADMIN — LACTULOSE 15 ML: 10 SOLUTION ORAL; RECTAL at 18:33

## 2025-02-03 RX ADMIN — MIDODRINE HYDROCHLORIDE 10 MG: 5 TABLET ORAL at 21:03

## 2025-02-03 RX ADMIN — LACTULOSE 15 ML: 10 SOLUTION ORAL; RECTAL at 12:33

## 2025-02-03 RX ADMIN — ALBUMIN (HUMAN) 12.5 G: 0.25 INJECTION, SOLUTION INTRAVENOUS at 18:38

## 2025-02-03 RX ADMIN — MIDODRINE HYDROCHLORIDE 10 MG: 5 TABLET ORAL at 13:28

## 2025-02-03 RX ADMIN — THIAMINE HYDROCHLORIDE 100 MG: 100 INJECTION, SOLUTION INTRAMUSCULAR; INTRAVENOUS at 06:24

## 2025-02-03 RX ADMIN — SPIRONOLACTONE 100 MG: 100 TABLET ORAL at 18:48

## 2025-02-03 RX ADMIN — OMEPRAZOLE 20 MG: 20 CAPSULE, DELAYED RELEASE ORAL at 06:24

## 2025-02-03 RX ADMIN — ALBUMIN (HUMAN) 25 G: 0.25 INJECTION, SOLUTION INTRAVENOUS at 07:42

## 2025-02-03 ASSESSMENT — ENCOUNTER SYMPTOMS
HEMOPTYSIS: 0
SPUTUM PRODUCTION: 0
MYALGIAS: 0
WEAKNESS: 1
FLANK PAIN: 0
DIARRHEA: 0
LOSS OF CONSCIOUSNESS: 0
BLOOD IN STOOL: 0
FEVER: 0
CHILLS: 0
DIZZINESS: 0
DOUBLE VISION: 0
VOMITING: 0
ABDOMINAL PAIN: 0
CONSTIPATION: 1
BLURRED VISION: 0
SHORTNESS OF BREATH: 0
COUGH: 0
NERVOUS/ANXIOUS: 0
WHEEZING: 0
SORE THROAT: 0
COUGH: 1
HEADACHES: 0
FALLS: 0
CONSTIPATION: 0
MEMORY LOSS: 1
PALPITATIONS: 0
DEPRESSION: 0
SHORTNESS OF BREATH: 1
NAUSEA: 0
INSOMNIA: 0

## 2025-02-03 ASSESSMENT — FIBROSIS 4 INDEX: FIB4 SCORE: 12.44

## 2025-02-03 ASSESSMENT — PAIN DESCRIPTION - PAIN TYPE: TYPE: ACUTE PAIN

## 2025-02-03 NOTE — PROCEDURES
Procedure Note    Date: 2/3/2025  Time: 1430    Procedure: Diagnostic and therapeutic thoracentesis  Site: right chest (fluid localized with US guidance in realtime)    Indication: Moderate-large pleural effusion, respiratory failure,     Consent: Informed consent obtained from patient or designated decision maker after explaining the benefits/risks of the procedure including but not limited to bleeding, infection, nerve or other deep structure injury, pneumothorax/hemothorax, arrythmia, or death. Patient or surrogate expressed understanding and agreement.    Time-out: Verbal consent was obtained. Immediately prior to procedure, a time out was called to verify the correct patient, procedure, equipment, support staff and site/side marked as required. Pre-procedure pain reported as 0/10 and post-procedure was 0/10.    Procedure: After obtaining consent, a time-out was performed. Appropriate site confirmed with ultrasound and patient positioned, prepped, and draped in sterile fashion. All those present wearing mask/cap and those physically participating remained adhering to sterile fashion with mask/cap and gloves. 5ml mL of local anesthetic injected (1% lidocaine without epinephrine) achieving appropriate comfort level for patient. Large pleural fluid pocket localized and accessed using continuous ultrasound guidance and a thoracentesis catheter placed using Seldinger technique into the pleural cavity without complication. Able to easily aspirate 10ml fluid.  ~2500 mL of fluid drained into pleurovac bottles. Significant reduction in fluid collection and re-expansion of the lung seen on US. Catheter removed and site dressed with bandage.  Patient tolerated procedure well without any difficulties and remains in care of bedside nurse. CXR will be performed to confirm improvement in effusion and to evaluate for pneumothorax. Samples sent to lab.    EBL: 5mL  Complications: None  CXR: Improvement of RIGHT pleural effusion.  No pneumothorax.    Sree Rachel M.D. PGY-1  [facility]

## 2025-02-03 NOTE — PROGRESS NOTES
10 ml fluid removed from balloon     Kc removed     Patient tolerated well    Educated on voiding within the next 6 hours, patient verbalized understanding

## 2025-02-03 NOTE — PROGRESS NOTES
Gastroenterology Progress Note               Author:  Tricia MATTHEWS Date & Time Created: 2/3/2025 8:06 AM       Patient ID:  Name:             Stacy Oliva  YOB: 1970  Age:                 55 y.o.  female  MRN:               [id number]    Medical Decision Making, by Problem:  Active Hospital Problems    Diagnosis     Acute hypoxic respiratory failure (HCC) [J96.01]     Decompensated cirrhosis (HCC) [K72.90, K74.60]     Shock (HCC) [R57.9]     Hepatic Hydrothorax [J90]     Hyponatremia [E87.1]     Thrombocytopenia (HCC) [D69.6]     Coagulopathy (HCC) [D68.9]      Presenting Chief Complaint:  Cirrhosis    HISTORY OF PRESENT ILLNESS:  Stacy Oliva is a 55 y.o. female with liver decompensation secondary to cirrhosis alcohol abuse. and R pleural effusions s/p multiple thoracocentesis and thoracentesis (seen at [facility] x3 in past week for therapeutic thoracenteses), hx of GI bleed due to esophageal varices, alcohol dependence who presented on 1/31/2025 with shortness of breath. Initially presented to [facility] ER; CXR with recurrent large R pleural effusion, hyponatremic to 118, hypoxic to 80s requiring 10L NC (none at baseline). Transferred to [facility] for higher level of care requiring diuresis, thoracocentesis.  Chest tube placed. Start IV lasix 40 daily.  Patient is on  spironolactone 50 mg daily. Patient is on a small dose of levophed.     Interval History:  2/1/2025: Patient seen at bedside.  Weaned off of pressors.  AAOx4.  She reports feeling well without nausea, vomiting, abdominal pain, or dyspnea.  No bowel movements overnight.  She reports increasing urine output since diuretics started/increased.  Pending ultrasound. WBC 9.3, hemoglobin 14.9, platelets 50.  Sodium 122, BUN 18, creatinine increased from 0.46-0.76.  GFR 92, AST 54, ALT 50, alk phos 123, total bilirubin 9.4, direct bilirubin 4.3, indirect bilirubin 5.1, albumin 2.3.    2/2/2025: Patient seen,  downgraded to medical telemetry.  Worsening shortness of breath today, pending CT scan of the chest. Endorses feeling foggy, last BM yesterday which was small.  Also with minimal urine output, hypotension, tachycardia.  Status post 1 L bolus. Labs this morning: Na 120, cl 88, liver enzymes uptrending, T. Bili 11.5>9.4. Ordered urine sodium, potassium and osmolality, specimen sent when I was bedside    2/3/2025: CT with large right sided pleural effusion, extensive left lung infiltrate. Patient reports that thoracentesis was done this morning but unable to find how much was taken off. Kidney function stable, Na improving. T. Bili now 14.1, INR 2.31  LM for TF gastroenterology    MELD 3.0 - 30    Hospital Medications:  Current Facility-Administered Medications   Medication Dose Frequency Provider Last Rate Last Admin    midodrine (Proamatine) tablet 10 mg  10 mg Q8HRS Kar Escobar M.D.   10 mg at 02/03/25 0624    spironolactone (Aldactone) tablet 100 mg  100 mg Q EVENING Kar Escobar M.D.   100 mg at 02/02/25 1819    [Held by provider] furosemide (Lasix) tablet 40 mg  40 mg QAM Kar Escobar M.D.   40 mg at 02/02/25 0543    Respiratory Therapy Consult   Continuous RT Seda Bear M.D.        omeprazole (PriLOSEC) capsule 20 mg  20 mg DAILY Seda Bear M.D.   20 mg at 02/03/25 0624    lactulose 20 GM/30ML solution 15 mL  15 mL TID Seda Bear M.D.   15 mL at 02/03/25 0624    thiamine (B-1) injection 100 mg  100 mg DAILY Jose Robert M.D.   100 mg at 02/03/25 0624   Last reviewed on 1/31/2025  3:10 PM by Cami Real       Review of Systems:  Review of Systems   Constitutional:  Negative for chills and fever.   HENT:  Negative for congestion and sore throat.    Respiratory:  Positive for shortness of breath. Negative for cough.    Cardiovascular:  Negative for chest pain and leg swelling.   Gastrointestinal:  Positive for constipation. Negative for abdominal pain, blood in stool, diarrhea,  "melena, nausea and vomiting.   Genitourinary:  Negative for dysuria and flank pain.   Musculoskeletal:  Negative for falls and myalgias.   Neurological:  Positive for weakness. Negative for headaches.   Psychiatric/Behavioral:  Positive for memory loss. The patient is not nervous/anxious and does not have insomnia.    All other systems reviewed and are negative.    Vital signs:  Weight/BMI: Body mass index is 26.52 kg/m².  /61   Pulse 98   Temp 36.7 °C (98 °F) (Temporal)   Resp 18   Ht 1.651 m (5' 5\")   Wt 72.3 kg (159 lb 6.3 oz)   SpO2 97%   Vitals:    02/03/25 0013 02/03/25 0509 02/03/25 0724 02/03/25 0734   BP: 109/61  110/61    Pulse: (!) 104  (!) 104 98   Resp: 18  18 18   Temp: 35.9 °C (96.6 °F)  36.7 °C (98 °F)    TempSrc: Temporal  Temporal    SpO2: 94%   97%   Weight:  72.3 kg (159 lb 6.3 oz)     Height:         Oxygen Therapy:  Pulse Oximetry: 97 %, O2 (LPM): 1, O2 Delivery Device: Silicone Nasal Cannula    Intake/Output Summary (Last 24 hours) at 2/3/2025 0806  Last data filed at 2/3/2025 0400  Gross per 24 hour   Intake 1000 ml   Output 370 ml   Net 630 ml       Physical Exam  Vitals and nursing note reviewed.   Constitutional:       General: She is awake. She is not in acute distress.     Appearance: She is normal weight. She is ill-appearing.   HENT:      Head: Normocephalic and atraumatic.      Nose: Nose normal. No congestion.      Mouth/Throat:      Mouth: Mucous membranes are moist.      Pharynx: Oropharynx is clear.   Eyes:      General: Scleral icterus present.      Extraocular Movements: Extraocular movements intact.      Conjunctiva/sclera: Conjunctivae normal.   Cardiovascular:      Rate and Rhythm: Regular rhythm. Tachycardia present.      Pulses: Normal pulses.      Heart sounds: Normal heart sounds. No murmur heard.  Pulmonary:      Comments: Decreased breath sounds right lobe  Abdominal:      General: Abdomen is flat. Bowel sounds are normal. There is distension (mild).      " Palpations: Abdomen is soft.      Tenderness: There is no abdominal tenderness.   Musculoskeletal:      Right lower leg: No edema.      Left lower leg: No edema.   Skin:     General: Skin is warm and dry.      Capillary Refill: Capillary refill takes less than 2 seconds.      Coloration: Skin is jaundiced.   Neurological:      General: No focal deficit present.      Mental Status: She is alert and oriented to person, place, and time. Mental status is at baseline.      Comments: +asterixis   Psychiatric:         Mood and Affect: Mood normal.         Behavior: Behavior is cooperative.         Labs:  Recent Labs     02/01/25  0232 02/01/25  0609 02/02/25  0257 02/02/25  2109 02/03/25 0347   SODIUM 122*  122*   < > 120* 124* 122*   POTASSIUM 4.0  --  4.2  --  4.5   CHLORIDE 91*  --  88*  --  88*   CO2 22  --  21  --  25   BUN 18  --  21  --  20   CREATININE 0.76  --  0.69  --  0.78   MAGNESIUM 2.3  --  1.9  --  1.9   PHOSPHORUS 2.2*  --  2.8  --  2.7   CALCIUM 7.4*  --  8.2*  --  8.8    < > = values in this interval not displayed.     Recent Labs     02/01/25 0232 02/02/25 0257 02/03/25 0347   ALTSGPT 50 53* 35   ASTSGOT 54* 58* 46*   ALKPHOSPHAT 123* 145* 108*   TBILIRUBIN 9.4* 11.5* 14.1*   DBILIRUBIN 4.3*  --   --    GLUCOSE 115* 97 108*     Recent Labs     02/01/25 0232 02/02/25 0257 02/03/25 0347 02/03/25  0633   WBC 9.3 12.3* 11.5* 11.3*   NEUTSPOLYS 87.00*  --  91.90* 91.90*   LYMPHOCYTES 8.30*  --  4.10* 4.30*   MONOCYTES 3.60  --  3.00 2.80   EOSINOPHILS 0.10  --  0.00 0.00   BASOPHILS 0.10  --  0.10 0.20   ASTSGOT 54* 58* 46*  --    ALTSGPT 50 53* 35  --    ALKPHOSPHAT 123* 145* 108*  --    TBILIRUBIN 9.4* 11.5* 14.1*  --      Recent Labs     01/31/25  0830 02/01/25  0232 02/02/25  0257 02/03/25  0347 02/03/25  0633   RBC 4.26   < > 3.94* 3.02* 2.97*   HEMOGLOBIN 16.3*   < > 15.0 11.6* 11.5*   HEMATOCRIT 44.2   < > 40.6 32.6* 31.7*   PLATELETCT 46*   < > 61* 35* 34*   PROTHROMBTM 20.3*  --   --  25.5*   --    APTT 29.8  --   --   --   --    INR 1.73*  --   --  2.31*  --     < > = values in this interval not displayed.     Recent Results (from the past 24 hours)   URINE SODIUM RANDOM    Collection Time: 25  3:16 PM   Result Value Ref Range    Sodium, Urine -per volume <20 mmol/L   URINE POTASSIUM RANDOM    Collection Time: 25  3:16 PM   Result Value Ref Range    Potassium 25.7 mmol/L   OSMOLALITY URINE    Collection Time: 25  3:16 PM   Result Value Ref Range    Osmolality Urine 608 300 - 900 mOsm/kg H2O   URINE CREATININE RANDOM    Collection Time: 25  3:16 PM   Result Value Ref Range    Creatinine, Random Urine 80.00 mg/dL   CORTISOL    Collection Time: 25  4:20 PM   Result Value Ref Range    Cortisol 26.4 (H) 0.0 - 23.0 ug/dL   SODIUM SERUM (NA)    Collection Time: 25  9:09 PM   Result Value Ref Range    Sodium 124 (L) 135 - 145 mmol/L   EKG    Collection Time: 25 10:08 PM   Result Value Ref Range    Report       [facility]    Test Date:  2025  Pt Name:    CARMEN CUADAR                 Department: [facility]  MRN:        [id number]                      Room:       [facility]  Gender:     Female                       Technician: [facility]  :        1970                   Requested By:LUBNA JEFFREY  Order #:    [id number]                    Reading MD: Nicol Padron    Measurements  Intervals                                Axis  Rate:       134                          P:          76  NJ:         96                           QRS:        88  QRSD:       78                           T:          -50  QT:         272  QTc:        406    Interpretive Statements  Sinus tachycardia, 134 bpm  Nonspecific ST and T changes  Rightward axis  Compared to ECG 2025 12:59:42  Rate has increased  Electronically Signed On 2025 22:08:53 PST by Nicol Padron     Prothrombin Time    Collection Time: 25  3:47 AM   Result Value Ref Range    PT 25.5 (H) 12.0 - 14.6 sec    INR 2.31  (H) 0.87 - 1.13   CBC WITH DIFFERENTIAL    Collection Time: 02/03/25  3:47 AM   Result Value Ref Range    WBC 11.5 (H) 4.8 - 10.8 K/uL    RBC 3.02 (L) 4.20 - 5.40 M/uL    Hemoglobin 11.6 (L) 12.0 - 16.0 g/dL    Hematocrit 32.6 (L) 37.0 - 47.0 %    .9 (H) 81.4 - 97.8 fL    MCH 38.4 (H) 27.0 - 33.0 pg    MCHC 35.6 (H) 32.2 - 35.5 g/dL    RDW 67.4 (H) 35.9 - 50.0 fL    Platelet Count 35 (L) 164 - 446 K/uL    MPV 10.2 9.0 - 12.9 fL    Neutrophils-Polys 91.90 (H) 44.00 - 72.00 %    Lymphocytes 4.10 (L) 22.00 - 41.00 %    Monocytes 3.00 0.00 - 13.40 %    Eosinophils 0.00 0.00 - 6.90 %    Basophils 0.10 0.00 - 1.80 %    Immature Granulocytes 0.90 0.00 - 0.90 %    Nucleated RBC 0.00 0.00 - 0.20 /100 WBC    Neutrophils (Absolute) 10.60 (H) 1.82 - 7.42 K/uL    Lymphs (Absolute) 0.47 (L) 1.00 - 4.80 K/uL    Monos (Absolute) 0.35 0.00 - 0.85 K/uL    Eos (Absolute) 0.00 0.00 - 0.51 K/uL    Baso (Absolute) 0.01 0.00 - 0.12 K/uL    Immature Granulocytes (abs) 0.10 0.00 - 0.11 K/uL    NRBC (Absolute) 0.00 K/uL   Comp Metabolic Panel    Collection Time: 02/03/25  3:47 AM   Result Value Ref Range    Sodium 122 (L) 135 - 145 mmol/L    Potassium 4.5 3.6 - 5.5 mmol/L    Chloride 88 (L) 96 - 112 mmol/L    Co2 25 20 - 33 mmol/L    Anion Gap 9.0 7.0 - 16.0    Glucose 108 (H) 65 - 99 mg/dL    Bun 20 8 - 22 mg/dL    Creatinine 0.78 0.50 - 1.40 mg/dL    Calcium 8.8 8.5 - 10.5 mg/dL    Correct Calcium 9.5 8.5 - 10.5 mg/dL    AST(SGOT) 46 (H) 12 - 45 U/L    ALT(SGPT) 35 2 - 50 U/L    Alkaline Phosphatase 108 (H) 30 - 99 U/L    Total Bilirubin 14.1 (H) 0.1 - 1.5 mg/dL    Albumin 3.1 (L) 3.2 - 4.9 g/dL    Total Protein 5.1 (L) 6.0 - 8.2 g/dL    Globulin 2.0 1.9 - 3.5 g/dL    A-G Ratio 1.6 g/dL   MAGNESIUM    Collection Time: 02/03/25  3:47 AM   Result Value Ref Range    Magnesium 1.9 1.5 - 2.5 mg/dL   PHOSPHORUS    Collection Time: 02/03/25  3:47 AM   Result Value Ref Range    Phosphorus 2.7 2.5 - 4.5 mg/dL   ESTIMATED GFR    Collection  Time: 02/03/25  3:47 AM   Result Value Ref Range    GFR (CKD-EPI) 89 >60 mL/min/1.73 m 2   IMMATURE PLT FRACTION    Collection Time: 02/03/25  3:47 AM   Result Value Ref Range    Imm. Plt Fraction 6.5 0.6 - 13.1 %   CBC WITH DIFFERENTIAL    Collection Time: 02/03/25  6:33 AM   Result Value Ref Range    WBC 11.3 (H) 4.8 - 10.8 K/uL    RBC 2.97 (L) 4.20 - 5.40 M/uL    Hemoglobin 11.5 (L) 12.0 - 16.0 g/dL    Hematocrit 31.7 (L) 37.0 - 47.0 %    .7 (H) 81.4 - 97.8 fL    MCH 38.7 (H) 27.0 - 33.0 pg    MCHC 36.3 (H) 32.2 - 35.5 g/dL    RDW 66.6 (H) 35.9 - 50.0 fL    Platelet Count 34 (L) 164 - 446 K/uL    MPV 10.4 9.0 - 12.9 fL    Neutrophils-Polys 91.90 (H) 44.00 - 72.00 %    Lymphocytes 4.30 (L) 22.00 - 41.00 %    Monocytes 2.80 0.00 - 13.40 %    Eosinophils 0.00 0.00 - 6.90 %    Basophils 0.20 0.00 - 1.80 %    Immature Granulocytes 0.80 0.00 - 0.90 %    Nucleated RBC 0.00 0.00 - 0.20 /100 WBC    Neutrophils (Absolute) 10.40 (H) 1.82 - 7.42 K/uL    Lymphs (Absolute) 0.49 (L) 1.00 - 4.80 K/uL    Monos (Absolute) 0.32 0.00 - 0.85 K/uL    Eos (Absolute) 0.00 0.00 - 0.51 K/uL    Baso (Absolute) 0.02 0.00 - 0.12 K/uL    Immature Granulocytes (abs) 0.09 0.00 - 0.11 K/uL    NRBC (Absolute) 0.00 K/uL   IMMATURE PLT FRACTION    Collection Time: 02/03/25  6:33 AM   Result Value Ref Range    Imm. Plt Fraction 5.2 0.6 - 13.1 %       Radiology Review:  CT-CTA CHEST PULMONARY ARTERY W/ RECONS   Final Result         1. Large right-sided pleural effusion although there has been some reexpansion of the right lung when compared with the prior exam.   2. There is new extensive left lung infiltrate.   3. Tiny right-sided pneumothorax.   4. No evidence for pulmonary embolism.   5. Cirrhotic appearance of the liver.      Findings were communicated to the ordering provider at the time of dictation via Voalte.      DX-CHEST-PORTABLE (1 VIEW)   Final Result         1. Increasing moderate right pleural effusion and basilar atelectasis.       2. Increasing mild pulmonary edema. Persistent left lower lung alveolar infiltrate.                     US-RUQ   Final Result         1. Cirrhosis.   2. Right pleural effusion.   3. Cholelithiasis. There is mild gallbladder wall thickening and pericholecystic fluid but no biliary ductal dilatation.      DX-CHEST-PORTABLE (1 VIEW)   Final Result         1. Persistent mild left lung pneumonia infiltrate.      2. Increasing small right effusion and basilar atelectasis.      3. No pneumothorax visible today.                     DX-CHEST-PORTABLE (1 VIEW)   Final Result      1.  Unchanged trace right-sided pneumothorax.   2.  Trace residual right-sided effusion.   3.  Stable patchy left-sided interstitial opacities.         DX-CHEST-PORTABLE (1 VIEW)   Final Result      1.  Unchanged possible trace right-sided pneumothorax following removal of right-sided chest tube.   2.  Trace residual right-sided effusion.   3.  Stable patchy left-sided interstitial opacities.      DX-CHEST-PORTABLE (1 VIEW)   Final Result      1.  Possible trace right-sided pneumothorax with right-sided chest tube in place.   2.  Trace residual right-sided effusion.   3.  Stable patchy left-sided interstitial opacities.      DX-CHEST-PORTABLE (1 VIEW)   Final Result         1. There is a very large right pleural effusion. Right sided chest tube is now noted.   2. Small amount left-sided airspace disease. This could represent edema or infection.      DX-CHEST-PORTABLE (1 VIEW)    (Results Pending)         MDM (Data Review):   -Records reviewed and summarized in current documentation  -I personally reviewed and interpreted the laboratory results  -I personally reviewed the radiology images    Assessment/Recommendations:  Hepatic hydrothorax  Cirrhosis  Alcohol misuse  Hyponatremia  Thrombocytopenia  History of esophageal varices  Liver decompensation - MELD 30  Consulted for diuretic resistance hepatic hydrothorax and need for TIPS.  MELD 3.0 -  28    Recommendations:  Discussed current clinical condition with Dr. Boyd and Dr. Escobar  Hepatic hydrothorax  RUQ US negative for ascites, continued right pleural effusion  Appears that her right pleural effusion has increased on CT scan.  Diuretics are critical in managing her hydrothorax.   Our team recommends continuing with diuretics and starting albumin with close monitoring of kidney function  Thora fluid negative for malignancy  She has a MELD of 28 today so not a candidate for TIPS at this time  We still have a lot of room to increase her diuresis        Hyponatremia  Euvolemic on exam today and hemodynamics have improved  Recommend continuing diuretics  2.   Low-sodium diet  3.   We do not generally recommend fluid restriction for hyponatremia in cirrhosis unless sodium is below 119  4.  Caution with albumin in cirrhosis due to potential for fluid volume overload    Decreased urine output  Thankfully her creatinine has remained stable  Status post 1 L fluid bolus  Urine sodium <20, osmolality WNL, Na/K ratio <1  Closely monitor for development of HRS    Hypotension  Cirrhotics are always going to have lower blood pressure  Keep MAP is above 65 and ensure that she is asymptomatic  Continue midodrine for blood pressure support    Hepatic encephalopathy with asterixis  Patient endorses feeling foggy - continue lactulose and rifaximin    Macrocytic anemia  History of GI bleed secondary to esophageal varices  EGD 2024 banded x 4  EGD 2024 banded x 4, +gastric varices, GAVE bleeding with banding  EGD 2023 banded x 3  On Coreg at home, held since admission  Continue omeprazole  Monitor for bleeding   Check folate and B12 - not done, asked RN to obtain    Alcohol misuse disorder  Patient reports 6 months of sobriety from August 3 of 2024 with one relapse in September because her dog . She drank about 5 airplane bottles of liquor for about a week then stopped. However, upon deeper review of  her records, it was reported in her December admission that her last drink was around [place].   PETH pending    Leukocytosis  Monitor closely for worsening infection    Discussed with her [facility] [facility] provider Dr. Maravilla. Stacy was seeing his PA and he does not see where a referral was placed.  I have called [facility] and placed referral for transplant evaluation.     Discussed with patient, nursing, Dr. Escobar, Dr. Almonte    ..Tricia Pike, BAKARI,  APRN    Core Quality Measures   Reviewed items::  Labs, Medications and Radiology reports reviewed

## 2025-02-03 NOTE — HOSPITAL COURSE
55 y.o. female who presented 1/31/2025 with a history of alcoholic as well as familial cirrhosis, the patient is currently treated at [facility] and has been referred to [place] evaluation for possible transplant, patient presented to the sending facility with increasing dyspnea, was found to have a complete opacification of the right hemithorax, the patient was transferred to this facility for specialty care, on arrival the patient was tachypneic into the 40s, saturations in the 90 range on 10 L facemask, she reports that she has been requiring thoracentesis every few days, x 3 in the past week, the patient reports a history of GI bleeding, chronic alcohol dependence, with apparently inability to stay abstinent, the patient reported that she had quit drinking about a year ago but then her dog passed away and she started using alcohol again, there is documentation that in January and emergency departments where she reported having had alcohol 2 weeks prior to that.  Per chart review days documentation the patient had undergone thoracentesis on 12/31, 1/3, 1/10, 1/11, 1/22, 1/27.  The patient works, she is reportedly a real state/.  The patient had a chest tube placed, had a total of 2.4 L drained.  The patient started on therapy for recurrent ascites including diuretics, she had correction of some hyponatremia, gastroenterology was consulted and medical therapy at this time recommended, did not recommend a placement of a TIPPS  The patient is scheduled to have an right upper quadrant ultrasound  She denies abdominal pain, there is no significant fluid wave  The patient states that she has been referred to a liver transplant, she is not very forthcoming with her recent alcohol use

## 2025-02-03 NOTE — PROGRESS NOTES
[facility] Daily Progress Note    Date of Service  2/3/2025    [facility] Team: [facility] Team   Attending: Varun Montero M.d.  Senior Resident: Dr. Escobar  Contact Number: [phone]    Chief Complaint  Stacy Oliva is a 55 y.o. female admitted 1/31/2025 with acute hypoxic respiratory failure secondary to hepatic hydrothorax secondary to decompensated liver cirrhosis.    Hospital Course  55 y.o. female who presented 1/31/2025 with a history of alcoholic as well as familial cirrhosis, the patient is currently treated at [facility] and has been referred to [place] evaluation for possible transplant, patient presented to the sending facility with increasing dyspnea, was found to have a complete opacification of the right hemithorax, the patient was transferred to this facility for specialty care, on arrival the patient was tachypneic into the 40s, saturations in the 90 range on 10 L facemask, she reports that she has been requiring thoracentesis every few days, x 3 in the past week, the patient reports a history of GI bleeding, chronic alcohol dependence, with apparently inability to stay abstinent, the patient reported that she had quit drinking about a year ago but then her dog passed away and she started using alcohol again, there is documentation that in January and emergency departments where she reported having had alcohol 2 weeks prior to that.  Per chart review days documentation the patient had undergone thoracentesis on 12/31, 1/3, 1/10, 1/11, 1/22, 1/27.  The patient works, she is reportedly a real state/.  The patient had a chest tube placed, had a total of 2.4 L drained.  The patient started on therapy for recurrent ascites including diuretics, she had correction of some hyponatremia, gastroenterology was consulted and medical therapy at this time recommended, did not recommend a placement of a TIPPS  The patient is scheduled to have an right upper quadrant  ultrasound  She denies abdominal pain, there is no significant fluid wave  The patient states that she has been referred to a liver transplant, she is not very forthcoming with her recent alcohol use    Interval Problem Update  2/3/2025: No acute overnight events.  States that she is feeling much better this morning, almost at baseline. Repeat cxr consistent with ongoing hydrothorax. O2 demand trending down. BP responded well to albumin, midodrine, and hydrocortisone. Will hold hydro as morning cortisol levels >18. Will drain remaining fluids via paracentesis. GI agrees with plan. Plt ct 35k, no overt signs of bleeding. Denies hematuria/hematochezia/hematemesis. Conducted uncomplicated paracentesis DRAINING 2500 mL. Refer to procedure note for further detail. Gave albumin 12.5. Tachycardia and blood pressure improved.    I have discussed this patient's plan of care and discharge plan at IDT rounds today with Case Management, Nursing, Nursing leadership, and other members of the IDT team.    Consultants/Specialty  critical care    Code Status  Full Code    Disposition  The patient is not medically cleared for discharge to home or a post-acute facility.      I have placed the appropriate orders for post-discharge needs.    Review of Systems  Review of Systems   Constitutional:  Positive for malaise/fatigue. Negative for chills and fever.   HENT:  Negative for congestion, hearing loss and sore throat.    Eyes:  Negative for blurred vision and double vision.   Respiratory:  Positive for cough. Negative for hemoptysis, sputum production, shortness of breath and wheezing.    Cardiovascular:  Negative for chest pain and palpitations.   Gastrointestinal:  Negative for abdominal pain, constipation, diarrhea, nausea and vomiting.   Neurological:  Negative for dizziness, loss of consciousness and headaches.   Psychiatric/Behavioral:  Negative for depression. The patient is not nervous/anxious.    All other systems reviewed and  are negative.     Physical Exam  Temp:  [35.6 °C (96 °F)-36.7 °C (98 °F)] 36.2 °C (97.2 °F)  Pulse:  [] 82  Resp:  [18-20] 18  BP: (109-128)/(61-68) 128/68  SpO2:  [93 %-100 %] 100 %    Physical Exam  Vitals and nursing note reviewed.   Constitutional:       General: She is awake. She is not in acute distress.     Appearance: She is normal weight. She is ill-appearing. She is not toxic-appearing or diaphoretic.   HENT:      Head: Normocephalic and atraumatic.      Nose: Nose normal. No congestion or rhinorrhea.      Mouth/Throat:      Mouth: Mucous membranes are moist.      Pharynx: Oropharynx is clear.   Eyes:      General: No scleral icterus.        Right eye: No discharge.         Left eye: No discharge.      Extraocular Movements: Extraocular movements intact.      Conjunctiva/sclera: Conjunctivae normal.   Cardiovascular:      Rate and Rhythm: Regular rhythm. Tachycardia present.      Pulses: Normal pulses.      Heart sounds: Normal heart sounds. No murmur heard.     No friction rub. No gallop.   Pulmonary:      Effort: Pulmonary effort is normal. No respiratory distress.      Breath sounds: No stridor. No wheezing, rhonchi or rales.      Comments: Diminished right lower lung field breath sounds.  Abdominal:      General: Abdomen is flat. Bowel sounds are normal. There is no distension.      Palpations: Abdomen is soft. There is no mass.      Tenderness: There is no abdominal tenderness. There is no guarding or rebound.   Musculoskeletal:         General: Normal range of motion.      Right lower leg: No edema.      Left lower leg: No edema.   Skin:     General: Skin is warm and dry.      Capillary Refill: Capillary refill takes less than 2 seconds.      Coloration: Skin is jaundiced.   Neurological:      General: No focal deficit present.      Mental Status: She is alert and oriented to person, place, and time. Mental status is at baseline.      Comments: +asterixis   Psychiatric:         Mood and Affect:  Mood normal.         Behavior: Behavior normal. Behavior is cooperative.         Thought Content: Thought content normal.         Judgment: Judgment normal.       Fluids    Intake/Output Summary (Last 24 hours) at 2/3/2025 1641  Last data filed at 2/3/2025 1620  Gross per 24 hour   Intake 490 ml   Output 150 ml   Net 340 ml     Laboratory  Recent Labs     02/02/25  0257 02/03/25  0347 02/03/25  0633   WBC 12.3* 11.5* 11.3*   RBC 3.94* 3.02* 2.97*   HEMOGLOBIN 15.0 11.6* 11.5*   HEMATOCRIT 40.6 32.6* 31.7*   .0* 107.9* 106.7*   MCH 38.1* 38.4* 38.7*   MCHC 36.9* 35.6* 36.3*   RDW 63.8* 67.4* 66.6*   PLATELETCT 61* 35* 34*   MPV 10.4 10.2 10.4     Recent Labs     02/01/25  0232 02/01/25  0609 02/02/25  0257 02/02/25  2109 02/03/25 0347 02/03/25  0838   SODIUM 122*  122*   < > 120* 124* 122* 124*   POTASSIUM 4.0  --  4.2  --  4.5  --    CHLORIDE 91*  --  88*  --  88*  --    CO2 22  --  21  --  25  --    GLUCOSE 115*  --  97  --  108*  --    BUN 18  --  21  --  20  --    CREATININE 0.76  --  0.69  --  0.78  --    CALCIUM 7.4*  --  8.2*  --  8.8  --     < > = values in this interval not displayed.     Recent Labs     02/03/25 0347   INR 2.31*               Imaging  DX-CHEST-LIMITED (1 VIEW)   Final Result      1.  Interval improvement of RIGHT pleural effusion.   2.  No pneumothorax.      DX-CHEST-PORTABLE (1 VIEW)   Final Result      No significant change from prior exam.      CT-CTA CHEST PULMONARY ARTERY W/ RECONS   Final Result         1. Large right-sided pleural effusion although there has been some reexpansion of the right lung when compared with the prior exam.   2. There is new extensive left lung infiltrate.   3. Tiny right-sided pneumothorax.   4. No evidence for pulmonary embolism.   5. Cirrhotic appearance of the liver.      Findings were communicated to the ordering provider at the time of dictation via [employer].      DX-CHEST-PORTABLE (1 VIEW)   Final Result         1. Increasing moderate right  pleural effusion and basilar atelectasis.      2. Increasing mild pulmonary edema. Persistent left lower lung alveolar infiltrate.                     US-RUQ   Final Result         1. Cirrhosis.   2. Right pleural effusion.   3. Cholelithiasis. There is mild gallbladder wall thickening and pericholecystic fluid but no biliary ductal dilatation.      DX-CHEST-PORTABLE (1 VIEW)   Final Result         1. Persistent mild left lung pneumonia infiltrate.      2. Increasing small right effusion and basilar atelectasis.      3. No pneumothorax visible today.                     DX-CHEST-PORTABLE (1 VIEW)   Final Result      1.  Unchanged trace right-sided pneumothorax.   2.  Trace residual right-sided effusion.   3.  Stable patchy left-sided interstitial opacities.         DX-CHEST-PORTABLE (1 VIEW)   Final Result      1.  Unchanged possible trace right-sided pneumothorax following removal of right-sided chest tube.   2.  Trace residual right-sided effusion.   3.  Stable patchy left-sided interstitial opacities.      DX-CHEST-PORTABLE (1 VIEW)   Final Result      1.  Possible trace right-sided pneumothorax with right-sided chest tube in place.   2.  Trace residual right-sided effusion.   3.  Stable patchy left-sided interstitial opacities.      DX-CHEST-PORTABLE (1 VIEW)   Final Result         1. There is a very large right pleural effusion. Right sided chest tube is now noted.   2. Small amount left-sided airspace disease. This could represent edema or infection.         Assessment/Plan  Problem Representation:  * Decompensated cirrhosis (HCC)- (present on admission)  Assessment & Plan  Alcoholic cirrhosis, MELD score 30 per most recent lab results.  Hx of ascites, none on this hospitalization  Pending repeat labs this morning  GI agree to continuing midodrine and albumin post para.   Continue home lactulose, spirinolactone 100 daily  Pending phosphotidyl ethanol     Shock (HCC)- (present on admission)  Assessment &  Plan  02/01: S/p chest tube insertion, 3L fluid drained  Likely dehydration/orthostatic hypotension  Levophed gtt for MAP > 55  Chest tube clamped @ 9am  Continue midodrine    Acute hypoxic respiratory failure (HCC)- (present on admission)  Assessment & Plan  Continuing to improve..   Initially required 10L NC at OSH (down to 1LPM 02/03)    Hepatic Hydrothorax- (present on admission)  Assessment & Plan  Likely in setting of alcoholic cirrhosis  Hx of multiple thoracenteses and chest tubes  Pending fluid studies s/p chest tube morning of 1/31; clamped around 9am due to hypotension and lightheadedness/dizziness  Repeat cxr shows fluid re-collection. Will benefit from thora.     Thora conducted: Refer to procedure note 02/03/25  Post-thora xray showed improvement of hydrothorax with no pneumothorax. Gave albumin 12.5. reprots symptomatic improvement. Tachycardia and BP improving.    Hyponatremia- (present on admission)  Assessment & Plan  Likely secondary to alcoholic cirrhosis/volume overload + low ECV due to 3rd spacing.  Na noted to be 118 at OSH  Persistently low at 122 (02/03), asymptomatic.  S/p IV lasix 40 x1 02/01, holding  Consider fluid restriction    Thrombocytopenia (HCC)- (present on admission)  Assessment & Plan  Likely secondary to alcoholic cirrhosis  Plt 34 as of 02/03, hold dvt ppx  Monitor for signs of bleeding     VTE prophylaxis: SCDs/TEDs    I have performed a physical exam and reviewed and updated ROS and Plan today (2/3/2025). In review of yesterday's note (2/2/2025), there are no changes except as documented above.

## 2025-02-03 NOTE — CARE PLAN
The patient is Watcher - Medium risk of patient condition declining or worsening    Shift Goals  Clinical Goals: Remain safe and free from falls, lasix, CT chest, hemodynamically stability  Patient Goals: Get better  Family Goals: ALEXANDRU    Progress made toward(s) clinical / shift goals:        Problem: Knowledge Deficit - Standard  Goal: Patient and family/care givers will demonstrate understanding of plan of care, disease process/condition, diagnostic tests and medications  Outcome: Progressing     Problem: Skin Integrity  Goal: Skin integrity is maintained or improved  Outcome: Progressing     Problem: Fall Risk  Goal: Patient will remain free from falls  Outcome: Progressing     Patient is not progressing towards the following goals:

## 2025-02-04 PROBLEM — Z71.89 ACP (ADVANCE CARE PLANNING): Status: ACTIVE | Noted: 2025-02-04

## 2025-02-04 LAB
ALBUMIN SERPL BCP-MCNC: 3.2 G/DL (ref 3.2–4.9)
ALBUMIN SERPL ELPH-MCNC: 2.67 G/DL (ref 3.75–5.01)
ALBUMIN/GLOB SERPL: 1.8 G/DL
ALP SERPL-CCNC: 93 U/L (ref 30–99)
ALPHA1 GLOB SERPL ELPH-MCNC: 0.24 G/DL (ref 0.19–0.46)
ALPHA2 GLOB SERPL ELPH-MCNC: 0.51 G/DL (ref 0.48–1.05)
ALT SERPL-CCNC: 34 U/L (ref 2–50)
ANION GAP SERPL CALC-SCNC: 9 MMOL/L (ref 7–16)
AST SERPL-CCNC: 51 U/L (ref 12–45)
B-GLOBULIN SERPL ELPH-MCNC: 1.26 G/DL (ref 0.48–1.1)
BILIRUB SERPL-MCNC: 12.5 MG/DL (ref 0.1–1.5)
BUN SERPL-MCNC: 17 MG/DL (ref 8–22)
CALCIUM ALBUM COR SERPL-MCNC: 9.3 MG/DL (ref 8.5–10.5)
CALCIUM SERPL-MCNC: 8.7 MG/DL (ref 8.5–10.5)
CHLORIDE SERPL-SCNC: 91 MMOL/L (ref 96–112)
CO2 SERPL-SCNC: 26 MMOL/L (ref 20–33)
CREAT SERPL-MCNC: 0.77 MG/DL (ref 0.5–1.4)
EER PROT ELECT SER Q1092: ABNORMAL
ERYTHROCYTE [DISTWIDTH] IN BLOOD BY AUTOMATED COUNT: 67.1 FL (ref 35.9–50)
FUNGUS SPEC CULT: NORMAL
FUNGUS SPEC FUNGUS STN: NORMAL
GAMMA GLOB SERPL ELPH-MCNC: 1.41 G/DL (ref 0.62–1.51)
GFR SERPLBLD CREATININE-BSD FMLA CKD-EPI: 90 ML/MIN/1.73 M 2
GLOBULIN SER CALC-MCNC: 1.8 G/DL (ref 1.9–3.5)
GLUCOSE SERPL-MCNC: 92 MG/DL (ref 65–99)
HCT VFR BLD AUTO: 28.8 % (ref 37–47)
HGB BLD-MCNC: 10.7 G/DL (ref 12–16)
IGG SERPL-MCNC: 1043 MG/DL (ref 768–1632)
INTERPRETATION SERPL IFE-IMP: ABNORMAL
LABORATORY REPORT: NORMAL
MAGNESIUM SERPL-MCNC: 1.8 MG/DL (ref 1.5–2.5)
MAGNESIUM SERPL-MCNC: 1.9 MG/DL (ref 1.5–2.5)
MCH RBC QN AUTO: 39.5 PG (ref 27–33)
MCHC RBC AUTO-ENTMCNC: 37.2 G/DL (ref 32.2–35.5)
MCV RBC AUTO: 106.3 FL (ref 81.4–97.8)
MONOCLONAL PROTEIN NL11656: ABNORMAL G/DL
PETH INTERPRETATION NL11780: NORMAL
PHOSPHATE SERPL-MCNC: 2 MG/DL (ref 2.5–4.5)
PLATELET # BLD AUTO: 32 K/UL (ref 164–446)
PLATELETS.RETICULATED NFR BLD AUTO: 4.6 % (ref 0.6–13.1)
PLPETH BLD-MCNC: <10 NG/ML
PMV BLD AUTO: 10.9 FL (ref 9–12.9)
POPETH BLD-MCNC: 15 NG/ML
POTASSIUM SERPL-SCNC: 3.9 MMOL/L (ref 3.6–5.5)
PROT SERPL-MCNC: 5 G/DL (ref 6–8.2)
PROT SERPL-MCNC: 6.1 G/DL (ref 6.3–8.2)
RBC # BLD AUTO: 2.71 M/UL (ref 4.2–5.4)
SIGNIFICANT IND 70042: NORMAL
SITE SITE: NORMAL
SODIUM SERPL-SCNC: 126 MMOL/L (ref 135–145)
SOLUBLE LIVER IGG SER IA-ACNC: 1.4 U (ref 0–24.9)
SOLUBLE LIVER IGG SER IA-ACNC: 1.4 U (ref 0–24.9)
SOURCE SOURCE: NORMAL
WBC # BLD AUTO: 9.6 K/UL (ref 4.8–10.8)

## 2025-02-04 PROCEDURE — 99233 SBSQ HOSP IP/OBS HIGH 50: CPT | Performed by: HOSPITALIST

## 2025-02-04 PROCEDURE — 700101 HCHG RX REV CODE 250

## 2025-02-04 PROCEDURE — A9270 NON-COVERED ITEM OR SERVICE: HCPCS

## 2025-02-04 PROCEDURE — 700102 HCHG RX REV CODE 250 W/ 637 OVERRIDE(OP)

## 2025-02-04 PROCEDURE — 770001 HCHG ROOM/CARE - MED/SURG/GYN PRIV*

## 2025-02-04 PROCEDURE — 83735 ASSAY OF MAGNESIUM: CPT | Mod: 91

## 2025-02-04 PROCEDURE — 80053 COMPREHEN METABOLIC PANEL: CPT

## 2025-02-04 PROCEDURE — 85055 RETICULATED PLATELET ASSAY: CPT

## 2025-02-04 PROCEDURE — 99232 SBSQ HOSP IP/OBS MODERATE 35: CPT | Performed by: NURSE PRACTITIONER

## 2025-02-04 PROCEDURE — 85027 COMPLETE CBC AUTOMATED: CPT

## 2025-02-04 PROCEDURE — 36415 COLL VENOUS BLD VENIPUNCTURE: CPT

## 2025-02-04 PROCEDURE — 94669 MECHANICAL CHEST WALL OSCILL: CPT

## 2025-02-04 PROCEDURE — 84100 ASSAY OF PHOSPHORUS: CPT

## 2025-02-04 PROCEDURE — 99497 ADVNCD CARE PLAN 30 MIN: CPT | Performed by: HOSPITALIST

## 2025-02-04 PROCEDURE — 700111 HCHG RX REV CODE 636 W/ 250 OVERRIDE (IP): Performed by: EMERGENCY MEDICINE

## 2025-02-04 RX ORDER — IBUPROFEN 200 MG
200 TABLET ORAL 4 TIMES DAILY PRN
Status: DISCONTINUED | OUTPATIENT
Start: 2025-02-04 | End: 2025-02-05

## 2025-02-04 RX ORDER — FUROSEMIDE 20 MG/1
20 TABLET ORAL EVERY MORNING
Status: DISCONTINUED | OUTPATIENT
Start: 2025-02-05 | End: 2025-02-05

## 2025-02-04 RX ORDER — ACETAMINOPHEN 325 MG/1
650 TABLET ORAL EVERY 6 HOURS PRN
Status: DISCONTINUED | OUTPATIENT
Start: 2025-02-04 | End: 2025-02-04

## 2025-02-04 RX ORDER — ACETAMINOPHEN 500 MG
500 TABLET ORAL EVERY 4 HOURS PRN
Status: DISCONTINUED | OUTPATIENT
Start: 2025-02-04 | End: 2025-02-06 | Stop reason: HOSPADM

## 2025-02-04 RX ADMIN — THIAMINE HYDROCHLORIDE 100 MG: 100 INJECTION, SOLUTION INTRAMUSCULAR; INTRAVENOUS at 06:41

## 2025-02-04 RX ADMIN — MIDODRINE HYDROCHLORIDE 10 MG: 5 TABLET ORAL at 13:35

## 2025-02-04 RX ADMIN — OMEPRAZOLE 20 MG: 20 CAPSULE, DELAYED RELEASE ORAL at 06:23

## 2025-02-04 RX ADMIN — DICLOFENAC SODIUM 2 G: 10 GEL TOPICAL at 17:54

## 2025-02-04 RX ADMIN — LACTULOSE 15 ML: 10 SOLUTION ORAL; RECTAL at 17:54

## 2025-02-04 RX ADMIN — LACTULOSE 15 ML: 10 SOLUTION ORAL; RECTAL at 11:13

## 2025-02-04 RX ADMIN — ACETAMINOPHEN 500 MG: 500 TABLET ORAL at 17:57

## 2025-02-04 RX ADMIN — DICLOFENAC SODIUM 2 G: 10 GEL TOPICAL at 13:24

## 2025-02-04 RX ADMIN — MIDODRINE HYDROCHLORIDE 10 MG: 5 TABLET ORAL at 22:03

## 2025-02-04 RX ADMIN — ACETAMINOPHEN 500 MG: 500 TABLET ORAL at 14:13

## 2025-02-04 RX ADMIN — LACTULOSE 15 ML: 10 SOLUTION ORAL; RECTAL at 06:24

## 2025-02-04 RX ADMIN — DIBASIC SODIUM PHOSPHATE, MONOBASIC POTASSIUM PHOSPHATE AND MONOBASIC SODIUM PHOSPHATE 500 MG: 852; 155; 130 TABLET ORAL at 08:20

## 2025-02-04 RX ADMIN — MIDODRINE HYDROCHLORIDE 10 MG: 5 TABLET ORAL at 06:24

## 2025-02-04 RX ADMIN — DIBASIC SODIUM PHOSPHATE, MONOBASIC POTASSIUM PHOSPHATE AND MONOBASIC SODIUM PHOSPHATE 500 MG: 852; 155; 130 TABLET ORAL at 17:57

## 2025-02-04 RX ADMIN — FUROSEMIDE 40 MG: 40 TABLET ORAL at 06:23

## 2025-02-04 RX ADMIN — SPIRONOLACTONE 100 MG: 100 TABLET ORAL at 17:54

## 2025-02-04 ASSESSMENT — ENCOUNTER SYMPTOMS
VOMITING: 0
SORE THROAT: 0
HEADACHES: 0
COUGH: 0
FEVER: 0
PALPITATIONS: 0
SPUTUM PRODUCTION: 0
BLOOD IN STOOL: 0
CONSTIPATION: 1
HEMOPTYSIS: 0
COUGH: 1
DOUBLE VISION: 0
NERVOUS/ANXIOUS: 0
SHORTNESS OF BREATH: 0
CHILLS: 0
FALLS: 0
DIARRHEA: 0
WEAKNESS: 1
INSOMNIA: 0
FLANK PAIN: 0
NAUSEA: 0
BLURRED VISION: 0
LOSS OF CONSCIOUSNESS: 0
CONSTIPATION: 0
SHORTNESS OF BREATH: 1
ABDOMINAL PAIN: 0
DEPRESSION: 0
DIZZINESS: 0
MYALGIAS: 0
WHEEZING: 0

## 2025-02-04 ASSESSMENT — PAIN DESCRIPTION - PAIN TYPE
TYPE: ACUTE PAIN
TYPE: ACUTE PAIN

## 2025-02-04 NOTE — PROGRESS NOTES
BP is 79/45; message sent to Sree Yanez. Also updated Dr. Sree Rachel that she has not had a void yet today.

## 2025-02-04 NOTE — CARE PLAN
The patient is Stable - Low risk of patient condition declining or worsening    Shift Goals  Clinical Goals: Remain safe and free from falls, call light and personal belonging within reach, hemodynamic stability, assess O2 demands, assess vitals  Patient Goals: Get better  Family Goals: ALEXANDRU    Progress made toward(s) clinical / shift goals:  yes    Patient is not progressing towards the following goals:

## 2025-02-04 NOTE — PROGRESS NOTES
[facility] Internal Medicine Daily Progress Note    Date of Service  2/4/2025    [facility] Team: [facility] Team   Attending: Abigail Zhang M.d.  Senior Resident: Dr. Escobar  Contact Number: [phone]    Chief Complaint  Stacy Oliva is a 55 y.o. female admitted 1/31/2025 with acute hypoxic respiratory failure secondary to hepatic hydrothorax secondary to decompensated liver cirrhosis.    Hospital Course  55 y.o. female who presented 1/31/2025 with a history of alcoholic as well as familial cirrhosis, the patient is currently treated at [facility] and has been referred to [place] evaluation for possible transplant, patient presented to the sending facility with increasing dyspnea, was found to have a complete opacification of the right hemithorax, the patient was transferred to this facility for specialty care, on arrival the patient was tachypneic into the 40s, saturations in the 90 range on 10 L facemask, she reports that she has been requiring thoracentesis every few days, x 3 in the past week, the patient reports a history of GI bleeding, chronic alcohol dependence, with apparently inability to stay abstinent, the patient reported that she had quit drinking about a year ago but then her dog passed away and she started using alcohol again, there is documentation that in January and emergency departments where she reported having had alcohol 2 weeks prior to that.  Per chart review days documentation the patient had undergone thoracentesis on 12/31, 1/3, 1/10, 1/11, 1/22, 1/27.  The patient works, she is reportedly a real state/.  The patient had a chest tube placed, had a total of 2.4 L drained.  The patient started on therapy for recurrent ascites including diuretics, she had correction of some hyponatremia, gastroenterology was consulted and medical therapy at this time recommended, did not recommend a placement of a TIPPS  The patient is scheduled to have an right upper quadrant  ultrasound  She denies abdominal pain, there is no significant fluid wave  The patient states that she has been referred to a liver transplant, she is not very forthcoming with her recent alcohol use    Interval Problem Update  2/3/2025: No acute overnight events.      States that it is much easier to breathe this morning.  Denies any chest pain/worsening shortness of breath.  Complains of some pain in her knees.  Lungs are better on auscultation, however there is still some decreased breath sounds at the right lower base.  Brief goals of care conversation with patient complaints, was reminded of her MELD score and prognosis moving forward given her end-stage liver disease/cirrhosis.  Patient understood her prognosis and insisted that she stay full code.  On interview, states that she is not quite sure when her last alcohol usage was, but it may have been in December or October 2024.  Otherwise states that she does not drink alcohol anymore.  No accepting liver transplant facilities given recent alcohol intake.    I have discussed this patient's plan of care and discharge plan at IDT rounds today with Case Management, Nursing, Nursing leadership, and other members of the IDT team.    Consultants/Specialty  critical care    Code Status  Full Code    Disposition  The patient is not medically cleared for discharge to home or a post-acute facility.      I have placed the appropriate orders for post-discharge needs.    Review of Systems  Review of Systems   Constitutional:  Positive for malaise/fatigue. Negative for chills and fever.   HENT:  Negative for congestion, hearing loss and sore throat.    Eyes:  Negative for blurred vision and double vision.   Respiratory:  Positive for cough. Negative for hemoptysis, sputum production, shortness of breath and wheezing.    Cardiovascular:  Negative for chest pain and palpitations.   Gastrointestinal:  Negative for abdominal pain, constipation, diarrhea, nausea and vomiting.    Neurological:  Negative for dizziness, loss of consciousness and headaches.   Psychiatric/Behavioral:  Negative for depression. The patient is not nervous/anxious.    All other systems reviewed and are negative.     Physical Exam  Temp:  [36.2 °C (97.2 °F)-36.6 °C (97.8 °F)] 36.5 °C (97.7 °F)  Pulse:  [] 82  Resp:  [17-18] 17  BP: ()/(45-73) 120/70  SpO2:  [95 %-100 %] 99 %    Physical Exam  Vitals and nursing note reviewed.   Constitutional:       General: She is awake. She is not in acute distress.     Appearance: She is normal weight. She is ill-appearing. She is not toxic-appearing or diaphoretic.   HENT:      Head: Normocephalic and atraumatic.      Nose: Nose normal. No congestion or rhinorrhea.      Mouth/Throat:      Mouth: Mucous membranes are moist.      Pharynx: Oropharynx is clear.   Eyes:      General: No scleral icterus.        Right eye: No discharge.         Left eye: No discharge.      Extraocular Movements: Extraocular movements intact.      Conjunctiva/sclera: Conjunctivae normal.   Cardiovascular:      Rate and Rhythm: Regular rhythm. Tachycardia present.      Pulses: Normal pulses.      Heart sounds: Normal heart sounds. No murmur heard.     No friction rub. No gallop.   Pulmonary:      Effort: Pulmonary effort is normal. No respiratory distress.      Breath sounds: No stridor. No wheezing, rhonchi or rales.      Comments: Diminished right lower lung field breath sounds.  Abdominal:      General: Abdomen is flat. Bowel sounds are normal. There is no distension.      Palpations: Abdomen is soft. There is no mass.      Tenderness: There is no abdominal tenderness. There is no guarding or rebound.   Musculoskeletal:         General: Normal range of motion.      Right lower leg: No edema.      Left lower leg: No edema.   Skin:     General: Skin is warm and dry.      Capillary Refill: Capillary refill takes less than 2 seconds.      Coloration: Skin is jaundiced.   Neurological:       General: No focal deficit present.      Mental Status: She is alert and oriented to person, place, and time. Mental status is at baseline.      Comments: +asterixis   Psychiatric:         Mood and Affect: Mood normal.         Behavior: Behavior normal. Behavior is cooperative.         Thought Content: Thought content normal.         Judgment: Judgment normal.       Fluids    Intake/Output Summary (Last 24 hours) at 2/4/2025 1453  Last data filed at 2/4/2025 1100  Gross per 24 hour   Intake 520 ml   Output 350 ml   Net 170 ml     Laboratory  Recent Labs     02/03/25  0347 02/03/25  0633 02/04/25  0204   WBC 11.5* 11.3* 9.6   RBC 3.02* 2.97* 2.71*   HEMOGLOBIN 11.6* 11.5* 10.7*   HEMATOCRIT 32.6* 31.7* 28.8*   .9* 106.7* 106.3*   MCH 38.4* 38.7* 39.5*   MCHC 35.6* 36.3* 37.2*   RDW 67.4* 66.6* 67.1*   PLATELETCT 35* 34* 32*   MPV 10.2 10.4 10.9     Recent Labs     02/02/25  0257 02/02/25  2109 02/03/25  0347 02/03/25  0838 02/04/25  0204   SODIUM 120*   < > 122* 124* 126*   POTASSIUM 4.2  --  4.5  --  3.9   CHLORIDE 88*  --  88*  --  91*   CO2 21  --  25  --  26   GLUCOSE 97  --  108*  --  92   BUN 21  --  20  --  17   CREATININE 0.69  --  0.78  --  0.77   CALCIUM 8.2*  --  8.8  --  8.7    < > = values in this interval not displayed.     Recent Labs     02/03/25 0347   INR 2.31*               Imaging  DX-CHEST-LIMITED (1 VIEW)   Final Result      1.  Interval improvement of RIGHT pleural effusion.   2.  No pneumothorax.      DX-CHEST-PORTABLE (1 VIEW)   Final Result      No significant change from prior exam.      CT-CTA CHEST PULMONARY ARTERY W/ RECONS   Final Result         1. Large right-sided pleural effusion although there has been some reexpansion of the right lung when compared with the prior exam.   2. There is new extensive left lung infiltrate.   3. Tiny right-sided pneumothorax.   4. No evidence for pulmonary embolism.   5. Cirrhotic appearance of the liver.      Findings were communicated to the  ordering provider at the time of dictation via Vo[employer].      DX-CHEST-PORTABLE (1 VIEW)   Final Result         1. Increasing moderate right pleural effusion and basilar atelectasis.      2. Increasing mild pulmonary edema. Persistent left lower lung alveolar infiltrate.                     US-RUQ   Final Result         1. Cirrhosis.   2. Right pleural effusion.   3. Cholelithiasis. There is mild gallbladder wall thickening and pericholecystic fluid but no biliary ductal dilatation.      DX-CHEST-PORTABLE (1 VIEW)   Final Result         1. Persistent mild left lung pneumonia infiltrate.      2. Increasing small right effusion and basilar atelectasis.      3. No pneumothorax visible today.                     DX-CHEST-PORTABLE (1 VIEW)   Final Result      1.  Unchanged trace right-sided pneumothorax.   2.  Trace residual right-sided effusion.   3.  Stable patchy left-sided interstitial opacities.         DX-CHEST-PORTABLE (1 VIEW)   Final Result      1.  Unchanged possible trace right-sided pneumothorax following removal of right-sided chest tube.   2.  Trace residual right-sided effusion.   3.  Stable patchy left-sided interstitial opacities.      DX-CHEST-PORTABLE (1 VIEW)   Final Result      1.  Possible trace right-sided pneumothorax with right-sided chest tube in place.   2.  Trace residual right-sided effusion.   3.  Stable patchy left-sided interstitial opacities.      DX-CHEST-PORTABLE (1 VIEW)   Final Result         1. There is a very large right pleural effusion. Right sided chest tube is now noted.   2. Small amount left-sided airspace disease. This could represent edema or infection.         Assessment/Plan  Problem Representation:  * Decompensated cirrhosis (HCC)- (present on admission)  Assessment & Plan  Alcoholic cirrhosis, MELD score 30 per most recent lab results.  Hx of ascites, none on this hospitalization  Pending repeat labs this morning  GI agree to continuing midodrine and albumin post para.    Continue home lactulose, spirinolactone 100 daily + lasix 20  Pending phosphotidyl ethanol     Shock (HCC)- (present on admission)  Assessment & Plan  02/01: S/p chest tube insertion, 3L fluid drained  Likely dehydration/orthostatic hypotension  Levophed gtt for MAP > 55  Chest tube clamped @ 9am  Continue midodrine 10 TID    Acute hypoxic respiratory failure (HCC)- (present on admission)  Assessment & Plan  Continuing to improve..   Initially required 10L NC at OSH (down to 1LPM 02/03)    Hepatic Hydrothorax- (present on admission)  Assessment & Plan  Likely in setting of alcoholic cirrhosis  Hx of multiple thoracenteses and chest tubes  Pending fluid studies s/p chest tube morning of 1/31; clamped around 9am due to hypotension and lightheadedness/dizziness    Thora conducted as Ct showed large fluid collection, drained 2.5L: Refer to procedure note 02/03/25  Post-thora xray showed improvement of hydrothorax with no pneumothorax. Gave albumin 12.5. reports symptomatic improvement. Tachycardia and BP improving.   -Will benefit from spironolactone 100 and lasix 20, will up titrate as tolerated.    Hyponatremia- (present on admission)  Assessment & Plan  Likely secondary to alcoholic cirrhosis/volume overload + low ECV due to 3rd spacing.  Na noted to be 118 at OSH  Persistently low at 122 (02/03), asymptomatic.  S/p IV lasix 40 x1 02/01  Consider fluid restriction    Thrombocytopenia (HCC)- (present on admission)  Assessment & Plan  Likely secondary to alcoholic cirrhosis  Plt 32 as of 02/04, hold dvt ppx  Monitor for signs of bleeding    VTE prophylaxis: SCDs/TEDs    I have performed a physical exam and reviewed and updated ROS and Plan today (2/4/2025). In review of yesterday's note (2/3/2025), there are no changes except as documented above.

## 2025-02-04 NOTE — CARE PLAN
The patient is Stable - Low risk of patient condition declining or worsening    Shift Goals  Clinical Goals: Remain safe and free from falls, call light and personal belonging within reach, hemodynamic stability, assess O2 demands, assess vitals  Patient Goals: Get better  Family Goals: ALEXANDRU    Progress made toward(s) clinical / shift goals:        Problem: Knowledge Deficit - Standard  Goal: Patient and family/care givers will demonstrate understanding of plan of care, disease process/condition, diagnostic tests and medications  Outcome: Progressing     Problem: Skin Integrity  Goal: Skin integrity is maintained or improved  Outcome: Progressing     Problem: Fall Risk  Goal: Patient will remain free from falls  Outcome: Progressing     Problem: Pain - Standard  Goal: Alleviation of pain or a reduction in pain to the patient’s comfort goal  Outcome: Progressing       Patient is not progressing towards the following goals:

## 2025-02-04 NOTE — PROGRESS NOTES
Internal Medicine Medical Student Note  Note Author: Guillaume Adams, Student    Name Stacy Oliva 1970   Age/Sex 55 y.o. female   MRN [id number]   Code Status Full             Reason for interval visit  (Principal Problem)   Decompensated cirrhosis (HCC)    Interval Problem Daily Status Update  (problem status, last 24 hours, new history, new data )   No acute overnight changes. Patient mentions doing a lot better and feels like she's back to her baseline.   Thoracentesis was performed by the [facility] team and repeat cxr showed significant improvement. Patient reported immediate relief after the procedure too.  Patient is overall stable      Physical Exam       Vitals:    25 0724 25 0734 25 1030 25 1620   BP: 110/61   128/68   Pulse: (!) 104 98 (!) 101 82   Resp: 18 18 18 18   Temp: 36.7 °C (98 °F)   36.2 °C (97.2 °F)   TempSrc: Temporal   Temporal   SpO2:  97% 93% 100%   Weight:       Height:         Body mass index is 26.52 kg/m². Weight: 72.3 kg (159 lb 6.3 oz)  Oxygen Therapy:  Pulse Oximetry: 100 %, O2 (LPM): 3, O2 Delivery Device: Silicone Nasal Cannula    Physical Exam  Gen- Positive for Malaise/fatigue  CV- RRR, normal s1/s/2  Resp- Decreased breath sounds in the lower lobes (Right more than left), dullness to percussion on the lower lobes which is more pronounced on the right  Abd/GI- Soft non-tender and non-distended abd  Skin- Warm and dry, non-jaundiced        Assessment/Plan   Problem representation: Ms. Oliva is a 54 yof with hx of end stage liver disease secondary to alcohol with recurrent ascites and pleural effusion that required multiple thoracentesis presented to the ER  with SOB. Pertinent imaging showed markedly high pleural effusion requiring 1L NC    Decompensated Cirrhosis secondary to alcohol  Meld score of 28, previously was at 30  - Continue Lasix/Spironolactone  - Continue home lactulose      2.    Hypovolemic shock  Likely  secondary to chest tube drainage of about 3L  Midodrine increase from 5mg to 10mg tid  - Albumin total of 75mg was given  - Cortisone 50mg qid was given and can now be discontinued       3.    Acute respiratory failure with hypoxia secondary to effusion  Continue NC  - Encourage ISS  - Continue to monitor      4.    Hepatic Hydrothorax  Repeat thoracentesis was done on 2/3/2025  - Aggressive Diuresis   - Spironolactone increased to 100mg  - Hold Lasix for now due to risk of hypotension until patient has a more stable BP      5.    Hyponatremia secondary to Cirrhosis and fluid overload  Persistently low at 122  - Hold Lasix  - Consider fluid restriction      6.    Thrombocytopenia secondary to cirrhosis  Plt now at 61K  - Can now restart Levonox since Thoracentesis procedure has been completed on 2/3/2025

## 2025-02-04 NOTE — PROGRESS NOTES
Gastroenterology Progress Note               Author:  MELO Blackwell   Date & Time Created: 2/4/2025 2:06 PM       Patient ID:  Name:             Stacy Oliva  YOB: 1970  Age:                 55 y.o.  female  MRN:               [id number]    Medical Decision Making, by Problem:  Active Hospital Problems    Diagnosis     Acute hypoxic respiratory failure (HCC) [J96.01]     Decompensated cirrhosis (HCC) [K72.90, K74.60]     Shock (HCC) [R57.9]     Hepatic Hydrothorax [J90]     Hyponatremia [E87.1]     Thrombocytopenia (HCC) [D69.6]     Coagulopathy (HCC) [D68.9]      Presenting Chief Complaint:  Cirrhosis    HISTORY OF PRESENT ILLNESS:  Stacy Oliva is a 55 y.o. female with liver decompensation secondary to cirrhosis alcohol abuse. and R pleural effusions s/p multiple thoracocentesis and thoracentesis (seen at [facility] x3 in past week for therapeutic thoracenteses), hx of GI bleed due to esophageal varices, alcohol dependence who presented on 1/31/2025 with shortness of breath. Initially presented to [facility] ER; CXR with recurrent large R pleural effusion, hyponatremic to 118, hypoxic to 80s requiring 10L NC (none at baseline). Transferred to [facility] for higher level of care requiring diuresis, thoracocentesis.  Chest tube placed. Start IV lasix 40 daily.  Patient is on  spironolactone 50 mg daily. Patient is on a small dose of levophed.     Interval History:  2/1/2025: Patient seen at bedside.  Weaned off of pressors.  AAOx4.  She reports feeling well without nausea, vomiting, abdominal pain, or dyspnea.  No bowel movements overnight.  She reports increasing urine output since diuretics started/increased.  Pending ultrasound. WBC 9.3, hemoglobin 14.9, platelets 50.  Sodium 122, BUN 18, creatinine increased from 0.46-0.76.  GFR 92, AST 54, ALT 50, alk phos 123, total bilirubin 9.4, direct bilirubin 4.3, indirect bilirubin 5.1, albumin 2.3.    2/2/2025: Patient seen,  downgraded to medical telemetry.  Worsening shortness of breath today, pending CT scan of the chest. Endorses feeling foggy, last BM yesterday which was small.  Also with minimal urine output, hypotension, tachycardia.  Status post 1 L bolus. Labs this morning: Na 120, cl 88, liver enzymes uptrending, T. Bili 11.5>9.4. Ordered urine sodium, potassium and osmolality, specimen sent when I was bedside    2/3/2025: CT with large right sided pleural effusion, extensive left lung infiltrate. Patient reports that thoracentesis was done this morning but unable to find how much was taken off. Kidney function stable, Na improving. T. Bili now 14.1, INR 2.31  LM for TF gastroenterology    MELD 3.0 - 30    2/4/2025: Patient seen at bedside.  AAOx 4.  Patient had large-volume thoracentesis 2/3/2025 with 2.5 L removed. States she is breathing easier.  +knee pain. Hemoglobin 10.7, platelets 32.  Sodium 126, BUN 17, creatinine 0.77, AST 51, ALT 34, alk phos 93, total bilirubin 12.5, albumin 3.2.        Hospital Medications:  Current Facility-Administered Medications   Medication Dose Frequency Provider Last Rate Last Admin    phosphorus (K-Phos-Neutral) per tablet 500 mg  500 mg BID Sree Rachel M.D.   500 mg at 02/04/25 0820    ibuprofen (Motrin) tablet 200 mg  200 mg 4X/DAY PRN Sree Rachel M.D.        acetaminophen (Tylenol) tablet 500 mg  500 mg Q4HRS PRN Kar Escobar M.D.        diclofenac sodium (Voltaren) 1 % gel 2 g  2 g 4X/DAY PRN Kar Escobar M.D.   2 g at 02/04/25 1324    [START ON 2/5/2025] furosemide (Lasix) tablet 20 mg  20 mg RAYA Rachel M.D.        midodrine (Proamatine) tablet 10 mg  10 mg Q8HRS Kar Escobar M.D.   10 mg at 02/04/25 1335    spironolactone (Aldactone) tablet 100 mg  100 mg Q EVENING Kar Escobar M.D.   100 mg at 02/03/25 1848    Respiratory Therapy Consult   Continuous RT Seda Bear M.D.        omeprazole (PriLOSEC) capsule 20 mg  20 mg DAILY Seda Bear M.D.    "20 mg at 02/04/25 0623    lactulose 20 GM/30ML solution 15 mL  15 mL TID Seda Bear M.D.   15 mL at 02/04/25 1113    thiamine (B-1) injection 100 mg  100 mg DAILY Jose Robert M.D.   100 mg at 02/04/25 0641   Last reviewed on 1/31/2025  3:10 PM by Liliam Nielsen T       Review of Systems:  Review of Systems   Constitutional:  Negative for chills and fever.   HENT:  Negative for congestion and sore throat.    Respiratory:  Positive for shortness of breath. Negative for cough.    Cardiovascular:  Negative for chest pain and leg swelling.   Gastrointestinal:  Positive for constipation. Negative for abdominal pain, blood in stool, diarrhea, melena, nausea and vomiting.   Genitourinary:  Negative for dysuria and flank pain.   Musculoskeletal:  Positive for joint pain (knee). Negative for falls and myalgias.   Neurological:  Positive for weakness. Negative for headaches.   Psychiatric/Behavioral:  The patient is not nervous/anxious and does not have insomnia.    All other systems reviewed and are negative.    Vital signs:  Weight/BMI: Body mass index is 26.52 kg/m².  /70   Pulse 82   Temp 36.5 °C (97.7 °F)   Resp 17   Ht 1.651 m (5' 5\")   Wt 72.3 kg (159 lb 6.3 oz)   SpO2 99%   Vitals:    02/03/25 1950 02/04/25 0400 02/04/25 1323 02/04/25 1328   BP: 123/66 132/73 (!) 79/45 120/70   Pulse: 86 82     Resp: 17 17     Temp: 36.4 °C (97.5 °F) 36.6 °C (97.8 °F) 36.5 °C (97.7 °F)    TempSrc: Temporal Temporal     SpO2: 98% 95% 99%    Weight:       Height:         Oxygen Therapy:  Pulse Oximetry: 99 %, O2 (LPM): 2.5, O2 Delivery Device: Silicone Nasal Cannula    Intake/Output Summary (Last 24 hours) at 2/4/2025 1406  Last data filed at 2/4/2025 1100  Gross per 24 hour   Intake 520 ml   Output 350 ml   Net 170 ml       Physical Exam  Vitals and nursing note reviewed.   Constitutional:       General: She is awake. She is not in acute distress.     Appearance: She is normal weight. She is ill-appearing. "   HENT:      Head: Normocephalic and atraumatic.      Nose: Nose normal. No congestion.      Mouth/Throat:      Mouth: Mucous membranes are moist.      Pharynx: Oropharynx is clear.   Eyes:      General: Scleral icterus present.      Extraocular Movements: Extraocular movements intact.      Conjunctiva/sclera: Conjunctivae normal.   Cardiovascular:      Rate and Rhythm: Regular rhythm. Tachycardia present.      Pulses: Normal pulses.      Heart sounds: Normal heart sounds. No murmur heard.  Pulmonary:      Comments: Decreased in RLL, Nasal canula in place  Abdominal:      General: Abdomen is flat. Bowel sounds are normal. There is distension (mild).      Palpations: Abdomen is soft.      Tenderness: There is no abdominal tenderness.   Musculoskeletal:      Right lower leg: No edema.      Left lower leg: No edema.   Skin:     General: Skin is warm and dry.      Capillary Refill: Capillary refill takes less than 2 seconds.      Coloration: Skin is jaundiced.   Neurological:      General: No focal deficit present.      Mental Status: She is alert and oriented to person, place, and time. Mental status is at baseline.      Comments: +asterixis   Psychiatric:         Mood and Affect: Mood normal.         Behavior: Behavior normal. Behavior is cooperative.         Labs:  Recent Labs     02/02/25 0257 02/02/25  2109 02/03/25 0347 02/03/25  0838 02/04/25  0204   SODIUM 120*   < > 122* 124* 126*   POTASSIUM 4.2  --  4.5  --  3.9   CHLORIDE 88*  --  88*  --  91*   CO2 21  --  25  --  26   BUN 21  --  20  --  17   CREATININE 0.69  --  0.78  --  0.77   MAGNESIUM 1.9  --  1.9  --  1.8   PHOSPHORUS 2.8  --  2.7  --  2.0*   CALCIUM 8.2*  --  8.8  --  8.7    < > = values in this interval not displayed.     Recent Labs     02/02/25 0257 02/03/25 0347 02/04/25  0204   ALTSGPT 53* 35 34   ASTSGOT 58* 46* 51*   ALKPHOSPHAT 145* 108* 93   TBILIRUBIN 11.5* 14.1* 12.5*   GLUCOSE 97 108* 92     Recent Labs     02/02/25 0257  02/03/25 0347 02/03/25 0633 02/04/25  0204   WBC 12.3* 11.5* 11.3* 9.6   NEUTSPOLYS  --  91.90* 91.90*  --    LYMPHOCYTES  --  4.10* 4.30*  --    MONOCYTES  --  3.00 2.80  --    EOSINOPHILS  --  0.00 0.00  --    BASOPHILS  --  0.10 0.20  --    ASTSGOT 58* 46*  --  51*   ALTSGPT 53* 35  --  34   ALKPHOSPHAT 145* 108*  --  93   TBILIRUBIN 11.5* 14.1*  --  12.5*     Recent Labs     02/03/25 0347 02/03/25 0633 02/04/25  0204   RBC 3.02* 2.97* 2.71*   HEMOGLOBIN 11.6* 11.5* 10.7*   HEMATOCRIT 32.6* 31.7* 28.8*   PLATELETCT 35* 34* 32*   PROTHROMBTM 25.5*  --   --    INR 2.31*  --   --      Recent Results (from the past 24 hours)   FOLATE    Collection Time: 02/03/25  4:09 PM   Result Value Ref Range    Folate -Folic Acid 6.0 >4.0 ng/mL   VITAMIN B12    Collection Time: 02/03/25  4:09 PM   Result Value Ref Range    Vitamin B12 -True Cobalamin 2755 (H) 211 - 911 pg/mL   CBC WITHOUT DIFFERENTIAL    Collection Time: 02/04/25  2:04 AM   Result Value Ref Range    WBC 9.6 4.8 - 10.8 K/uL    RBC 2.71 (L) 4.20 - 5.40 M/uL    Hemoglobin 10.7 (L) 12.0 - 16.0 g/dL    Hematocrit 28.8 (L) 37.0 - 47.0 %    .3 (H) 81.4 - 97.8 fL    MCH 39.5 (H) 27.0 - 33.0 pg    MCHC 37.2 (H) 32.2 - 35.5 g/dL    RDW 67.1 (H) 35.9 - 50.0 fL    Platelet Count 32 (L) 164 - 446 K/uL    MPV 10.9 9.0 - 12.9 fL   Comp Metabolic Panel    Collection Time: 02/04/25  2:04 AM   Result Value Ref Range    Sodium 126 (L) 135 - 145 mmol/L    Potassium 3.9 3.6 - 5.5 mmol/L    Chloride 91 (L) 96 - 112 mmol/L    Co2 26 20 - 33 mmol/L    Anion Gap 9.0 7.0 - 16.0    Glucose 92 65 - 99 mg/dL    Bun 17 8 - 22 mg/dL    Creatinine 0.77 0.50 - 1.40 mg/dL    Calcium 8.7 8.5 - 10.5 mg/dL    Correct Calcium 9.3 8.5 - 10.5 mg/dL    AST(SGOT) 51 (H) 12 - 45 U/L    ALT(SGPT) 34 2 - 50 U/L    Alkaline Phosphatase 93 30 - 99 U/L    Total Bilirubin 12.5 (H) 0.1 - 1.5 mg/dL    Albumin 3.2 3.2 - 4.9 g/dL    Total Protein 5.0 (L) 6.0 - 8.2 g/dL    Globulin 1.8 (L) 1.9 - 3.5 g/dL     A-G Ratio 1.8 g/dL   MAGNESIUM    Collection Time: 02/04/25  2:04 AM   Result Value Ref Range    Magnesium 1.8 1.5 - 2.5 mg/dL   PHOSPHORUS    Collection Time: 02/04/25  2:04 AM   Result Value Ref Range    Phosphorus 2.0 (L) 2.5 - 4.5 mg/dL   IMMATURE PLT FRACTION    Collection Time: 02/04/25  2:04 AM   Result Value Ref Range    Imm. Plt Fraction 4.6 0.6 - 13.1 %   ESTIMATED GFR    Collection Time: 02/04/25  2:04 AM   Result Value Ref Range    GFR (CKD-EPI) 90 >60 mL/min/1.73 m 2       Radiology Review:  DX-CHEST-LIMITED (1 VIEW)   Final Result      1.  Interval improvement of RIGHT pleural effusion.   2.  No pneumothorax.      DX-CHEST-PORTABLE (1 VIEW)   Final Result      No significant change from prior exam.      CT-CTA CHEST PULMONARY ARTERY W/ RECONS   Final Result         1. Large right-sided pleural effusion although there has been some reexpansion of the right lung when compared with the prior exam.   2. There is new extensive left lung infiltrate.   3. Tiny right-sided pneumothorax.   4. No evidence for pulmonary embolism.   5. Cirrhotic appearance of the liver.      Findings were communicated to the ordering provider at the time of dictation via [employer].      DX-CHEST-PORTABLE (1 VIEW)   Final Result         1. Increasing moderate right pleural effusion and basilar atelectasis.      2. Increasing mild pulmonary edema. Persistent left lower lung alveolar infiltrate.                     US-RUQ   Final Result         1. Cirrhosis.   2. Right pleural effusion.   3. Cholelithiasis. There is mild gallbladder wall thickening and pericholecystic fluid but no biliary ductal dilatation.      DX-CHEST-PORTABLE (1 VIEW)   Final Result         1. Persistent mild left lung pneumonia infiltrate.      2. Increasing small right effusion and basilar atelectasis.      3. No pneumothorax visible today.                     DX-CHEST-PORTABLE (1 VIEW)   Final Result      1.  Unchanged trace right-sided pneumothorax.   2.   Trace residual right-sided effusion.   3.  Stable patchy left-sided interstitial opacities.         DX-CHEST-PORTABLE (1 VIEW)   Final Result      1.  Unchanged possible trace right-sided pneumothorax following removal of right-sided chest tube.   2.  Trace residual right-sided effusion.   3.  Stable patchy left-sided interstitial opacities.      DX-CHEST-PORTABLE (1 VIEW)   Final Result      1.  Possible trace right-sided pneumothorax with right-sided chest tube in place.   2.  Trace residual right-sided effusion.   3.  Stable patchy left-sided interstitial opacities.      DX-CHEST-PORTABLE (1 VIEW)   Final Result         1. There is a very large right pleural effusion. Right sided chest tube is now noted.   2. Small amount left-sided airspace disease. This could represent edema or infection.            MDM (Data Review):   -Records reviewed and summarized in current documentation  -I personally reviewed and interpreted the laboratory results  -I personally reviewed the radiology images    Assessment/Recommendations:  Hepatic hydrothorax  Cirrhosis  Alcohol misuse  Hyponatremia  Thrombocytopenia  History of esophageal varices  Liver decompensation - MELD 30  Consulted for diuretic resistance hepatic hydrothorax and need for TIPS.  MELD 3.0 - 30    Recommendations:    Hepatic hydrothorax  RUQ US negative for ascites, continued right pleural effusion  Appears that her right pleural effusion has increased on CT scan.  Diuretics are critical in managing her hydrothorax. Increase as tolerated while maintaining normal renal function. Keeping with ratio of Spironolactone 50mg: lasix 20mg  Our team recommends continuing with diuretics and starting albumin with close monitoring of kidney function  Thora fluid negative for malignancy  She has a MELD of 30 2/3/2025 so not a candidate for TIPS at this time. Must be 18 or less.   We still have a lot of room to increase her diuresis        Hyponatremia  Euvolemic on exam today  and hemodynamics have improved  Recommend continuing diuretics  2.   Low-sodium diet  3.   We do not generally recommend fluid restriction for hyponatremia in cirrhosis unless sodium is below 119  4.  Caution with albumin in cirrhosis due to potential for fluid volume overload    Decreased urine output  Thankfully her creatinine has remained stable  Urine sodium <20, osmolality WNL, Na/K ratio <1  Closely monitor for development of HRS    Hypotension  Cirrhotics are always going to have lower blood pressure  Keep MAP is above 65 and ensure that she is asymptomatic  Continue midodrine for blood pressure support    Hepatic encephalopathy with asterixis  Patient endorses feeling foggy - continue lactulose and rifaximin    Macrocytic anemia  History of GI bleed secondary to esophageal varices  EGD 2024 banded x 4  EGD 2024 banded x 4, +gastric varices, GAVE bleeding with banding  EGD 2023 banded x 3  On Coreg at home, held since admission  Continue omeprazole  Monitor for bleeding   Check folate and B12 - not done, asked RN to obtain    Alcohol misuse disorder  Patient reports 6 months of sobriety from August 3 of 2024 with one relapse in September because her dog . She drank about 5 airplane bottles of liquor for about a week then stopped. However, upon deeper review of her records, it was reported in her December admission that her last drink was around [place].   PETH pending.  RTOC reached out to [facility] for transplant evaluation- they declined.  Ins Co[employer] is accepted at [facility] but her plan does not pay out of network outside of [facility]. This is a [facility] only plan.   Spoke with Dr. Whitfield at [facility].  Patient is not a candidate for transplant evaluation due to her relapse into drinking this last September/December.  They recommend medical management at this time.  They recommend patient remain sober ensure that she can reliably stay sober/make efforts to maintain sobriety by attending AA  meetings.  Perhaps then, patient may be an outpatient evaluation candidate.    Leukocytosis  Monitor closely for worsening infection    No further inventions from acute GI team.  GI to sign off.  Please reconsult for any further questions or concerns.      Discussed with patient, nursing, primary team, Dr. Boyd, RTOC, Dr. Whitfield at [facility].     ..MELO Blackwell    Core Quality Measures   Reviewed items::  Labs, Medications and Radiology reports reviewed

## 2025-02-05 ENCOUNTER — PHARMACY VISIT (OUTPATIENT)
Dept: PHARMACY | Facility: MEDICAL CENTER | Age: 55
End: 2025-02-05
Payer: COMMERCIAL

## 2025-02-05 LAB
ALBUMIN SERPL BCP-MCNC: 3.2 G/DL (ref 3.2–4.9)
ALBUMIN/GLOB SERPL: 1.3 G/DL
ALP SERPL-CCNC: 114 U/L (ref 30–99)
ALT SERPL-CCNC: 42 U/L (ref 2–50)
ANION GAP SERPL CALC-SCNC: 12 MMOL/L (ref 7–16)
AST SERPL-CCNC: 64 U/L (ref 12–45)
BACTERIA SPEC ANAEROBE CULT: NORMAL
BILIRUB SERPL-MCNC: 12.5 MG/DL (ref 0.1–1.5)
BUN SERPL-MCNC: 23 MG/DL (ref 8–22)
CALCIUM ALBUM COR SERPL-MCNC: 9.6 MG/DL (ref 8.5–10.5)
CALCIUM SERPL-MCNC: 9 MG/DL (ref 8.5–10.5)
CHLORIDE SERPL-SCNC: 89 MMOL/L (ref 96–112)
CO2 SERPL-SCNC: 26 MMOL/L (ref 20–33)
CREAT SERPL-MCNC: 1.08 MG/DL (ref 0.5–1.4)
GFR SERPLBLD CREATININE-BSD FMLA CKD-EPI: 60 ML/MIN/1.73 M 2
GLOBULIN SER CALC-MCNC: 2.5 G/DL (ref 1.9–3.5)
GLUCOSE SERPL-MCNC: 104 MG/DL (ref 65–99)
PHOSPHATE SERPL-MCNC: 3.1 MG/DL (ref 2.5–4.5)
POTASSIUM SERPL-SCNC: 3.9 MMOL/L (ref 3.6–5.5)
PROT SERPL-MCNC: 5.7 G/DL (ref 6–8.2)
SIGNIFICANT IND 70042: NORMAL
SITE SITE: NORMAL
SODIUM SERPL-SCNC: 127 MMOL/L (ref 135–145)
SOURCE SOURCE: NORMAL

## 2025-02-05 PROCEDURE — 36415 COLL VENOUS BLD VENIPUNCTURE: CPT

## 2025-02-05 PROCEDURE — 700105 HCHG RX REV CODE 258

## 2025-02-05 PROCEDURE — 84100 ASSAY OF PHOSPHORUS: CPT

## 2025-02-05 PROCEDURE — 85055 RETICULATED PLATELET ASSAY: CPT

## 2025-02-05 PROCEDURE — A9270 NON-COVERED ITEM OR SERVICE: HCPCS

## 2025-02-05 PROCEDURE — 700111 HCHG RX REV CODE 636 W/ 250 OVERRIDE (IP): Performed by: EMERGENCY MEDICINE

## 2025-02-05 PROCEDURE — 99233 SBSQ HOSP IP/OBS HIGH 50: CPT | Performed by: HOSPITALIST

## 2025-02-05 PROCEDURE — 770001 HCHG ROOM/CARE - MED/SURG/GYN PRIV*

## 2025-02-05 PROCEDURE — 700102 HCHG RX REV CODE 250 W/ 637 OVERRIDE(OP)

## 2025-02-05 PROCEDURE — RXMED WILLOW AMBULATORY MEDICATION CHARGE

## 2025-02-05 PROCEDURE — 97162 PT EVAL MOD COMPLEX 30 MIN: CPT

## 2025-02-05 PROCEDURE — 94669 MECHANICAL CHEST WALL OSCILL: CPT

## 2025-02-05 PROCEDURE — 85027 COMPLETE CBC AUTOMATED: CPT

## 2025-02-05 PROCEDURE — 80053 COMPREHEN METABOLIC PANEL: CPT

## 2025-02-05 RX ORDER — SPIRONOLACTONE 25 MG/1
25 TABLET ORAL EVERY EVENING
Status: DISCONTINUED | OUTPATIENT
Start: 2025-02-06 | End: 2025-02-06 | Stop reason: HOSPADM

## 2025-02-05 RX ORDER — FUROSEMIDE 20 MG/1
10 TABLET ORAL EVERY MORNING
Status: DISCONTINUED | OUTPATIENT
Start: 2025-02-06 | End: 2025-02-06 | Stop reason: HOSPADM

## 2025-02-05 RX ORDER — FUROSEMIDE 20 MG/1
20 TABLET ORAL DAILY
Qty: 30 TABLET | Refills: 0 | Status: SHIPPED | OUTPATIENT
Start: 2025-02-05 | End: 2025-02-06

## 2025-02-05 RX ORDER — SPIRONOLACTONE 25 MG/1
50 TABLET ORAL EVERY EVENING
Status: DISCONTINUED | OUTPATIENT
Start: 2025-02-05 | End: 2025-02-05

## 2025-02-05 RX ORDER — SODIUM CHLORIDE 9 MG/ML
INJECTION, SOLUTION INTRAVENOUS CONTINUOUS
Status: DISCONTINUED | OUTPATIENT
Start: 2025-02-05 | End: 2025-02-05

## 2025-02-05 RX ORDER — FUROSEMIDE 20 MG/1
10 TABLET ORAL EVERY MORNING
Status: DISCONTINUED | OUTPATIENT
Start: 2025-02-06 | End: 2025-02-05

## 2025-02-05 RX ORDER — FUROSEMIDE 20 MG/1
20 TABLET ORAL EVERY MORNING
Status: DISCONTINUED | OUTPATIENT
Start: 2025-02-06 | End: 2025-02-05

## 2025-02-05 RX ORDER — ENOXAPARIN SODIUM 100 MG/ML
40 INJECTION SUBCUTANEOUS DAILY
Status: DISCONTINUED | OUTPATIENT
Start: 2025-02-05 | End: 2025-02-05

## 2025-02-05 RX ORDER — LACTULOSE 10 G/15ML
15 SOLUTION ORAL 2 TIMES DAILY
Status: DISCONTINUED | OUTPATIENT
Start: 2025-02-06 | End: 2025-02-06 | Stop reason: HOSPADM

## 2025-02-05 RX ORDER — SODIUM CHLORIDE, SODIUM LACTATE, POTASSIUM CHLORIDE, AND CALCIUM CHLORIDE .6; .31; .03; .02 G/100ML; G/100ML; G/100ML; G/100ML
1000 INJECTION, SOLUTION INTRAVENOUS ONCE
Status: COMPLETED | OUTPATIENT
Start: 2025-02-05 | End: 2025-02-06

## 2025-02-05 RX ORDER — MIDODRINE HYDROCHLORIDE 10 MG/1
10 TABLET ORAL EVERY 8 HOURS
Qty: 60 TABLET | Refills: 0 | Status: SHIPPED | OUTPATIENT
Start: 2025-02-05 | End: 2025-02-14 | Stop reason: SDUPTHER

## 2025-02-05 RX ORDER — INSULIN LISPRO 100 [IU]/ML
1-6 INJECTION, SOLUTION INTRAVENOUS; SUBCUTANEOUS EVERY 6 HOURS
Status: DISCONTINUED | OUTPATIENT
Start: 2025-02-05 | End: 2025-02-05

## 2025-02-05 RX ORDER — SPIRONOLACTONE 25 MG/1
25 TABLET ORAL EVERY EVENING
Status: DISCONTINUED | OUTPATIENT
Start: 2025-02-05 | End: 2025-02-05

## 2025-02-05 RX ORDER — DEXTROSE MONOHYDRATE 25 G/50ML
25 INJECTION, SOLUTION INTRAVENOUS
Status: DISCONTINUED | OUTPATIENT
Start: 2025-02-05 | End: 2025-02-05

## 2025-02-05 RX ADMIN — MIDODRINE HYDROCHLORIDE 10 MG: 5 TABLET ORAL at 21:57

## 2025-02-05 RX ADMIN — SODIUM CHLORIDE, POTASSIUM CHLORIDE, SODIUM LACTATE AND CALCIUM CHLORIDE 1000 ML: 600; 310; 30; 20 INJECTION, SOLUTION INTRAVENOUS at 20:33

## 2025-02-05 RX ADMIN — LACTULOSE 15 ML: 10 SOLUTION ORAL; RECTAL at 12:00

## 2025-02-05 RX ADMIN — OMEPRAZOLE 20 MG: 20 CAPSULE, DELAYED RELEASE ORAL at 05:59

## 2025-02-05 RX ADMIN — LACTULOSE 15 ML: 10 SOLUTION ORAL; RECTAL at 18:36

## 2025-02-05 RX ADMIN — FUROSEMIDE 20 MG: 20 TABLET ORAL at 05:59

## 2025-02-05 RX ADMIN — MIDODRINE HYDROCHLORIDE 10 MG: 5 TABLET ORAL at 05:59

## 2025-02-05 RX ADMIN — MIDODRINE HYDROCHLORIDE 10 MG: 5 TABLET ORAL at 14:30

## 2025-02-05 RX ADMIN — LACTULOSE 15 ML: 10 SOLUTION ORAL; RECTAL at 05:59

## 2025-02-05 RX ADMIN — THIAMINE HYDROCHLORIDE 100 MG: 100 INJECTION, SOLUTION INTRAMUSCULAR; INTRAVENOUS at 06:00

## 2025-02-05 ASSESSMENT — COGNITIVE AND FUNCTIONAL STATUS - GENERAL
MOVING FROM LYING ON BACK TO SITTING ON SIDE OF FLAT BED: A LITTLE
SUGGESTED CMS G CODE MODIFIER MOBILITY: CK
MOVING TO AND FROM BED TO CHAIR: A LITTLE
TURNING FROM BACK TO SIDE WHILE IN FLAT BAD: A LITTLE
MOBILITY SCORE: 18
WALKING IN HOSPITAL ROOM: A LITTLE
STANDING UP FROM CHAIR USING ARMS: A LITTLE
CLIMB 3 TO 5 STEPS WITH RAILING: A LITTLE

## 2025-02-05 ASSESSMENT — PAIN DESCRIPTION - PAIN TYPE: TYPE: ACUTE PAIN

## 2025-02-05 ASSESSMENT — GAIT ASSESSMENTS
GAIT LEVEL OF ASSIST: STANDBY ASSIST
DISTANCE (FEET): 50
ASSISTIVE DEVICE: FRONT WHEEL WALKER
DEVIATION: SHUFFLED GAIT

## 2025-02-05 NOTE — DISCHARGE PLANNING
Referral sent to [facility]  Updated order sent for     1455- Referral sent to [facility]    1515- [facility] declined / non contracted insurance    1521- Referral sent to [facility]    Home address-  (Address- [address] [place] [address])

## 2025-02-05 NOTE — DISCHARGE PLANNING
[employer] [employer] called, they don't take patient's insurance,    Referral sent to [facility], they sent it to [place].     Called [facility], they take patients insurance. They may have to deliver the wheelchair to the home tomorrow.    Patient will be staying with family before going to her home.    Address is [address] [place] [address]  [id number]    Given to [facility] [employer] to notify home health.

## 2025-02-05 NOTE — THERAPY
Physical Therapy   Initial Evaluation     Patient Name: Stacy Oliva  Age:  55 y.o., Sex:  female  Medical Record #: [id number]  Today's Date: 2/5/2025     Precautions  Precautions: Fall Risk    Assessment  Patient is 55 y.o. female admitted with SOB found to have right sided pleural effusion and hyponatremia. Pt underwent thoracostomy with CT placement. PMH includes end stage liver disease secondary to alcohol with recurrent ascites and pleural effusion that require multiple thoracentesis. Pt has been in the hospital for 5 days and reports she has not ambulated. Pt initially required CGA for gait but was able to progress to SBA with FWW. She reports her spouse can assist at dc. She would benefit from HHPT. PT will cont while pt is in acute care setting to address mobility.     Plan    Physical Therapy Initial Treatment Plan   Treatment Plan : Bed Mobility, Equipment, Family / Caregiver Training, Gait Training, Neuro Re-Education / Balance, Self Care / Home Evaluation, Stair Training, Therapeutic Activities, Therapeutic Exercise  Treatment Frequency: 3 Times per Week  Duration: Until Therapy Goals Met    DC Equipment Recommendations: Front-Wheel Walker  Discharge Recommendations: Recommend home health for continued physical therapy services        02/05/25 1515   Vitals   O2 Delivery Device None - Room Air   Pain 0 - 10 Group   Therapist Pain Assessment During Activity;Nurse Notified  (none noted)   Non Verbal Descriptors   Non Verbal Scale  Calm   Prior Living Situation   Prior Services Home-Independent   Housing / Facility 1 Story House   Steps Into Home 2   Steps In Home 0   Equipment Owned None   Lives with - Patient's Self Care Capacity Spouse   Comments spouse doesnt work and he is able to assist as needed   Prior Level of Functional Mobility   Bed Mobility Independent   Transfer Status Independent   Ambulation Independent   Ambulation Distance community   Assistive Devices Used None   Stairs Independent    Comments no AD and independent prior   Cognition    Level of Consciousness Alert   Strength Upper Body   Upper Body Strength  WDL   Strength Lower Body   Lower Body Strength  X   Gross Strength Generalized Weakness, Equal Bilaterally   Comments functional but weak   Other Treatments   Other Treatments Provided spent time discussing dc home with spouse   Balance Assessment   Sitting Balance (Static) Fair +   Sitting Balance (Dynamic) Fair +   Standing Balance (Static) Fair   Standing Balance (Dynamic) Fair   Weight Shift Sitting Good   Weight Shift Standing Fair   Comments FWW   Bed Mobility    Supine to Sit Supervised   Sit to Supine Supervised   Scooting Supervised   Rolling Supervised   Comments HOB rasied   Gait Analysis   Gait Level Of Assist Standby Assist   Assistive Device Front Wheel Walker   Distance (Feet) 50   # of Times Distance was Traveled 1   Deviation Shuffled Gait   Weight Bearing Status no restrictions   Comments initially CGA but progressed to SBA   Functional Mobility   Sit to Stand Standby Assist   Bed, Chair, Wheelchair Transfer Standby Assist   Toilet Transfers Standby Assist   Mobility in room and halllway with FWW   Edema / Skin Assessment   Edema / Skin  Not Assessed   Short Term Goals    Short Term Goal # 1 pt will be able to complete supine<>sitting from flat bed with SPV in 6tx in order to improve independent   Short Term Goal # 2 pt will be able to complete STS with FWW and SPV in 6tx in order to progress prior level   Short Term Goal # 3 pt will be able to ambulate 150ft with FWW and SPV in 6tx in order to decrease fall risk   Short Term Goal # 4 pt will be able to negotiate 2 steps with SPV in 6tx in order to enter and exit home   Education Group   Education Provided Role of Physical Therapist   Role of Physical Therapist Patient Response Patient;Acceptance;Demonstration;Action Demonstration   Anticipated Discharge Equipment and Recommendations   DC Equipment Recommendations  Front-Wheel Walker   Discharge Recommendations Recommend home health for continued physical therapy services

## 2025-02-05 NOTE — PROGRESS NOTES
Internal Medicine Medical Student Note  Note Author: Guillaume Adams, Student    Name Stacy Oliva     1970   Age/Sex 55 y.o. female   MRN [id number]   Code Status Full             Reason for interval visit  (Principal Problem)   Decompensated cirrhosis (HCC)    Interval Problem Daily Status Update  (problem status, last 24 hours, new history, new data )   No acute overnight changes. Patient mentions doing about the same and mentions feeling much easier to breathe this morning. Patient complains of fatigue. Patient is tolerating diet well without nausea or vomiting.     Advanced care of planning was briefly discussed with patient and was informed about her MELD score and what all that meant for her prognosis. Patient still insists on Full code and did not seem to want to engage in any of those conversations  Due to recent alcohol use, no liver transplant facilities is currently accepting.     Patient is overall stable        Physical Exam       Vitals:    25 1950 25 0400 25 1323 25 1328   BP: 123/66 132/73 (!) 79/45 120/70   Pulse: 86 82     Resp: 17 17     Temp: 36.4 °C (97.5 °F) 36.6 °C (97.8 °F) 36.5 °C (97.7 °F)    TempSrc: Temporal Temporal     SpO2: 98% 95% 99%    Weight:       Height:         Body mass index is 26.52 kg/m².    Oxygen Therapy:  Pulse Oximetry: 99 %, O2 (LPM): 2.5, O2 Delivery Device: Silicone Nasal Cannula    Physical Exam  Gen- Positive for Malaise/fatigue  CV- RRR, normal s1/s/2  Resp- Mild decreased breath sounds in the lower lobes   Abd/GI- Soft non-tender and non-distended abd  Skin- Warm and dry, jaundiced        Assessment/Plan   Problem representation:  Ms. Oliva is a 54 yof with hx of end stage liver disease secondary to alcohol with recurrent ascites and pleural effusion that required multiple thoracentesis presented to the ER  with SOB. Pertinent imaging showed markedly high pleural effusion requiring 1L NC     Decompensated Cirrhosis  secondary to alcohol  Meld score of 28, previously was at 30  - Continue Lasix/Spironolactone  - Continue home lactulose        2.    Hypovolemic shock  Likely secondary to chest tube drainage of about 3L  Midodrine increase from 5mg to 10mg tid  - Albumin total of 75mg was given  - Cortisone 50mg qid was given and can now be discontinued         3.    Acute respiratory failure with hypoxia secondary to effusion  Continue NC  - Encourage ISS  - Continue to monitor        4.    Hepatic Hydrothorax  Repeat thoracentesis was done on 2/3/2025  - Aggressive Diuresis   - Spironolactone increased to 100mg  - Restart Lasix since BP is now more stable        5.    Hyponatremia secondary to Cirrhosis and fluid overload  Persistently low at 122  - Continue Lasix  - Consider fluid restriction        6.    Thrombocytopenia secondary to cirrhosis  Plt now at 61K  - Can now restart Levonox since Thoracentesis procedure has been completed on 2/3/2025

## 2025-02-05 NOTE — FACE TO FACE
Face to Face Note  -  Durable Medical Equipment    Sree Rachel M.D. - NPI: [id number]  I certify that this patient is under my care and that they had a durable medical equipment(DME)face to face encounter by myself that meets the physician DME face-to-face encounter requirements with this patient on:    Date of encounter:   Patient:                    MRN:                       YOB: 2026  Stacy Oliva  [id number]  1970     The encounter with the patient was in whole, or in part, for the following medical condition, which is the primary reason for durable medical equipment:  Other - Weakness, hypoxia    I certify that, based on my findings, the following durable medical equipment is medically necessary:    Wheelchair   Patient needs manual wheelchair for use inside the home based on the above diagnosis. Per guidelines patient meets criteria in the following ways:   A.  Patient has significant impairment in the following Toileting, Dressing, and Bathing and is is unable to complete these tasks in a reasonable timeframe and places the patient at a heightened risk of morbidity.   B.  The patient's mobility limitations cannot be sufficiently resolved by use of  fitted cane or walker.   C.  The patient reports his home provides adequate access between rooms,  maneuvering space, and surfaces for use of the manual wheelchair that is  provided.   D.  The use of the manual wheelchair will significantly improve the patient's  ability to participate in MRADLs and the patient will use it on a regular basis in  the home.   E.  The patient has not expressed an unwillingness to use the manual  wheelchair.   F. The patient has limitations of strength, endurance, range of motion, or coordination per OT notes:.    My Clinical findings support the need for the above equipment due to:  Hypoxia

## 2025-02-05 NOTE — DISCHARGE INSTRUCTIONS
Medications:  -Diuretics (e.g., Furosemide 20mg daily and spironolactone 50 mg daily): Take as prescribed to manage fluid retention. Monitor for signs of dehydration, dizziness, feeling like passing out or electrolyte imbalance.  -Midodrine: Continue to take as prescribed for blood pressure support.  -Albumin: Follow your healthcare provider’s instructions regarding any further administration.  Other Medications: Continue all other prescribed medications as directed by your healthcare provider.    Activity:  -Gradually resume normal activities as tolerated. Avoid strenuous physical activity until cleared by your physician.  -Rest as needed and listen to your body.    Diet:  -Follow a low-sodium diet to help manage fluid retention.  -Avoid alcohol completely to improve liver health and potential eligibility for future liver transplantation.  -Stay hydrated, but monitor fluid intake as recommended by your healthcare provider.    Symptoms to Monitor:  -Shortness of breath or increased difficulty breathing  -Swelling in your legs or abdomen  -Unusual fatigue or weakness  -Changes in urine output or color  -Signs of bleeding, such as blood in stool or vomit, or unusual bruising  -Any new or worsening symptoms    Follow-Up Appointments:  -Gastroenterology: place appt at your nearest convenience  -Primary Care Physician:Ensure you attend all scheduled follow-up appointments to monitor your condition and adjust treatment as necessary.    When to Seek Medical Attention:  If you experience severe shortness of breath or chest pain  If you have a significant change in mental status or confusion  If you notice any signs of infection, such as fever or chills  If you experience severe abdominal pain or swelling    Lifestyle Recommendations:  -Abstain from alcohol to support liver health and future treatment options.  -Consider joining a support group for individuals with liver disease or alcohol dependence.  -Engage in regular,  gentle exercise as tolerated to maintain overall health.    Additional Instructions:  -Keep a daily log of weight, blood pressure, and fluid intake/output to share with your healthcare providers.  -Review and understand your current medications and their purposes.  -Contact your healthcare provider if you have any questions or concerns regarding your treatment plan.    Emergency Contact Information:  In case of emergency, call 911 or go to the nearest emergency room.

## 2025-02-05 NOTE — DISCHARGE SUMMARY
I have personally seen and examined the patient and discussed the management with the resident.   I, Abigail Zhang MD, performed a substantial portion of the EM visit face-to-face on the same date of service as the resident note.  I reviewed the resident's note and agree with the documented findings and plan of care, with additional comments, as documented below.     Date of service: 2/6/2025   Additional attending comments:   Decompensated liver cirrhosis   MELD score of 29 -30 points, and this correlates with an estimated 3-Month Mortality ~ 20%.  Patient has been counseled extensively to completely stop alcohol to get a chance for a liver transplant.  The patient tolerating minimal dose spironolactone / furosemide, secondary developing hypotension.  She is high risk for readmission especially if he goes back to drinking alcohol.  Recommend close follow-up with primary care physician and gastroenterology.    Total time spent is 52 minutes.  This included review of patient chart for overnight events, face-to-face interview, physical examination, lab review and analysis.  In addition, I spoke with patient and nurse, charge nurse, pharmacy, case management during discharge planning IDT rounds.  In addition to time needed for goals of care discussion.     [facility] Internal Medicine Discharge Summary    Attending: Abigail Zhang M.d.  Senior Resident: Dr. Escobar  Intern:  Dr. Rachel  Contact Number: [phone]    CHIEF COMPLAINT ON ADMISSION  Shortness of breath    Reason for Admission  Transfer from [facility] due to need for higher level of care to manage decompensated cirrhosis and hepatic hydrothorax.    Admission Date  1/31/2025    CODE STATUS  Full Code    HPI & HOSPITAL COURSE  Mrs. Stacy Oliva, a 55-year-old female with a significant history of end-stage liver disease (MELD score of 30) secondary to alcoholic cirrhosis, presented with shortness of breath and was found to have recurrent large  right-sided pleural effusion and hyponatremia. She was transferred from [facility] to [facility] for a higher level of care.    Upon arrival, she underwent thoracostomy with chest tube placement, which drained approximately 3 liters of pleural fluid. Despite initial management with IV Lasix and chest tube drainage, she experienced hypotension and sinus tachycardia, consistent with hypovolemic shock due to intravascular depletion. Initiation of norepinephrine (Levophed) to maintain mean arterial pressure.    02/01/2025: Continued hypovolemic shock symptoms despite diuresis. Persistent hyponatremia addressed with hypertonic saline.  Interventions: Gastroenterology consulted for management of hepatic hydrothorax. Continued norepinephrine and added midodrine and albumin to improve hemodynamic status.  Discussed alcohol use, revealing uncertainty about recent intake.    02/02/2025: No acute overnight events. Downgraded from IMCU to SMT1. Noted dizziness and hypotension with positive orthostatic vitals.  Interventions: ECG showed sinus tachycardia and right axis deviation. CTA chest performed, negative for pulmonary embolism but confirmed large hepatic hydrothorax. Hypovolemic shock due to intravascular depletion. Random cortisol drawn; hydrocortisone administered considering potential adrenal insufficiency.    02/03/2025: No acute overnight events. Patient reported feeling improved.  Interventions: Repeat chest X-ray indicated ongoing hydrothorax.  Uncomplicated thoracocentesis performed, draining 2500 mL of fluid, followed by albumin administration. Blood pressure and tachycardia improved. Held further hydrocortisone as morning cortisol levels were adequate.    02/04/2025: Patient reported significant improvement in breathing. Oxygen demand decreased. Continued monitoring with focus on optimizing fluid balance and hemodynamics. Discussed goals of care with the patient, who opted to remain  full code. No new complications; stable respiratory status with improved auscultation findings.  Direct transfer to transplant facility denied given recent alcohol usage.  Patient encouraged to continue cessation and attend Alcoholics Anonymous meetings and hopes for future acceptance.    02/05/2025: Plan was for discharge, however patient experienced low blood pressure. O2 sats unchanged. S/p 1L NS bolus. Euvolemic and BP normalizing after bolus. Diuretic dosage adjusted. No further overnight events.    02/06/2025: Patient stable, with improved oxygenation and blood pressure control. Discharged with instructions for outpatient follow-up and continued abstinence from alcohol to facilitate potential future transplant eligibility. She would like to be discharged to SNF with oxygen as needed.    Throughout her stay, Mrs. Oliva's condition improved with a decrease in oxygen requirements and stabilization of vital signs. She engaged in discussions about her prognosis and code status, confirming her desire to remain full code despite her advanced liver disease.    Therefore, she is discharged in fair and stable condition to home with close outpatient follow-up.    The patient met 2-midnight criteria for an inpatient stay at the time of discharge.    Discharge Date  02/06/2025    Physical Exam on Day of Discharge  Physical Exam  Vitals and nursing note reviewed.   Constitutional:       General: She is awake. She is not in acute distress.     Appearance: She is normal weight. She is ill-appearing.   HENT:      Head: Normocephalic and atraumatic.      Nose: Nose normal. No congestion.      Mouth/Throat:      Mouth: Mucous membranes are moist.      Pharynx: Oropharynx is clear.   Eyes:      General: Scleral icterus present.      Extraocular Movements: Extraocular movements intact.      Conjunctiva/sclera: Conjunctivae normal.   Cardiovascular:      Rate and Rhythm: Regular rhythm. Tachycardia present.      Pulses: Normal  pulses.      Heart sounds: Normal heart sounds. No murmur heard.  Pulmonary:      Comments: Decreased in RLL, Nasal canula in place  Abdominal:      General: Abdomen is flat. Bowel sounds are normal. There is distension (mild).      Palpations: Abdomen is soft.      Tenderness: There is no abdominal tenderness.   Musculoskeletal:      Right lower leg: No edema.      Left lower leg: No edema.   Skin:     General: Skin is warm and dry.      Capillary Refill: Capillary refill takes less than 2 seconds.      Coloration: Skin is jaundiced.   Neurological:      General: No focal deficit present.      Mental Status: She is alert and oriented to person, place, and time. Mental status is at baseline.      Comments: +asterixis   Psychiatric:         Mood and Affect: Mood normal.         Behavior: Behavior normal. Behavior is cooperative.       FOLLOW UP ITEMS POST DISCHARGE  -Outpatient follow-up with primary care physician in 1-2 weeks  - Outpatient follow-up with gastroenterology    DISCHARGE DIAGNOSES  Principal Problem:    Decompensated cirrhosis (HCC) (Chronic) (POA: Yes)  Active Problems:    Coagulopathy (HCC) (POA: Yes)    Thrombocytopenia (HCC) (POA: Yes)    Hyponatremia (POA: Yes)    Hepatic Hydrothorax (POA: Yes)    Acute hypoxic respiratory failure (HCC) (POA: Yes)    Shock (HCC) (POA: Yes)    ACP (advance care planning) (POA: Yes)  Resolved Problems:    * No resolved hospital problems. *    FOLLOW UP  No future appointments.  No follow-up provider specified.    MEDICATIONS ON DISCHARGE     Medication List        START taking these medications        Instructions   midodrine 10 MG tablet  Commonly known as: Proamatine   Take 1 Tablet by mouth every 8 hours.  Dose: 10 mg            CHANGE how you take these medications        Instructions   furosemide 20 MG Tabs  Start taking on: February 7, 2025  What changed:   medication strength  how much to take  when to take this  Commonly known as: Lasix   Take 0.5 Tablets by  mouth every morning.  Dose: 10 mg     * lactulose 20 GM/30ML Soln  What changed: Another medication with the same name was added. Make sure you understand how and when to take each.   Take 15 mL by mouth 3 times a day. 15-30mL one to three times per day. Titrate to ~2 bowel movements per day  Dose: 15 mL     * lactulose 20 GM/30ML Soln  What changed: You were already taking a medication with the same name, and this prescription was added. Make sure you understand how and when to take each.   Take 15 mL by mouth 2 times a day.  Dose: 15 mL     potassium chloride SA 20 MEQ Tbcr  What changed: how much to take  Commonly known as: Kdur   Take 0.5 Tablets by mouth 2 times a day.  Dose: 10 mEq     spironolactone 25 MG Tabs  What changed:   medication strength  how much to take  when to take this  Commonly known as: Aldactone   Take 1 Tablet by mouth every evening.  Dose: 25 mg           * This list has 2 medication(s) that are the same as other medications prescribed for you. Read the directions carefully, and ask your doctor or other care provider to review them with you.                CONTINUE taking these medications        Instructions   omeprazole 20 MG delayed-release capsule  Commonly known as: PriLOSEC   Take 1 Capsule by mouth every day.  Dose: 20 mg     Phospha 250 Neutral [id number] MG tablet  Generic drug: phosphorus   Take 1 Tablet by mouth 2 times a day.  Dose: 1 Tablet            STOP taking these medications      carvedilol 6.25 MG Tabs  Commonly known as: Coreg            Allergies  No Known Allergies    DIET  Orders Placed This Encounter   Procedures    Diet Order Diet: 2 Gram Sodium     Standing Status:   Standing     Number of Occurrences:   1     Order Specific Question:   Diet:     Answer:   2 Gram Sodium [7]     ACTIVITY  As tolerated.  Weight bearing as tolerated    CONSULTATIONS  Critical Care  Gastroenterology    PROCEDURES  Chest tube placement 1/31/25  Paracentesis 02/03/25    LABORATORY  Lab  Results   Component Value Date    SODIUM 125 (L) 02/06/2025    POTASSIUM 4.1 02/06/2025    CHLORIDE 89 (L) 02/06/2025    CO2 26 02/06/2025    GLUCOSE 89 02/06/2025    BUN 19 02/06/2025    CREATININE 0.89 02/06/2025      Lab Results   Component Value Date    WBC 9.0 02/05/2025    HEMOGLOBIN 12.6 02/05/2025    HEMATOCRIT 36.6 (L) 02/05/2025    PLATELETCT 50 (L) 02/05/2025      Total time of the discharge process exceeds 51 minutes.

## 2025-02-05 NOTE — ASSESSMENT & PLAN NOTE
MELD score of 29 -30 points, and this correlates with an estimated 3-Month Mortality ~ 20%.  I explained the very poor prognosis for the patient.  I recommended that she considers changing her CODE STATUS to DNAR/DNI.  The patient understands her poor prognosis, and she wants to continue with a full code at this point.

## 2025-02-05 NOTE — CARE PLAN
The patient is Stable - Low risk of patient condition declining or worsening    Shift Goals  Clinical Goals: Maintain adequate BP, Encourage activity with rest, Appetite promotion  Patient Goals: To feel better  Family Goals: To make sure she's healthy and safe    Progress made toward(s) clinical / shift goals:  yes    Patient is not progressing towards the following goals:

## 2025-02-05 NOTE — FACE TO FACE
"Face to Face Note  -  Durable Medical Equipment    Sree Rachel M.D. - NPI: [id number]  I certify that this patient is under my care and that they had a durable medical equipment(DME)face to face encounter by myself that meets the physician DME face-to-face encounter requirements with this patient on:    Date of encounter:   Patient:                    MRN:                       YOB: 2026  Stacy Oliva  [id number]  1970     The encounter with the patient was in whole, or in part, for the following medical condition, which is the primary reason for durable medical equipment:  Other - Decompensated Cirrhosis    I certify that, based on my findings, the following durable medical equipment is medically necessary:    Oxygen   HOME O2 Saturation Measurements:(Values must be present for Home Oxygen orders)  Room air sat at rest: 88  Room air sat with amb: 85  With liters of O2: 2, O2 sat at rest with O2: 94  With Liters of O2: 2, O2 sat with amb with O2 : 90  Is the patient mobile?: Yes  If patient feels more short of breath, they can go up to 6 liters per minute and contact healthcare provider.    Supporting Symptoms: The patient requires supplemental oxygen, as the following interventions have been tried with limited or no improvement: \"Ambulation with oximetry and \"Incentive spirometry.    My Clinical findings support the need for the above equipment due to:  Hypoxia  "

## 2025-02-06 ENCOUNTER — PHARMACY VISIT (OUTPATIENT)
Dept: PHARMACY | Facility: MEDICAL CENTER | Age: 55
End: 2025-02-06
Payer: COMMERCIAL

## 2025-02-06 VITALS
RESPIRATION RATE: 18 BRPM | HEART RATE: 98 BPM | WEIGHT: 159.39 LBS | BODY MASS INDEX: 26.56 KG/M2 | OXYGEN SATURATION: 96 % | DIASTOLIC BLOOD PRESSURE: 67 MMHG | HEIGHT: 65 IN | SYSTOLIC BLOOD PRESSURE: 106 MMHG | TEMPERATURE: 98.2 F

## 2025-02-06 LAB
ANION GAP SERPL CALC-SCNC: 10 MMOL/L (ref 7–16)
BUN SERPL-MCNC: 19 MG/DL (ref 8–22)
CALCIUM SERPL-MCNC: 8.6 MG/DL (ref 8.5–10.5)
CHLORIDE SERPL-SCNC: 89 MMOL/L (ref 96–112)
CO2 SERPL-SCNC: 26 MMOL/L (ref 20–33)
CREAT SERPL-MCNC: 0.89 MG/DL (ref 0.5–1.4)
ERYTHROCYTE [DISTWIDTH] IN BLOOD BY AUTOMATED COUNT: 74.3 FL (ref 35.9–50)
GFR SERPLBLD CREATININE-BSD FMLA CKD-EPI: 76 ML/MIN/1.73 M 2
GLUCOSE SERPL-MCNC: 89 MG/DL (ref 65–99)
HCT VFR BLD AUTO: 36.6 % (ref 37–47)
HGB BLD-MCNC: 12.6 G/DL (ref 12–16)
MAGNESIUM SERPL-MCNC: 1.8 MG/DL (ref 1.5–2.5)
MCH RBC QN AUTO: 38.7 PG (ref 27–33)
MCHC RBC AUTO-ENTMCNC: 34.4 G/DL (ref 32.2–35.5)
MCV RBC AUTO: 112.3 FL (ref 81.4–97.8)
PHOSPHATE SERPL-MCNC: 2.7 MG/DL (ref 2.5–4.5)
PLATELET # BLD AUTO: 50 K/UL (ref 164–446)
PLATELETS.RETICULATED NFR BLD AUTO: 5.7 % (ref 0.6–13.1)
PMV BLD AUTO: 11.3 FL (ref 9–12.9)
POTASSIUM SERPL-SCNC: 4.1 MMOL/L (ref 3.6–5.5)
RBC # BLD AUTO: 3.26 M/UL (ref 4.2–5.4)
SODIUM SERPL-SCNC: 125 MMOL/L (ref 135–145)
WBC # BLD AUTO: 9 K/UL (ref 4.8–10.8)

## 2025-02-06 PROCEDURE — 99239 HOSP IP/OBS DSCHRG MGMT >30: CPT | Performed by: HOSPITALIST

## 2025-02-06 PROCEDURE — 97166 OT EVAL MOD COMPLEX 45 MIN: CPT

## 2025-02-06 PROCEDURE — 700102 HCHG RX REV CODE 250 W/ 637 OVERRIDE(OP): Performed by: HOSPITALIST

## 2025-02-06 PROCEDURE — 97165 OT EVAL LOW COMPLEX 30 MIN: CPT

## 2025-02-06 PROCEDURE — RXMED WILLOW AMBULATORY MEDICATION CHARGE

## 2025-02-06 PROCEDURE — 700102 HCHG RX REV CODE 250 W/ 637 OVERRIDE(OP)

## 2025-02-06 PROCEDURE — 700111 HCHG RX REV CODE 636 W/ 250 OVERRIDE (IP): Performed by: EMERGENCY MEDICINE

## 2025-02-06 PROCEDURE — 36415 COLL VENOUS BLD VENIPUNCTURE: CPT

## 2025-02-06 PROCEDURE — 80048 BASIC METABOLIC PNL TOTAL CA: CPT

## 2025-02-06 PROCEDURE — A9270 NON-COVERED ITEM OR SERVICE: HCPCS | Performed by: HOSPITALIST

## 2025-02-06 PROCEDURE — 83735 ASSAY OF MAGNESIUM: CPT

## 2025-02-06 PROCEDURE — A9270 NON-COVERED ITEM OR SERVICE: HCPCS

## 2025-02-06 PROCEDURE — 84100 ASSAY OF PHOSPHORUS: CPT

## 2025-02-06 PROCEDURE — 97535 SELF CARE MNGMENT TRAINING: CPT

## 2025-02-06 RX ORDER — LACTULOSE 10 G/15ML
15 SOLUTION ORAL 2 TIMES DAILY
Qty: 450 ML | Refills: 0 | Status: SHIPPED | OUTPATIENT
Start: 2025-02-06 | End: 2025-02-07

## 2025-02-06 RX ORDER — POTASSIUM CHLORIDE 1500 MG/1
10 TABLET, EXTENDED RELEASE ORAL 2 TIMES DAILY
Qty: 30 TABLET | Refills: 0 | Status: SHIPPED | OUTPATIENT
Start: 2025-02-06 | End: 2025-02-14 | Stop reason: SDUPTHER

## 2025-02-06 RX ORDER — SPIRONOLACTONE 25 MG/1
25 TABLET ORAL EVERY EVENING
Qty: 30 TABLET | Refills: 0 | Status: ON HOLD | OUTPATIENT
Start: 2025-02-06 | End: 2025-02-12

## 2025-02-06 RX ORDER — FUROSEMIDE 20 MG/1
10 TABLET ORAL EVERY MORNING
Qty: 15 TABLET | Refills: 0 | Status: ON HOLD | OUTPATIENT
Start: 2025-02-07 | End: 2025-02-12

## 2025-02-06 RX ADMIN — OMEPRAZOLE 20 MG: 20 CAPSULE, DELAYED RELEASE ORAL at 05:16

## 2025-02-06 RX ADMIN — LACTULOSE 15 ML: 10 SOLUTION ORAL at 05:16

## 2025-02-06 RX ADMIN — MIDODRINE HYDROCHLORIDE 10 MG: 5 TABLET ORAL at 13:55

## 2025-02-06 RX ADMIN — THIAMINE HYDROCHLORIDE 100 MG: 100 INJECTION, SOLUTION INTRAMUSCULAR; INTRAVENOUS at 05:16

## 2025-02-06 RX ADMIN — MIDODRINE HYDROCHLORIDE 10 MG: 5 TABLET ORAL at 05:16

## 2025-02-06 ASSESSMENT — COGNITIVE AND FUNCTIONAL STATUS - GENERAL
HELP NEEDED FOR BATHING: A LITTLE
DRESSING REGULAR UPPER BODY CLOTHING: A LITTLE
DRESSING REGULAR LOWER BODY CLOTHING: A LITTLE
DAILY ACTIVITIY SCORE: 20
TOILETING: A LITTLE
SUGGESTED CMS G CODE MODIFIER DAILY ACTIVITY: CJ

## 2025-02-06 ASSESSMENT — ENCOUNTER SYMPTOMS
HEMOPTYSIS: 0
CHILLS: 0
PALPITATIONS: 0
CONSTIPATION: 0
VOMITING: 0
WHEEZING: 0
COUGH: 1
DOUBLE VISION: 0
ABDOMINAL PAIN: 0
SPUTUM PRODUCTION: 0
DIARRHEA: 0
NERVOUS/ANXIOUS: 0
DEPRESSION: 0
NAUSEA: 0
BLURRED VISION: 0
SORE THROAT: 0
HEADACHES: 0
DIZZINESS: 0
FEVER: 0
LOSS OF CONSCIOUSNESS: 0
SHORTNESS OF BREATH: 0

## 2025-02-06 ASSESSMENT — ACTIVITIES OF DAILY LIVING (ADL): TOILETING: INDEPENDENT

## 2025-02-06 ASSESSMENT — PAIN DESCRIPTION - PAIN TYPE
TYPE: ACUTE PAIN
TYPE: ACUTE PAIN

## 2025-02-06 NOTE — PROGRESS NOTES
Patient's mother called at 0300 and 0600 to request patient discharge to short-term rehab. Patient agreed. Unknown if spouse is agreeable to this decision.    Decision to discharge to rehab to be discussed with discharge planning staff.

## 2025-02-06 NOTE — DISCHARGE PLANNING
Note placed in discharge instructions.    You have an appointment scheduled to establish care with a primary care physician.     2/13/25 at 1040 AM     [facility]     [id number] [facility] [place] [facility] [place] [address]

## 2025-02-06 NOTE — PROGRESS NOTES
FWW fit to patient and left at bedside for post-discharge, home use.    Contact traction with any questions or concerns regarding the use of this DME.

## 2025-02-06 NOTE — DISCHARGE PLANNING
Spoke with Stacie patient's neighbor/caregiver. [phone] She expected patient to go to rehab. Explained PT/OT notes and the recommendation for home health. That insurance will not cover a SNF based on the documentation. Suggested a bedside commode at least at night so patient does not have to walk far to use the bathroom.

## 2025-02-06 NOTE — PROGRESS NOTES
Discharge instructions covered with patient and family and oxygen delivered. Transport arrived to  patient and she had a hypotensive episode while transferring to the wheelchair. BP's checked while sitting and standing and patient's had positive orthostatics. Charge RN notified MD. Patient transferred back to bed with the help of 2 RN's.

## 2025-02-06 NOTE — PROGRESS NOTES
Patient was positive for orthostatic hypotension with decreased BP and elevated HR and change in mentation upon standing.  Family concerned and requested evalution.  MD notified and ok'd delay discharge with 1L bolus.    After 500ml laying BP 92/58 with pulse 93 and standing 83/52 with pulse 122 and lightheadedness.    Continued remaining bolus.

## 2025-02-06 NOTE — CARE PLAN
The patient is Stable - Low risk of patient condition declining or worsening    Shift Goals  Clinical Goals: monitor for orthostatic hypotension  Patient Goals: increase LOC and decrase fatigue  Family Goals: Updates and reduced orthostatic hypotension    Progress made toward(s) clinical / shift goals:    Problem: Skin Integrity  Goal: Skin integrity is maintained or improved  Outcome: Progressing     Problem: Knowledge Deficit - Standard  Goal: Patient and family/care givers will demonstrate understanding of plan of care, disease process/condition, diagnostic tests and medications  Outcome: Progressing     NOTE: patient and family understands Tx plan and skin intact with frequent ambulations     Patient is not progressing towards the following goals:

## 2025-02-06 NOTE — FACE TO FACE
Face to Face Note  -  Durable Medical Equipment    Sree Rachel M.D. - NPI: [id number]  I certify that this patient is under my care and that they had a durable medical equipment(DME)face to face encounter by myself that meets the physician DME face-to-face encounter requirements with this patient on:    Date of encounter:   Patient:                    MRN:                       YOB: 2026  Stacy Oliva  [id number]  1970     The encounter with the patient was in whole, or in part, for the following medical condition, which is the primary reason for durable medical equipment:  Other - Decompensated Cirrhosis    I certify that, based on my findings, the following durable medical equipment is medically necessary:    Walkers.    My Clinical findings support the need for the above equipment due to:  Hypoxia   Detail Level: Generalized Detail Level: Detailed Detail Level: Simple

## 2025-02-06 NOTE — DISCHARGE PLANNING
Spoke with Kenneth, all equipment concentrator and longer tubing has been delivered to the [facility] where they are staying tonight,  feels like he needs a wheelchair and left the room to buy one in town, [facility] has provided oxygen and a walker. Insurance will not cover a walker and wheelchair.Walker recommended by PT/OT.

## 2025-02-06 NOTE — DISCHARGE PLANNING
Case Management Discharge Planning    Admission Date: 1/31/2025  GMLOS: 4.8  ALOS: 6    6-Clicks ADL Score: 20  6-Clicks Mobility Score: 18      Anticipated Discharge Dispo: Discharge Disposition: Discharged to home/self care (01)    DME Needed: Yes    DME Ordered: Yes    Action(s) Taken: Updated Provider/Nurse on Discharge PlanPatient discussed during IDT rounds with medical team.    Escalations Completed: None    Medically Clear: No    Next Steps: Case Management will continue to follow for discharge planning needs.    Barriers to Discharge: Medical clearance    Is the patient up for discharge tomorrow: Yes    Is transport arranged for discharge disposition: Yes    RN Case Manager met with patient at bedside and obtained the information used in this assessment. Patient verified accuracy of facesheet; patient lives in a single story home with her spouse.  Prior to current hospitalization, patient was independent with ADLS/IADLS. Patient has a ride and is able to attend necessary MD appointments. Patient prefers to use [facility]. Patient has no financial concerns. Patient has support from her spouse and neighbor/caregiver. Denies any history of substance use and denies any diagnosis of mental illness.    Care Transition Team Assessment    Information Source: Patient  Orientation Level: Oriented X4  Information Given By: Patient  Who is responsible for making decisions for patient? : Patient    Readmission Evaluation  Is this a readmission?: No    Elopement Risk  Legal Hold: No  Ambulatory or Self Mobile in Wheelchair: Yes  Disoriented: No  Psychiatric Symptoms: None  History of Wandering: No  Elopement this Admit: No  Vocalizing Wanting to Leave: No  Displays Behaviors, Body Language Wanting to Leave: No-Not at Risk for Elopement  Elopement Risk: Not at Risk for Elopement    Interdisciplinary Discharge Planning  Does Admitting Nurse Feel This Could be a Complex Discharge?: No  Primary Care Physician:  None  Lives with - Patient's Self Care Capacity: Spouse  Support Systems: Spouse / Significant Other  Housing / Facility: [address]  Do You Take your Prescribed Medications Regularly: Yes  Mobility Issues: Yes  Prior Services: Home-Independent  Patient Prefers to be Discharged to:: Home  Assistance Needed: Yes    Discharge Preparedness  What is your plan after discharge?: Home with help  What are your discharge supports?: Other (comment) (Neighbor and spouse)  Prior Functional Level: Ambulatory, Independent with Activities of Daily Living, Independent with Medication Management  Difficulity with ADLs: None  Difficulity with IADLs: Driving    Functional Assesment  Prior Functional Level: Ambulatory, Independent with Activities of Daily Living, Independent with Medication Management    Finances  Financial Barriers to Discharge: No  Prescription Coverage: No    Vision / Hearing Impairment  Right Eye Vision: Impaired, Wears Glasses  Left Eye Vision: Impaired, Wears Glasses         Advance Directive  Advance Directive?: None  Advance Directive offered?: AD Booklet refused         Psychological Assessment  History of Substance Abuse: Alcohol, Cocaine, Amphetamines  Non-compliant with Treatment: No  Newly Diagnosed Illness: No    Discharge Risks or Barriers  Discharge risks or barriers?: No PCP    Anticipated Discharge Information  Discharge Disposition: Discharged to home/self care (01)

## 2025-02-06 NOTE — DISCHARGE PLANNING
GERW received call from Kortney with [facility]. Kortney reported that because the orders are written for a portable concentrator, rather than tanks, they are not able to deliver the O2 until tomorrow along with the wheelchair. Kortney confirmed that if the orders are modified to specify tanks rather than a POC, they would be able to deliver O2 tonight for DC and can f/u with pt tomorrow for wheelchair delivery.    Kortney reported she will reach out to the [place] office to confirm O2 delivery tonight. Care team notified and MD updated DME O2 orders. Requested DPA send updated referral to [facility].    Addendum @7529  LSW received call back from Kortney who confirmed their [place] branch will deliver O2 supplies to bedside tonight and they are aware pt will be staying at the [facility]. Kortney asked that the updated order be sent to their [place] branch.

## 2025-02-06 NOTE — PROGRESS NOTES
[facility] Internal Medicine Daily Progress Note    Date of Service  2/5/25    [facility] Team: [facility]   Attending: Abigail Zhang M.d.  Senior Resident: Dr. Escobar  Contact Number: [phone]    Chief Complaint  Stacy Oliva is a 55 y.o. female admitted 1/31/2025 with acute hypoxic respiratory failure secondary to hepatic hydrothorax secondary to decompensated liver cirrhosis.    Hospital Course  55 y.o. female who presented 1/31/2025 with a history of alcoholic as well as familial cirrhosis, the patient is currently treated at [facility] and has been referred to [place] evaluation for possible transplant, patient presented to the sending facility with increasing dyspnea, was found to have a complete opacification of the right hemithorax, the patient was transferred to this facility for specialty care, on arrival the patient was tachypneic into the 40s, saturations in the 90 range on 10 L facemask, she reports that she has been requiring thoracentesis every few days, x 3 in the past week, the patient reports a history of GI bleeding, chronic alcohol dependence, with apparently inability to stay abstinent, the patient reported that she had quit drinking about a year ago but then her dog passed away and she started using alcohol again, there is documentation that in January and emergency departments where she reported having had alcohol 2 weeks prior to that.  Per chart review days documentation the patient had undergone thoracentesis on 12/31, 1/3, 1/10, 1/11, 1/22, 1/27.  The patient works, she is reportedly a real state/.  The patient had a chest tube placed, had a total of 2.4 L drained.  The patient started on therapy for recurrent ascites including diuretics, she had correction of some hyponatremia, gastroenterology was consulted and medical therapy at this time recommended, did not recommend a placement of a TIPPS  The patient is scheduled to have an right upper quadrant  ultrasound  She denies abdominal pain, there is no significant fluid wave  The patient states that she has been referred to a liver transplant, she is not very forthcoming with her recent alcohol use    Interval Problem Update  2/5/2025: No overnight events    Plan was for discharge, however patient experienced episode of hypotension. S/p 1L fluid bolus administered by night team. Diuretics reduced 10:25. Will plan for discharge 02/06/25 once stabilized.    I have discussed this patient's plan of care and discharge plan at IDT rounds today with Case Management, Nursing, Nursing leadership, and other members of the IDT team.    Consultants/Specialty  critical care    Code Status  Full Code    Disposition  The patient is medically cleared for discharge to home or a post-acute facility.  Anticipate discharge to: home with close outpatient follow-up    I have placed the appropriate orders for post-discharge needs.    Review of Systems  Review of Systems   Constitutional:  Positive for malaise/fatigue. Negative for chills and fever.   HENT:  Negative for congestion, hearing loss and sore throat.    Eyes:  Negative for blurred vision and double vision.   Respiratory:  Positive for cough. Negative for hemoptysis, sputum production, shortness of breath and wheezing.    Cardiovascular:  Negative for chest pain and palpitations.   Gastrointestinal:  Negative for abdominal pain, constipation, diarrhea, nausea and vomiting.   Neurological:  Negative for dizziness, loss of consciousness and headaches.   Psychiatric/Behavioral:  Negative for depression. The patient is not nervous/anxious.    All other systems reviewed and are negative.     Physical Exam  Temp:  [36.2 °C (97.1 °F)-36.6 °C (97.8 °F)] 36.6 °C (97.8 °F)  Pulse:  [] 115  Resp:  [17-18] 17  BP: ()/(52-63) 95/63  SpO2:  [91 %-93 %] 93 %    Physical Exam  Vitals and nursing note reviewed.   Constitutional:       General: She is awake. She is not in acute  distress.     Appearance: She is normal weight. She is ill-appearing. She is not toxic-appearing or diaphoretic.   HENT:      Head: Normocephalic and atraumatic.      Nose: Nose normal. No congestion or rhinorrhea.      Mouth/Throat:      Mouth: Mucous membranes are moist.      Pharynx: Oropharynx is clear.   Eyes:      General: No scleral icterus.        Right eye: No discharge.         Left eye: No discharge.      Extraocular Movements: Extraocular movements intact.      Conjunctiva/sclera: Conjunctivae normal.   Cardiovascular:      Rate and Rhythm: Regular rhythm. Tachycardia present.      Pulses: Normal pulses.      Heart sounds: Normal heart sounds. No murmur heard.     No friction rub. No gallop.   Pulmonary:      Effort: Pulmonary effort is normal. No respiratory distress.      Breath sounds: No stridor. No wheezing, rhonchi or rales.      Comments: Diminished right lower lung field breath sounds.  Abdominal:      General: Abdomen is flat. Bowel sounds are normal. There is no distension.      Palpations: Abdomen is soft. There is no mass.      Tenderness: There is no abdominal tenderness. There is no guarding or rebound.   Musculoskeletal:         General: Normal range of motion.      Right lower leg: No edema.      Left lower leg: No edema.   Skin:     General: Skin is warm and dry.      Capillary Refill: Capillary refill takes less than 2 seconds.      Coloration: Skin is jaundiced.   Neurological:      General: No focal deficit present.      Mental Status: She is alert and oriented to person, place, and time. Mental status is at baseline.      Comments: +asterixis   Psychiatric:         Mood and Affect: Mood normal.         Behavior: Behavior normal. Behavior is cooperative.         Thought Content: Thought content normal.         Judgment: Judgment normal.       Fluids    Intake/Output Summary (Last 24 hours) at 2/6/2025 0687  Last data filed at 2/6/2025 0400  Gross per 24 hour   Intake 740 ml   Output  600 ml   Net 140 ml     Laboratory  Recent Labs     02/03/25  0633 02/04/25  0204 02/05/25  0835   WBC 11.3* 9.6 9.0   RBC 2.97* 2.71* 3.26*   HEMOGLOBIN 11.5* 10.7* 12.6   HEMATOCRIT 31.7* 28.8* 36.6*   .7* 106.3* 112.3*   MCH 38.7* 39.5* 38.7*   MCHC 36.3* 37.2* 34.4   RDW 66.6* 67.1* 74.3*   PLATELETCT 34* 32* 50*   MPV 10.4 10.9 11.3     Recent Labs     02/04/25  0204 02/05/25  0835 02/06/25  0447   SODIUM 126* 127* 125*   POTASSIUM 3.9 3.9 4.1   CHLORIDE 91* 89* 89*   CO2 26 26 26   GLUCOSE 92 104* 89   BUN 17 23* 19   CREATININE 0.77 1.08 0.89   CALCIUM 8.7 9.0 8.6                     Imaging  DX-CHEST-LIMITED (1 VIEW)   Final Result      1.  Interval improvement of RIGHT pleural effusion.   2.  No pneumothorax.      DX-CHEST-PORTABLE (1 VIEW)   Final Result      No significant change from prior exam.      CT-CTA CHEST PULMONARY ARTERY W/ RECONS   Final Result         1. Large right-sided pleural effusion although there has been some reexpansion of the right lung when compared with the prior exam.   2. There is new extensive left lung infiltrate.   3. Tiny right-sided pneumothorax.   4. No evidence for pulmonary embolism.   5. Cirrhotic appearance of the liver.      Findings were communicated to the ordering provider at the time of dictation via [employer].      DX-CHEST-PORTABLE (1 VIEW)   Final Result         1. Increasing moderate right pleural effusion and basilar atelectasis.      2. Increasing mild pulmonary edema. Persistent left lower lung alveolar infiltrate.                     US-RUQ   Final Result         1. Cirrhosis.   2. Right pleural effusion.   3. Cholelithiasis. There is mild gallbladder wall thickening and pericholecystic fluid but no biliary ductal dilatation.      DX-CHEST-PORTABLE (1 VIEW)   Final Result         1. Persistent mild left lung pneumonia infiltrate.      2. Increasing small right effusion and basilar atelectasis.      3. No pneumothorax visible today.                      DX-CHEST-PORTABLE (1 VIEW)   Final Result      1.  Unchanged trace right-sided pneumothorax.   2.  Trace residual right-sided effusion.   3.  Stable patchy left-sided interstitial opacities.         DX-CHEST-PORTABLE (1 VIEW)   Final Result      1.  Unchanged possible trace right-sided pneumothorax following removal of right-sided chest tube.   2.  Trace residual right-sided effusion.   3.  Stable patchy left-sided interstitial opacities.      DX-CHEST-PORTABLE (1 VIEW)   Final Result      1.  Possible trace right-sided pneumothorax with right-sided chest tube in place.   2.  Trace residual right-sided effusion.   3.  Stable patchy left-sided interstitial opacities.      DX-CHEST-PORTABLE (1 VIEW)   Final Result         1. There is a very large right pleural effusion. Right sided chest tube is now noted.   2. Small amount left-sided airspace disease. This could represent edema or infection.         Assessment/Plan  Problem Representation:  * Decompensated cirrhosis (HCC)- (present on admission)  Assessment & Plan  Alcoholic cirrhosis, MELD score 30 per most recent lab results.  Hx of ascites, none on this hospitalization  Pending repeat labs this morning  GI agree to continuing midodrine and albumin post para.   Continue home lactulose, spirinolactone 25 lasix 10  Pending phosphotidyl ethanol     Shock (HCC)- (present on admission)  Assessment & Plan  02/01: S/p chest tube insertion, 3L fluid drained  Likely dehydration/orthostatic hypotension  Levophed gtt for MAP > 55  Chest tube clamped @ 9am  Continue midodrine 10 TID    Acute hypoxic respiratory failure (HCC)- (present on admission)  Assessment & Plan  Continuing to improve..   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 Gave albumin 12.5. reports symptomatic improvement. Tachycardia and BP improving.   -Will benefit from spironolactone 100 and lasix 20, will up titrate as tolerated.    Hyponatremia- (present on admission)  Assessment & Plan  Likely secondary to alcoholic cirrhosis/volume overload + low ECV due to 3rd spacing.  Na noted to be 118 at OSH  Persistently low at 122 (02/03), asymptomatic.  S/p IV lasix 40 x1 02/01  Consider fluid restriction    Thrombocytopenia (HCC)- (present on admission)  Assessment & Plan  Likely secondary to alcoholic cirrhosis  Plt 32 as of 02/04, hold dvt ppx  Monitor for signs of bleeding    VTE prophylaxis: SCDs/TEDs    I have performed a physical exam and reviewed and updated ROS and Plan today (2/6/2025). In review of yesterday's note (2/5/2025), there are no changes except as documented above.

## 2025-02-06 NOTE — DISCHARGE PLANNING
@1652  DPA faxed an updated DME O2 order to [facility] via comm mgt, fax #[phone]    @1702  DPA faxed an updated DME O2 order to [facility] via comm mgt, fax #[phone]

## 2025-02-06 NOTE — THERAPY
"Occupational Therapy   Initial Evaluation     Patient Name: Stacy Oliva  Age:  55 y.o., Sex:  female  Medical Record #: [id number]  Today's Date: 2/6/2025     Precautions: Fall Risk    Assessment  Patient is 55 y.o. female admitted from [facility] for higher level of care. Pt has a complex medical hx including: end-stage liver disease (MELD score of 30) secondary to alcoholic cirrhosis, recurrent right sided pleural effusion w/hyponatremia. Pt has had several hospital admits d/t SOB. Pt did require a thoracostomy w/CT placement, and experienced hypotension and tachycardia. Pt reports being independent prior to admit but has had declining function w/recent hospitalizations.   Pt is currently dx w/decompensated cirrhosis, thrombocytopenia, hyponatremia, hepatic hydrothorax, acute hypoxic respiratory failure, and shock.   Pt presents w/generalized deconditioning, but was able to perform seated and standing ADL's w/SBA-spv. Pt had no c/o pain and mild c/o fatigue. OT will follow in this setting and currently recommend HH.     Plan  Occupational Therapy Initial Treatment Plan   Treatment Interventions: Self Care / Activities of Daily Living, Adaptive Equipment, Neuro Re-Education / Balance, Therapeutic Exercises, Therapeutic Activity  Treatment Frequency: 3 Times per Week  Duration: Until Therapy Goals Met    DC Equipment Recommendations: Defer to PT for FWW  Discharge Recommendations: Recommend home health for continued occupational therapy services       Subjective  \"They said I need to go to a rehab\"      Objective     02/06/25 0909   Initial Contact Note    Initial Contact Note Order Received and Verified, Occupational Therapy Evaluation in Progress with Full Report to Follow.   Prior Living Situation   Prior Services Home-Independent   Housing / Facility 1 Story House   Steps Into Home 2   Steps In Home 0   Equipment Owned None   Lives with - Patient's Self Care Capacity Spouse   Comments Pt reports SO is available as " needed   Prior Level of ADL Function   Self Feeding Independent   Grooming / Hygiene Independent   Bathing Independent   Dressing Independent   Toileting Independent   Prior Level of IADL Function   Medication Management Independent   Laundry Requires Assist   Kitchen Mobility Requires Assist   Finances Requires Assist   Home Management Requires Assist   Shopping Requires Assist   Prior Level Of Mobility Independent Without Device in Community   Comments pt reports recent functional decline w/multiple hospitlizations   Precautions   Precautions Fall Risk   Pain 0 - 10 Group   Location Generalized   Therapist Pain Assessment During Activity;Nurse Notified   Cognition    Cognition / Consciousness X   Level of Consciousness Alert   Comments Pleasant and cooperative w/some limited insight w/regards to need for O2 and generalized safety awarenes   Passive ROM Upper Body   Passive ROM Upper Body WDL   Active ROM Upper Body   Active ROM Upper Body  WDL   Strength Upper Body   Upper Body Strength  WDL   Coordination Upper Body   Coordination WDL   Balance Assessment   Sitting Balance (Static) Fair +   Sitting Balance (Dynamic) Fair   Standing Balance (Static) Fair   Standing Balance (Dynamic) Fair   Weight Shift Sitting Fair   Weight Shift Standing Fair   Comments w/fww   Bed Mobility    Supine to Sit Supervised   Sit to Supine Supervised   Scooting Supervised   ADL Assessment   Eating Modified Independent   Grooming Supervision;Seated   Upper Body Dressing Supervision   Lower Body Dressing Supervision   Toileting   (NT)   Comments declined standing ADL's or need for bathroom declined ambulation d/t wanting to eat   How much help from another person does the patient currently need...   6 Clicks Daily Activity Score 20   Functional Mobility   Sit to Stand Supervised   Bed, Chair, Wheelchair Transfer Supervised   Mobility EOB sit>stand side step to HOB BTB   Activity Tolerance   Comments mild c/o fatigue reports not sleeping  well   Patient / Family Goals   Patient / Family Goal #1 to get a shower   Short Term Goals   Short Term Goal # 1 pt will complete toilet txf and clothing manaement w/spv   Short Term Goal # 2 pt will complete grooming standing at sink w/spv   Short Term Goal # 3 pt will complete FB dressing w/mod I including clothing retrieval   Education Group   Role of Occupational Therapist Patient Response Patient;Acceptance;Explanation;Demonstration   Occupational Therapy Initial Treatment Plan    Treatment Interventions Self Care / Activities of Daily Living;Adaptive Equipment;Neuro Re-Education / Balance;Therapeutic Exercises;Therapeutic Activity   Treatment Frequency 3 Times per Week   Duration Until Therapy Goals Met   Problem List   Problem List Decreased Active Daily Living Skills;Decreased Activity Tolerance;Decreased Functional Mobility;Safety Awareness Deficits / Cognition;Impaired Postural Control / Balance   Anticipated Discharge Equipment and Recommendations   DC Equipment Recommendations Unable to determine at this time   Discharge Recommendations Recommend home health for continued occupational therapy services   Interdisciplinary Plan of Care Collaboration   IDT Collaboration with  Nursing   Patient Position at End of Therapy In Bed;Bed Alarm On;Call Light within Reach;Tray Table within Reach;Phone within Reach   Collaboration Comments RN aware of OT eval and pts efforts   Session Information   Date / Session Number  2/6 #1 (1/3, 2/12)

## 2025-02-06 NOTE — DISCHARGE INSTR - CASE MGT
You have an appointment scheduled to establish care with a primary care physician.    2/13/25 at 1040 AM    [facility]    [address] [facility] [place] [address]        [facility]  Address: [address] [place] [address] [id number], [place] [address]  Phone: [phone]

## 2025-02-06 NOTE — DISCHARGE PLANNING
[facility] patient needs a PCP for admission to [facility]. PCP appointment scheduled for 2/13.    Oxygen is at bedside.    Wheelchair cancelled per PT/OT recommendations.    Walker ordered as recommended.   No

## 2025-02-07 ENCOUNTER — PHARMACY VISIT (OUTPATIENT)
Dept: PHARMACY | Facility: MEDICAL CENTER | Age: 55
End: 2025-02-07
Payer: COMMERCIAL

## 2025-02-07 ENCOUNTER — HOSPITAL ENCOUNTER (INPATIENT)
Facility: MEDICAL CENTER | Age: 55
LOS: 5 days | DRG: 432 | End: 2025-02-12
Attending: EMERGENCY MEDICINE | Admitting: HOSPITALIST
Payer: COMMERCIAL

## 2025-02-07 ENCOUNTER — APPOINTMENT (OUTPATIENT)
Dept: RADIOLOGY | Facility: MEDICAL CENTER | Age: 55
DRG: 432 | End: 2025-02-07
Attending: EMERGENCY MEDICINE
Payer: COMMERCIAL

## 2025-02-07 ENCOUNTER — APPOINTMENT (OUTPATIENT)
Dept: RADIOLOGY | Facility: MEDICAL CENTER | Age: 55
DRG: 432 | End: 2025-02-07
Attending: HOSPITALIST
Payer: COMMERCIAL

## 2025-02-07 DIAGNOSIS — K74.60 DECOMPENSATED CIRRHOSIS (HCC): Chronic | ICD-10-CM

## 2025-02-07 DIAGNOSIS — R53.1 GENERALIZED WEAKNESS: ICD-10-CM

## 2025-02-07 DIAGNOSIS — J90 PLEURAL EFFUSION: ICD-10-CM

## 2025-02-07 DIAGNOSIS — R17 JAUNDICE: ICD-10-CM

## 2025-02-07 DIAGNOSIS — J90 RECURRENT PLEURAL EFFUSION ON RIGHT: ICD-10-CM

## 2025-02-07 DIAGNOSIS — J96.01 ACUTE HYPOXIC RESPIRATORY FAILURE (HCC): ICD-10-CM

## 2025-02-07 DIAGNOSIS — E87.1 HYPONATREMIA: ICD-10-CM

## 2025-02-07 DIAGNOSIS — E86.0 DEHYDRATION: ICD-10-CM

## 2025-02-07 DIAGNOSIS — K70.9 ALCOHOLIC LIVER DISEASE (HCC): ICD-10-CM

## 2025-02-07 DIAGNOSIS — K72.90 DECOMPENSATED CIRRHOSIS (HCC): Chronic | ICD-10-CM

## 2025-02-07 DIAGNOSIS — K74.69 DECOMPENSATED LIVER DISEASE (HCC): ICD-10-CM

## 2025-02-07 PROBLEM — J18.9 PNEUMONIA: Status: ACTIVE | Noted: 2025-02-07

## 2025-02-07 PROBLEM — I95.9 HYPOTENSION: Status: ACTIVE | Noted: 2025-02-07

## 2025-02-07 LAB
ALBUMIN SERPL BCP-MCNC: 2.8 G/DL (ref 3.2–4.9)
ALBUMIN/GLOB SERPL: 1 G/DL
ALP SERPL-CCNC: 211 U/L (ref 30–99)
ALT SERPL-CCNC: 48 U/L (ref 2–50)
AMMONIA PLAS-SCNC: 24 UMOL/L (ref 11–45)
ANION GAP SERPL CALC-SCNC: 9 MMOL/L (ref 7–16)
ANISOCYTOSIS BLD QL SMEAR: ABNORMAL
APTT PPP: 36.4 SEC (ref 24.7–36)
AST SERPL-CCNC: 54 U/L (ref 12–45)
BASOPHILS # BLD AUTO: 0.2 % (ref 0–1.8)
BASOPHILS # BLD: 0.02 K/UL (ref 0–0.12)
BILIRUB SERPL-MCNC: 9.9 MG/DL (ref 0.1–1.5)
BUN SERPL-MCNC: 18 MG/DL (ref 8–22)
CALCIUM ALBUM COR SERPL-MCNC: 9.7 MG/DL (ref 8.5–10.5)
CALCIUM SERPL-MCNC: 8.7 MG/DL (ref 8.5–10.5)
CHLORIDE SERPL-SCNC: 92 MMOL/L (ref 96–112)
CO2 SERPL-SCNC: 27 MMOL/L (ref 20–33)
COMMENT 1642: NORMAL
CREAT SERPL-MCNC: 0.9 MG/DL (ref 0.5–1.4)
EKG IMPRESSION: NORMAL
EOSINOPHIL # BLD AUTO: 0.02 K/UL (ref 0–0.51)
EOSINOPHIL NFR BLD: 0.2 % (ref 0–6.9)
ERYTHROCYTE [DISTWIDTH] IN BLOOD BY AUTOMATED COUNT: 72.5 FL (ref 35.9–50)
GFR SERPLBLD CREATININE-BSD FMLA CKD-EPI: 75 ML/MIN/1.73 M 2
GLOBULIN SER CALC-MCNC: 2.7 G/DL (ref 1.9–3.5)
GLUCOSE SERPL-MCNC: 103 MG/DL (ref 65–99)
HCT VFR BLD AUTO: 37 % (ref 37–47)
HGB BLD-MCNC: 13.2 G/DL (ref 12–16)
HOLDING TUBE BB 8507: NORMAL
IMM GRANULOCYTES # BLD AUTO: 0.05 K/UL (ref 0–0.11)
IMM GRANULOCYTES NFR BLD AUTO: 0.5 % (ref 0–0.9)
INR PPP: 1.97 (ref 0.87–1.13)
LACTATE SERPL-SCNC: 2.7 MMOL/L (ref 0.5–2)
LIPASE SERPL-CCNC: 111 U/L (ref 11–82)
LYMPHOCYTES # BLD AUTO: 0.6 K/UL (ref 1–4.8)
LYMPHOCYTES NFR BLD: 6.1 % (ref 22–41)
MACROCYTES BLD QL SMEAR: ABNORMAL
MCH RBC QN AUTO: 38.7 PG (ref 27–33)
MCHC RBC AUTO-ENTMCNC: 35.7 G/DL (ref 32.2–35.5)
MCV RBC AUTO: 108.5 FL (ref 81.4–97.8)
MONOCYTES # BLD AUTO: 0.28 K/UL (ref 0–0.85)
MONOCYTES NFR BLD AUTO: 2.8 % (ref 0–13.4)
MORPHOLOGY BLD-IMP: NORMAL
NEUTROPHILS # BLD AUTO: 8.93 K/UL (ref 1.82–7.42)
NEUTROPHILS NFR BLD: 90.2 % (ref 44–72)
NRBC # BLD AUTO: 0 K/UL
NRBC BLD-RTO: 0 /100 WBC (ref 0–0.2)
NT-PROBNP SERPL IA-MCNC: 440 PG/ML (ref 0–125)
PLATELET # BLD AUTO: 60 K/UL (ref 164–446)
PLATELET BLD QL SMEAR: NORMAL
PLATELETS.RETICULATED NFR BLD AUTO: 4 % (ref 0.6–13.1)
PMV BLD AUTO: 10.6 FL (ref 9–12.9)
POLYCHROMASIA BLD QL SMEAR: NORMAL
POTASSIUM SERPL-SCNC: 4.2 MMOL/L (ref 3.6–5.5)
PROCALCITONIN SERPL-MCNC: 0.16 NG/ML
PROT SERPL-MCNC: 5.5 G/DL (ref 6–8.2)
PROTHROMBIN TIME: 22.5 SEC (ref 12–14.6)
RBC # BLD AUTO: 3.41 M/UL (ref 4.2–5.4)
RBC BLD AUTO: PRESENT
SODIUM SERPL-SCNC: 128 MMOL/L (ref 135–145)
TOXIC GRANULES BLD QL SMEAR: NORMAL
TROPONIN T SERPL-MCNC: 13 NG/L (ref 6–19)
WBC # BLD AUTO: 9.9 K/UL (ref 4.8–10.8)

## 2025-02-07 PROCEDURE — 700101 HCHG RX REV CODE 250: Performed by: EMERGENCY MEDICINE

## 2025-02-07 PROCEDURE — 83690 ASSAY OF LIPASE: CPT

## 2025-02-07 PROCEDURE — 80053 COMPREHEN METABOLIC PANEL: CPT

## 2025-02-07 PROCEDURE — 85730 THROMBOPLASTIN TIME PARTIAL: CPT

## 2025-02-07 PROCEDURE — 71045 X-RAY EXAM CHEST 1 VIEW: CPT

## 2025-02-07 PROCEDURE — 99223 1ST HOSP IP/OBS HIGH 75: CPT | Performed by: HOSPITALIST

## 2025-02-07 PROCEDURE — 700102 HCHG RX REV CODE 250 W/ 637 OVERRIDE(OP): Performed by: HOSPITALIST

## 2025-02-07 PROCEDURE — 85055 RETICULATED PLATELET ASSAY: CPT

## 2025-02-07 PROCEDURE — 71250 CT THORAX DX C-: CPT

## 2025-02-07 PROCEDURE — 96374 THER/PROPH/DIAG INJ IV PUSH: CPT

## 2025-02-07 PROCEDURE — 770020 HCHG ROOM/CARE - TELE (206)

## 2025-02-07 PROCEDURE — 83880 ASSAY OF NATRIURETIC PEPTIDE: CPT

## 2025-02-07 PROCEDURE — 36415 COLL VENOUS BLD VENIPUNCTURE: CPT

## 2025-02-07 PROCEDURE — P9047 ALBUMIN (HUMAN), 25%, 50ML: HCPCS | Mod: JZ | Performed by: HOSPITALIST

## 2025-02-07 PROCEDURE — 82140 ASSAY OF AMMONIA: CPT

## 2025-02-07 PROCEDURE — RXMED WILLOW AMBULATORY MEDICATION CHARGE

## 2025-02-07 PROCEDURE — 83605 ASSAY OF LACTIC ACID: CPT

## 2025-02-07 PROCEDURE — 93005 ELECTROCARDIOGRAM TRACING: CPT | Mod: TC | Performed by: EMERGENCY MEDICINE

## 2025-02-07 PROCEDURE — 84484 ASSAY OF TROPONIN QUANT: CPT

## 2025-02-07 PROCEDURE — 99285 EMERGENCY DEPT VISIT HI MDM: CPT

## 2025-02-07 PROCEDURE — 700105 HCHG RX REV CODE 258: Performed by: EMERGENCY MEDICINE

## 2025-02-07 PROCEDURE — 85025 COMPLETE CBC W/AUTO DIFF WBC: CPT

## 2025-02-07 PROCEDURE — 94760 N-INVAS EAR/PLS OXIMETRY 1: CPT

## 2025-02-07 PROCEDURE — A9270 NON-COVERED ITEM OR SERVICE: HCPCS | Performed by: HOSPITALIST

## 2025-02-07 PROCEDURE — 85610 PROTHROMBIN TIME: CPT

## 2025-02-07 PROCEDURE — 700111 HCHG RX REV CODE 636 W/ 250 OVERRIDE (IP): Mod: JZ | Performed by: HOSPITALIST

## 2025-02-07 PROCEDURE — 84145 PROCALCITONIN (PCT): CPT

## 2025-02-07 RX ORDER — LACTULOSE 10 G/15ML
20 SOLUTION ORAL 2 TIMES DAILY
Qty: 237 ML | Refills: 0 | Status: SHIPPED | OUTPATIENT
Start: 2025-02-07 | End: 2025-02-14

## 2025-02-07 RX ORDER — OMEPRAZOLE 20 MG/1
20 CAPSULE, DELAYED RELEASE ORAL DAILY
Qty: 30 CAPSULE | Refills: 0 | Status: SHIPPED | OUTPATIENT
Start: 2025-02-07 | End: 2025-02-07

## 2025-02-07 RX ORDER — SODIUM CHLORIDE 9 MG/ML
INJECTION, SOLUTION INTRAVENOUS CONTINUOUS
Status: DISCONTINUED | OUTPATIENT
Start: 2025-02-07 | End: 2025-02-07

## 2025-02-07 RX ORDER — MIDODRINE HYDROCHLORIDE 5 MG/1
10 TABLET ORAL EVERY 8 HOURS
Status: DISCONTINUED | OUTPATIENT
Start: 2025-02-07 | End: 2025-02-10

## 2025-02-07 RX ORDER — PROMETHAZINE HYDROCHLORIDE 25 MG/1
12.5-25 TABLET ORAL EVERY 4 HOURS PRN
Status: DISCONTINUED | OUTPATIENT
Start: 2025-02-07 | End: 2025-02-12 | Stop reason: HOSPADM

## 2025-02-07 RX ORDER — DOXYCYCLINE 100 MG/1
100 TABLET ORAL EVERY 12 HOURS
Status: DISCONTINUED | OUTPATIENT
Start: 2025-02-08 | End: 2025-02-08

## 2025-02-07 RX ORDER — ONDANSETRON 4 MG/1
4 TABLET, ORALLY DISINTEGRATING ORAL EVERY 4 HOURS PRN
Status: DISCONTINUED | OUTPATIENT
Start: 2025-02-07 | End: 2025-02-12 | Stop reason: HOSPADM

## 2025-02-07 RX ORDER — ALBUMIN (HUMAN) 12.5 G/50ML
25 SOLUTION INTRAVENOUS ONCE
Status: COMPLETED | OUTPATIENT
Start: 2025-02-07 | End: 2025-02-07

## 2025-02-07 RX ORDER — ONDANSETRON 2 MG/ML
4 INJECTION INTRAMUSCULAR; INTRAVENOUS EVERY 4 HOURS PRN
Status: DISCONTINUED | OUTPATIENT
Start: 2025-02-07 | End: 2025-02-12 | Stop reason: HOSPADM

## 2025-02-07 RX ORDER — PROMETHAZINE HYDROCHLORIDE 25 MG/1
12.5-25 SUPPOSITORY RECTAL EVERY 4 HOURS PRN
Status: DISCONTINUED | OUTPATIENT
Start: 2025-02-07 | End: 2025-02-12 | Stop reason: HOSPADM

## 2025-02-07 RX ORDER — PROCHLORPERAZINE EDISYLATE 5 MG/ML
5-10 INJECTION INTRAMUSCULAR; INTRAVENOUS EVERY 4 HOURS PRN
Status: DISCONTINUED | OUTPATIENT
Start: 2025-02-07 | End: 2025-02-12 | Stop reason: HOSPADM

## 2025-02-07 RX ADMIN — MIDODRINE HYDROCHLORIDE 10 MG: 5 TABLET ORAL at 22:19

## 2025-02-07 RX ADMIN — DICLOFENAC SODIUM 2 G: 10 GEL TOPICAL at 21:57

## 2025-02-07 RX ADMIN — SODIUM CHLORIDE: 9 INJECTION, SOLUTION INTRAVENOUS at 20:14

## 2025-02-07 RX ADMIN — ALBUMIN (HUMAN) 25 G: 0.25 INJECTION, SOLUTION INTRAVENOUS at 22:20

## 2025-02-07 ASSESSMENT — ENCOUNTER SYMPTOMS
PHOTOPHOBIA: 0
POLYDIPSIA: 0
DIAPHORESIS: 0
BACK PAIN: 0
DIARRHEA: 0
VOMITING: 0
SHORTNESS OF BREATH: 1
HEARTBURN: 0
HALLUCINATIONS: 0
PALPITATIONS: 0
DOUBLE VISION: 0
SINUS PAIN: 0
CONSTIPATION: 0
PND: 0
COUGH: 0
DIZZINESS: 0
BRUISES/BLEEDS EASILY: 0
BLOOD IN STOOL: 0
MYALGIAS: 0
EYE PAIN: 0
SORE THROAT: 0
CLAUDICATION: 0
CHILLS: 0
ABDOMINAL PAIN: 0
ORTHOPNEA: 0
TINGLING: 0
WEAKNESS: 0
NECK PAIN: 0
DEPRESSION: 0
FEVER: 0
HEMOPTYSIS: 0
STRIDOR: 0
SPUTUM PRODUCTION: 0
FLANK PAIN: 0
TREMORS: 0
BLURRED VISION: 0
NAUSEA: 0
HEADACHES: 0
WHEEZING: 0
FALLS: 0

## 2025-02-07 ASSESSMENT — FIBROSIS 4 INDEX: FIB4 SCORE: 11.06

## 2025-02-07 ASSESSMENT — LIFESTYLE VARIABLES: SUBSTANCE_ABUSE: 0

## 2025-02-07 NOTE — PROGRESS NOTES
Discharge orders placed. Patient alert and oriented times 4. IV removed. Discharge paperwork discussed. All questions answered. Follow up appointment discussed. Patient discharged home/ [facility] for the evening due to snow via charge RN with medications. Lactulose is no availible at this time, MD changing to Miralax. Patient and family made aware of updated medication that can be picked up at [facility] or [facility]. Patient has all personal belongings.

## 2025-02-08 LAB
ALBUMIN SERPL BCP-MCNC: 2.8 G/DL (ref 3.2–4.9)
ALBUMIN/GLOB SERPL: 1.1 G/DL
ALP SERPL-CCNC: 113 U/L (ref 30–99)
ALT SERPL-CCNC: 43 U/L (ref 2–50)
ANION GAP SERPL CALC-SCNC: 11 MMOL/L (ref 7–16)
APPEARANCE UR: CLEAR
AST SERPL-CCNC: 49 U/L (ref 12–45)
BACTERIA #/AREA URNS HPF: ABNORMAL /HPF
BASOPHILS # BLD AUTO: 0.1 % (ref 0–1.8)
BASOPHILS # BLD: 0.01 K/UL (ref 0–0.12)
BILIRUB SERPL-MCNC: 10.3 MG/DL (ref 0.1–1.5)
BILIRUB UR QL STRIP.AUTO: ABNORMAL
BUN SERPL-MCNC: 17 MG/DL (ref 8–22)
CALCIUM ALBUM COR SERPL-MCNC: 9.5 MG/DL (ref 8.5–10.5)
CALCIUM SERPL-MCNC: 8.5 MG/DL (ref 8.5–10.5)
CASTS URNS QL MICRO: ABNORMAL /LPF (ref 0–2)
CHLORIDE SERPL-SCNC: 91 MMOL/L (ref 96–112)
CO2 SERPL-SCNC: 25 MMOL/L (ref 20–33)
COLOR UR: ABNORMAL
CREAT SERPL-MCNC: 0.78 MG/DL (ref 0.5–1.4)
EOSINOPHIL # BLD AUTO: 0.02 K/UL (ref 0–0.51)
EOSINOPHIL NFR BLD: 0.2 % (ref 0–6.9)
EPITHELIAL CELLS 1715: ABNORMAL /HPF (ref 0–5)
ERYTHROCYTE [DISTWIDTH] IN BLOOD BY AUTOMATED COUNT: 72.9 FL (ref 35.9–50)
GFR SERPLBLD CREATININE-BSD FMLA CKD-EPI: 89 ML/MIN/1.73 M 2
GLOBULIN SER CALC-MCNC: 2.5 G/DL (ref 1.9–3.5)
GLUCOSE SERPL-MCNC: 103 MG/DL (ref 65–99)
GLUCOSE UR STRIP.AUTO-MCNC: NEGATIVE MG/DL
HCT VFR BLD AUTO: 33.6 % (ref 37–47)
HGB BLD-MCNC: 11.9 G/DL (ref 12–16)
IMM GRANULOCYTES # BLD AUTO: 0.07 K/UL (ref 0–0.11)
IMM GRANULOCYTES NFR BLD AUTO: 0.7 % (ref 0–0.9)
KETONES UR STRIP.AUTO-MCNC: ABNORMAL MG/DL
LEUKOCYTE ESTERASE UR QL STRIP.AUTO: ABNORMAL
LYMPHOCYTES # BLD AUTO: 0.82 K/UL (ref 1–4.8)
LYMPHOCYTES NFR BLD: 8.4 % (ref 22–41)
MCH RBC QN AUTO: 38.8 PG (ref 27–33)
MCHC RBC AUTO-ENTMCNC: 35.4 G/DL (ref 32.2–35.5)
MCV RBC AUTO: 109.4 FL (ref 81.4–97.8)
MICRO URNS: ABNORMAL
MONOCYTES # BLD AUTO: 0.29 K/UL (ref 0–0.85)
MONOCYTES NFR BLD AUTO: 3 % (ref 0–13.4)
NEUTROPHILS # BLD AUTO: 8.57 K/UL (ref 1.82–7.42)
NEUTROPHILS NFR BLD: 87.6 % (ref 44–72)
NITRITE UR QL STRIP.AUTO: POSITIVE
NRBC # BLD AUTO: 0 K/UL
NRBC BLD-RTO: 0 /100 WBC (ref 0–0.2)
PH UR STRIP.AUTO: 5.5 [PH] (ref 5–8)
PLATELET # BLD AUTO: 51 K/UL (ref 164–446)
PLATELETS.RETICULATED NFR BLD AUTO: 3.6 % (ref 0.6–13.1)
PMV BLD AUTO: 10.1 FL (ref 9–12.9)
POTASSIUM SERPL-SCNC: 3.8 MMOL/L (ref 3.6–5.5)
PROT SERPL-MCNC: 5.3 G/DL (ref 6–8.2)
PROT UR QL STRIP: NEGATIVE MG/DL
RBC # BLD AUTO: 3.07 M/UL (ref 4.2–5.4)
RBC # URNS HPF: ABNORMAL /HPF (ref 0–2)
RBC UR QL AUTO: NEGATIVE
SODIUM SERPL-SCNC: 127 MMOL/L (ref 135–145)
SP GR UR STRIP.AUTO: 1.03
UROBILINOGEN UR STRIP.AUTO-MCNC: 4 EU/DL
WBC # BLD AUTO: 9.8 K/UL (ref 4.8–10.8)
WBC #/AREA URNS HPF: ABNORMAL /HPF

## 2025-02-08 PROCEDURE — A9270 NON-COVERED ITEM OR SERVICE: HCPCS

## 2025-02-08 PROCEDURE — 85055 RETICULATED PLATELET ASSAY: CPT

## 2025-02-08 PROCEDURE — 99233 SBSQ HOSP IP/OBS HIGH 50: CPT | Performed by: HOSPITALIST

## 2025-02-08 PROCEDURE — 700105 HCHG RX REV CODE 258: Performed by: HOSPITALIST

## 2025-02-08 PROCEDURE — 700111 HCHG RX REV CODE 636 W/ 250 OVERRIDE (IP): Mod: JZ | Performed by: HOSPITALIST

## 2025-02-08 PROCEDURE — 80053 COMPREHEN METABOLIC PANEL: CPT

## 2025-02-08 PROCEDURE — 85025 COMPLETE CBC W/AUTO DIFF WBC: CPT

## 2025-02-08 PROCEDURE — 700102 HCHG RX REV CODE 250 W/ 637 OVERRIDE(OP): Performed by: HOSPITALIST

## 2025-02-08 PROCEDURE — 700102 HCHG RX REV CODE 250 W/ 637 OVERRIDE(OP)

## 2025-02-08 PROCEDURE — 36415 COLL VENOUS BLD VENIPUNCTURE: CPT

## 2025-02-08 PROCEDURE — 81001 URINALYSIS AUTO W/SCOPE: CPT

## 2025-02-08 PROCEDURE — A9270 NON-COVERED ITEM OR SERVICE: HCPCS | Performed by: HOSPITALIST

## 2025-02-08 PROCEDURE — 770020 HCHG ROOM/CARE - TELE (206)

## 2025-02-08 RX ORDER — LACTULOSE 10 G/15ML
15 SOLUTION ORAL 2 TIMES DAILY
Status: DISCONTINUED | OUTPATIENT
Start: 2025-02-08 | End: 2025-02-10

## 2025-02-08 RX ORDER — FUROSEMIDE 20 MG/1
10 TABLET ORAL EVERY MORNING
Status: DISCONTINUED | OUTPATIENT
Start: 2025-02-08 | End: 2025-02-11

## 2025-02-08 RX ORDER — SPIRONOLACTONE 25 MG/1
25 TABLET ORAL EVERY EVENING
Status: DISCONTINUED | OUTPATIENT
Start: 2025-02-08 | End: 2025-02-11

## 2025-02-08 RX ADMIN — MIDODRINE HYDROCHLORIDE 10 MG: 5 TABLET ORAL at 21:06

## 2025-02-08 RX ADMIN — DOXYCYCLINE 100 MG: 100 TABLET, FILM COATED ORAL at 05:39

## 2025-02-08 RX ADMIN — MIDODRINE HYDROCHLORIDE 10 MG: 5 TABLET ORAL at 05:39

## 2025-02-08 RX ADMIN — FUROSEMIDE 10 MG: 20 TABLET ORAL at 13:00

## 2025-02-08 RX ADMIN — LACTULOSE 15 ML: 10 SOLUTION ORAL at 17:52

## 2025-02-08 RX ADMIN — AMPICILLIN AND SULBACTAM 3 G: 1; 2 INJECTION, POWDER, FOR SOLUTION INTRAMUSCULAR; INTRAVENOUS at 00:51

## 2025-02-08 RX ADMIN — SPIRONOLACTONE 25 MG: 25 TABLET ORAL at 17:52

## 2025-02-08 RX ADMIN — AMPICILLIN AND SULBACTAM 3 G: 1; 2 INJECTION, POWDER, FOR SOLUTION INTRAMUSCULAR; INTRAVENOUS at 05:43

## 2025-02-08 RX ADMIN — MIDODRINE HYDROCHLORIDE 10 MG: 5 TABLET ORAL at 14:27

## 2025-02-08 ASSESSMENT — SOCIAL DETERMINANTS OF HEALTH (SDOH)
IN THE PAST 12 MONTHS, HAS THE ELECTRIC, GAS, OIL, OR WATER COMPANY THREATENED TO SHUT OFF SERVICE IN YOUR HOME?: NO
WITHIN THE LAST YEAR, HAVE YOU BEEN AFRAID OF YOUR PARTNER OR EX-PARTNER?: NO
WITHIN THE PAST 12 MONTHS, THE FOOD YOU BOUGHT JUST DIDN'T LAST AND YOU DIDN'T HAVE MONEY TO GET MORE: NEVER TRUE
WITHIN THE LAST YEAR, HAVE YOU BEEN HUMILIATED OR EMOTIONALLY ABUSED IN OTHER WAYS BY YOUR PARTNER OR EX-PARTNER?: NO
WITHIN THE LAST YEAR, HAVE TO BEEN RAPED OR FORCED TO HAVE ANY KIND OF SEXUAL ACTIVITY BY YOUR PARTNER OR EX-PARTNER?: NO
WITHIN THE LAST YEAR, HAVE YOU BEEN HUMILIATED OR EMOTIONALLY ABUSED IN OTHER WAYS BY YOUR PARTNER OR EX-PARTNER?: NO
WITHIN THE LAST YEAR, HAVE YOU BEEN KICKED, HIT, SLAPPED, OR OTHERWISE PHYSICALLY HURT BY YOUR PARTNER OR EX-PARTNER?: NO
WITHIN THE LAST YEAR, HAVE YOU BEEN AFRAID OF YOUR PARTNER OR EX-PARTNER?: NO
WITHIN THE LAST YEAR, HAVE TO BEEN RAPED OR FORCED TO HAVE ANY KIND OF SEXUAL ACTIVITY BY YOUR PARTNER OR EX-PARTNER?: NO
WITHIN THE LAST YEAR, HAVE YOU BEEN KICKED, HIT, SLAPPED, OR OTHERWISE PHYSICALLY HURT BY YOUR PARTNER OR EX-PARTNER?: NO
WITHIN THE PAST 12 MONTHS, YOU WORRIED THAT YOUR FOOD WOULD RUN OUT BEFORE YOU GOT THE MONEY TO BUY MORE: NEVER TRUE

## 2025-02-08 ASSESSMENT — ENCOUNTER SYMPTOMS
BACK PAIN: 0
DEPRESSION: 0
DIARRHEA: 0
COUGH: 0
FALLS: 0
NECK PAIN: 0
HEMOPTYSIS: 0
BRUISES/BLEEDS EASILY: 0
TREMORS: 0
FEVER: 0
CHILLS: 0
FLANK PAIN: 0
CLAUDICATION: 0
SPUTUM PRODUCTION: 0
POLYDIPSIA: 0
PND: 0
ORTHOPNEA: 0
SINUS PAIN: 0
HEARTBURN: 0
STRIDOR: 0
PHOTOPHOBIA: 0
BLOOD IN STOOL: 0
DIZZINESS: 0
VOMITING: 0
SORE THROAT: 0
MYALGIAS: 0
BLURRED VISION: 0
TINGLING: 0
WHEEZING: 0
DIAPHORESIS: 0
EYE PAIN: 0
HEADACHES: 0
WEAKNESS: 0
ABDOMINAL PAIN: 0
CONSTIPATION: 0
SHORTNESS OF BREATH: 1
DOUBLE VISION: 0
PALPITATIONS: 0
HALLUCINATIONS: 0
NAUSEA: 0

## 2025-02-08 ASSESSMENT — LIFESTYLE VARIABLES
ON A TYPICAL DAY WHEN YOU DRINK ALCOHOL HOW MANY DRINKS DO YOU HAVE: 0
TOTAL SCORE: 1
HAVE YOU EVER FELT YOU SHOULD CUT DOWN ON YOUR DRINKING: NO
SUBSTANCE_ABUSE: 0
DOES PATIENT WANT TO STOP DRINKING: NO
ALCOHOL_USE: NO
AVERAGE NUMBER OF DAYS PER WEEK YOU HAVE A DRINK CONTAINING ALCOHOL: 0
HAVE PEOPLE ANNOYED YOU BY CRITICIZING YOUR DRINKING: NO
TOTAL SCORE: 1
EVER FELT BAD OR GUILTY ABOUT YOUR DRINKING: NO
TOTAL SCORE: 1
EVER HAD A DRINK FIRST THING IN THE MORNING TO STEADY YOUR NERVES TO GET RID OF A HANGOVER: YES
HOW MANY TIMES IN THE PAST YEAR HAVE YOU HAD 5 OR MORE DRINKS IN A DAY: 0
CONSUMPTION TOTAL: NEGATIVE

## 2025-02-08 ASSESSMENT — COGNITIVE AND FUNCTIONAL STATUS - GENERAL
STANDING UP FROM CHAIR USING ARMS: A LITTLE
DRESSING REGULAR LOWER BODY CLOTHING: A LITTLE
TOILETING: A LOT
SUGGESTED CMS G CODE MODIFIER MOBILITY: CK
SUGGESTED CMS G CODE MODIFIER DAILY ACTIVITY: CK
WALKING IN HOSPITAL ROOM: A LOT
MOVING FROM LYING ON BACK TO SITTING ON SIDE OF FLAT BED: A LITTLE
TURNING FROM BACK TO SIDE WHILE IN FLAT BAD: A LITTLE
CLIMB 3 TO 5 STEPS WITH RAILING: A LOT
DAILY ACTIVITIY SCORE: 19
MOBILITY SCORE: 16
DRESSING REGULAR UPPER BODY CLOTHING: A LITTLE
HELP NEEDED FOR BATHING: A LITTLE
MOVING TO AND FROM BED TO CHAIR: A LITTLE

## 2025-02-08 ASSESSMENT — FIBROSIS 4 INDEX
FIB4 SCORE: 7.27

## 2025-02-08 ASSESSMENT — PAIN DESCRIPTION - PAIN TYPE: TYPE: ACUTE PAIN

## 2025-02-08 NOTE — ASSESSMENT & PLAN NOTE
Patient received IV fluids that improved her hypotension in the ER   continue midodrine  Given albumin

## 2025-02-08 NOTE — PROGRESS NOTES
[facility] Internal Medicine Daily Progress Note    Date of Service  2/8/2025    [facility] Team: R JEM [facility] Team   Attending: Abigail Zhang M.d.  Senior Resident: Dr. Escobar  Intern:  Dr. Rachel  Contact Number: [phone]    Chief Complaint  Stacy Oliva is a 55 y.o. female admitted 2/7/2025 with Decompensated Cirrhosis    Hospital Course  Stacy Oliva, a 55-year-old female with a history of alcoholic cirrhosis and varices, presented on Feb 7, 2025, with generalized weakness. She was recently discharged but could not return to [facility], staying instead at a local hotel. Upon EMS arrival, she was found lethargic and hypotensive with a blood pressure in the 60s. She denies experiencing nausea, vomiting, diarrhea, fever, chills, cough, or shortness of breath.     In the ED:   CXR: increased intravascular congestion and right-sided pleural effusion, while an EKG reveals sinus tachycardia. urinalysis showed orange urine with trace ketones, moderate bilirubin, positive nitrites, and moderate bacteria.    started on IV Unasyn and doxycycline. Midodrine and albumin continued. Spironolactone and lasix held.    Interval Problem Update  Overnight events: none    A non-contrast CT scan of the chest shows scattered patchy bilateral pulmonary infiltrates, a moderate to large right pleural effusion, cholelithiasis, cirrhosis, and a right upper lobe pulmonary nodule (to be followed up in 12 months).  Patient states that she is feeling normal this morning, however does endorse that be having symptoms of weakness which is reducing her overall ADLs and quality of life. Will request PT/OT recs. Asymptomatic from pulmonary standpoint. Saturating well on 0.5LPM NC. Thora not urgently needed for the time being. Will restart low dose lasix and spironolactone. Low suspicion of PNA given negative procal, no white count, and decreasing o2 demand will d/c IV unasyn and doxy. Monitor for symptoms of respiratory distress.    I have discussed  this patient's plan of care and discharge plan at IDT rounds today with Case Management, Nursing, Nursing leadership, and other members of the IDT team.    Consultants/Specialty  N/A    Code Status  Full Code    Disposition  The patient is not medically cleared for discharge to home or a post-acute facility.      I have placed the appropriate orders for post-discharge needs.    Review of Systems  Review of Systems   Constitutional:  Positive for malaise/fatigue. Negative for chills, diaphoresis and fever.   HENT:  Negative for congestion, ear discharge, ear pain, hearing loss, nosebleeds, sinus pain, sore throat and tinnitus.    Eyes:  Negative for blurred vision, double vision, photophobia and pain.   Respiratory:  Positive for shortness of breath. Negative for cough, hemoptysis, sputum production, wheezing and stridor.    Cardiovascular:  Negative for chest pain, palpitations, orthopnea, claudication, leg swelling and PND.   Gastrointestinal:  Negative for abdominal pain, blood in stool, constipation, diarrhea, heartburn, melena, nausea and vomiting.   Genitourinary:  Negative for dysuria, flank pain, frequency, hematuria and urgency.   Musculoskeletal:  Negative for back pain, falls, joint pain, myalgias and neck pain.   Skin:  Negative for itching and rash.   Neurological:  Negative for dizziness, tingling, tremors, weakness and headaches.   Endo/Heme/Allergies:  Negative for environmental allergies and polydipsia. Does not bruise/bleed easily.   Psychiatric/Behavioral:  Negative for depression, hallucinations, substance abuse and suicidal ideas.       Physical Exam  Temp:  [35.9 °C (96.6 °F)-36.5 °C (97.7 °F)] (P) 36.1 °C (97 °F)  Pulse:  [] (P) 97  Resp:  [18] (P) 18  BP: ()/(50-76) (P) 97/62  SpO2:  [94 %-99 %] (P) 96 %    Physical Exam  Vitals and nursing note reviewed.   Constitutional:       General: She is not in acute distress.     Appearance: Normal appearance. She is not ill-appearing,  toxic-appearing or diaphoretic.   HENT:      Head: Normocephalic and atraumatic.      Nose: No congestion or rhinorrhea.      Mouth/Throat:      Pharynx: No oropharyngeal exudate or posterior oropharyngeal erythema.   Eyes:      General: No scleral icterus.  Neck:      Vascular: No carotid bruit or JVD.   Cardiovascular:      Rate and Rhythm: Normal rate and regular rhythm.      Pulses: Normal pulses.      Heart sounds: Normal heart sounds. No murmur heard.     No friction rub. No gallop.   Pulmonary:      Effort: Pulmonary effort is normal. No respiratory distress.      Breath sounds: No stridor. No wheezing, rhonchi or rales.   Abdominal:      General: Abdomen is flat. There is no distension.      Palpations: There is no mass.      Tenderness: There is no abdominal tenderness. There is no left CVA tenderness, guarding or rebound.      Hernia: No hernia is present.   Musculoskeletal:         General: No swelling. Normal range of motion.      Cervical back: No rigidity. No muscular tenderness.      Right lower leg: No edema.      Left lower leg: No edema.   Lymphadenopathy:      Cervical: No cervical adenopathy.   Skin:     General: Skin is warm and dry.      Capillary Refill: Capillary refill takes less than 2 seconds.      Coloration: Skin is not jaundiced or pale.      Findings: No bruising or erythema.   Neurological:      General: No focal deficit present.      Mental Status: She is alert and oriented to person, place, and time. Mental status is at baseline.   Psychiatric:         Mood and Affect: Mood normal.         Behavior: Behavior normal.     Fluids    Intake/Output Summary (Last 24 hours) at 2/8/2025 1537  Last data filed at 2/8/2025 0800  Gross per 24 hour   Intake 242.07 ml   Output 75 ml   Net 167.07 ml     Laboratory  Recent Labs     02/07/25  1905 02/08/25  0324   WBC 9.9 9.8   RBC 3.41* 3.07*   HEMOGLOBIN 13.2 11.9*   HEMATOCRIT 37.0 33.6*   .5* 109.4*   MCH 38.7* 38.8*   MCHC 35.7* 35.4    RDW 72.5* 72.9*   PLATELETCT 60* 51*   MPV 10.6 10.1     Recent Labs     02/06/25  0447 02/07/25  1905 02/08/25  0324   SODIUM 125* 128* 127*   POTASSIUM 4.1 4.2 3.8   CHLORIDE 89* 92* 91*   CO2 26 27 25   GLUCOSE 89 103* 103*   BUN 19 18 17   CREATININE 0.89 0.90 0.78   CALCIUM 8.6 8.7 8.5     Recent Labs     02/07/25 1905   APTT 36.4*   INR 1.97*             Imaging  CT-CHEST (THORAX) W/O   Final Result         1.  Scattered patchy bilateral pulmonary infiltrates   2.  Moderate to large layering right pleural effusion   3.  Cholelithiasis   4.  Cirrhosis   5.  Right upper lobe pulmonary nodule, see nodule follow-up recommendations below.      Fleischner Society pulmonary nodule recommendations:   Low Risk: No routine follow-up      High Risk: Optional CT at 12 months      Comments: Nodules less than 6 mm do not require routine follow-up, but certain patients at high risk with suspicious nodule morphology, upper lobe location, or both may warrant 12-month follow-up.      Low Risk - Minimal or absent history of smoking and of other known risk factors.      High Risk - History of smoking or of other known risk factors.      Note: These recommendations do not apply to lung cancer screening, patients with immunosuppression, or patients with known primary cancer.      Fleischner Society 2017 Guidelines for Management of Incidentally Detected Pulmonary Nodules in Adults         DX-CHEST-PORTABLE (1 VIEW)   Final Result         Diffuse interstitial prominence could relate to fluid overload or atypical infection.      Moderate to large right pleural effusion         Assessment/Plan  Problem Representation:  * Decompensated liver disease (HCC)- (present on admission)  Assessment & Plan  MELD score 30  Strict ins and outs and daily weights  Monitor on telemetry  Continue lasix and aldactone  Continue lactulose    Pneumonia  Assessment & Plan  Infiltrates found on the left side of the lung. Review of CT chest displays  findings inconsistent with PNA. Afebrile. Negative procal. On 0.5 LPM o2.  Will d/c IV Unasyn and doxycycline.    Hypotension  Assessment & Plan  Patient received IV fluids that improved her hypotension in the ER   continue midodrine  Given albumin    Acute hypoxic respiratory failure (HCC)- (present on admission)  Assessment & Plan  On 2 L of O2 above baseline    Lactic acidosis- (present on admission)  Assessment & Plan  2.7 > 2.7. low suspicion of sepsis.  - continue to monitor    Thrombocytopenia (HCC)- (present on admission)  Assessment & Plan  Chronic, likely 2/2 chronic alcohol usage.  Continue to trend    Pleural effusion- (present on admission)  Assessment & Plan  non-contrast CT scan of the chest shows scattered patchy bilateral pulmonary infiltrates, a moderate to large right pleural effusion, cholelithiasis, cirrhosis, and a right upper lobe pulmonary nodule (to be followed up in 12 months). Patient had multiple thoracentesis on the right side and had a chest tube.  - Asymptomatic from pulmonary standpoint. Saturating well on 0.5LPM NC. Thora not urgently needed for the time being.  - Will restart low dose lasix and spironolactone    VTE prophylaxis: SCDs/TEDs    I have performed a physical exam and reviewed and updated ROS and Plan today (2/8/2025). In review of yesterday's note (2/7/2025), there are no changes except as documented above.   Epidermal Sutures: 5-0 Nylon

## 2025-02-08 NOTE — ED PROVIDER NOTES
ER Provider Note    Scribed for Dr. Madeline Osman D.O. by Mishel Alexander. 2/7/2025  7:09 PM    Primary Care Provider: None noted    CHIEF COMPLAINT  Chief Complaint   Patient presents with    Weakness     EXTERNAL RECORDS REVIEWED  Inpatient Notes Patient hospitalized from 1/31/25- 2/6/25 for decompensated cirrhosis. History of significant history of end-stage liver disease (MELD score of 30) secondary to alcoholic cirrhosis. She underwent thoracostomy with chest tube placement, which drained approximately 3 liters of pleural fluid     HPI/ROS  LIMITATION TO HISTORY   Select: : None    OUTSIDE HISTORIAN(S):  Family at bedside to confirm sequence of events and collateral information provided. See HPI below.    Stacy Oliva is a 55 y.o. female with a history of ESLD secondary to alcoholic cirrhosis who presents to the ED via EMS for severe generalized weakness onset today. Family states patient was discharged yesterday for liver failure.  Because they live in [facility] they decided to stay in town overnight because of the weather.  They report upon discharge she was slightly jaundiced however this has worsened today. Family notes patient is lethargic, confused, has a decrease in appetite and knee pain for which she was treated with Voltaren gel, they report patient was on fluids for 6 days at hospitalization, and this is the first day she has not been on fluids. No alleviating or exacerbating factors noted.  Patient is very dehydrated and has not been taking p.o. fluids.    PAST MEDICAL HISTORY  Past Medical History:   Diagnosis Date    Liver disease     ESRD secondary to alcoholic cirrhosis       SURGICAL HISTORY  Past Surgical History:   Procedure Laterality Date    CHEST TUBE INSERTION  1/31/2025    CT UPPER GI ENDOSCOPY,DIAGNOSIS N/A 2/4/2024    Procedure: GASTROSCOPY;  Surgeon: Javi Paz M.D.;  Location: SURGERY [facility];  Service: Gastroenterology    CT UPPER GI ENDOSCOPY,CLEMENTAT VARISUSAN   "2/4/2024    Procedure: GASTROSCOPY, WITH BANDING;  Surgeon: Javi Paz M.D.;  Location: SURGERY [facility];  Service: Gastroenterology     FAMILY HISTORY  History reviewed. No pertinent family history.    SOCIAL HISTORY   reports that she has never smoked. She has never used smokeless tobacco. She reports that she does not currently use alcohol. She reports current drug use.    CURRENT MEDICATIONS  Previous Medications    FUROSEMIDE (LASIX) 20 MG TAB    Take 0.5 Tablets by mouth every morning.    LACTULOSE 10 G/15ML SOLUTION    Take 30 mL by mouth 2 times a day. Indications: Constipation, Impaired Brain Function due to Liver Disease, aim for 2 bowel movements per day    LACTULOSE 20 GM/30ML SOLUTION    Take 15 mL by mouth 3 times a day. 15-30mL one to three times per day. Titrate to ~2 bowel movements per day    LACTULOSE 20 GM/30ML SOLUTION    Take 15 mL by mouth 2 times a day.    MIDODRINE (PROAMATINE) 10 MG TABLET    Take 1 Tablet by mouth every 8 hours.    OMEPRAZOLE (PRILOSEC) 20 MG DELAYED-RELEASE CAPSULE    Take 1 Capsule by mouth every day.    OMEPRAZOLE (PRILOSEC) 20 MG DELAYED-RELEASE CAPSULE    Take 1 Capsule by mouth every day.    PHOSPHORUS (K-PHOS-NEUTRAL) 250 MG TABLET    Take 1 Tablet by mouth 2 times a day.    POTASSIUM CHLORIDE SA (KDUR) 20 MEQ TAB CR    Take 0.5 Tablets by mouth 2 times a day.    SPIRONOLACTONE (ALDACTONE) 25 MG TAB    Take 1 Tablet by mouth every evening.     ALLERGIES  Patient has no known allergies.    PHYSICAL EXAM  /61   Pulse (!) 107   Temp 35.9 °C (96.6 °F)   Resp 18   Ht 1.651 m (5' 5\")   Wt 72.1 kg (159 lb)   SpO2 94%   BMI 26.46 kg/m²   Constitutional: Patient is a jaundiced very ill appearing female with mild confusion  HENT: Normocephalic, atraumatic. Very dry oral mucosa.   Eyes: Positive scleral icterus  Cardiovascular: Tachycardic heart rate and Regular rhythm. No murmur  Thorax & Lungs: Clear and equal breath sounds with good excursion. No " respiratory distress, no rhonchi, wheezing or rales.   Abdomen: Bowel sounds normal in all four quadrants. Soft,nontender, no rebound , guarding, palpable masses. Non distended  Skin: Warm, Dry,  Jaundiced with multiple areas of bruising on her extremities.   Extremities: Peripheral pulses 4/4 No edema, No tenderness    Neurologic: Alert & oriented x 3, Normal motor function, Normal sensory function, mild confusion  Psychiatric: Affect normal, mildly confused, Mood normal.     DIAGNOSTIC STUDIES & PROCEDURES  Labs:   Results for orders placed or performed during the hospital encounter of 02/07/25   CBC WITH DIFFERENTIAL    Collection Time: 02/07/25  7:05 PM   Result Value Ref Range    WBC 9.9 4.8 - 10.8 K/uL    RBC 3.41 (L) 4.20 - 5.40 M/uL    Hemoglobin 13.2 12.0 - 16.0 g/dL    Hematocrit 37.0 37.0 - 47.0 %    .5 (H) 81.4 - 97.8 fL    MCH 38.7 (H) 27.0 - 33.0 pg    MCHC 35.7 (H) 32.2 - 35.5 g/dL    RDW 72.5 (H) 35.9 - 50.0 fL    Platelet Count 60 (L) 164 - 446 K/uL    MPV 10.6 9.0 - 12.9 fL    Neutrophils-Polys 90.20 (H) 44.00 - 72.00 %    Lymphocytes 6.10 (L) 22.00 - 41.00 %    Monocytes 2.80 0.00 - 13.40 %    Eosinophils 0.20 0.00 - 6.90 %    Basophils 0.20 0.00 - 1.80 %    Immature Granulocytes 0.50 0.00 - 0.90 %    Nucleated RBC 0.00 0.00 - 0.20 /100 WBC    Neutrophils (Absolute) 8.93 (H) 1.82 - 7.42 K/uL    Lymphs (Absolute) 0.60 (L) 1.00 - 4.80 K/uL    Monos (Absolute) 0.28 0.00 - 0.85 K/uL    Eos (Absolute) 0.02 0.00 - 0.51 K/uL    Baso (Absolute) 0.02 0.00 - 0.12 K/uL    Immature Granulocytes (abs) 0.05 0.00 - 0.11 K/uL    NRBC (Absolute) 0.00 K/uL    Anisocytosis 1+     Macrocytosis 2+ (A)    COMP METABOLIC PANEL    Collection Time: 02/07/25  7:05 PM   Result Value Ref Range    Sodium 128 (L) 135 - 145 mmol/L    Potassium 4.2 3.6 - 5.5 mmol/L    Chloride 92 (L) 96 - 112 mmol/L    Co2 27 20 - 33 mmol/L    Anion Gap 9.0 7.0 - 16.0    Glucose 103 (H) 65 - 99 mg/dL    Bun 18 8 - 22 mg/dL    Creatinine  0.90 0.50 - 1.40 mg/dL    Calcium 8.7 8.5 - 10.5 mg/dL    Correct Calcium 9.7 8.5 - 10.5 mg/dL    AST(SGOT) 54 (H) 12 - 45 U/L    ALT(SGPT) 48 2 - 50 U/L    Alkaline Phosphatase 211 (H) 30 - 99 U/L    Total Bilirubin 9.9 (H) 0.1 - 1.5 mg/dL    Albumin 2.8 (L) 3.2 - 4.9 g/dL    Total Protein 5.5 (L) 6.0 - 8.2 g/dL    Globulin 2.7 1.9 - 3.5 g/dL    A-G Ratio 1.0 g/dL   LIPASE    Collection Time: 02/07/25  7:05 PM   Result Value Ref Range    Lipase 111 (H) 11 - 82 U/L   APTT    Collection Time: 02/07/25  7:05 PM   Result Value Ref Range    APTT 36.4 (H) 24.7 - 36.0 sec   PROTHROMBIN TIME (INR)    Collection Time: 02/07/25  7:05 PM   Result Value Ref Range    PT 22.5 (H) 12.0 - 14.6 sec    INR 1.97 (H) 0.87 - 1.13   HOLD BLOOD BANK SPECIMEN (NOT TESTED)    Collection Time: 02/07/25  7:05 PM   Result Value Ref Range    Holding Tube - Bb DONE    ESTIMATED GFR    Collection Time: 02/07/25  7:05 PM   Result Value Ref Range    GFR (CKD-EPI) 75 >60 mL/min/1.73 m 2   PLATELET ESTIMATE    Collection Time: 02/07/25  7:05 PM   Result Value Ref Range    Plt Estimation Decreased    MORPHOLOGY    Collection Time: 02/07/25  7:05 PM   Result Value Ref Range    RBC Morphology Present     Polychromia 1+     Toxic Gran Few    PERIPHERAL SMEAR REVIEW    Collection Time: 02/07/25  7:05 PM   Result Value Ref Range    Peripheral Smear Review see below    IMMATURE PLT FRACTION    Collection Time: 02/07/25  7:05 PM   Result Value Ref Range    Imm. Plt Fraction 4.0 0.6 - 13.1 %   DIFFERENTIAL COMMENT    Collection Time: 02/07/25  7:05 PM   Result Value Ref Range    Comments-Diff see below    TROPONIN    Collection Time: 02/07/25  7:05 PM   Result Value Ref Range    Troponin T 13 6 - 19 ng/L   proBrain Natriuretic Peptide, NT    Collection Time: 02/07/25  7:05 PM   Result Value Ref Range    NT-proBNP 440 (H) 0 - 125 pg/mL   EKG (NOW)    Collection Time: 02/07/25  7:33 PM   Result Value Ref Range    Report       [facility]  Emergency Dept.    Test Date:  2025  Pt Name:    CARMEN CUADRA                 Department: ER  MRN:        [id number]                      Room:        07  Gender:     Female                       Technician: [id number]  :        [id number]                   Requested By:BETHANY CASANOVA  Order #:    [id number]                    Reading MD:    Measurements  Intervals                                Axis  Rate:       103                          P:          76  MT:         115                          QRS:        56  QRSD:       79                           T:          -64  QT:         307  QTc:        402    Interpretive Statements  Sinus tachycardia  Probable left atrial enlargement  Borderline repolarization abnormality  Compared to ECG 2025 11:06:29  Right-axis deviation no longer present     LACTIC ACID    Collection Time: 25  7:48 PM   Result Value Ref Range    Lactic Acid 2.7 (H) 0.5 - 2.0 mmol/L   AMMONIA    Collection Time: 25  7:48 PM   Result Value Ref Range    Ammonia 24 11 - 45 umol/L   All labs reviewed by me.    EKG:   I have independently interpreted this EKG     Radiology:   The attending Emergency Physician has independently interpreted the diagnostic imaging associated with this visit and is awaiting the final reading from the radiologist, which will be displayed below.  Preliminary interpretation is a follows: Increased pulmonary vasculature  Radiologist interpretation:  DX-CHEST-PORTABLE (1 VIEW)   Final Result         Diffuse interstitial prominence could relate to fluid overload or atypical infection.      Moderate to large right pleural effusion        COURSE & MEDICAL DECISION MAKING    Hydration: Based on the patient's presentation of Tachycardia the patient was given IV fluids. IV Hydration was used because oral hydration was not adequate alone. Upon recheck following hydration, the patient was improved.     INITIAL ASSESSMENT AND PLAN  Care Narrative:       7:19 PM -  Patient was seen and evaluated at bedside. Patient presents to the ED for weakness.  After my exam, I discussed with the patient the plan of care, which includes treating the patient with medication for their symptoms, as well as obtaining lab work and imaging for further evaluation. Discussed with patient and family plan for hospitalization. Patient understands and verbalizes agreement to plan of care. Patient will be treated with Voltaren 1% gel and NS infusion. Ordered DX chest, CBC w diff, CMP, lipase, LA, APTT, INR, ammonia, UA and EKG to evaluate.   Differential diagnoses include but not limited to: liver failure    Patient's labs are consistent with end-stage liver disease.  Her bilirubin actually has improved from her previous hospital admission but her lactic acid is slightly elevated at 2.7.  Her twelve-lead EKG shows sinus tachycardia with no acute changes.  Chest x-ray shows increased pulmonary vasculature with no focal loculations or infiltrates.  Her white blood cell count is normal, H&H is stable.  Platelet count is low at 60, sodium is also low at 128, albumin is low at 2.8 total protein 5.5.  Patient is extremely weak and is high risk for falling.  Her family does not feel that she could go home nor does the patient.  She is also mildly confused.    8:54 PM - Paged Hospitalist.     9:38 PM - Hospitalist responded. I discussed the patient's case and the above findings with Dr. Amin (Hospitalist) who agrees to evaluate the patient for hospitalization.                    DISPOSITION AND DISCUSSIONS  I have discussed management of the patient with the following physicians and SALMA's: Dr. Amin (Hospitalist)    Discussion of management with other QHP or appropriate source(s): None     Barriers to care at this time, including but not limited to: None.     Decision tools and prescription drugs considered including, but not limited to: Hospital admission.    DISPOSITION:  Patient will be hospitalized by  Dr. Amin in guarded condition.    FINAL IMPRESSION   1. Generalized weakness    2. Hyponatremia    3. Dehydration    4. Alcoholic liver disease (HCC)    5. Jaundice       I, Mishel Alexander (Scribe), am scribing for, and in the presence of, Madeline Osman D.O..    Electronically signed by: Mishel Alexander (Scribe), 2/7/2025    IMadeline D.O. personally performed the services described in this documentation, as scribed by Mishel Alexander in my presence, and it is both accurate and complete.    The note accurately reflects work and decisions made by me.  Madeline Osman D.O.  2/8/2025  1:41 AM

## 2025-02-08 NOTE — ASSESSMENT & PLAN NOTE
Infiltrates found on the left side of the lung. Review of CT chest displays findings inconsistent with PNA. Afebrile. Negative procal. On 0.5 LPM o2.  Will d/c IV Unasyn and doxycycline.

## 2025-02-08 NOTE — ED TRIAGE NOTES
Chief Complaint   Patient presents with    Weakness     PT BIB by EMS from other [facility] for weakness. PT has been being treated for liver failure for the and was discharged yesterday. PT states they were taken off fluids and started to experience increased weakness. PT visitors report a loss of appetite today. PT 88% on RA and is placed on 2lpm via NC

## 2025-02-08 NOTE — PROGRESS NOTES
..4 Eyes Skin Assessment Completed by Lauryn RN and Brian RN.    Head WDL  Ears Jaundice  Nose Jaundice  Mouth WDL  Neck Jaundice  Breast/Chest Scab and Jaundice  Shoulder Blades WDL  Spine WDL  (R) Arm/Elbow/Hand Bruising  (L) Arm/Elbow/Hand Bruising  Abdomen Scab  Groin WDL  Scrotum/Coccyx/Buttocks Redness and Blanching  (R) Leg Jaundice  (L) Leg Jaundice  (R) Heel/Foot/Toe Scab and Jaundice  (L) Heel/Foot/Toe Jaundice    Thoracentesis incision scab on  on back       Devices In Places Tele Box, Blood Pressure Cuff, Pulse Ox, and Nasal Cannula      Interventions In Place Gray Ear Foams and Pillows    Possible Skin Injury No    Pictures Uploaded Into Epic Yes  Wound Consult Placed N/A  RN Wound Prevention Protocol Ordered No

## 2025-02-08 NOTE — HOSPITAL COURSE
Stacy Oliva is a 55 y.o. female who presented 2/7/2025 with past medical history of alcoholic cirrhosis, history of varices who presents to the hospital for generalized weakness.  The patient was discharged from the hospital yesterday and the patient was unable to go back to [facility].  The family stayed at the hotel at [facility].  When EMS arrived they found her to be lethargic, weak and her blood pressure was in the 60s.  She denies any nausea, vomiting, diarrhea, fever, chills, cough, shortness of breath.  On her previous admission she was admitted for pleural effusion and chest tube was placed.  She was started on IV Lasix and then underwent hypovolemic shock.  She was placed on IV Levophed and transferred to the IMCU.  During the hospital admission she was found to have cholelithiasis without cholecystitis.  The patient was discharged on Lasix and Aldactone which she was taking.    Patient found to have decompensated cirrhosis with MELD score of 29-30.  Repeat chest x-ray shows increased right-sided pleural effusion.  Patient pending transplant and [place] per insurance.

## 2025-02-08 NOTE — CARE PLAN
The patient is Watcher - Medium risk of patient condition declining or worsening         Progress made toward(s) clinical / shift goals:        Problem: Knowledge Deficit - Standard  Goal: Patient and family/care givers will demonstrate understanding of plan of care, disease process/condition, diagnostic tests and medications  Description: Target End Date:  1-3 days or as soon as patient condition allows    Document in Patient Education    1.  Patient and family/caregiver oriented to unit, equipment, visitation policy and means for communicating concern  2.  Complete/review Learning Assessment  3.  Assess knowledge level of disease process/condition, treatment plan, diagnostic tests and medications  4.  Explain disease process/condition, treatment plan, diagnostic tests and medications  Outcome: Progressing  Note: Asked general questions regarding plan of care. Verbalize understanding      Problem: Skin Integrity  Goal: Skin integrity is maintained or improved  Description: Target End Date:  Prior to discharge or change in level of care    Document interventions on Skin Risk/Kai flowsheet groups and corresponding LDA    1.  Assess and monitor skin integrity, appearance and/or temperature  2.  Assess risk factors for impaired skin integrity and/or pressures ulcers  3.  Implement precautions to protect skin integrity in collaboration with interdisciplinary team  4.  Implement pressure ulcer prevention protocol if at risk for skin breakdown  5.  Confirm wound care consult if at risk for skin breakdown  6.  Ensure patient use of pressure relieving devices  (Low air loss bed, waffle overlay, heel protectors, ROHO cushion, etc)  Outcome: Progressing  Note: Patient turns self from side to side

## 2025-02-08 NOTE — H&P
Hospital Medicine History & Physical Note    Date of Service  2/7/2025    Primary Care Physician  Pcp Unknown    Consultants      Specialist Names:     Code Status  Full Code    Chief Complaint  Chief Complaint   Patient presents with    Weakness       History of Presenting Illness  Stacy Oliva is a 55 y.o. female who presented 2/7/2025 with past medical history of alcoholic cirrhosis, history of varices who presents to the hospital for generalized weakness.  The patient was discharged from the hospital yesterday and the patient was unable to go back to [facility].  The family stayed at the hotel at [facility].  When EMS arrived they found her to be lethargic, weak and her blood pressure was in the 60s.  She denies any nausea, vomiting, diarrhea, fever, chills, cough, shortness of breath.  On her previous admission she was admitted for pleural effusion and chest tube was placed.  She was started on IV Lasix and then underwent hypovolemic shock.  She was placed on IV Levophed and transferred to the CU.  During the hospital admission she was found to have cholelithiasis without cholecystitis.  The patient was discharged on Lasix and Aldactone which she was taking.    Chest x-ray interpreted by me found increased intravascular congestion and a right-sided pleural effusion  EKG interpreted by me found sinus tachycardia      I discussed the plan of care with patient.    Review of Systems  Review of Systems   Constitutional:  Positive for malaise/fatigue. Negative for chills, diaphoresis and fever.   HENT:  Negative for congestion, ear discharge, ear pain, hearing loss, nosebleeds, sinus pain, sore throat and tinnitus.    Eyes:  Negative for blurred vision, double vision, photophobia and pain.   Respiratory:  Positive for shortness of breath. Negative for cough, hemoptysis, sputum production, wheezing and stridor.    Cardiovascular:  Negative for chest pain, palpitations, orthopnea, claudication, leg swelling and PND.    Gastrointestinal:  Negative for abdominal pain, blood in stool, constipation, diarrhea, heartburn, melena, nausea and vomiting.   Genitourinary:  Negative for dysuria, flank pain, frequency, hematuria and urgency.   Musculoskeletal:  Negative for back pain, falls, joint pain, myalgias and neck pain.   Skin:  Negative for itching and rash.   Neurological:  Negative for dizziness, tingling, tremors, weakness and headaches.   Endo/Heme/Allergies:  Negative for environmental allergies and polydipsia. Does not bruise/bleed easily.   Psychiatric/Behavioral:  Negative for depression, hallucinations, substance abuse and suicidal ideas.        Past Medical History   has a past medical history of Liver disease and Patient denies medical problems.    Surgical History   has a past surgical history that includes pr upper gi endoscopy,diagnosis (N/A, 2/4/2024); pr upper gi endoscopy,ligat varix (2/4/2024); and chest tube insertion (1/31/2025).     Family History  Family history reviewed with patient. There is no family history that is pertinent to the chief complaint.     Social History   reports that she has never smoked. She has never used smokeless tobacco. She reports that she does not currently use alcohol. She reports current drug use.    Allergies  No Known Allergies    Medications  Prior to Admission Medications   Prescriptions Last Dose Informant Patient Reported? Taking?   furosemide (LASIX) 20 MG Tab   No No   Sig: Take 0.5 Tablets by mouth every morning.   lactulose 10 g/15mL Solution   No No   Sig: Take 30 mL by mouth 2 times a day. Indications: Constipation, Impaired Brain Function due to Liver Disease, aim for 2 bowel movements per day   midodrine (PROAMATINE) 10 MG tablet   No No   Sig: Take 1 Tablet by mouth every 8 hours.   omeprazole (PRILOSEC) 20 MG delayed-release capsule   No No   Sig: Take 1 Capsule by mouth every day.   phosphorus (K-PHOS-NEUTRAL) 250 MG tablet   No No   Sig: Take 1 Tablet by mouth 2  times a day.   potassium chloride SA (KDUR) 20 MEQ Tab CR   No No   Sig: Take 0.5 Tablets by mouth 2 times a day.   spironolactone (ALDACTONE) 25 MG Tab   No No   Sig: Take 1 Tablet by mouth every evening.      Facility-Administered Medications: None       Physical Exam  Temp:  [35.9 °C (96.6 °F)] 35.9 °C (96.6 °F)  Pulse:  [100-107] 105  Resp:  [18] 18  BP: ()/(50-68) 91/57  SpO2:  [94 %-98 %] 98 %  Blood Pressure: 103/61   Temperature: 35.9 °C (96.6 °F)   Pulse: 100   Respiration: 18   Pulse Oximetry: 98 %       Physical Exam  Vitals and nursing note reviewed.   Constitutional:       General: She is not in acute distress.     Appearance: Normal appearance. She is not ill-appearing, toxic-appearing or diaphoretic.   HENT:      Head: Normocephalic and atraumatic.      Nose: No congestion or rhinorrhea.      Mouth/Throat:      Pharynx: No oropharyngeal exudate or posterior oropharyngeal erythema.   Eyes:      General: No scleral icterus.  Neck:      Vascular: No carotid bruit or JVD.   Cardiovascular:      Rate and Rhythm: Normal rate and regular rhythm.      Pulses: Normal pulses.      Heart sounds: Normal heart sounds. No murmur heard.     No friction rub. No gallop.   Pulmonary:      Effort: Pulmonary effort is normal. No respiratory distress.      Breath sounds: No stridor. No wheezing, rhonchi or rales.   Abdominal:      General: Abdomen is flat. There is no distension.      Palpations: There is no mass.      Tenderness: There is no abdominal tenderness. There is no left CVA tenderness, guarding or rebound.      Hernia: No hernia is present.   Musculoskeletal:         General: No swelling. Normal range of motion.      Cervical back: No rigidity. No muscular tenderness.      Right lower leg: No edema.      Left lower leg: No edema.   Lymphadenopathy:      Cervical: No cervical adenopathy.   Skin:     General: Skin is warm and dry.      Capillary Refill: Capillary refill takes more than 3 seconds.       Coloration: Skin is not jaundiced or pale.      Findings: No bruising or erythema.   Neurological:      Mental Status: She is alert.         Laboratory:  Recent Labs     02/05/25  0835 02/07/25 1905   WBC 9.0 9.9   RBC 3.26* 3.41*   HEMOGLOBIN 12.6 13.2   HEMATOCRIT 36.6* 37.0   .3* 108.5*   MCH 38.7* 38.7*   MCHC 34.4 35.7*   RDW 74.3* 72.5*   PLATELETCT 50* 60*   MPV 11.3 10.6     Recent Labs     02/05/25  0835 02/06/25 0447 02/07/25 1905   SODIUM 127* 125* 128*   POTASSIUM 3.9 4.1 4.2   CHLORIDE 89* 89* 92*   CO2 26 26 27   GLUCOSE 104* 89 103*   BUN 23* 19 18   CREATININE 1.08 0.89 0.90   CALCIUM 9.0 8.6 8.7     Recent Labs     02/05/25  0835 02/06/25 0447 02/07/25 1905   ALTSGPT 42  --  48   ASTSGOT 64*  --  54*   ALKPHOSPHAT 114*  --  211*   TBILIRUBIN 12.5*  --  9.9*   LIPASE  --   --  111*   GLUCOSE 104* 89 103*     Recent Labs     02/07/25 1905   APTT 36.4*   INR 1.97*     Recent Labs     02/07/25  1905   NTPROBNP 440*         Recent Labs     02/07/25  1905   TROPONINT 13       Imaging:  DX-CHEST-PORTABLE (1 VIEW)   Final Result         Diffuse interstitial prominence could relate to fluid overload or atypical infection.      Moderate to large right pleural effusion      CT-CHEST (THORAX) W/O    (Results Pending)       X-Ray:  I have personally reviewed the images and compared with prior images.  EKG:  I have personally reviewed the images and compared with prior images.    Assessment/Plan:  Justification for Admission Status  I anticipate this patient will require at least two midnights for appropriate medical management, necessitating inpatient admission because decompensated liver disease    Patient will need a Telemetry bed on MEDICAL service .  The need is secondary to decompensated liver disease.    Decompensated liver disease (HCC)- (present on admission)  Assessment & Plan  MELD score 30  Strict ins and outs and daily weights  I will hold her Lasix and Aldactone for now  Monitor on  telemetry    Pleural effusion- (present on admission)  Assessment & Plan  Persistent right-sided pleural effusion  Patient had multiple thoracentesis on the right side and had a chest tube  Consider Pleurx catheter  I have ordered a noncontrast CT    Pneumonia  Assessment & Plan  Infiltrates found on the left side of the lung  Start IV Unasyn and doxycycline    Hypotension  Assessment & Plan  Patient received IV fluids that improved her hypotension in the ER   continue midodrine  I will order albumin    Acute hypoxic respiratory failure (HCC)- (present on admission)  Assessment & Plan  On 2 L of O2 above baseline    Lactic acidosis- (present on admission)  Assessment & Plan  Continue to trend    Thrombocytopenia (HCC)- (present on admission)  Assessment & Plan  Chronic  Continue to trend        VTE prophylaxis: SCDs/TEDs

## 2025-02-08 NOTE — ED NOTES
Bedside report received from off going RN/tech: Brooklynn, assumed care of patient.  POC discussed with patient. Call light within reach, all needs addressed at this time.       Fall risk interventions in place: Patient's personal possessions are with in their safe reach, Place socks on patient, Place fall risk sign on patient's door, Keep floor surfaces clean and dry, and Accompanied to restroom (all applicable per [place] Fall risk assessment)   Continuous monitoring: Cardiac Leads, Pulse Ox, or Blood Pressure  IVF/IV medications: Not Applicable   Oxygen: How many liters 2L  Bedside sitter: Not Applicable   Isolation: Not Applicable

## 2025-02-08 NOTE — PROGRESS NOTES
Telemetry Report:        Per Telestrip from Monitor Room     Rhythm: SR 90-96    Ectopy:PVC    PA:.13  QRS:.08  Qt: .36

## 2025-02-08 NOTE — ASSESSMENT & PLAN NOTE
Non-contrast CT scan of the chest shows scattered patchy bilateral pulmonary infiltrates, a moderate to large right pleural effusion, cholelithiasis, cirrhosis, and a right upper lobe pulmonary nodule (to be followed up in 12 months). Patient had multiple thoracentesis on the right side and had a chest tube.  - Asymptomatic from pulmonary standpoint.  Patient had increased oxygen requirements in the morning on 02/10/2025 and is continues to be on 2 L NC.  -Therapeutic thoracentesis completed on 02/11/2025.  Patient was hypotensive in 70s/30s shortly after the procedure. Output was 1.6 L however thoracentesis could not be completed due to hypotension. Patient given albumin 25 g. Midodrine was increased from 5 to 10 mg.  Subsequently blood pressures in 100s/60s. Patient is asymptomatic.  Currently on 2 L NC. Patient comfortable and denies shortness of breath.  Follow-up thoracentesis by IR in the a.m. on 02/12/2025 prior to discharge.    -Lasix increased to 40 mg p.o. daily.

## 2025-02-08 NOTE — ASSESSMENT & PLAN NOTE
MELD score 30  Strict ins and outs and daily weights  Monitor on telemetry  Continue lasix and aldactone  Continue lactulose  Low Na diet

## 2025-02-08 NOTE — ASSESSMENT & PLAN NOTE
On 2 L of O2 above baseline  -Repeat chest x-ray shows increased right-sided pleural effusion.  Possible thoracentesis in the a.m. on 02/11/2025 prior to discharge.

## 2025-02-08 NOTE — ED NOTES
Medication history reviewed with patient and family at bedside.   Med rec is complete  Allergies reviewed.     Patient has not had any outpatient antibiotics in the last 30 days.   Anticoagulants: No    Cami Rust

## 2025-02-08 NOTE — CARE PLAN
The patient is Stable - Low risk of patient condition declining or worsening    Shift Goals  Clinical Goals: monitor BP, safety  Patient Goals: poc  Family Goals: patient to shower    Progress made toward(s) clinical / shift goals:  Patient on MADIE bed, up to chair with assistance, self-turn in bed, on purewick for incontinence.    Problem: Skin Integrity  Goal: Skin integrity is maintained or improved  Description: Target End Date:  Prior to discharge or change in level of care    Document interventions on Skin Risk/Kai flowsheet groups and corresponding LDA    1.  Assess and monitor skin integrity, appearance and/or temperature  2.  Assess risk factors for impaired skin integrity and/or pressures ulcers  3.  Implement precautions to protect skin integrity in collaboration with interdisciplinary team  4.  Implement pressure ulcer prevention protocol if at risk for skin breakdown  5.  Confirm wound care consult if at risk for skin breakdown  6.  Ensure patient use of pressure relieving devices  (Low air loss bed, waffle overlay, heel protectors, ROHO cushion, etc)  Outcome: Progressing     Problem: Fall Risk  Goal: Patient will remain free from falls  Description: Target End Date:  Prior to discharge or change in level of care    Document interventions on the [place] Fall Risk Assessment    1.  Assess for fall risk factors  2.  Implement fall precautions  Outcome: Progressing       Patient is not progressing towards the following goals:

## 2025-02-09 LAB
ALBUMIN SERPL BCP-MCNC: 3 G/DL (ref 3.2–4.9)
ALBUMIN/GLOB SERPL: 1 G/DL
ALP SERPL-CCNC: 136 U/L (ref 30–99)
ALT SERPL-CCNC: 47 U/L (ref 2–50)
ANION GAP SERPL CALC-SCNC: 12 MMOL/L (ref 7–16)
AST SERPL-CCNC: 54 U/L (ref 12–45)
BILIRUB SERPL-MCNC: 10.2 MG/DL (ref 0.1–1.5)
BUN SERPL-MCNC: 16 MG/DL (ref 8–22)
CALCIUM ALBUM COR SERPL-MCNC: 9.7 MG/DL (ref 8.5–10.5)
CALCIUM SERPL-MCNC: 8.9 MG/DL (ref 8.5–10.5)
CHLORIDE SERPL-SCNC: 91 MMOL/L (ref 96–112)
CO2 SERPL-SCNC: 25 MMOL/L (ref 20–33)
CREAT SERPL-MCNC: 0.74 MG/DL (ref 0.5–1.4)
ERYTHROCYTE [DISTWIDTH] IN BLOOD BY AUTOMATED COUNT: 71.7 FL (ref 35.9–50)
GFR SERPLBLD CREATININE-BSD FMLA CKD-EPI: 95 ML/MIN/1.73 M 2
GLOBULIN SER CALC-MCNC: 2.9 G/DL (ref 1.9–3.5)
GLUCOSE SERPL-MCNC: 71 MG/DL (ref 65–99)
HCT VFR BLD AUTO: 39.8 % (ref 37–47)
HGB BLD-MCNC: 14 G/DL (ref 12–16)
MCH RBC QN AUTO: 38.5 PG (ref 27–33)
MCHC RBC AUTO-ENTMCNC: 35.2 G/DL (ref 32.2–35.5)
MCV RBC AUTO: 109.3 FL (ref 81.4–97.8)
PLATELET # BLD AUTO: 57 K/UL (ref 164–446)
PLATELETS.RETICULATED NFR BLD AUTO: 4.2 % (ref 0.6–13.1)
PMV BLD AUTO: 10.4 FL (ref 9–12.9)
POTASSIUM SERPL-SCNC: 3.9 MMOL/L (ref 3.6–5.5)
PROT SERPL-MCNC: 5.9 G/DL (ref 6–8.2)
RBC # BLD AUTO: 3.64 M/UL (ref 4.2–5.4)
SODIUM SERPL-SCNC: 128 MMOL/L (ref 135–145)
WBC # BLD AUTO: 8.8 K/UL (ref 4.8–10.8)

## 2025-02-09 PROCEDURE — 97535 SELF CARE MNGMENT TRAINING: CPT

## 2025-02-09 PROCEDURE — 99233 SBSQ HOSP IP/OBS HIGH 50: CPT | Mod: GC | Performed by: HOSPITALIST

## 2025-02-09 PROCEDURE — 700102 HCHG RX REV CODE 250 W/ 637 OVERRIDE(OP): Performed by: HOSPITALIST

## 2025-02-09 PROCEDURE — 36415 COLL VENOUS BLD VENIPUNCTURE: CPT

## 2025-02-09 PROCEDURE — 770020 HCHG ROOM/CARE - TELE (206)

## 2025-02-09 PROCEDURE — 99418 PROLNG IP/OBS E/M EA 15 MIN: CPT | Mod: GC | Performed by: HOSPITALIST

## 2025-02-09 PROCEDURE — A9270 NON-COVERED ITEM OR SERVICE: HCPCS | Performed by: HOSPITALIST

## 2025-02-09 PROCEDURE — 80053 COMPREHEN METABOLIC PANEL: CPT

## 2025-02-09 PROCEDURE — 97166 OT EVAL MOD COMPLEX 45 MIN: CPT

## 2025-02-09 PROCEDURE — 85027 COMPLETE CBC AUTOMATED: CPT

## 2025-02-09 PROCEDURE — 97162 PT EVAL MOD COMPLEX 30 MIN: CPT

## 2025-02-09 PROCEDURE — 700102 HCHG RX REV CODE 250 W/ 637 OVERRIDE(OP)

## 2025-02-09 PROCEDURE — A9270 NON-COVERED ITEM OR SERVICE: HCPCS

## 2025-02-09 PROCEDURE — 85055 RETICULATED PLATELET ASSAY: CPT

## 2025-02-09 RX ADMIN — MIDODRINE HYDROCHLORIDE 10 MG: 5 TABLET ORAL at 21:57

## 2025-02-09 RX ADMIN — LACTULOSE 15 ML: 10 SOLUTION ORAL at 18:05

## 2025-02-09 RX ADMIN — MIDODRINE HYDROCHLORIDE 10 MG: 5 TABLET ORAL at 14:38

## 2025-02-09 RX ADMIN — MIDODRINE HYDROCHLORIDE 10 MG: 5 TABLET ORAL at 04:26

## 2025-02-09 RX ADMIN — FUROSEMIDE 10 MG: 20 TABLET ORAL at 04:26

## 2025-02-09 RX ADMIN — LACTULOSE 15 ML: 10 SOLUTION ORAL at 04:25

## 2025-02-09 RX ADMIN — SPIRONOLACTONE 25 MG: 25 TABLET ORAL at 18:05

## 2025-02-09 ASSESSMENT — ENCOUNTER SYMPTOMS
ABDOMINAL PAIN: 0
WHEEZING: 0
DIARRHEA: 0
CHILLS: 0
LOSS OF CONSCIOUSNESS: 0
PALPITATIONS: 0
CONSTIPATION: 0
SHORTNESS OF BREATH: 0
NAUSEA: 0
VOMITING: 0
COUGH: 0
SPUTUM PRODUCTION: 0
FEVER: 0
SORE THROAT: 0
DIZZINESS: 0

## 2025-02-09 ASSESSMENT — ACTIVITIES OF DAILY LIVING (ADL): TOILETING: INDEPENDENT

## 2025-02-09 ASSESSMENT — SOCIAL DETERMINANTS OF HEALTH (SDOH)
WITHIN THE PAST 12 MONTHS, YOU WORRIED THAT YOUR FOOD WOULD RUN OUT BEFORE YOU GOT THE MONEY TO BUY MORE: NEVER TRUE
IN THE PAST 12 MONTHS, HAS THE ELECTRIC, GAS, OIL, OR WATER COMPANY THREATENED TO SHUT OFF SERVICE IN YOUR HOME?: NO
WITHIN THE PAST 12 MONTHS, THE FOOD YOU BOUGHT JUST DIDN'T LAST AND YOU DIDN'T HAVE MONEY TO GET MORE: NEVER TRUE

## 2025-02-09 ASSESSMENT — PAIN DESCRIPTION - PAIN TYPE
TYPE: ACUTE PAIN
TYPE: ACUTE PAIN;CHRONIC PAIN

## 2025-02-09 ASSESSMENT — COGNITIVE AND FUNCTIONAL STATUS - GENERAL
TURNING FROM BACK TO SIDE WHILE IN FLAT BAD: A LITTLE
MOVING FROM LYING ON BACK TO SITTING ON SIDE OF FLAT BED: A LITTLE
WALKING IN HOSPITAL ROOM: A LITTLE
MOVING TO AND FROM BED TO CHAIR: A LITTLE
CLIMB 3 TO 5 STEPS WITH RAILING: A LOT
SUGGESTED CMS G CODE MODIFIER MOBILITY: CK
STANDING UP FROM CHAIR USING ARMS: A LITTLE
PERSONAL GROOMING: A LITTLE
DRESSING REGULAR LOWER BODY CLOTHING: A LITTLE
DAILY ACTIVITIY SCORE: 20
MOBILITY SCORE: 17
TOILETING: A LITTLE
HELP NEEDED FOR BATHING: A LITTLE
SUGGESTED CMS G CODE MODIFIER DAILY ACTIVITY: CJ

## 2025-02-09 ASSESSMENT — GAIT ASSESSMENTS
DISTANCE (FEET): 30
DEVIATION: BRADYKINETIC;SHUFFLED GAIT
GAIT LEVEL OF ASSIST: CONTACT GUARD ASSIST
ASSISTIVE DEVICE: FRONT WHEEL WALKER

## 2025-02-09 ASSESSMENT — FIBROSIS 4 INDEX: FIB4 SCORE: 8.21

## 2025-02-09 NOTE — PROGRESS NOTES
[facility] Internal Medicine Daily Progress Note    Date of Service  2/9/2025    [facility] Team: R [facility] Team   Attending: Abigail Zhang M.d.  Senior Resident: Dr. Escobar  Contact Number: [phone]    Chief Complaint  Stacy Oliva is a 55 y.o. female admitted 2/7/2025 with decompensated cirrhosis.    Hospital Course  Stacy Oliva, a 55-year-old female with a history of alcoholic cirrhosis and varices, presented on Feb 7, 2025, with generalized weakness. She was recently discharged but could not return to [facility], staying instead at a local hotel. Upon EMS arrival, she was found lethargic and hypotensive with a blood pressure in the 60s. She denies experiencing nausea, vomiting, diarrhea, fever, chills, cough, or shortness of breath.      In the ED:   CXR: increased intravascular congestion and right-sided pleural effusion, while an EKG reveals sinus tachycardia. urinalysis showed orange urine with trace ketones, moderate bilirubin, positive nitrites, and moderate bacteria.     started on IV Unasyn and doxycycline. Midodrine and albumin continued. Spironolactone and lasix held.    Interval Problem Update  2/9/2025: No acute overnight events.  Patient not hypotensive.  Asymptomatic.  Weaned down to room air. Need to titrate lactulose to 2-3 BM/day.    Today, I had an extensive conversation with the patient's , sister, and mother.  We discussed patient's MELD score of 29 and what that means.  She will need follow-up with GI/hepatology.  She was referred to GI upon discharge last time.  Also important to note that during last admission, GI initiated transfer to transplant center, but all declined in [place].  [facility] did accept her, but her insurance only covered [place] transplant centers. PEth demonstrated abstinence.  I have placed outpatient referral to hepatology.  Discussed with case management and the family will have to help coordinate appointment (likely at [facility] per  family).    I have discussed this patient's plan of care and discharge plan at IDT rounds today with Case Management, Nursing, Nursing leadership, and other members of the IDT team.    Consultants/Specialty  None    Code Status  Full Code    Disposition  The patient is not medically cleared for discharge to home or a post-acute facility.      I have placed the appropriate orders for post-discharge needs.    Review of Systems  Review of Systems   Constitutional:  Negative for chills, fever and malaise/fatigue.   HENT:  Negative for congestion and sore throat.    Respiratory:  Negative for cough, sputum production, shortness of breath and wheezing.    Cardiovascular:  Negative for chest pain, palpitations and leg swelling.   Gastrointestinal:  Negative for abdominal pain, constipation, diarrhea, nausea and vomiting.   Neurological:  Negative for dizziness and loss of consciousness.   All other systems reviewed and are negative.       Physical Exam  Temp:  [35.9 °C (96.6 °F)-36.4 °C (97.5 °F)] 36.3 °C (97.3 °F)  Pulse:  [] 109  Resp:  [18] 18  BP: ()/(62-79) 103/71  SpO2:  [91 %-97 %] 91 %    Physical Exam  Constitutional:       General: She is not in acute distress.     Appearance: She is normal weight. She is not ill-appearing, toxic-appearing or diaphoretic.   HENT:      Head: Normocephalic and atraumatic.      Nose: Nose normal. No rhinorrhea.   Eyes:      General: No scleral icterus.        Right eye: No discharge.         Left eye: No discharge.      Extraocular Movements: Extraocular movements intact.      Conjunctiva/sclera: Conjunctivae normal.   Cardiovascular:      Rate and Rhythm: Normal rate and regular rhythm.      Pulses: Normal pulses.      Heart sounds: Normal heart sounds. No murmur heard.     No friction rub. No gallop.   Pulmonary:      Effort: Pulmonary effort is normal. No respiratory distress.      Breath sounds: No stridor. Rales present. No wheezing or rhonchi.      Comments:  Decreased breath sounds on the right.  Abdominal:      General: Bowel sounds are normal. There is no distension.      Palpations: Abdomen is soft.      Tenderness: There is no abdominal tenderness. There is no guarding.   Musculoskeletal:         General: Normal range of motion.      Right lower leg: No edema.      Left lower leg: No edema.   Skin:     General: Skin is warm and dry.   Neurological:      General: No focal deficit present.      Mental Status: She is alert and oriented to person, place, and time. Mental status is at baseline.   Psychiatric:         Mood and Affect: Mood normal.         Behavior: Behavior normal.         Fluids    Intake/Output Summary (Last 24 hours) at 2/9/2025 0705  Last data filed at 2/9/2025 0445  Gross per 24 hour   Intake 500 ml   Output 350 ml   Net 150 ml       Laboratory  Recent Labs     02/07/25 1905 02/08/25 0324 02/09/25  0624   WBC 9.9 9.8 8.8   RBC 3.41* 3.07* 3.64*   HEMOGLOBIN 13.2 11.9* 14.0   HEMATOCRIT 37.0 33.6* 39.8   .5* 109.4* 109.3*   MCH 38.7* 38.8* 38.5*   MCHC 35.7* 35.4 35.2   RDW 72.5* 72.9* 71.7*   PLATELETCT 60* 51* 57*   MPV 10.6 10.1 10.4     Recent Labs     02/07/25 1905 02/08/25 0324   SODIUM 128* 127*   POTASSIUM 4.2 3.8   CHLORIDE 92* 91*   CO2 27 25   GLUCOSE 103* 103*   BUN 18 17   CREATININE 0.90 0.78   CALCIUM 8.7 8.5     Recent Labs     02/07/25 1905   APTT 36.4*   INR 1.97*               Imaging  CT-CHEST (THORAX) W/O   Final Result         1.  Scattered patchy bilateral pulmonary infiltrates   2.  Moderate to large layering right pleural effusion   3.  Cholelithiasis   4.  Cirrhosis   5.  Right upper lobe pulmonary nodule, see nodule follow-up recommendations below.      Fleischner Society pulmonary nodule recommendations:   Low Risk: No routine follow-up      High Risk: Optional CT at 12 months      Comments: Nodules less than 6 mm do not require routine follow-up, but certain patients at high risk with suspicious nodule  morphology, upper lobe location, or both may warrant 12-month follow-up.      Low Risk - Minimal or absent history of smoking and of other known risk factors.      High Risk - History of smoking or of other known risk factors.      Note: These recommendations do not apply to lung cancer screening, patients with immunosuppression, or patients with known primary cancer.      Fleischner Society 2017 Guidelines for Management of Incidentally Detected Pulmonary Nodules in Adults         DX-CHEST-PORTABLE (1 VIEW)   Final Result         Diffuse interstitial prominence could relate to fluid overload or atypical infection.      Moderate to large right pleural effusion           Assessment/Plan  Problem Representation:    * Decompensated liver disease (HCC)- (present on admission)  Assessment & Plan  MELD score 30  Strict ins and outs and daily weights  Monitor on telemetry  Continue lasix and aldactone  Continue lactulose  Low Na diet    Pneumonia  Assessment & Plan  Infiltrates found on the left side of the lung. Review of CT chest displays findings inconsistent with PNA. Afebrile. Negative procal. On 0.5 LPM o2.  Will d/c IV Unasyn and doxycycline.    Hypotension  Assessment & Plan  Patient received IV fluids that improved her hypotension in the ER   continue midodrine  Given albumin    Acute hypoxic respiratory failure (HCC)- (present on admission)  Assessment & Plan  On 2 L of O2 above baseline, but has now improved to RA.    Lactic acidosis- (present on admission)  Assessment & Plan  2.7 > 2.7. low suspicion of sepsis.  - continue to monitor    Thrombocytopenia (HCC)- (present on admission)  Assessment & Plan  Chronic, likely 2/2 chronic alcohol usage.  Continue to trend    Pleural effusion- (present on admission)  Assessment & Plan  Non-contrast CT scan of the chest shows scattered patchy bilateral pulmonary infiltrates, a moderate to large right pleural effusion, cholelithiasis, cirrhosis, and a right upper lobe  pulmonary nodule (to be followed up in 12 months). Patient had multiple thoracentesis on the right side and had a chest tube.  - Asymptomatic from pulmonary standpoint. Saturating well on RA. Thora not urgently needed for the time being.  - Will restart low dose lasix and spironolactone         VTE prophylaxis: SCDs/TEDs    I have performed a physical exam and reviewed and updated ROS and Plan today (2/9/2025). In review of yesterday's note (2/8/2025), there are no changes except as documented above.

## 2025-02-09 NOTE — CARE PLAN
The patient is Stable - Low risk of patient condition declining or worsening    Shift Goals  Clinical Goals: Monitor BP, Tele Monitor, self care  Patient Goals: rest  Family Goals: Updates    Progress made toward(s) clinical / shift goals:      Problem: Knowledge Deficit - Standard  Goal: Patient and family/care givers will demonstrate understanding of plan of care, disease process/condition, diagnostic tests and medications  Description: Target End Date:  1-3 days or as soon as patient condition allows    Document in Patient Education    1.  Patient and family/caregiver oriented to unit, equipment, visitation policy and means for communicating concern  2.  Complete/review Learning Assessment  3.  Assess knowledge level of disease process/condition, treatment plan, diagnostic tests and medications  4.  Explain disease process/condition, treatment plan, diagnostic tests and medications  Outcome: Progressing  Note: Patient asked about meds and plan of care. Verbalized understanding      Problem: Fall Risk  Goal: Patient will remain free from falls  Description: Target End Date:  Prior to discharge or change in level of care    Document interventions on the Nidih Sanchez Fall Risk Assessment    1.  Assess for fall risk factors  2.  Implement fall precautions  Outcome: Progressing  Note: Compliant with fall risk interventions.   Remained free of falls this shift.

## 2025-02-09 NOTE — THERAPY
Physical Therapy   Initial Evaluation     Patient Name: Stacy Oliva  Age:  55 y.o., Sex:  female  Medical Record #: [id number]  Today's Date: 2/9/2025     Precautions  Precautions: Fall Risk    Assessment  Patient is a 55 y.o. female who was recently admitted with decompensated cirrhosis and re-admitted the next day with generalized weakness. Pt dx with recurrent pleural effusion and continued decompensated liver disease (MELD score 30). PMH significant for etoh abuse, hepatic hydrothorax, hypotension, pleural effusion.    Pt received in bed and agreeable to PT evaluation. Pt with family at bedside throughout. Pt presents with generalized weakness, impaired activity tolerance, and hypoxia with ambulation. Pt required overall CGA to standby assist to mobilize with a FWW in the room. Pt reports plan to DC to a friend's home, where she will have 24/7 support as needed. Provided pt and family with education on progressive mobility while at the hospital and upon return home, use of FWW for mobility, importance of hydration and eating for maintaining strength, and follow up with HHPT. Pt and family in agreement with recommendations discussed. Will follow for acute PT to progress as able.    Plan    Physical Therapy Initial Treatment Plan   Treatment Plan : Bed Mobility, Family / Caregiver Training, Equipment, Gait Training, Neuro Re-Education / Balance, Self Care / Home Evaluation, Stair Training, Therapeutic Activities, Therapeutic Exercise  Treatment Frequency: 3 Times per Week  Duration: Until Therapy Goals Met    DC Equipment Recommendations: None (has FWW and WC per )  Discharge Recommendations: Recommend home health for continued physical therapy services     Objective       02/09/25 0941   Precautions   Precautions Fall Risk   Vitals   Vitals Comments BP stable. Tachycardic 110's at rest, 130's with mobility. 92% on RA at rest, desat to 84% with ambulation and required 2L to maintain >90% SpO2   Pain 0 -  10 Group   Therapist Pain Assessment Post Activity Pain Same as Prior to Activity;Nurse Notified;2   Prior Living Situation   Housing / Facility 1 Story House   Steps Into Home 2   Steps In Home 0   Equipment Owned Front-Wheel Walker;Wheelchair   Lives with - Patient's Self Care Capacity Spouse   Comments Spouse reports purchasing a FWW and wheelchair. Per pt, plan is to DC to a friend's home where she will have assist from her spouse, mother, and friend as needed.   Prior Level of Functional Mobility   Bed Mobility Independent   Transfer Status Independent   Ambulation Independent   Assistive Devices Used None   Stairs Independent   Comments independent prior to recent admissions   History of Falls   History of Falls No   Cognition    Level of Consciousness Alert   Comments Pleasant and cooperative, somewhat decreased insight   Active ROM Lower Body    Active ROM Lower Body  WDL   Strength Lower Body   Comments BLE generalized weakness, no buckling in standing   Sensation Lower Body   Lower Extremity Sensation   WDL   Lower Body Muscle Tone   Lower Body Muscle Tone  WDL   Coordination Lower Body    Coordination Lower Body  WDL   Balance Assessment   Sitting Balance (Static) Fair   Sitting Balance (Dynamic) Fair   Standing Balance (Static) Fair   Standing Balance (Dynamic) Fair -   Weight Shift Sitting Fair   Weight Shift Standing Fair   Comments FWW in standing   Bed Mobility    Supine to Sit Supervised   Scooting Supervised   Rolling Supervised   Comments HOB raised   Gait Analysis   Gait Level Of Assist Contact Guard Assist   Assistive Device Front Wheel Walker   Distance (Feet) 30   # of Times Distance was Traveled 1   Deviation Bradykinetic;Shuffled Gait   Comments Limited by fatigue   Functional Mobility   Sit to Stand Contact Guard Assist   Bed, Chair, Wheelchair Transfer Contact Guard Assist   Mobility up with FWW   Comments Cues for hand placement with transfers, reaching back prior to sitting down   6  Clicks Assessment - How much HELP from from another person do you currently need... (If the patient hasn't done an activity recently, how much help from another person do you think he/she would need if he/she tried?)   Turning from your back to your side while in a flat bed without using bedrails? 3   Moving from lying on your back to sitting on the side of a flat bed without using bedrails? 3   Moving to and from a bed to a chair (including a wheelchair)? 3   Standing up from a chair using your arms (e.g., wheelchair, or bedside chair)? 3   Walking in hospital room? 3   Climbing 3-5 steps with a railing? 2   6 clicks Mobility Score 17   Short Term Goals    Short Term Goal # 1 pt will be able to complete supine<>sitting from flat bed with SPV in 6tx in order to improve independent   Short Term Goal # 2 pt will be able to complete STS with FWW and SPV in 6tx in order to progress prior level   Short Term Goal # 3 pt will be able to ambulate 150ft with FWW and SPV in 6tx in order to decrease fall risk   Short Term Goal # 4 pt will be able to negotiate 2 steps with SPV in 6tx in order to enter and exit home   Education Group   Role of Physical Therapist Patient Response Patient;Acceptance;Explanation;Verbal Demonstration   Physical Therapy Initial Treatment Plan    Treatment Plan  Bed Mobility;Family / Caregiver Training;Equipment;Gait Training;Neuro Re-Education / Balance;Self Care / Home Evaluation;Stair Training;Therapeutic Activities;Therapeutic Exercise   Treatment Frequency 3 Times per Week   Duration Until Therapy Goals Met   Problem List    Problems Impaired Bed Mobility;Impaired Transfers;Impaired Ambulation;Functional Strength Deficit;Decreased Activity Tolerance   Anticipated Discharge Equipment and Recommendations   DC Equipment Recommendations None  (has FWW and WC per )   Discharge Recommendations Recommend home health for continued physical therapy services   Interdisciplinary Plan of Care  Collaboration   IDT Collaboration with  Nursing;Family / Caregiver   Patient Position at End of Therapy Seated;Chair Alarm On;Call Light within Reach;Tray Table within Reach;Phone within Reach;Family / Friend in Room   Collaboration Comments RN updated.  and mother present during session   Session Information   Date / Session Number  2/9-1 (1/3, 2/15)

## 2025-02-09 NOTE — CARE PLAN
The patient is Stable - Low risk of patient condition declining or worsening    Shift Goals  Clinical Goals: monitor VS, wean O2, mobility, I&O  Patient Goals: rest, feel better  Family Goals: updates, answer questions    Progress made toward(s) clinical / shift goals:    Problem: Knowledge Deficit - Standard  Goal: Patient and family/care givers will demonstrate understanding of plan of care, disease process/condition, diagnostic tests and medications  Description: Target End Date:  1-3 days or as soon as patient condition allows    Document in Patient Education    1.  Patient and family/caregiver oriented to unit, equipment, visitation policy and means for communicating concern  2.  Complete/review Learning Assessment  3.  Assess knowledge level of disease process/condition, treatment plan, diagnostic tests and medications  4.  Explain disease process/condition, treatment plan, diagnostic tests and medications  Outcome: Progressing  Note: Discuss POC with patient  Address questions and concerns, escalate as appropriate  Check for pt understanding of POC       Problem: Skin Integrity  Goal: Skin integrity is maintained or improved  Description: Target End Date:  Prior to discharge or change in level of care    Document interventions on Skin Risk/Kai flowsheet groups and corresponding LDA    1.  Assess and monitor skin integrity, appearance and/or temperature  2.  Assess risk factors for impaired skin integrity and/or pressures ulcers  3.  Implement precautions to protect skin integrity in collaboration with interdisciplinary team  4.  Implement pressure ulcer prevention protocol if at risk for skin breakdown  5.  Confirm wound care consult if at risk for skin breakdown  6.  Ensure patient use of pressure relieving devices  (Low air loss bed, waffle overlay, heel protectors, ROHO cushion, etc)  Outcome: Progressing  Note: Kai assessment done  PUP interventions in place per unit standard; see flowsheets        Problem: Fall Risk  Goal: Patient will remain free from falls  Description: Target End Date:  Prior to discharge or change in level of care    Document interventions on the Terrell Daniel Fall Risk Assessment    1.  Assess for fall risk factors  2.  Implement fall precautions  Outcome: Progressing  Note: Fall risk assessment   Fall precautions in place per unit standard and protocol; see flowsheet       Problem: Pain - Standard  Goal: Alleviation of pain or a reduction in pain to the patient’s comfort goal  Description: Target End Date:  Prior to discharge or change in level of care    Document on Vitals flowsheet    1.  Document pain using the appropriate pain scale per order or unit policy  2.  Educate and implement non-pharmacologic comfort measures (i.e. relaxation, distraction, massage, cold/heat therapy, etc.)  3.  Pain management medications as ordered  4.  Reassess pain after pain med administration per policy  5.  If opiods administered assess patient's response to pain medication is appropriate per POSS sedation scale  6.  Follow pain management plan developed in collaboration with patient and interdisciplinary team (including palliative care or pain specialists if applicable)  Outcome: Progressing  Note: Assess pain per unit standard and as needed  Provide comfort measures as appropriate; see flowsheet  Administer medications as ordered         Patient is not progressing towards the following goals:

## 2025-02-09 NOTE — THERAPY
Occupational Therapy   Initial Evaluation     Patient Name: Stacy Oliva  Age:  55 y.o., Sex:  female  Medical Record #: [id number]  Today's Date: 2/9/2025     Precautions: Fall Risk    Assessment  Patient is 55 y.o. female admitted with generalized weakness. She was recently discharged but could not return to [facility], staying instead at a local hotel. Upon EMS arrival, she was found lethargic and hypotensive with a blood pressure in the 60s. PMHx of  end-stage liver disease (MELD score of 30) secondary to alcoholic cirrhosis, recurrent right sided pleural effusion w/hyponatremia. Pt seen for OT eval and tx. Pt plans to DC to her friend's 1-story house after DC where she have support from her , mother, and friend. Pt was independent with ADLs PTA.    During OT eval, pt presented with pain as well as deficits in self-care tasks, balance, functional mobility, cognition, and activity tolerance. She required SBA-CGA to complete ADLs, functional mobility, and txfs using FWW. Pt was provided education on role of acute OT, compensatory strategies to safely complete ADLs, and importance of frequent EOB/OOB ADLs with staff to reduce risk of deconditioning. Currently recommend home health for further OT services. Will continue to follow for ongoing acute OT services.     Plan    Occupational Therapy Initial Treatment Plan   Treatment Interventions: Self Care / Activities of Daily Living, Adaptive Equipment, Neuro Re-Education / Balance, Therapeutic Exercises, Therapeutic Activity  Treatment Frequency: 3 Times per Week  Duration: Until Therapy Goals Met    DC Equipment Recommendations: Unable to determine at this time  Discharge Recommendations: Recommend home health for continued occupational therapy services      Objective    Prior Living Situation   Housing / Facility 1 Story House   Steps Into Home 2   Steps In Home 0   Bathroom Set up Walk In Shower  ([place]ver, she plans to primarily complete sponge baths until she  gets stronger)   Equipment Owned Front-Wheel Walker;Wheelchair   Lives with - Patient's Self Care Capacity Spouse   Comments Pt reported that she plans to DC to her friend's house, which is listed above. Pt reported that she will have support from her , mother, and friend.   Prior Level of ADL Function   Self Feeding Independent   Grooming / Hygiene Independent   Bathing Independent   Dressing Independent   Toileting Independent   Prior Level of IADL Function   Medication Management Independent   Laundry Requires Assist   Kitchen Mobility Requires Assist   Finances Requires Assist   Home Management Requires Assist   Shopping Requires Assist   Prior Level Of Mobility Independent Without Device in Community   History of Falls   History of Falls No   Vitals   O2 (LPM) 2   O2 Delivery Device Silicone Nasal Cannula   Vitals Comments Pt with O2 off under chin upon arrival; stated that she was informed to take it off for a little while. She was sating at 88% while on RA. Pt demo an increased WOB with activity and O2 was replaced; sating at 91% with 2L O2.   Pain 0 - 10 Group   Therapist Pain Assessment Post Activity Pain Same as Prior to Activity;Nurse Notified  (Not rated, reported an increase generalized pain with activity)   Cognition    Cognition / Consciousness X   Level of Consciousness Alert   Safety Awareness Impaired;Impulsive   Attention Impaired   Comments Pleasant and cooperative; some limited insight into deficits in regards to O2 needs   Active ROM Upper Body   Active ROM Upper Body  WDL   Strength Upper Body   Upper Body Strength  WDL   Balance Assessment   Sitting Balance (Static) Fair   Sitting Balance (Dynamic) Fair   Standing Balance (Static) Fair -   Standing Balance (Dynamic) Fair -   Weight Shift Sitting Fair   Weight Shift Standing Fair   Comments w/FWW   Bed Mobility    Supine to Sit Supervised   Sit to Supine Supervised   Scooting Supervised   Rolling Supervised   Comments HOB elevated per  pt preference   ADL Assessment   Eating Supervision   Grooming Contact Guard Assist;Standing   Upper Body Dressing Supervision   Lower Body Dressing Standby Assist  (Donned socks in tailor sit; donned underwear in supine (able to bridge to get over hips))   Toileting Contact Guard Assist   How much help from another person does the patient currently need...   6 Clicks Daily Activity Score 20   Functional Mobility   Sit to Stand Contact Guard Assist   Bed, Chair, Wheelchair Transfer Contact Guard Assist   Toilet Transfers Contact Guard Assist   Mobility Functional mobility in room; w/FWW   Activity Tolerance   Sitting in Chair 5 min (toilet)  (Declined sitting up in chair)   Sitting Edge of Bed 5 min   Standing 5 min   Comments Limited by generalized weakness   Patient / Family Goals   Patient / Family Goal #1 To get stronger   Short Term Goals   Short Term Goal # 1 Pt will complete ADL txfs with supv   Short Term Goal # 2 Pt will complete FB dressing with supv including clothing retrieval   Short Term Goal # 3 Pt will complete standing g/h routine with supv   Short Term Goal # 4 Pt will complete toileting ADLs with supv   Education Group   Education Provided Role of Occupational Therapist;Activities of Daily Living;Pathology of bedrest   Role of Occupational Therapist Patient Response Patient;Acceptance;Explanation;Verbal Demonstration   ADL Patient Response Patient;Acceptance;Explanation;Verbal Demonstration;Action Demonstration;Reinforcement Needed   Pathology of Bedrest Patient Response Patient;Acceptance;Explanation;Verbal Demonstration;Action Demonstration;Reinforcement Needed

## 2025-02-10 ENCOUNTER — APPOINTMENT (OUTPATIENT)
Dept: RADIOLOGY | Facility: MEDICAL CENTER | Age: 55
DRG: 432 | End: 2025-02-10
Payer: COMMERCIAL

## 2025-02-10 LAB
ALBUMIN SERPL BCP-MCNC: 3.1 G/DL (ref 3.2–4.9)
ALBUMIN/GLOB SERPL: 0.9 G/DL
ALP SERPL-CCNC: 164 U/L (ref 30–99)
ALT SERPL-CCNC: 53 U/L (ref 2–50)
ANION GAP SERPL CALC-SCNC: 13 MMOL/L (ref 7–16)
AST SERPL-CCNC: 62 U/L (ref 12–45)
BASOPHILS # BLD AUTO: 0.6 % (ref 0–1.8)
BASOPHILS # BLD: 0.05 K/UL (ref 0–0.12)
BILIRUB SERPL-MCNC: 9.5 MG/DL (ref 0.1–1.5)
BUN SERPL-MCNC: 15 MG/DL (ref 8–22)
CALCIUM ALBUM COR SERPL-MCNC: 9.5 MG/DL (ref 8.5–10.5)
CALCIUM SERPL-MCNC: 8.8 MG/DL (ref 8.5–10.5)
CHLORIDE SERPL-SCNC: 92 MMOL/L (ref 96–112)
CO2 SERPL-SCNC: 25 MMOL/L (ref 20–33)
CREAT SERPL-MCNC: 0.8 MG/DL (ref 0.5–1.4)
EOSINOPHIL # BLD AUTO: 0.06 K/UL (ref 0–0.51)
EOSINOPHIL NFR BLD: 0.7 % (ref 0–6.9)
ERYTHROCYTE [DISTWIDTH] IN BLOOD BY AUTOMATED COUNT: 74.4 FL (ref 35.9–50)
GFR SERPLBLD CREATININE-BSD FMLA CKD-EPI: 86 ML/MIN/1.73 M 2
GLOBULIN SER CALC-MCNC: 3.4 G/DL (ref 1.9–3.5)
GLUCOSE SERPL-MCNC: 77 MG/DL (ref 65–99)
HCT VFR BLD AUTO: 42.6 % (ref 37–47)
HGB BLD-MCNC: 14.9 G/DL (ref 12–16)
IMM GRANULOCYTES # BLD AUTO: 0.1 K/UL (ref 0–0.11)
IMM GRANULOCYTES NFR BLD AUTO: 1.2 % (ref 0–0.9)
LYMPHOCYTES # BLD AUTO: 1.1 K/UL (ref 1–4.8)
LYMPHOCYTES NFR BLD: 13.7 % (ref 22–41)
MCH RBC QN AUTO: 38.5 PG (ref 27–33)
MCHC RBC AUTO-ENTMCNC: 35 G/DL (ref 32.2–35.5)
MCV RBC AUTO: 110.1 FL (ref 81.4–97.8)
MONOCYTES # BLD AUTO: 0.47 K/UL (ref 0–0.85)
MONOCYTES NFR BLD AUTO: 5.8 % (ref 0–13.4)
NEUTROPHILS # BLD AUTO: 6.26 K/UL (ref 1.82–7.42)
NEUTROPHILS NFR BLD: 78 % (ref 44–72)
NRBC # BLD AUTO: 0.02 K/UL
NRBC BLD-RTO: 0.2 /100 WBC (ref 0–0.2)
PLATELET # BLD AUTO: 78 K/UL (ref 164–446)
PLATELETS.RETICULATED NFR BLD AUTO: 2.5 % (ref 0.6–13.1)
PMV BLD AUTO: 10.3 FL (ref 9–12.9)
POTASSIUM SERPL-SCNC: 3.7 MMOL/L (ref 3.6–5.5)
PROT SERPL-MCNC: 6.5 G/DL (ref 6–8.2)
RBC # BLD AUTO: 3.87 M/UL (ref 4.2–5.4)
SODIUM SERPL-SCNC: 130 MMOL/L (ref 135–145)
WBC # BLD AUTO: 8 K/UL (ref 4.8–10.8)

## 2025-02-10 PROCEDURE — A9270 NON-COVERED ITEM OR SERVICE: HCPCS

## 2025-02-10 PROCEDURE — 80053 COMPREHEN METABOLIC PANEL: CPT

## 2025-02-10 PROCEDURE — 770020 HCHG ROOM/CARE - TELE (206)

## 2025-02-10 PROCEDURE — 36415 COLL VENOUS BLD VENIPUNCTURE: CPT

## 2025-02-10 PROCEDURE — 85055 RETICULATED PLATELET ASSAY: CPT

## 2025-02-10 PROCEDURE — A9270 NON-COVERED ITEM OR SERVICE: HCPCS | Performed by: HOSPITALIST

## 2025-02-10 PROCEDURE — 700102 HCHG RX REV CODE 250 W/ 637 OVERRIDE(OP): Performed by: HOSPITALIST

## 2025-02-10 PROCEDURE — 85025 COMPLETE CBC W/AUTO DIFF WBC: CPT

## 2025-02-10 PROCEDURE — 99233 SBSQ HOSP IP/OBS HIGH 50: CPT | Mod: GC | Performed by: HOSPITALIST

## 2025-02-10 PROCEDURE — 71046 X-RAY EXAM CHEST 2 VIEWS: CPT

## 2025-02-10 PROCEDURE — 700102 HCHG RX REV CODE 250 W/ 637 OVERRIDE(OP)

## 2025-02-10 PROCEDURE — 99418 PROLNG IP/OBS E/M EA 15 MIN: CPT | Mod: GC | Performed by: HOSPITALIST

## 2025-02-10 RX ORDER — MIDODRINE HYDROCHLORIDE 5 MG/1
5 TABLET ORAL EVERY 8 HOURS
Status: DISCONTINUED | OUTPATIENT
Start: 2025-02-10 | End: 2025-02-11

## 2025-02-10 RX ORDER — LACTULOSE 10 G/15ML
15 SOLUTION ORAL DAILY
Status: DISCONTINUED | OUTPATIENT
Start: 2025-02-11 | End: 2025-02-12 | Stop reason: HOSPADM

## 2025-02-10 RX ADMIN — LACTULOSE 15 ML: 10 SOLUTION ORAL at 05:00

## 2025-02-10 RX ADMIN — MIDODRINE HYDROCHLORIDE 5 MG: 5 TABLET ORAL at 14:14

## 2025-02-10 RX ADMIN — MIDODRINE HYDROCHLORIDE 10 MG: 5 TABLET ORAL at 05:00

## 2025-02-10 RX ADMIN — FUROSEMIDE 10 MG: 20 TABLET ORAL at 05:00

## 2025-02-10 RX ADMIN — MIDODRINE HYDROCHLORIDE 5 MG: 5 TABLET ORAL at 21:22

## 2025-02-10 RX ADMIN — SPIRONOLACTONE 25 MG: 25 TABLET ORAL at 18:09

## 2025-02-10 ASSESSMENT — ENCOUNTER SYMPTOMS
FEVER: 0
VOMITING: 0
NAUSEA: 0
HEADACHES: 0
COUGH: 0
SPUTUM PRODUCTION: 0
SORE THROAT: 0
CHILLS: 0
DIZZINESS: 0
NECK PAIN: 0
ABDOMINAL PAIN: 0
WHEEZING: 0
BACK PAIN: 0
PALPITATIONS: 0
DIARRHEA: 0
SHORTNESS OF BREATH: 0
BLURRED VISION: 0
CONSTIPATION: 0

## 2025-02-10 ASSESSMENT — PAIN DESCRIPTION - PAIN TYPE
TYPE: ACUTE PAIN
TYPE: ACUTE PAIN

## 2025-02-10 ASSESSMENT — FIBROSIS 4 INDEX: FIB4 SCORE: 6.11

## 2025-02-10 NOTE — FACE TO FACE
Face to Face Supporting Documentation - Home Health    The encounter with this patient was in whole or in part the primary reason for home health admission.    Date of encounter:   Patient:                    MRN:                       YOB: 2026  Stacy Oliva  [id number]  1970     Home health to see patient for:  Skilled Nursing care for assessment, interventions & education, Physical Therapy evaluation and treatment, and Occupational therapy evaluation and treatment    Skilled need for:  Exacerbation of Chronic Disease State Decompensated cirrhosis c/b hepatic hydrothorax    Skilled nursing interventions to include:  Comment: PT and OT    Homebound status evidenced by:  Needs the assistance of another person in order to leave the home. Leaving home requires a considerable and taxing effort. There is a normal inability to leave the home.    Community Physician to provide follow up care: Pcp Unknown     Optional Interventions? No      I certify the face to face encounter for this home health care referral meets the CMS requirements and the encounter/clinical assessment with the patient was, in whole, or in part, for the medical condition(s) listed above, which is the primary reason for home health care. Based on my clinical findings: the service(s) are medically necessary, support the need for home health care, and the homebound criteria are met.  I certify that this patient has had a face to face encounter by myself.  Gilda Guevara M.D. - NPI: [id number]

## 2025-02-10 NOTE — PROGRESS NOTES
[facility] Internal Medicine Daily Progress Note    Date of Service  2/10/2025    [facility] Team: R [facility]erra Team   Attending: Abigail Zhang M.d.  Senior Resident: Dr. Escobar  Contact Number: [phone]    Chief Complaint  Stacy Oliva is a 55 y.o. female admitted 2/7/2025 with decompensated cirrhosis.    Hospital Course  Stacy Oliva, a 55-year-old female with a history of alcoholic cirrhosis and varices, presented on Feb 7, 2025, with generalized weakness. She was recently discharged but could not return to [facility], staying instead at a local hotel. Upon EMS arrival, she was found lethargic and hypotensive with a blood pressure in the 60s. She denies experiencing nausea, vomiting, diarrhea, fever, chills, cough, or shortness of breath.      In the ED:   CXR: increased intravascular congestion and right-sided pleural effusion, while an EKG reveals sinus tachycardia. urinalysis showed orange urine with trace ketones, moderate bilirubin, positive nitrites, and moderate bacteria.     started on IV Unasyn and doxycycline. Midodrine and albumin continued. Spironolactone and lasix held.      Interval Problem Update  02/10/2025: No acute overnight events.  Patient not hypotensive.  Asymptomatic.  Patient has increased oxygen requirements, currently on 2 L NC.  Patient comfortable and denies shortness of breath.  Lactulose decreased to once a day.  Midodrine decreased to 5 mg.  Repeat chest x-ray shows increased right-sided pleural effusion.  Possible thoracentesis by IR in the a.m. on 02/11/2025 prior to discharge.    Patient and family has had extensive discussions with providers regarding MELD score of 29 and what that means.  She was referred to GI upon discharge last time. Outpatient referral placed to hepatology.  Discussed with case management and the family will have to help coordinate appointment (likely at [facility] per family).    I have discussed this patient's plan of care and discharge plan at IDT rounds  today with Case Management, Nursing, Nursing leadership, and other members of the IDT team.    Consultants/Specialty  None    Code Status  Full Code    Disposition  The patient is medically cleared for discharge to home or a post-acute facility.  Anticipate discharge to: home with organized home healthcare and close outpatient follow-up    I have placed the appropriate orders for post-discharge needs.    Review of Systems  Review of Systems   Constitutional:  Negative for chills, fever and malaise/fatigue.        Jaundiced   HENT:  Negative for congestion, hearing loss and sore throat.    Eyes:  Negative for blurred vision.   Respiratory:  Negative for cough, sputum production, shortness of breath and wheezing.    Cardiovascular:  Negative for chest pain, palpitations and leg swelling.   Gastrointestinal:  Negative for abdominal pain, constipation, diarrhea, nausea and vomiting.   Genitourinary:  Negative for dysuria and urgency.   Musculoskeletal:  Negative for back pain and neck pain.   Skin:  Negative for rash.   Neurological:  Negative for dizziness and headaches.   All other systems reviewed and are negative.       Physical Exam  Temp:  [36.1 °C (97 °F)-36.4 °C (97.5 °F)] 36.3 °C (97.3 °F)  Pulse:  [102-121] 121  Resp:  [18] 18  BP: ()/(59-73) 104/59  SpO2:  [91 %-97 %] 91 %    Physical Exam  Constitutional:       General: She is not in acute distress.     Appearance: She is normal weight. She is not ill-appearing, toxic-appearing or diaphoretic.   HENT:      Head: Normocephalic and atraumatic.      Nose: Nose normal. No rhinorrhea.   Eyes:      General: No scleral icterus.        Right eye: No discharge.         Left eye: No discharge.      Extraocular Movements: Extraocular movements intact.      Conjunctiva/sclera: Conjunctivae normal.   Cardiovascular:      Rate and Rhythm: Normal rate and regular rhythm.      Pulses: Normal pulses.      Heart sounds: Normal heart sounds. No murmur heard.     No  friction rub. No gallop.   Pulmonary:      Effort: Pulmonary effort is normal. No respiratory distress.      Breath sounds: No stridor. No wheezing, rhonchi or rales.      Comments: Decreased breath sounds on the right.  Abdominal:      General: Bowel sounds are normal. There is no distension.      Palpations: Abdomen is soft.      Tenderness: There is no abdominal tenderness. There is no guarding.   Musculoskeletal:         General: Normal range of motion.      Right lower leg: No edema.      Left lower leg: No edema.   Skin:     General: Skin is warm and dry.   Neurological:      General: No focal deficit present.      Mental Status: She is alert and oriented to person, place, and time. Mental status is at baseline.   Psychiatric:         Mood and Affect: Mood normal.         Behavior: Behavior normal.         Fluids    Intake/Output Summary (Last 24 hours) at 2/10/2025 1545  Last data filed at 2/10/2025 1100  Gross per 24 hour   Intake 580 ml   Output 0 ml   Net 580 ml       Laboratory  Recent Labs     02/08/25  0324 02/09/25  0624 02/10/25  0303   WBC 9.8 8.8 8.0   RBC 3.07* 3.64* 3.87*   HEMOGLOBIN 11.9* 14.0 14.9   HEMATOCRIT 33.6* 39.8 42.6   .4* 109.3* 110.1*   MCH 38.8* 38.5* 38.5*   MCHC 35.4 35.2 35.0   RDW 72.9* 71.7* 74.4*   PLATELETCT 51* 57* 78*   MPV 10.1 10.4 10.3     Recent Labs     02/08/25  0324 02/09/25  0624 02/10/25  0303   SODIUM 127* 128* 130*   POTASSIUM 3.8 3.9 3.7   CHLORIDE 91* 91* 92*   CO2 25 25 25   GLUCOSE 103* 71 77   BUN 17 16 15   CREATININE 0.78 0.74 0.80   CALCIUM 8.5 8.9 8.8     Recent Labs     02/07/25  1905   APTT 36.4*   INR 1.97*               Imaging  DX-CHEST-2 VIEWS   Final Result      Interval increase in right pleural effusion and bilateral infiltrates.      CT-CHEST (THORAX) W/O   Final Result         1.  Scattered patchy bilateral pulmonary infiltrates   2.  Moderate to large layering right pleural effusion   3.  Cholelithiasis   4.  Cirrhosis   5.  Right  upper lobe pulmonary nodule, see nodule follow-up recommendations below.      Fleischner Society pulmonary nodule recommendations:   Low Risk: No routine follow-up      High Risk: Optional CT at 12 months      Comments: Nodules less than 6 mm do not require routine follow-up, but certain patients at high risk with suspicious nodule morphology, upper lobe location, or both may warrant 12-month follow-up.      Low Risk - Minimal or absent history of smoking and of other known risk factors.      High Risk - History of smoking or of other known risk factors.      Note: These recommendations do not apply to lung cancer screening, patients with immunosuppression, or patients with known primary cancer.      Fleischner Society 2017 Guidelines for Management of Incidentally Detected Pulmonary Nodules in Adults         DX-CHEST-PORTABLE (1 VIEW)   Final Result         Diffuse interstitial prominence could relate to fluid overload or atypical infection.      Moderate to large right pleural effusion           Assessment/Plan  Problem Representation:    * Decompensated liver disease (HCC)- (present on admission)  Assessment & Plan  MELD score 30  Strict ins and outs and daily weights  Monitor on telemetry  Continue lasix and aldactone  Continue lactulose  Low Na diet    Pneumonia  Assessment & Plan  Infiltrates found on the left side of the lung. Review of CT chest displays findings inconsistent with PNA. Afebrile. Negative procal. On 0.5 LPM o2.  Will d/c IV Unasyn and doxycycline.    Hypotension  Assessment & Plan  Patient received IV fluids that improved her hypotension in the ER   continue midodrine  Given albumin    Acute hypoxic respiratory failure (HCC)- (present on admission)  Assessment & Plan  On 2 L of O2 above baseline  -Repeat chest x-ray shows increased right-sided pleural effusion.  Possible thoracentesis in the a.m. on 02/11/2025 prior to discharge.    Lactic acidosis- (present on admission)  Assessment &  Plan  2.7 > 2.7. low suspicion of sepsis.  - continue to monitor    Thrombocytopenia (HCC)- (present on admission)  Assessment & Plan  Chronic, likely 2/2 chronic alcohol usage.  Continue to trend    Pleural effusion- (present on admission)  Assessment & Plan  Non-contrast CT scan of the chest shows scattered patchy bilateral pulmonary infiltrates, a moderate to large right pleural effusion, cholelithiasis, cirrhosis, and a right upper lobe pulmonary nodule (to be followed up in 12 months). Patient had multiple thoracentesis on the right side and had a chest tube.  - Asymptomatic from pulmonary standpoint.  Patient had increased oxygen requirements in the morning on 02/10/2025 and is currently on 2 L NC.  -Repeat chest x-ray shows increased right-sided pleural effusion.  Possible thoracentesis by IR in the a.m. on 02/11/2025 prior to discharge.  -Continue low dose lasix and spironolactone         VTE prophylaxis: SCDs/TEDs

## 2025-02-10 NOTE — CARE PLAN
The patient is Stable - Low risk of patient condition declining or worsening    Shift Goals  Clinical Goals: monitor respiratory status, wean O@, manage BP, monitor LFTs, lactulose  Patient Goals: rest, discharge  Family Goals: discharge to SNF    Progress made toward(s) clinical / shift goals:    Problem: Knowledge Deficit - Standard  Goal: Patient and family/care givers will demonstrate understanding of plan of care, disease process/condition, diagnostic tests and medications  Description: Target End Date:  1-3 days or as soon as patient condition allows    Document in Patient Education    1.  Patient and family/caregiver oriented to unit, equipment, visitation policy and means for communicating concern  2.  Complete/review Learning Assessment  3.  Assess knowledge level of disease process/condition, treatment plan, diagnostic tests and medications  4.  Explain disease process/condition, treatment plan, diagnostic tests and medications  Outcome: Progressing  Note: Discuss POC with patient  Address questions and concerns, escalate as appropriate  Check for pt understanding of POC       Problem: Skin Integrity  Goal: Skin integrity is maintained or improved  Description: Target End Date:  Prior to discharge or change in level of care    Document interventions on Skin Risk/Kai flowsheet groups and corresponding LDA    1.  Assess and monitor skin integrity, appearance and/or temperature  2.  Assess risk factors for impaired skin integrity and/or pressures ulcers  3.  Implement precautions to protect skin integrity in collaboration with interdisciplinary team  4.  Implement pressure ulcer prevention protocol if at risk for skin breakdown  5.  Confirm wound care consult if at risk for skin breakdown  6.  Ensure patient use of pressure relieving devices  (Low air loss bed, waffle overlay, heel protectors, ROHO cushion, etc)  Outcome: Progressing  Note: Kai assessment done  PUP interventions in place per unit  standard; see flowsheets       Problem: Fall Risk  Goal: Patient will remain free from falls  Description: Target End Date:  Prior to discharge or change in level of care    Document interventions on the [facility] Fall Risk Assessment    1.  Assess for fall risk factors  2.  Implement fall precautions  Outcome: Progressing  Note: Fall risk assessment   Fall precautions in place per unit standard and protocol; see flowsheet       Problem: Pain - Standard  Goal: Alleviation of pain or a reduction in pain to the patient’s comfort goal  Description: Target End Date:  Prior to discharge or change in level of care    Document on Vitals flowsheet    1.  Document pain using the appropriate pain scale per order or unit policy  2.  Educate and implement non-pharmacologic comfort measures (i.e. relaxation, distraction, massage, cold/heat therapy, etc.)  3.  Pain management medications as ordered  4.  Reassess pain after pain med administration per policy  5.  If opiods administered assess patient's response to pain medication is appropriate per POSS sedation scale  6.  Follow pain management plan developed in collaboration with patient and interdisciplinary team (including palliative care or pain specialists if applicable)  Outcome: Progressing  Note: Assess pain per unit standard and as needed  Provide comfort measures as appropriate; see flowsheet  Administer medications as ordered         Patient is not progressing towards the following goals:

## 2025-02-10 NOTE — HOSPITAL COURSE
Stacy Oliva, a 55-year-old female with a history of alcoholic cirrhosis and varices, presented on Feb 7, 2025, with generalized weakness. She was recently discharged but could not return to [facility], staying instead at a local hotel. Upon EMS arrival, she was found lethargic and hypotensive with a blood pressure in the 60s. She denies experiencing nausea, vomiting, diarrhea, fever, chills, cough, or shortness of breath.

## 2025-02-10 NOTE — CARE PLAN
The patient is Stable - Low risk of patient condition declining or worsening    Shift Goals  Clinical Goals: Monitor VS, monitor O2  Patient Goals: Rest, sleep  Family Goals: ALEXANDRU    Progress made toward(s) clinical / shift goals:    Problem: Knowledge Deficit - Standard  Goal: Patient and family/care givers will demonstrate understanding of plan of care, disease process/condition, diagnostic tests and medications  Description: Target End Date:  1-3 days or as soon as patient condition allows    Document in Patient Education    1.  Patient and family/caregiver oriented to unit, equipment, visitation policy and means for communicating concern  2.  Complete/review Learning Assessment  3.  Assess knowledge level of disease process/condition, treatment plan, diagnostic tests and medications  4.  Explain disease process/condition, treatment plan, diagnostic tests and medications  Outcome: Progressing  Note: RN discussed plan of care with the pt and family members at bedside.     Problem: Fall Risk  Goal: Patient will remain free from falls  Description: Target End Date:  Prior to discharge or change in level of care    Document interventions on the [facility] Fall Risk Assessment    1.  Assess for fall risk factors  2.  Implement fall precautions  Outcome: Progressing  Note: Pt was placed on bed alarm as they scored as a high fall risk. Fall risk sign was placed outside of door, room remained uncluttered, non-slip socks were placed on pt, bed was in lowest and locked position, and pts personal belongings and call light were placed within reach.       Problem: Pain - Standard  Goal: Alleviation of pain or a reduction in pain to the patient’s comfort goal  Description: Target End Date:  Prior to discharge or change in level of care    Document on Vitals flowsheet    1.  Document pain using the appropriate pain scale per order or unit policy  2.  Educate and implement non-pharmacologic comfort measures (i.e. relaxation,  distraction, massage, cold/heat therapy, etc.)  3.  Pain management medications as ordered  4.  Reassess pain after pain med administration per policy  5.  If opiods administered assess patient's response to pain medication is appropriate per POSS sedation scale  6.  Follow pain management plan developed in collaboration with patient and interdisciplinary team (including palliative care or pain specialists if applicable)  Outcome: Progressing  Note: Pt did not complain of any pain throughout the night.

## 2025-02-10 NOTE — DISCHARGE PLANNING
LELAND hard faxed HH order to [facility].    1520  LELAND called [facility]. Spoke to Catracho and said that they have submitted insurance authorization which takes about 1-2 days.   LELAND provided DPA and RN CM direct numbers for any updates.

## 2025-02-11 ENCOUNTER — APPOINTMENT (OUTPATIENT)
Dept: RADIOLOGY | Facility: MEDICAL CENTER | Age: 55
DRG: 432 | End: 2025-02-11
Payer: COMMERCIAL

## 2025-02-11 ENCOUNTER — HOME HEALTH ADMISSION (OUTPATIENT)
Dept: HOME HEALTH SERVICES | Facility: HOME HEALTHCARE | Age: 55
End: 2025-02-11
Payer: COMMERCIAL

## 2025-02-11 LAB
ALBUMIN SERPL BCP-MCNC: 2.9 G/DL (ref 3.2–4.9)
ALBUMIN/GLOB SERPL: 0.8 G/DL
ALP SERPL-CCNC: 134 U/L (ref 30–99)
ALT SERPL-CCNC: 52 U/L (ref 2–50)
ANION GAP SERPL CALC-SCNC: 13 MMOL/L (ref 7–16)
APTT PPP: 33.8 SEC (ref 24.7–36)
AST SERPL-CCNC: 71 U/L (ref 12–45)
BASOPHILS # BLD AUTO: 0.8 % (ref 0–1.8)
BASOPHILS # BLD: 0.07 K/UL (ref 0–0.12)
BILIRUB SERPL-MCNC: 8.6 MG/DL (ref 0.1–1.5)
BUN SERPL-MCNC: 17 MG/DL (ref 8–22)
CALCIUM ALBUM COR SERPL-MCNC: 9.8 MG/DL (ref 8.5–10.5)
CALCIUM SERPL-MCNC: 8.9 MG/DL (ref 8.5–10.5)
CHLORIDE SERPL-SCNC: 90 MMOL/L (ref 96–112)
CO2 SERPL-SCNC: 24 MMOL/L (ref 20–33)
CREAT SERPL-MCNC: 0.93 MG/DL (ref 0.5–1.4)
EOSINOPHIL # BLD AUTO: 0.06 K/UL (ref 0–0.51)
EOSINOPHIL NFR BLD: 0.7 % (ref 0–6.9)
ERYTHROCYTE [DISTWIDTH] IN BLOOD BY AUTOMATED COUNT: 71.7 FL (ref 35.9–50)
GFR SERPLBLD CREATININE-BSD FMLA CKD-EPI: 72 ML/MIN/1.73 M 2
GLOBULIN SER CALC-MCNC: 3.5 G/DL (ref 1.9–3.5)
GLUCOSE SERPL-MCNC: 87 MG/DL (ref 65–99)
HCT VFR BLD AUTO: 42.9 % (ref 37–47)
HGB BLD-MCNC: 15.3 G/DL (ref 12–16)
IMM GRANULOCYTES # BLD AUTO: 0.14 K/UL (ref 0–0.11)
IMM GRANULOCYTES NFR BLD AUTO: 1.7 % (ref 0–0.9)
INR PPP: 1.89 (ref 0.87–1.13)
LYMPHOCYTES # BLD AUTO: 1.29 K/UL (ref 1–4.8)
LYMPHOCYTES NFR BLD: 15.6 % (ref 22–41)
MCH RBC QN AUTO: 38.5 PG (ref 27–33)
MCHC RBC AUTO-ENTMCNC: 35.7 G/DL (ref 32.2–35.5)
MCV RBC AUTO: 108.1 FL (ref 81.4–97.8)
MONOCYTES # BLD AUTO: 0.63 K/UL (ref 0–0.85)
MONOCYTES NFR BLD AUTO: 7.6 % (ref 0–13.4)
NEUTROPHILS # BLD AUTO: 6.07 K/UL (ref 1.82–7.42)
NEUTROPHILS NFR BLD: 73.6 % (ref 44–72)
NRBC # BLD AUTO: 0.03 K/UL
NRBC BLD-RTO: 0.4 /100 WBC (ref 0–0.2)
PLATELET # BLD AUTO: 92 K/UL (ref 164–446)
PLATELETS.RETICULATED NFR BLD AUTO: 3.9 % (ref 0.6–13.1)
PMV BLD AUTO: 10.9 FL (ref 9–12.9)
POTASSIUM SERPL-SCNC: 4.4 MMOL/L (ref 3.6–5.5)
PROT SERPL-MCNC: 6.4 G/DL (ref 6–8.2)
PROTHROMBIN TIME: 21.8 SEC (ref 12–14.6)
RBC # BLD AUTO: 3.97 M/UL (ref 4.2–5.4)
SODIUM SERPL-SCNC: 127 MMOL/L (ref 135–145)
WBC # BLD AUTO: 8.3 K/UL (ref 4.8–10.8)

## 2025-02-11 PROCEDURE — 99233 SBSQ HOSP IP/OBS HIGH 50: CPT | Mod: GC | Performed by: HOSPITALIST

## 2025-02-11 PROCEDURE — A9270 NON-COVERED ITEM OR SERVICE: HCPCS

## 2025-02-11 PROCEDURE — 97530 THERAPEUTIC ACTIVITIES: CPT | Mod: CQ

## 2025-02-11 PROCEDURE — 71045 X-RAY EXAM CHEST 1 VIEW: CPT

## 2025-02-11 PROCEDURE — 85730 THROMBOPLASTIN TIME PARTIAL: CPT

## 2025-02-11 PROCEDURE — 700102 HCHG RX REV CODE 250 W/ 637 OVERRIDE(OP)

## 2025-02-11 PROCEDURE — 770020 HCHG ROOM/CARE - TELE (206)

## 2025-02-11 PROCEDURE — 85055 RETICULATED PLATELET ASSAY: CPT

## 2025-02-11 PROCEDURE — P9047 ALBUMIN (HUMAN), 25%, 50ML: HCPCS | Mod: JZ

## 2025-02-11 PROCEDURE — 0W993ZZ DRAINAGE OF RIGHT PLEURAL CAVITY, PERCUTANEOUS APPROACH: ICD-10-PCS | Performed by: NURSE PRACTITIONER

## 2025-02-11 PROCEDURE — 97116 GAIT TRAINING THERAPY: CPT | Mod: CQ

## 2025-02-11 PROCEDURE — 85610 PROTHROMBIN TIME: CPT

## 2025-02-11 PROCEDURE — 80053 COMPREHEN METABOLIC PANEL: CPT

## 2025-02-11 PROCEDURE — C1729 CATH, DRAINAGE: HCPCS

## 2025-02-11 PROCEDURE — 700111 HCHG RX REV CODE 636 W/ 250 OVERRIDE (IP): Mod: JZ

## 2025-02-11 PROCEDURE — 97535 SELF CARE MNGMENT TRAINING: CPT

## 2025-02-11 PROCEDURE — 36415 COLL VENOUS BLD VENIPUNCTURE: CPT

## 2025-02-11 PROCEDURE — 85025 COMPLETE CBC W/AUTO DIFF WBC: CPT

## 2025-02-11 RX ORDER — MIDODRINE HYDROCHLORIDE 5 MG/1
10 TABLET ORAL EVERY 8 HOURS
Status: DISCONTINUED | OUTPATIENT
Start: 2025-02-11 | End: 2025-02-12 | Stop reason: HOSPADM

## 2025-02-11 RX ORDER — ALBUMIN (HUMAN) 12.5 G/50ML
25 SOLUTION INTRAVENOUS ONCE
Status: COMPLETED | OUTPATIENT
Start: 2025-02-11 | End: 2025-02-11

## 2025-02-11 RX ORDER — FUROSEMIDE 40 MG/1
40 TABLET ORAL EVERY MORNING
Status: DISCONTINUED | OUTPATIENT
Start: 2025-02-12 | End: 2025-02-12 | Stop reason: HOSPADM

## 2025-02-11 RX ADMIN — MIDODRINE HYDROCHLORIDE 10 MG: 5 TABLET ORAL at 22:31

## 2025-02-11 RX ADMIN — MIDODRINE HYDROCHLORIDE 10 MG: 5 TABLET ORAL at 13:58

## 2025-02-11 RX ADMIN — MIDODRINE HYDROCHLORIDE 5 MG: 5 TABLET ORAL at 04:49

## 2025-02-11 RX ADMIN — LACTULOSE 15 ML: 10 SOLUTION ORAL at 04:49

## 2025-02-11 RX ADMIN — ALBUMIN (HUMAN) 25 G: 0.25 INJECTION, SOLUTION INTRAVENOUS at 14:06

## 2025-02-11 RX ADMIN — FUROSEMIDE 10 MG: 20 TABLET ORAL at 04:49

## 2025-02-11 ASSESSMENT — ENCOUNTER SYMPTOMS
DIZZINESS: 0
HEADACHES: 0
VOMITING: 0
NECK PAIN: 0
BLURRED VISION: 0
FEVER: 0
CONSTIPATION: 0
COUGH: 0
WHEEZING: 0
NAUSEA: 0
ABDOMINAL PAIN: 0
SPUTUM PRODUCTION: 0
BACK PAIN: 0
SORE THROAT: 0
SHORTNESS OF BREATH: 0
PALPITATIONS: 0
DIARRHEA: 0
CHILLS: 0

## 2025-02-11 ASSESSMENT — COGNITIVE AND FUNCTIONAL STATUS - GENERAL
CLIMB 3 TO 5 STEPS WITH RAILING: A LITTLE
WALKING IN HOSPITAL ROOM: A LITTLE
DAILY ACTIVITIY SCORE: 20
STANDING UP FROM CHAIR USING ARMS: A LITTLE
DRESSING REGULAR LOWER BODY CLOTHING: A LITTLE
PERSONAL GROOMING: A LITTLE
TURNING FROM BACK TO SIDE WHILE IN FLAT BAD: A LITTLE
TOILETING: A LITTLE
SUGGESTED CMS G CODE MODIFIER MOBILITY: CK
MOBILITY SCORE: 18
MOVING TO AND FROM BED TO CHAIR: A LITTLE
MOVING FROM LYING ON BACK TO SITTING ON SIDE OF FLAT BED: A LITTLE
HELP NEEDED FOR BATHING: A LITTLE
SUGGESTED CMS G CODE MODIFIER DAILY ACTIVITY: CJ

## 2025-02-11 ASSESSMENT — GAIT ASSESSMENTS
DEVIATION: BRADYKINETIC;SHUFFLED GAIT
GAIT LEVEL OF ASSIST: STANDBY ASSIST
DISTANCE (FEET): 30
ASSISTIVE DEVICE: FRONT WHEEL WALKER

## 2025-02-11 ASSESSMENT — PAIN DESCRIPTION - PAIN TYPE
TYPE: ACUTE PAIN
TYPE: ACUTE PAIN

## 2025-02-11 ASSESSMENT — FIBROSIS 4 INDEX: FIB4 SCORE: 5.99

## 2025-02-11 NOTE — CARE PLAN
The patient is Stable - Low risk of patient condition declining or worsening    Shift Goals  Clinical Goals: Monitor respiratory status, monitor BP, NPO at midnight  Patient Goals: Rest  Family Goals: Discharge    Progress made toward(s) clinical / shift goals:    Problem: Knowledge Deficit - Standard  Goal: Patient and family/care givers will demonstrate understanding of plan of care, disease process/condition, diagnostic tests and medications  Description: Target End Date:  1-3 days or as soon as patient condition allows    Document in Patient Education    1.  Patient and family/caregiver oriented to unit, equipment, visitation policy and means for communicating concern  2.  Complete/review Learning Assessment  3.  Assess knowledge level of disease process/condition, treatment plan, diagnostic tests and medications  4.  Explain disease process/condition, treatment plan, diagnostic tests and medications  Outcome: Progressing     Problem: Fall Risk  Goal: Patient will remain free from falls  Description: Target End Date:  Prior to discharge or change in level of care    Document interventions on the [facility] Fall Risk Assessment    1.  Assess for fall risk factors  2.  Implement fall precautions  Outcome: Progressing  Note: Pt was placed on bed alarm as they scored as a fall risk. Fall risk sign was placed outside of door, room remained uncluttered, non-slip socks were placed on pt, bed was in lowest and locked position, and pts personal belongings and call light were placed within reach.         Patient is not progressing towards the following goals:      Problem: Respiratory  Goal: Patient will achieve/maintain optimum respiratory ventilation and gas exchange  Description: Target End Date:  Prior to discharge or change in level of care    Document on Assessment flowsheet    1.  Assess and monitor rate, rhythm, depth and effort of respiration  2.  Breath sounds assessed qshift and/or as needed  3.  Assess O2  saturation, administer/titrate oxygen as ordered  4.  Position patient for maximum ventilatory efficiency  5.  Turn, cough, and deep breath with splinting to improve effectiveness  6.  Collaborate with RT to administer medication/treatments per order  7.  Encourage use of incentive spirometer and encourage patient to cough after use and utilize splinting techniques if applicable  8.  Airway suctioning  9.  Monitor sputum production for changes in color, consistency and frequency  10. Perform frequent oral hygiene  11. Alternate physical activity with rest periods  Outcome: Not Progressing  Note: Pt needed to be placed on oxygen during the shift

## 2025-02-11 NOTE — PROCEDURES
Procedure Type: Right Sided Thoracentesis   Performed by: CASSIE Grayson  Amount of fluid removed: 1125mL  Fluid was not sent to lab.  Patient tolerated the procedure well.  Last BP: 72/35  Spoke with floor nurse. Jessika SERRATO came to US Room 2 to assess patient and brought her back up to her room.

## 2025-02-11 NOTE — DISCHARGE PLANNING
Case Management Discharge Planning    Admission Date: 2/7/2025  GMLOS: 4.9  ALOS: 4    6-Clicks ADL Score: 20  6-Clicks Mobility Score: 17  PT and/or OT Eval ordered: Yes  Post-acute Referrals Ordered: Yes  Post-acute Choice Obtained: Yes  Has referral(s) been sent to post-acute provider:  Yes      Anticipated Discharge Dispo:      DME Needed: No    Action(s) Taken: Family Conference, Spoke with Madeline Sierra, patients sister, here from [place] to assist with care, states she may keep patient here a few days or longer prior to taking her back to [place], informed her I need an address for the home health agency and staying here vs going to [place] involves different HH agencies. She is unable to give me a decision or address, states she will call me at 1000 with an answer. Informed her the HH agency is one or the other, not both.     [employer] insurance requested a call from patient about her PCP, patient called and her PCP is not in network. Made and appointment to establish a PCP that [employer] will cover.      New Patient with Resident Michelle Vanessa M.D.  Friday Feb 14, 2025 2:15 PM     [facility]  [address] [place] [address]  [phone]     Escalations Completed: Pending Discharge Destination    Medically Clear: Yes    Next Steps: Case Management will continue to follow for discharge planning needs.    Barriers to Discharge: Pending Placement and Pending Insurance Authorization    Is the patient up for discharge tomorrow: Yes    Is transport arranged for discharge disposition: Yes

## 2025-02-11 NOTE — DISCHARGE PLANNING
ATTN: Case Management  RE: Referral for Home Health    Reason for referral denial: not in network with patients insurance.               Unfortunately, we are not able to accept this referral for the reason listed above. If further clarity is needed, our Transitional Care Specialists are available to discuss any barriers to service at [phone].      We look forward to collaborating with you in the future,  [facility] Team

## 2025-02-11 NOTE — DISCHARGE PLANNING
P: [phone] Catracho ibarra [facility] LM for DPA.     Pt needs to change PCP to provider on file which is Dr. Yuen NPI: [id number]   Pt just needs to call number on the back of her insurance card and state this is her PCP due to [facility] needing HH orders from this specific provider per [employer] insurance.     1640  DPA requested for LMSW Alla BELTRÁN to help with informing pt of [facility] request.

## 2025-02-11 NOTE — CARE PLAN
The patient is Stable - Low risk of patient condition declining or worsening    Shift Goals  Clinical Goals: (P) Monitor respiratory status, remain NPO  Patient Goals: (P) Rest  Family Goals: (P) Discharge    Progress made toward(s) clinical / shift goals:    Problem: Knowledge Deficit - Standard  Goal: Patient and family/care givers will demonstrate understanding of plan of care, disease process/condition, diagnostic tests and medications  Description: Target End Date:  1-3 days or as soon as patient condition allows    Document in Patient Education    1.  Patient and family/caregiver oriented to unit, equipment, visitation policy and means for communicating concern  2.  Complete/review Learning Assessment  3.  Assess knowledge level of disease process/condition, treatment plan, diagnostic tests and medications  4.  Explain disease process/condition, treatment plan, diagnostic tests and medications  Outcome: Progressing  Note: Discuss POC with patient  Address questions and concerns, escalate as appropriate  Check for pt understanding of POC       Problem: Skin Integrity  Goal: Skin integrity is maintained or improved  Description: Target End Date:  Prior to discharge or change in level of care    Document interventions on Skin Risk/Kai flowsheet groups and corresponding LDA    1.  Assess and monitor skin integrity, appearance and/or temperature  2.  Assess risk factors for impaired skin integrity and/or pressures ulcers  3.  Implement precautions to protect skin integrity in collaboration with interdisciplinary team  4.  Implement pressure ulcer prevention protocol if at risk for skin breakdown  5.  Confirm wound care consult if at risk for skin breakdown  6.  Ensure patient use of pressure relieving devices  (Low air loss bed, waffle overlay, heel protectors, ROHO cushion, etc)  Outcome: Progressing  Note: Kai assessment done  PUP interventions in place per unit standard; see flowsheets       Problem: Fall  Risk  Goal: Patient will remain free from falls  Description: Target End Date:  Prior to discharge or change in level of care    Document interventions on the Terrell Daniel Fall Risk Assessment    1.  Assess for fall risk factors  2.  Implement fall precautions  Outcome: Progressing  Note: Fall risk assessment   Fall precautions in place per unit standard and protocol; see flowsheet       Problem: Pain - Standard  Goal: Alleviation of pain or a reduction in pain to the patient’s comfort goal  Description: Target End Date:  Prior to discharge or change in level of care    Document on Vitals flowsheet    1.  Document pain using the appropriate pain scale per order or unit policy  2.  Educate and implement non-pharmacologic comfort measures (i.e. relaxation, distraction, massage, cold/heat therapy, etc.)  3.  Pain management medications as ordered  4.  Reassess pain after pain med administration per policy  5.  If opiods administered assess patient's response to pain medication is appropriate per POSS sedation scale  6.  Follow pain management plan developed in collaboration with patient and interdisciplinary team (including palliative care or pain specialists if applicable)  Outcome: Progressing  Note: Assess pain per unit standard and as needed  Provide comfort measures as appropriate; see flowsheet  Administer medications as ordered       Problem: Respiratory  Goal: Patient will achieve/maintain optimum respiratory ventilation and gas exchange  Description: Target End Date:  Prior to discharge or change in level of care    Document on Assessment flowsheet    1.  Assess and monitor rate, rhythm, depth and effort of respiration  2.  Breath sounds assessed qshift and/or as needed  3.  Assess O2 saturation, administer/titrate oxygen as ordered  4.  Position patient for maximum ventilatory efficiency  5.  Turn, cough, and deep breath with splinting to improve effectiveness  6.  Collaborate with RT to administer  medication/treatments per order  7.  Encourage use of incentive spirometer and encourage patient to cough after use and utilize splinting techniques if applicable  8.  Airway suctioning  9.  Monitor sputum production for changes in color, consistency and frequency  10. Perform frequent oral hygiene  11. Alternate physical activity with rest periods  Outcome: Progressing  Note: Assess respiratory status per unit standard and as appropriate  Monitor WOB  Encourage pulmonary hygeine  Administer medications as ordered  Consult with RT as needed         Patient is not progressing towards the following goals:

## 2025-02-11 NOTE — DISCHARGE PLANNING
Choice for [facility] is [facility].  Choice for [place] is [employer]    Awaiting family decision.

## 2025-02-11 NOTE — DISCHARGE PLANNING
Madeline called with address, [address], [place] [address], [address]. they will be staying at the [facility] with the patient.    1251  Called [facility], Alex will cancel insurance authorization.    Referral sent to [facility].    1310  Spoke with Vin at [facility], will review and submit auth.

## 2025-02-11 NOTE — PROGRESS NOTES
[facility] Internal Medicine Daily Progress Note    Date of Service  2/11/2025    [facility] Team: R JEM [facility]erra Team   Attending: Diane Amin M.d.  Senior Resident: Dr. Carolina  Contact Number: [phone]    Chief Complaint  Stacy Oliva is a 55 y.o. female admitted 2/7/2025 with decompensated cirrhosis.    Hospital Course  Stacy Oliva, a 55-year-old female with a history of alcoholic cirrhosis and varices, presented on Feb 7, 2025, with generalized weakness. She was recently discharged but could not return to [facility], staying instead at a local hotel. Upon EMS arrival, she was found lethargic and hypotensive with a blood pressure in the 60s. She denies experiencing nausea, vomiting, diarrhea, fever, chills, cough, or shortness of breath.      In the ED:   CXR: increased intravascular congestion and right-sided pleural effusion, while an EKG reveals sinus tachycardia. urinalysis showed orange urine with trace ketones, moderate bilirubin, positive nitrites, and moderate bacteria.     started on IV Unasyn and doxycycline. Midodrine and albumin continued. Spironolactone and lasix held.      Interval Problem Update  02/11/2025: No acute overnight events.  Patient underwent therapeutic thoracentesis this morning shortly after which she was hypotensive in 70s/30s.  Output was 1.6 L however Thoracentesis could not be completed due to hypotension. Patient given albumin 25 g. Midodrine was increased from 5 to 10 mg.  Subsequently blood pressures in 100s/60s. Patient is asymptomatic.  Currently on 2 L NC. Patient comfortable and denies shortness of breath.  Follow-up thoracentesis by IR in the a.m. on 02/12/2025 prior to discharge.  Lasix increased to 40 mg p.o. daily.  Spironolactone discontinued due to hyponatremia.  PT recommend home health for continued physical therapy services.    I have discussed this patient's plan of care and discharge plan at IDT rounds today with Case Management, Nursing, Nursing leadership, and  other members of the IDT team.    Consultants/Specialty  None    Code Status  Full Code    Disposition  The patient is not medically cleared for discharge to home or a post-acute facility.  Anticipate discharge to: home with organized home healthcare and close outpatient follow-up    I have placed the appropriate orders for post-discharge needs.    Review of Systems  Review of Systems   Constitutional:  Negative for chills, fever and malaise/fatigue.        Jaundiced   HENT:  Negative for congestion, hearing loss and sore throat.    Eyes:  Negative for blurred vision.   Respiratory:  Negative for cough, sputum production, shortness of breath and wheezing.    Cardiovascular:  Negative for chest pain, palpitations and leg swelling.   Gastrointestinal:  Negative for abdominal pain, constipation, diarrhea, nausea and vomiting.   Genitourinary:  Negative for dysuria and urgency.   Musculoskeletal:  Negative for back pain and neck pain.   Skin:  Negative for rash.   Neurological:  Negative for dizziness and headaches.   All other systems reviewed and are negative.       Physical Exam  Temp:  [36.1 °C (97 °F)-36.5 °C (97.7 °F)] 36.1 °C (97 °F)  Pulse:  [120-125] 121  Resp:  [17-20] 20  BP: ()/(35-99) 94/51  SpO2:  [90 %-98 %] 96 %    Physical Exam  Constitutional:       General: She is not in acute distress.     Appearance: She is normal weight. She is not ill-appearing, toxic-appearing or diaphoretic.   HENT:      Head: Normocephalic and atraumatic.      Nose: Nose normal. No rhinorrhea.   Eyes:      General: No scleral icterus.        Right eye: No discharge.         Left eye: No discharge.      Extraocular Movements: Extraocular movements intact.      Conjunctiva/sclera: Conjunctivae normal.   Cardiovascular:      Rate and Rhythm: Normal rate and regular rhythm.      Pulses: Normal pulses.      Heart sounds: Normal heart sounds. No murmur heard.     No friction rub. No gallop.   Pulmonary:      Effort: Pulmonary  effort is normal. No respiratory distress.      Breath sounds: No stridor. No wheezing, rhonchi or rales.      Comments: Decreased breath sounds on the right.  Abdominal:      General: Bowel sounds are normal. There is no distension.      Palpations: Abdomen is soft.      Tenderness: There is no abdominal tenderness. There is no guarding.   Musculoskeletal:         General: Normal range of motion.      Right lower leg: No edema.      Left lower leg: No edema.   Skin:     General: Skin is warm and dry.   Neurological:      General: No focal deficit present.      Mental Status: She is alert and oriented to person, place, and time. Mental status is at baseline.   Psychiatric:         Mood and Affect: Mood normal.         Behavior: Behavior normal.         Fluids    Intake/Output Summary (Last 24 hours) at 2/11/2025 1606  Last data filed at 2/11/2025 1138  Gross per 24 hour   Intake 0 ml   Output 600 ml   Net -600 ml       Laboratory  Recent Labs     02/09/25  0624 02/10/25  0303 02/11/25  0335   WBC 8.8 8.0 8.3   RBC 3.64* 3.87* 3.97*   HEMOGLOBIN 14.0 14.9 15.3   HEMATOCRIT 39.8 42.6 42.9   .3* 110.1* 108.1*   MCH 38.5* 38.5* 38.5*   MCHC 35.2 35.0 35.7*   RDW 71.7* 74.4* 71.7*   PLATELETCT 57* 78* 92*   MPV 10.4 10.3 10.9     Recent Labs     02/09/25  0624 02/10/25  0303 02/11/25  0335   SODIUM 128* 130* 127*   POTASSIUM 3.9 3.7 4.4   CHLORIDE 91* 92* 90*   CO2 25 25 24   GLUCOSE 71 77 87   BUN 16 15 17   CREATININE 0.74 0.80 0.93   CALCIUM 8.9 8.8 8.9     Recent Labs     02/11/25  0335   APTT 33.8   INR 1.89*               Imaging  US-THORACENTESIS PUNCTURE RIGHT   Final Result      1. Ultrasound guided right sided therapeutic thoracentesis.      2. 1125 mL of fluid withdrawn.      DX-CHEST-PORTABLE (1 VIEW)   Final Result      1.  Small to moderate right pleural effusion which is decreased in size since previous exam of 2/10/2025.      2.  Mild pulmonary edema.      3.  Right lower lobe and perihilar  atelectasis.      DX-CHEST-2 VIEWS   Final Result      Interval increase in right pleural effusion and bilateral infiltrates.      CT-CHEST (THORAX) W/O   Final Result         1.  Scattered patchy bilateral pulmonary infiltrates   2.  Moderate to large layering right pleural effusion   3.  Cholelithiasis   4.  Cirrhosis   5.  Right upper lobe pulmonary nodule, see nodule follow-up recommendations below.      Fleischner Society pulmonary nodule recommendations:   Low Risk: No routine follow-up      High Risk: Optional CT at 12 months      Comments: Nodules less than 6 mm do not require routine follow-up, but certain patients at high risk with suspicious nodule morphology, upper lobe location, or both may warrant 12-month follow-up.      Low Risk - Minimal or absent history of smoking and of other known risk factors.      High Risk - History of smoking or of other known risk factors.      Note: These recommendations do not apply to lung cancer screening, patients with immunosuppression, or patients with known primary cancer.      Fleischner Society 2017 Guidelines for Management of Incidentally Detected Pulmonary Nodules in Adults         DX-CHEST-PORTABLE (1 VIEW)   Final Result         Diffuse interstitial prominence could relate to fluid overload or atypical infection.      Moderate to large right pleural effusion           Assessment/Plan  Problem Representation:    * Decompensated liver disease (HCC)- (present on admission)  Assessment & Plan  MELD score 30  Strict ins and outs and daily weights  Monitor on telemetry  Continue lasix and aldactone  Continue lactulose  Low Na diet    Hyponatremia- (present on admission)  Assessment & Plan  Sodium at 127 on 02/11/2025.  Likely dilutional.  -Continue to monitor clinically.  -Spironolactone discontinued.    Pleural effusion- (present on admission)  Assessment & Plan  Non-contrast CT scan of the chest shows scattered patchy bilateral pulmonary infiltrates, a moderate  to large right pleural effusion, cholelithiasis, cirrhosis, and a right upper lobe pulmonary nodule (to be followed up in 12 months). Patient had multiple thoracentesis on the right side and had a chest tube.  - Asymptomatic from pulmonary standpoint.  Patient had increased oxygen requirements in the morning on 02/10/2025 and is continues to be on 2 L NC.  -Therapeutic thoracentesis completed on 02/11/2025.  Patient was hypotensive in 70s/30s shortly after the procedure. Output was 1.6 L however thoracentesis could not be completed due to hypotension. Patient given albumin 25 g. Midodrine was increased from 5 to 10 mg.  Subsequently blood pressures in 100s/60s. Patient is asymptomatic.  Currently on 2 L NC. Patient comfortable and denies shortness of breath.  Follow-up thoracentesis by IR in the a.m. on 02/12/2025 prior to discharge.    -Lasix increased to 40 mg p.o. daily.    Pneumonia  Assessment & Plan  Infiltrates found on the left side of the lung. Review of CT chest displays findings inconsistent with PNA. Afebrile. Negative procal. On 0.5 LPM o2.  Will d/c IV Unasyn and doxycycline.    Hypotension  Assessment & Plan  Patient received IV fluids that improved her hypotension in the ER   continue midodrine  Given albumin    Acute hypoxic respiratory failure (HCC)- (present on admission)  Assessment & Plan  On 2 L of O2 above baseline  -Repeat chest x-ray shows increased right-sided pleural effusion.  Possible thoracentesis in the a.m. on 02/11/2025 prior to discharge.    Lactic acidosis- (present on admission)  Assessment & Plan  2.7 > 2.7. low suspicion of sepsis.  - continue to monitor    Thrombocytopenia (HCC)- (present on admission)  Assessment & Plan  Chronic, likely 2/2 chronic alcohol usage.  Continue to trend         VTE prophylaxis: SCDs/TEDs

## 2025-02-11 NOTE — DISCHARGE PLANNING
1253  DPA sent referral to [facility] , per choice form.     1500  DPA received manual fax from [facility] with acceptance. Uploaded to MM and advised RN JEREL Drake.     1540  DPA sent referral to [facility], per choice form.

## 2025-02-11 NOTE — DOCUMENTATION QUERY
[facility]                                                                       Query Response Note      PATIENT:               CARMEN CUADRA  ACCT #:                  [id number]  MRN:                     [id number]  :                      1970  ADMIT DATE:       2025 6:48 PM  DISCH DATE:          RESPONDING  PROVIDER #:        [id number]           QUERY TEXT:    ''Hyponatremia'' is documented in the ED Provider Notes from  only.    Please clarify status of this condition:    *Note:  If you agree with a diagnosis provided, please remember to include it in your daily concurrent documentation and onto the Discharge Summary    The patient's Clinical Indicators include:  Findings:  --Patient admitted for decompensated liver disease, right pleural effusion, hypotension, acute hypoxic respiratory failure and lactic     acidosis  --Per ED Provider Notes , ''hyponatremia'' is documented  --Sodium level on  admission:  128  --Sodium level on :  127  --Sodium level on :  128  --Sodium level on 02/10:  130    Treatment:  --Daily BMP    Risk Factors:  --PO Lasix/Aldactone use  --Decompensated liver disease  --Lactic acidosis    Thank you,  Darby Bautista BSN, RN  Clinical   Connect via [employer]  Options provided:   -- Hyponatremia is ruled in   -- Hyponatremia is ruled out   -- Other explanation-please specify, (please specify other explanation)   -- Unable to determine      Query created by: Darby Bautista on 2/10/2025 11:24 AM    RESPONSE TEXT:    Hyponatremia is ruled in       QUERY TEXT:    ''Pneumonia'' and ''findings inconsistent with pneumonia'' are both documented in the Medical Record.     Please clarify status of this condition:    *Note:  If you agree with a diagnosis provided, please remember to include it in your daily concurrent documentation and onto the Discharge  Summary    The patient's Clinical Indicators include:  Findings:  --Patient admitted for decompensated liver disease, right pleural effusion, hypotension, acute hypoxic respiratory failure and lactic     acidosis  --Per H&P, ''Infiltrates found on the left side of the lung, start IV Unasyn and Doxycycline'' documented  --Per PN 02/08, ''Review of CT chest displays findings inconsistent with PNA, afebrile, negative procal, will d/c IV Unasyn and     Doxycycline'' documented  --CXR on 02/07 admission:  Diffuse interstitial prominence could relate to fluid overload or atypical infection. Moderate to large     right pleural effusion  --Chest CT on 02/07:  Scattered patchy bilateral pulmonary infiltrates, moderate to large layering right pleural effusion,     cholelithiasis, cirrhosis, right upper lobe pulmonary nodule    Treatment:  --CXR  --Chest CT  --IV Unasyn and Doxycycline discontinued    Risk Factors:  --Acute hypoxic respiratory failure  --Right pleural effusion    Thank you,  Darby Bautista BSN, RN  Clinical   Connect via [facility]  Options provided:   -- Pneumonia is ruled in   -- Pneumonia is ruled out   -- Other explanation-please specify, (please specify other explanation)   -- Unable to determine      Query created by: Darby Bautista on 2/10/2025 11:32 AM    RESPONSE TEXT:    Pneumonia is ruled out          Electronically signed by:  SASCHA ZEPEDA MD 2/10/2025 6:01 PM

## 2025-02-11 NOTE — THERAPY
"Physical Therapy   Daily Treatment     Patient Name: Stacy Oliva  Age:  55 y.o., Sex:  female  Medical Record #: [id number]  Today's Date: 2/11/2025     Precautions  Precautions: Fall Risk    Assessment    Pt greeted and seen for PT treatment. Pt did not need assist for bed mobility from a flat bed or standing w/ FWW. She ambulated 30ft w/ FWW and SBA, declined further distance 2/2 not feeling well and waiting for lunch (NPO since midnight). She will have assist from family upon DC.   Pt currently limited by impaired balance 2/2 decreased strength which negatively impacts functional mobility. Pt will continue to benefit from skilled PT to address deficits.       Plan    Treatment Plan Status: Continue Current Treatment Plan  Type of Treatment: Bed Mobility, Family / Caregiver Training, Equipment, Gait Training, Neuro Re-Education / Balance, Self Care / Home Evaluation, Stair Training, Therapeutic Activities, Therapeutic Exercise  Treatment Frequency: 3 Times per Week  Treatment Duration: Until Therapy Goals Met    DC Equipment Recommendations: Front-Wheel Walker (pt/family unsure if  had FWW for pt as previous mentioned)  Discharge Recommendations: Recommend home health for continued physical therapy services      Subjective  \"I'm not worried about the stairs, it's only 2\"       02/11/25 1337   Vitals   Pulse (!) 121   Blood Pressure 94/51   Pulse Oximetry 96 %   O2 (LPM) 2   O2 Delivery Device Silicone Nasal Cannula   Pain 0 - 10 Group   Therapist Pain Assessment Post Activity Pain Same as Prior to Activity;Nurse Notified  (no c/o pain)   Cognition    Level of Consciousness Alert   Comments pleasant and cooperative. self  limiting   Balance   Sitting Balance (Static) Fair   Sitting Balance (Dynamic) Fair   Standing Balance (Static) Fair   Standing Balance (Dynamic) Fair   Weight Shift Sitting Fair   Weight Shift Standing Fair   Skilled Intervention Verbal Cuing;Tactile Cuing;Sequencing;Compensatory " Strategies   Comments w/ FWW   Bed Mobility    Supine to Sit Supervised   Sit to Supine Supervised   Scooting Supervised   Rolling Supervised   Skilled Intervention Verbal Cuing   Comments flat HOB   Gait Analysis   Gait Level Of Assist Standby Assist   Assistive Device Front Wheel Walker   Distance (Feet) 30   # of Times Distance was Traveled 1   Deviation Bradykinetic;Shuffled Gait   Weight Bearing Status no restrictions   Skilled Intervention Verbal Cuing;Compensatory Strategies   Comments no LOB. Pt did report dizziness after a couple steps and sat again, BP stable. Pt has been NPO since midnight. She was agreeable to walk a short distance, no further c/o dizziness, denied further distance. Declined trial of stairs.   Functional Mobility   Sit to Stand Supervised   Bed, Chair, Wheelchair Transfer Supervised   Transfer Method Stand Step   Mobility bed<>lake   Skilled Intervention Verbal Cuing   Comments w/ FWW   Short Term Goals    Short Term Goal # 1 pt will be able to complete supine<>sitting from flat bed with SPV in 6tx in order to improve independent   Goal Outcome # 1 Goal met   Short Term Goal # 2 pt will be able to complete STS with FWW and SPV in 6tx in order to progress prior level   Goal Outcome # 2 Goal met   Short Term Goal # 3 pt will be able to ambulate 150ft with FWW and SPV in 6tx in order to decrease fall risk   Goal Outcome # 3 Goal not met   Short Term Goal # 4 pt will be able to negotiate 2 steps with SPV in 6tx in order to enter and exit home   Goal Outcome # 4 Goal not met   Supervising Physical Therapist (PTA Treatments Only)   Supervising Physical Therapist Jessi Heard

## 2025-02-11 NOTE — THERAPY
Occupational Therapy  Daily Treatment     Patient Name: Stacy Oliva  Age:  55 y.o., Sex:  female  Medical Record #: [id number]  Today's Date: 2/11/2025     Precautions: Fall Risk    Assessment    Pt seen for OT tx. Pt demo progress towards OT goals, but is currently limited by decreased activity tolerance, balance deficits, generalized weakness, and decreased functional mobility. She required CGA to complete most ADLs, functional mobility, and txfs using FWW. Discussed with pt and family importance of frequent OOB ADLs as well as advocacy strategies to implement with staff to reduce risk of further deconditioning. Will continue to follow for ongoing acute OT services.     Plan    Treatment Plan Status: Continue Current Treatment Plan  Type of Treatment: Self Care / Activities of Daily Living, Adaptive Equipment, Neuro Re-Education / Balance, Therapeutic Exercises, Therapeutic Activity  Treatment Frequency: 3 Times per Week  Treatment Duration: Until Therapy Goals Met    DC Equipment Recommendations: Unable to determine at this time  Discharge Recommendations: Recommend home health for continued occupational therapy services     Objective    Vitals   Blood Pressure (!) 120/99   O2 (LPM) 2   O2 Delivery Device Silicone Nasal Cannula   Vitals Comments Reported dizziness following transition to seated position at the EOB. Reported that dizziness resolved with increased time.   Pain 0 - 10 Group   Therapist Pain Assessment Post Activity Pain Same as Prior to Activity;Nurse Notified  (Not rated, agreeable to activity)   Cognition    Level of Consciousness Alert   Comments Pleasant and cooperative; may be self limiting.   Balance   Sitting Balance (Static) Fair   Sitting Balance (Dynamic) Fair   Standing Balance (Static) Fair   Standing Balance (Dynamic) Fair   Weight Shift Sitting Fair   Weight Shift Standing Fair   Skilled Intervention Verbal Cuing;Tactile Cuing;Compensatory Strategies   Comments w/FWW   Bed Mobility     Supine to Sit Supervised   Sit to Supine Supervised   Scooting Supervised   Rolling Supervised   Skilled Intervention Verbal Cuing   Comments HOB slightly elevated   Activities of Daily Living   Eating Supervision   Grooming Contact Guard Assist;Standing   Lower Body Dressing Standby Assist   Toileting   (NT; reported that she had completed it prior to start of session)   Skilled Intervention Verbal Cuing;Tactile Cuing;Sequencing;Compensatory Strategies   How much help from another person does the patient currently need...   6 Clicks Daily Activity Score 20   Functional Mobility   Sit to Stand Supervised   Bed, Chair, Wheelchair Transfer Supervised   Mobility Functional mobility in room w/FWW.   Skilled Intervention Verbal Cuing   Activity Tolerance   Sitting Edge of Bed 9 min   Standing 6 min   Comments Limited by generalized weakness   Patient / Family Goals   Patient / Family Goal #1 To get stronger   Goal #1 Outcome Progressing as expected   Short Term Goals   Short Term Goal # 1 Pt will complete ADL txfs with supv   Goal Outcome # 1 Progressing as expected   Short Term Goal # 2 Pt will complete FB dressing with supv including clothing retrieval   Goal Outcome # 2 Progressing as expected   Short Term Goal # 3 Pt will complete standing g/h routine with supv   Goal Outcome # 3 Progressing as expected   Short Term Goal # 4 Pt will complete toileting ADLs with supv   Goal Outcome # 4 Goal not met   Education Group   Education Provided Role of Occupational Therapist;Activities of Daily Living;Pathology of bedrest   Role of Occupational Therapist Patient Response Patient;Family;Acceptance;Explanation;Verbal Demonstration   ADL Patient Response Patient;Family;Acceptance;Explanation;Verbal Demonstration;Action Demonstration;Reinforcement Needed   Pathology of Bedrest Patient Response Patient;Family;Acceptance;Explanation;Verbal Demonstration;Action Demonstration;Reinforcement Needed  (Discussed importance of advocacy  and strategies to implement with staff to complete frequent OOB ADLs to reduce furhter risk of deconditioning.)

## 2025-02-12 ENCOUNTER — PHARMACY VISIT (OUTPATIENT)
Dept: PHARMACY | Facility: MEDICAL CENTER | Age: 55
End: 2025-02-12
Payer: COMMERCIAL

## 2025-02-12 VITALS
BODY MASS INDEX: 20.35 KG/M2 | SYSTOLIC BLOOD PRESSURE: 95 MMHG | OXYGEN SATURATION: 95 % | RESPIRATION RATE: 18 BRPM | WEIGHT: 122.14 LBS | HEART RATE: 107 BPM | TEMPERATURE: 97.5 F | DIASTOLIC BLOOD PRESSURE: 59 MMHG | HEIGHT: 65 IN

## 2025-02-12 LAB
ALBUMIN SERPL BCP-MCNC: 2.8 G/DL (ref 3.2–4.9)
ALBUMIN/GLOB SERPL: 1.1 G/DL
ALP SERPL-CCNC: 107 U/L (ref 30–99)
ALT SERPL-CCNC: 43 U/L (ref 2–50)
ANION GAP SERPL CALC-SCNC: 10 MMOL/L (ref 7–16)
AST SERPL-CCNC: 50 U/L (ref 12–45)
BASOPHILS # BLD AUTO: 0.3 % (ref 0–1.8)
BASOPHILS # BLD: 0.02 K/UL (ref 0–0.12)
BILIRUB SERPL-MCNC: 8.5 MG/DL (ref 0.1–1.5)
BUN SERPL-MCNC: 14 MG/DL (ref 8–22)
CALCIUM ALBUM COR SERPL-MCNC: 9.6 MG/DL (ref 8.5–10.5)
CALCIUM SERPL-MCNC: 8.6 MG/DL (ref 8.5–10.5)
CHLORIDE SERPL-SCNC: 93 MMOL/L (ref 96–112)
CO2 SERPL-SCNC: 25 MMOL/L (ref 20–33)
CREAT SERPL-MCNC: 0.61 MG/DL (ref 0.5–1.4)
EOSINOPHIL # BLD AUTO: 0.08 K/UL (ref 0–0.51)
EOSINOPHIL NFR BLD: 1 % (ref 0–6.9)
ERYTHROCYTE [DISTWIDTH] IN BLOOD BY AUTOMATED COUNT: 70.4 FL (ref 35.9–50)
GFR SERPLBLD CREATININE-BSD FMLA CKD-EPI: 105 ML/MIN/1.73 M 2
GLOBULIN SER CALC-MCNC: 2.6 G/DL (ref 1.9–3.5)
GLUCOSE SERPL-MCNC: 101 MG/DL (ref 65–99)
HCT VFR BLD AUTO: 33.7 % (ref 37–47)
HGB BLD-MCNC: 12.3 G/DL (ref 12–16)
IMM GRANULOCYTES # BLD AUTO: 0.1 K/UL (ref 0–0.11)
IMM GRANULOCYTES NFR BLD AUTO: 1.3 % (ref 0–0.9)
LYMPHOCYTES # BLD AUTO: 1.51 K/UL (ref 1–4.8)
LYMPHOCYTES NFR BLD: 19.8 % (ref 22–41)
MCH RBC QN AUTO: 38.9 PG (ref 27–33)
MCHC RBC AUTO-ENTMCNC: 36.5 G/DL (ref 32.2–35.5)
MCV RBC AUTO: 106.6 FL (ref 81.4–97.8)
MONOCYTES # BLD AUTO: 0.54 K/UL (ref 0–0.85)
MONOCYTES NFR BLD AUTO: 7.1 % (ref 0–13.4)
NEUTROPHILS # BLD AUTO: 5.37 K/UL (ref 1.82–7.42)
NEUTROPHILS NFR BLD: 70.5 % (ref 44–72)
NRBC # BLD AUTO: 0 K/UL
NRBC BLD-RTO: 0 /100 WBC (ref 0–0.2)
PLATELET # BLD AUTO: 78 K/UL (ref 164–446)
PLATELETS.RETICULATED NFR BLD AUTO: 3.1 % (ref 0.6–13.1)
PMV BLD AUTO: 9.8 FL (ref 9–12.9)
POTASSIUM SERPL-SCNC: 3.3 MMOL/L (ref 3.6–5.5)
PROCALCITONIN SERPL-MCNC: 0.15 NG/ML
PROT SERPL-MCNC: 5.4 G/DL (ref 6–8.2)
RBC # BLD AUTO: 3.16 M/UL (ref 4.2–5.4)
SODIUM SERPL-SCNC: 128 MMOL/L (ref 135–145)
WBC # BLD AUTO: 7.6 K/UL (ref 4.8–10.8)

## 2025-02-12 PROCEDURE — A9270 NON-COVERED ITEM OR SERVICE: HCPCS

## 2025-02-12 PROCEDURE — 85055 RETICULATED PLATELET ASSAY: CPT

## 2025-02-12 PROCEDURE — 36415 COLL VENOUS BLD VENIPUNCTURE: CPT

## 2025-02-12 PROCEDURE — 700102 HCHG RX REV CODE 250 W/ 637 OVERRIDE(OP)

## 2025-02-12 PROCEDURE — 85025 COMPLETE CBC W/AUTO DIFF WBC: CPT

## 2025-02-12 PROCEDURE — 80053 COMPREHEN METABOLIC PANEL: CPT

## 2025-02-12 PROCEDURE — RXMED WILLOW AMBULATORY MEDICATION CHARGE

## 2025-02-12 PROCEDURE — 99238 HOSP IP/OBS DSCHRG MGMT 30/<: CPT | Mod: GC | Performed by: HOSPITALIST

## 2025-02-12 PROCEDURE — 84145 PROCALCITONIN (PCT): CPT

## 2025-02-12 RX ORDER — CEFDINIR 300 MG/1
300 CAPSULE ORAL 2 TIMES DAILY
Qty: 10 CAPSULE | Refills: 0 | Status: SHIPPED | OUTPATIENT
Start: 2025-02-12 | End: 2025-02-12

## 2025-02-12 RX ORDER — FUROSEMIDE 40 MG/1
40 TABLET ORAL EVERY MORNING
Qty: 30 TABLET | Refills: 0 | Status: SHIPPED | OUTPATIENT
Start: 2025-02-13 | End: 2025-02-14 | Stop reason: SDUPTHER

## 2025-02-12 RX ORDER — CEFUROXIME AXETIL 500 MG/1
500 TABLET ORAL 2 TIMES DAILY
Qty: 10 TABLET | Refills: 0 | Status: ON HOLD | OUTPATIENT
Start: 2025-02-12 | End: 2025-02-24

## 2025-02-12 RX ORDER — POTASSIUM CHLORIDE 1500 MG/1
40 TABLET, EXTENDED RELEASE ORAL ONCE
Status: COMPLETED | OUTPATIENT
Start: 2025-02-12 | End: 2025-02-12

## 2025-02-12 RX ADMIN — MIDODRINE HYDROCHLORIDE 10 MG: 5 TABLET ORAL at 13:44

## 2025-02-12 RX ADMIN — MIDODRINE HYDROCHLORIDE 10 MG: 5 TABLET ORAL at 06:05

## 2025-02-12 RX ADMIN — LACTULOSE 15 ML: 10 SOLUTION ORAL at 06:05

## 2025-02-12 RX ADMIN — FUROSEMIDE 40 MG: 40 TABLET ORAL at 06:05

## 2025-02-12 RX ADMIN — POTASSIUM CHLORIDE 40 MEQ: 1500 TABLET, EXTENDED RELEASE ORAL at 08:47

## 2025-02-12 ASSESSMENT — PAIN DESCRIPTION - PAIN TYPE: TYPE: ACUTE PAIN

## 2025-02-12 ASSESSMENT — PATIENT HEALTH QUESTIONNAIRE - PHQ9
SUM OF ALL RESPONSES TO PHQ9 QUESTIONS 1 AND 2: 0
1. LITTLE INTEREST OR PLEASURE IN DOING THINGS: NOT AT ALL
2. FEELING DOWN, DEPRESSED, IRRITABLE, OR HOPELESS: NOT AT ALL

## 2025-02-12 ASSESSMENT — FIBROSIS 4 INDEX: FIB4 SCORE: 5.47

## 2025-02-12 NOTE — PROGRESS NOTES
Patient given incentive spirometer, patient educated on how to use incentive spirometer. Patient demonstrated correct usage of incentive spirometer, patient denies any questions.

## 2025-02-12 NOTE — CARE PLAN
The patient is Stable - Low risk of patient condition declining or worsening    Shift Goals  Clinical Goals: R thoracentesis, UA, wean o2 needs  Patient Goals: Rest and get off the o2  Family Goals: Updates    Progress made toward(s) clinical / shift goals:        Problem: Skin Integrity  Goal: Skin integrity is maintained or improved  Description: Target End Date:  Prior to discharge or change in level of care    Document interventions on Skin Risk/Kai flowsheet groups and corresponding LDA    1.  Assess and monitor skin integrity, appearance and/or temperature  2.  Assess risk factors for impaired skin integrity and/or pressures ulcers  3.  Implement precautions to protect skin integrity in collaboration with interdisciplinary team  4.  Implement pressure ulcer prevention protocol if at risk for skin breakdown  5.  Confirm wound care consult if at risk for skin breakdown  6.  Ensure patient use of pressure relieving devices  (Low air loss bed, waffle overlay, heel protectors, ROHO cushion, etc)  Outcome: Progressing     Problem: Fall Risk  Goal: Patient will remain free from falls  Description: Target End Date:  Prior to discharge or change in level of care    Document interventions on the Nidhi Sanchez Fall Risk Assessment    1.  Assess for fall risk factors  2.  Implement fall precautions  Outcome: Progressing

## 2025-02-12 NOTE — PROGRESS NOTES
Patient and family explained discharge paperwork, patient and family verbalized understanding. Patients meds to bed delivered to bedside. Patients IV removed. Patients belongings present on admission were present at discharge. Patients concentrator in her hotel room. RN waiting for patients sister to get a hotel room key so RN can wheel patient to her hotel room. Patient and family denies any other questions.

## 2025-02-12 NOTE — DISCHARGE INSTRUCTIONS
-Furosemide 40 mg daily. Continue taking potassium daily.  -Hepatology referral outpatient.  -Follow-up with primary care physician.     Awake

## 2025-02-12 NOTE — DISCHARGE PLANNING
[id number]  LELAND spoke with Abby (Carolina SOTO) to confirm pt will be staying at [facility] for 2 weeks. Then will be moving to a local [place] address. Pt does not have address yet and is shopping for apartment. Abby say will forward to manager and will have answer shortly on acceptance. Advised RN JEREL Drake.     [id number]  DPA sent referral to [facility], via manual fax. Per choice form.

## 2025-02-12 NOTE — FACE TO FACE
"Face to Face Note  -  Durable Medical Equipment    Gilda Guevara M.D. 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    Supporting Symptoms: The patient requires supplemental oxygen, as the following interventions have been tried with limited or no improvement: \"Incentive spirometry.    My Clinical findings support the need for the above equipment due to:  Hypoxia  "

## 2025-02-12 NOTE — DISCHARGE SUMMARY
[facility] Internal Medicine Discharge Summary    Attending: Diane Amin M.d.  Senior Resident: Dr. Carolina  Intern:  Dr. Guevara  Contact Number: [phone]    CHIEF COMPLAINT ON ADMISSION  Chief Complaint   Patient presents with    Weakness       Reason for Admission  ems     Admission Date  2/7/2025    CODE STATUS  Full Code    HPI & HOSPITAL COURSE    Stacy Oliva, a 55-year-old female with a history of alcoholic cirrhosis and varices, presented on Feb 7, 2025, with generalized weakness. She was recently discharged but could not return to [facility], staying instead at a local hotel. Upon EMS arrival, she was found lethargic and hypotensive with a blood pressure in the 60s. She denies experiencing nausea, vomiting, diarrhea, fever, chills, cough, or shortness of breath.      Patient found to have decompensated cirrhosis with MELD score of 29-30.  Repeat chest x-ray shows increased right-sided pleural effusion now s/p therapeutic thoracentesis completed on 02/11/2025 following which patient became hypotensive however was asymptomatic. Patient was given albumin and midodrine was increased from 5 to 10 mg daily.  Patient was weaned off oxygen and is currently on room air. Spironolactone was discontinued due to hyponatremia in the hospital. Started on p.o. Lasix 40 mg daily. Patient was discharged on cefuroxime twice daily for 5 days due to concern for pneumonia on chest x-ray and CT scan.  Continue p.o. Lasix 40 mg daily and potassium supplementation. Patient advised to remain sober for at least 6 months prior to transplant, referral to follow-up with hepatology placed. She is pending transplant in [place] per insurance.     Therefore, she is discharged in fair and stable condition to home with organized home healthcare and close outpatient follow-up.    The patient met 2-midnight criteria for an inpatient stay at the time of discharge.    Discharge Date  02/12/2025    Physical Exam on Day of Discharge  Physical  Exam  Constitutional:       Appearance: Normal appearance.   HENT:      Head: Normocephalic and atraumatic.      Nose: Nose normal.      Mouth/Throat:      Mouth: Mucous membranes are moist.   Eyes:      Extraocular Movements: Extraocular movements intact.      Conjunctiva/sclera: Conjunctivae normal.   Cardiovascular:      Rate and Rhythm: Normal rate and regular rhythm.      Pulses: Normal pulses.      Heart sounds: Normal heart sounds. No murmur heard.     No friction rub. No gallop.   Pulmonary:      Effort: Pulmonary effort is normal. No respiratory distress.      Breath sounds: Normal breath sounds. No wheezing or rales.   Abdominal:      General: Abdomen is flat. Bowel sounds are normal. There is no distension.      Palpations: Abdomen is soft.      Tenderness: There is no abdominal tenderness. There is no guarding or rebound.   Musculoskeletal:         General: Normal range of motion.      Cervical back: Normal range of motion.      Right lower leg: No edema.      Left lower leg: No edema.   Skin:     Capillary Refill: Capillary refill takes less than 2 seconds.      Coloration: Skin is jaundiced.   Neurological:      General: No focal deficit present.      Mental Status: She is alert and oriented to person, place, and time.   Psychiatric:         Mood and Affect: Mood normal.         Behavior: Behavior normal.         FOLLOW UP ITEMS POST DISCHARGE  -Take cefuroxime 500 mg twice a day for 5 days  -Lasix 40 mg daily with potassium supplementation  -Hepatology outpatient referral  -Follow-up with primary care physician    DISCHARGE DIAGNOSES  Principal Problem:    Decompensated liver disease (HCC) (POA: Yes)  Active Problems:    Pleural effusion (POA: Yes)    Hyponatremia (POA: Yes)    Thrombocytopenia (HCC) (POA: Yes)    Lactic acidosis (POA: Yes)    Acute hypoxic respiratory failure (HCC) (POA: Yes)    Hypotension (POA: Unknown)    Pneumonia (POA: Unknown)  Resolved Problems:    * No resolved hospital  problems. *      FOLLOW UP  Future Appointments   Date Time Provider Department Center   2/14/2025  2:30 PM Michelle Vanessa M.D. [facility]     No follow-up provider specified.    MEDICATIONS ON DISCHARGE     Medication List        START taking these medications        Instructions   cefUROXime 500 MG Tabs  Commonly known as: Ceftin   Take 1 Tablet by mouth 2 times a day.  Dose: 500 mg            CHANGE how you take these medications        Instructions   furosemide 40 MG Tabs  Start taking on: February 13, 2025  What changed:   medication strength  how much to take  Commonly known as: Lasix   Take 1 Tablet by mouth every morning.  Dose: 40 mg            CONTINUE taking these medications        Instructions   lactulose 10 g/15mL Soln   Take 30 mL by mouth 2 times a day. Indications: Constipation, Impaired Brain Function due to Liver Disease, aim for 2 bowel movements per day  Dose: 20 g     midodrine 10 MG tablet  Commonly known as: Proamatine   Take 1 Tablet by mouth every 8 hours.  Dose: 10 mg     omeprazole 20 MG delayed-release capsule  Commonly known as: PriLOSEC   Take 1 Capsule by mouth every day.  Dose: 20 mg     Phospha 250 Neutral [id number] MG tablet  Generic drug: phosphorus   Take 1 Tablet by mouth 2 times a day.  Dose: 1 Tablet     potassium chloride SA 20 MEQ Tbcr  Commonly known as: Kdur   Take 0.5 Tablets by mouth 2 times a day.  Dose: 10 mEq            STOP taking these medications      spironolactone 25 MG Tabs  Commonly known as: Aldactone              Allergies  No Known Allergies    DIET  Orders Placed This Encounter   Procedures    Diet Order Diet: Regular     Standing Status:   Standing     Number of Occurrences:   1     Diet::   Regular [1]       ACTIVITY  As tolerated.  Weight bearing as tolerated    CONSULTATIONS  IR    PROCEDURES  Therapeutic thoracentesis on 02/11/2025    LABORATORY  Lab Results   Component Value Date    SODIUM 128 (L) 02/12/2025    POTASSIUM 3.3 (L) 02/12/2025     CHLORIDE 93 (L) 02/12/2025    CO2 25 02/12/2025    GLUCOSE 101 (H) 02/12/2025    BUN 14 02/12/2025    CREATININE 0.61 02/12/2025        Lab Results   Component Value Date    WBC 7.6 02/12/2025    HEMOGLOBIN 12.3 02/12/2025    HEMATOCRIT 33.7 (L) 02/12/2025    PLATELETCT 78 (L) 02/12/2025        Total time of the discharge process exceeds 54 minutes.

## 2025-02-12 NOTE — DISCHARGE PLANNING
Request for home oxygen, patient set up with a concentrator  from last visit, Madeline ngo is aware and states she has access to it in the hotel room. Staff notified.

## 2025-02-12 NOTE — ASSESSMENT & PLAN NOTE
Sodium at 127 on 02/11/2025.  Likely dilutional.  -Continue to monitor clinically.  -Spironolactone discontinued.

## 2025-02-13 LAB
MYCOBACTERIUM SPEC CULT: NORMAL
RHODAMINE-AURAMINE STN SPEC: NORMAL
SIGNIFICANT IND 70042: NORMAL
SITE SITE: NORMAL
SOURCE SOURCE: NORMAL

## 2025-02-13 NOTE — PROGRESS NOTES
Patients sister called regarding medications, BP management, fluid intake. Sister educated, sister denies any further questions.

## 2025-02-14 ENCOUNTER — PHARMACY VISIT (OUTPATIENT)
Dept: PHARMACY | Facility: MEDICAL CENTER | Age: 55
End: 2025-02-14
Payer: COMMERCIAL

## 2025-02-14 ENCOUNTER — OFFICE VISIT (OUTPATIENT)
Dept: MEDICAL GROUP | Facility: CLINIC | Age: 55
End: 2025-02-14
Payer: COMMERCIAL

## 2025-02-14 VITALS
HEART RATE: 116 BPM | HEIGHT: 65 IN | SYSTOLIC BLOOD PRESSURE: 89 MMHG | BODY MASS INDEX: 20.32 KG/M2 | TEMPERATURE: 97.7 F | DIASTOLIC BLOOD PRESSURE: 62 MMHG | OXYGEN SATURATION: 92 %

## 2025-02-14 DIAGNOSIS — K92.2 UPPER GI BLEED: ICD-10-CM

## 2025-02-14 DIAGNOSIS — K74.69 DECOMPENSATED LIVER DISEASE (HCC): ICD-10-CM

## 2025-02-14 DIAGNOSIS — E83.39 HYPOPHOSPHATEMIA: ICD-10-CM

## 2025-02-14 DIAGNOSIS — I95.9 HYPOTENSION, UNSPECIFIED HYPOTENSION TYPE: ICD-10-CM

## 2025-02-14 DIAGNOSIS — E87.1 HYPONATREMIA: ICD-10-CM

## 2025-02-14 DIAGNOSIS — K70.30 ALCOHOLIC CIRRHOSIS OF LIVER WITHOUT ASCITES (HCC): ICD-10-CM

## 2025-02-14 PROCEDURE — 99204 OFFICE O/P NEW MOD 45 MIN: CPT | Mod: GC

## 2025-02-14 PROCEDURE — 3074F SYST BP LT 130 MM HG: CPT

## 2025-02-14 PROCEDURE — 3078F DIAST BP <80 MM HG: CPT

## 2025-02-14 PROCEDURE — RXMED WILLOW AMBULATORY MEDICATION CHARGE

## 2025-02-14 RX ORDER — MIDODRINE HYDROCHLORIDE 10 MG/1
10 TABLET ORAL EVERY 8 HOURS
Qty: 60 TABLET | Refills: 0 | Status: ON HOLD | OUTPATIENT
Start: 2025-02-14 | End: 2025-02-27

## 2025-02-14 RX ORDER — LACTULOSE 10 G/15ML
20 SOLUTION ORAL 4 TIMES DAILY
Qty: 3600 ML | Refills: 6 | Status: ON HOLD | OUTPATIENT
Start: 2025-02-14 | End: 2025-02-27

## 2025-02-14 RX ORDER — OMEPRAZOLE 20 MG/1
20 CAPSULE, DELAYED RELEASE ORAL DAILY
Qty: 90 CAPSULE | Refills: 3 | Status: ON HOLD | OUTPATIENT
Start: 2025-02-14 | End: 2025-02-24

## 2025-02-14 RX ORDER — FUROSEMIDE 40 MG/1
40 TABLET ORAL EVERY MORNING
Qty: 30 TABLET | Refills: 1 | Status: ON HOLD | OUTPATIENT
Start: 2025-02-14 | End: 2025-02-27

## 2025-02-14 RX ORDER — POTASSIUM CHLORIDE 1500 MG/1
10 TABLET, EXTENDED RELEASE ORAL 2 TIMES DAILY
Qty: 90 TABLET | Refills: 1 | Status: ON HOLD | OUTPATIENT
Start: 2025-02-14 | End: 2025-02-27

## 2025-02-14 NOTE — PROGRESS NOTES
This patient was seen, evaluated, and care plan was developed with the resident. I agree with the assessment and plan as outlined in the resident's note.   Report electronically signed by:    Fabian Jansen MD   Family and Community Medicine  [facility]  [facility]

## 2025-02-14 NOTE — PROGRESS NOTES
Subjective:     CC: Establish care    HPI:   Stacy with end stage liver disease who presents today with:    Problem   Decompensated Liver Disease (Hcc)    Patient has had several hospitalizations recently due to her cirrhosis and recurrent hepatic hydrothorax.  She reports a previous history of alcohol use but quit at the end of December.  Patient has been compliant with her medications including lactulose which she is taking twice daily.  Patient reports having a bowel movement every 3-4 days on the lactulose.  Her and her family also had questions regarding her chronic hyponatremia from her cirrhosis.     Hypotension    Patient's blood pressures have been ranging between 80-90s/60 in the past few days.  She is compliant with her midodrine.  The patient denies any symptoms of hypotension including dizziness.     Hyponatremia    Chronic and stable.         Current Outpatient Medications Ordered in Epic   Medication Sig Dispense Refill    lactulose 10 g/15mL Solution Take 30 mL by mouth 4 times a day for 210 days. 3600 mL 6    furosemide (LASIX) 40 MG Tab Take 1 Tablet by mouth every morning. 30 Tablet 1    midodrine (PROAMATINE) 10 MG tablet Take 1 Tablet by mouth every 8 hours. 60 Tablet 0    omeprazole (PRILOSEC) 20 MG delayed-release capsule Take 1 Capsule by mouth every day. 90 Capsule 3    phosphorus (K-PHOS-NEUTRAL) 250 MG tablet Take 1 Tablet by mouth 2 times a day. 180 Tablet 1    potassium chloride SA (KDUR) 20 MEQ Tab CR Take 0.5 Tablets by mouth 2 times a day. 90 Tablet 1    cefUROXime (CEFTIN) 500 MG Tab Take 1 Tablet by mouth 2 times a day. 10 Tablet 0     No current Epic-ordered facility-administered medications on file.       ROS:  ROS as per HPI. Otherwise negative.    Allergies: No known allergies    Social History:   Work:   Alcohol: Denies  Tobacco: Denies  Drugs: Denies      Objective:     Exam:  BP (!) 89/62 (BP Location: Left arm, Patient Position: Sitting, BP Cuff Size: Adult)   Pulse (!)  "116   Temp 36.5 °C (97.7 °F) (Temporal)   Ht 1.651 m (5' 5\")   SpO2 92%   BMI 20.32 kg/m²  Body mass index is 20.32 kg/m².    Gen: Alert and oriented, No apparent distress. Jaundice and tired appearing.  Lungs: Normal effort. NC in place. Decreased breath sound in right lower lobe. No crackles, wheezing, or rhonchi.  CV: Regular rate and rhythm. No murmurs, rubs, or gallops.  Abdomen: No ascites. No abdominal tenderness. No rebound or guarding.    Assessment & Plan:     55 y.o. female with the following -     Problem List Items Addressed This Visit       Hyponatremia    Discussed 2 g sodium restriction and ordered repeat CMP in 1 week to reevaluate.  Patient is compliant with her Lasix.         Relevant Orders    Comp Metabolic Panel    Decompensated liver disease (HCC)    Will increase patient's lactulose to 20 g 4 times a day and titrate to 2-3 bowel movements daily given her elevated bilirubin.  Discussed 2 g sodium restriction with her chronic hyponatremia and the indications for this.  Patient will continue on her Lasix and supplementation with K-Phos and KCl.  Her and her family are requesting a referral to [facility] or [place] for hepatology.  They would like a second opinion in regards to her need for liver transplant versus medical therapies.  Discussed importance of continued alcohol cessation and offered medications to help with cravings which patient declined.         Relevant Orders    Referral to Gastroenterology    Hypotension    Discussed ED/return precautions including BP less than 70/50 or symptoms of hypotension including dizziness, lightheadedness, or syncope.         Relevant Medications    furosemide (LASIX) 40 MG Tab    midodrine (PROAMATINE) 10 MG tablet     Other Visit Diagnoses         Hypophosphatemia        Relevant Orders    PHOSPHORUS      Upper GI bleed        Relevant Medications    omeprazole (PRILOSEC) 20 MG delayed-release capsule      Alcoholic cirrhosis of liver with ascites " (HCC)        Relevant Medications    potassium chloride SA (KDUR) 20 MEQ Tab CR              Return in about 4 weeks (around 3/14/2025).

## 2025-02-15 NOTE — ASSESSMENT & PLAN NOTE
Discussed 2 g sodium restriction and ordered repeat CMP in 1 week to reevaluate.  Patient is compliant with her Lasix.

## 2025-02-15 NOTE — ASSESSMENT & PLAN NOTE
Discussed ED/return precautions including BP less than 70/50 or symptoms of hypotension including dizziness, lightheadedness, or syncope.

## 2025-02-15 NOTE — ASSESSMENT & PLAN NOTE
Will increase patient's lactulose to 20 g 4 times a day and titrate to 2-3 bowel movements daily given her elevated bilirubin.  Discussed 2 g sodium restriction with her chronic hyponatremia and the indications for this.  Patient will continue on her Lasix and supplementation with K-Phos and KCl.  Her and her family are requesting a referral to [facility] or [facility] for hepatology.  They would like a second opinion in regards to her need for liver transplant versus medical therapies.  Discussed importance of continued alcohol cessation and offered medications to help with cravings which patient declined.

## 2025-02-17 NOTE — Clinical Note
REFERRAL APPROVAL NOTICE         Sent on February 17, 2025                   Stacy Oliva  [address], [place] [address] [address]  [facility] [address]                   Dear Ms. Oliva,    After a careful review of the medical information and benefit coverage, [facility] has processed your referral. See below for additional details.    If applicable, you must be actively enrolled with your insurance for coverage of the authorized service. If you have any questions regarding your coverage, please contact your insurance directly.    REFERRAL INFORMATION   Referral #:  [id number]  Referred-To Provider    Referred-By Provider:  Gastroenterology    Michelle Vanessa M.D.   [facility]      [address]  [facility] [address]  [phone] [address]  [address]  [facility] [address]  [phone]    Referral Start Date:  02/14/2025  Referral End Date:   02/14/2026             SCHEDULING  If you do not already have an appointment, please call [phone] to make an appointment.     MORE INFORMATION  If you do not already have a backstitch account, sign up at: [email or web address]  You can access your medical information, make appointments, see lab results, billing information, and more.  If you have questions regarding this referral, please contact  the [facility] Referrals department at:             [phone]. Monday - Friday 8:00AM - 5:00PM.     Sincerely,    [facility]

## 2025-02-18 NOTE — DISCHARGE PLANNING
"Patient called regarding discharge questions.  Patient inquiring if they have a standing order for 'fluid to be removed from stomach\". CM reviewed patient chart and noted they do not have any standing orders, but are to follow up with PCP for additional care and orders.  Patient verbalized understanding.    "

## 2025-02-19 ENCOUNTER — TELEPHONE (OUTPATIENT)
Dept: MEDICAL GROUP | Facility: CLINIC | Age: 55
End: 2025-02-19
Payer: COMMERCIAL

## 2025-02-20 ENCOUNTER — TELEPHONE (OUTPATIENT)
Dept: MEDICAL GROUP | Facility: CLINIC | Age: 55
End: 2025-02-20
Payer: COMMERCIAL

## 2025-02-20 NOTE — TELEPHONE ENCOUNTER
Caller Name: cathi    Call Back Number: [phone]    How would the patient prefer to be contacted with a response: Phone call do NOT leave a detailed message    Order    Patient need a new DME order for Oxygen with Turation  maxim and minium.she has been rounding bp 80/60 .    Faxed the order to [phone]

## 2025-02-21 ENCOUNTER — PHARMACY VISIT (OUTPATIENT)
Dept: PHARMACY | Facility: MEDICAL CENTER | Age: 55
End: 2025-02-21
Payer: COMMERCIAL

## 2025-02-21 ENCOUNTER — TELEPHONE (OUTPATIENT)
Dept: MEDICAL GROUP | Facility: CLINIC | Age: 55
End: 2025-02-21
Payer: COMMERCIAL

## 2025-02-21 PROCEDURE — RXMED WILLOW AMBULATORY MEDICATION CHARGE

## 2025-02-22 NOTE — TELEPHONE ENCOUNTER
Caller Name: Madeline Oliva on behalf of Stacy Oliva  Call Back Number: [phone]    How would the patient prefer to be contacted with a response: Phone call OK to leave a detailed message    Sister of patient called, she had a couple of questions. She mentioned they want a standing order to get pt's chest drained. They also mentioned that they were given a referral for BERNARDINO Sanchez and she wants to know if she can get another one just in case BERNARDINO Sanchez is booking out far. She also mentioned that Stacy's breathing is getting heavier, she is currently in 1 liter, the nurse they have told them to bump it up to 3, and they just want to know if it is okay.

## 2025-02-23 PROCEDURE — RXMED WILLOW AMBULATORY MEDICATION CHARGE

## 2025-02-24 ENCOUNTER — HOSPITAL ENCOUNTER (INPATIENT)
Facility: MEDICAL CENTER | Age: 55
LOS: 3 days | End: 2025-02-27
Attending: EMERGENCY MEDICINE | Admitting: FAMILY MEDICINE
Payer: COMMERCIAL

## 2025-02-24 ENCOUNTER — APPOINTMENT (OUTPATIENT)
Dept: RADIOLOGY | Facility: MEDICAL CENTER | Age: 55
End: 2025-02-24
Attending: EMERGENCY MEDICINE
Payer: COMMERCIAL

## 2025-02-24 DIAGNOSIS — K70.30 ALCOHOLIC CIRRHOSIS OF LIVER WITHOUT ASCITES (HCC): ICD-10-CM

## 2025-02-24 DIAGNOSIS — K74.60 DECOMPENSATED CIRRHOSIS (HCC): Chronic | ICD-10-CM

## 2025-02-24 DIAGNOSIS — J94.8 HYDROTHORAX: ICD-10-CM

## 2025-02-24 DIAGNOSIS — K74.69 DECOMPENSATED LIVER DISEASE (HCC): Primary | ICD-10-CM

## 2025-02-24 DIAGNOSIS — K72.10 CHRONIC LIVER FAILURE WITHOUT HEPATIC COMA (HCC): ICD-10-CM

## 2025-02-24 DIAGNOSIS — K72.90 DECOMPENSATED CIRRHOSIS (HCC): Chronic | ICD-10-CM

## 2025-02-24 DIAGNOSIS — K74.69 DECOMPENSATED LIVER DISEASE (HCC): ICD-10-CM

## 2025-02-24 DIAGNOSIS — J90 RECURRENT PLEURAL EFFUSION ON RIGHT: ICD-10-CM

## 2025-02-24 DIAGNOSIS — E87.1 HYPONATREMIA: ICD-10-CM

## 2025-02-24 PROBLEM — R00.0 TACHYCARDIA: Status: ACTIVE | Noted: 2025-02-24

## 2025-02-24 LAB
ALBUMIN SERPL BCP-MCNC: 2.9 G/DL (ref 3.2–4.9)
ALBUMIN/GLOB SERPL: 0.9 G/DL
ALP SERPL-CCNC: 232 U/L (ref 30–99)
ALT SERPL-CCNC: 135 U/L (ref 2–50)
AMMONIA PLAS-SCNC: 50 UMOL/L (ref 11–45)
ANION GAP SERPL CALC-SCNC: 14 MMOL/L (ref 7–16)
APTT PPP: 39.1 SEC (ref 24.7–36)
AST SERPL-CCNC: 96 U/L (ref 12–45)
BASOPHILS # BLD AUTO: 0.4 % (ref 0–1.8)
BASOPHILS # BLD: 0.04 K/UL (ref 0–0.12)
BILIRUB SERPL-MCNC: 10 MG/DL (ref 0.1–1.5)
BUN SERPL-MCNC: 11 MG/DL (ref 8–22)
CALCIUM ALBUM COR SERPL-MCNC: 9.8 MG/DL (ref 8.5–10.5)
CALCIUM SERPL-MCNC: 8.9 MG/DL (ref 8.5–10.5)
CHLORIDE SERPL-SCNC: 88 MMOL/L (ref 96–112)
CO2 SERPL-SCNC: 23 MMOL/L (ref 20–33)
CREAT SERPL-MCNC: 0.58 MG/DL (ref 0.5–1.4)
EKG IMPRESSION: NORMAL
EOSINOPHIL # BLD AUTO: 0.09 K/UL (ref 0–0.51)
EOSINOPHIL NFR BLD: 0.9 % (ref 0–6.9)
ERYTHROCYTE [DISTWIDTH] IN BLOOD BY AUTOMATED COUNT: 61.7 FL (ref 35.9–50)
GFR SERPLBLD CREATININE-BSD FMLA CKD-EPI: 106 ML/MIN/1.73 M 2
GLOBULIN SER CALC-MCNC: 3.4 G/DL (ref 1.9–3.5)
GLUCOSE SERPL-MCNC: 123 MG/DL (ref 65–99)
HCT VFR BLD AUTO: 40.7 % (ref 37–47)
HGB BLD-MCNC: 14.3 G/DL (ref 12–16)
IMM GRANULOCYTES # BLD AUTO: 0.09 K/UL (ref 0–0.11)
IMM GRANULOCYTES NFR BLD AUTO: 0.9 % (ref 0–0.9)
INR PPP: 2.09 (ref 0.87–1.13)
LIPASE SERPL-CCNC: 68 U/L (ref 11–82)
LYMPHOCYTES # BLD AUTO: 1.73 K/UL (ref 1–4.8)
LYMPHOCYTES NFR BLD: 18.2 % (ref 22–41)
MCH RBC QN AUTO: 38.2 PG (ref 27–33)
MCHC RBC AUTO-ENTMCNC: 35.1 G/DL (ref 32.2–35.5)
MCV RBC AUTO: 108.8 FL (ref 81.4–97.8)
MONOCYTES # BLD AUTO: 1.18 K/UL (ref 0–0.85)
MONOCYTES NFR BLD AUTO: 12.4 % (ref 0–13.4)
NEUTROPHILS # BLD AUTO: 6.35 K/UL (ref 1.82–7.42)
NEUTROPHILS NFR BLD: 67.2 % (ref 44–72)
NRBC # BLD AUTO: 0 K/UL
NRBC BLD-RTO: 0 /100 WBC (ref 0–0.2)
PLATELET # BLD AUTO: 88 K/UL (ref 164–446)
PLATELETS.RETICULATED NFR BLD AUTO: 3.2 % (ref 0.6–13.1)
PMV BLD AUTO: 9.2 FL (ref 9–12.9)
POTASSIUM SERPL-SCNC: 4 MMOL/L (ref 3.6–5.5)
PROT SERPL-MCNC: 6.3 G/DL (ref 6–8.2)
PROTHROMBIN TIME: 23.5 SEC (ref 12–14.6)
RBC # BLD AUTO: 3.74 M/UL (ref 4.2–5.4)
SODIUM SERPL-SCNC: 125 MMOL/L (ref 135–145)
WBC # BLD AUTO: 9.5 K/UL (ref 4.8–10.8)

## 2025-02-24 PROCEDURE — A9270 NON-COVERED ITEM OR SERVICE: HCPCS

## 2025-02-24 PROCEDURE — 80053 COMPREHEN METABOLIC PANEL: CPT

## 2025-02-24 PROCEDURE — 36415 COLL VENOUS BLD VENIPUNCTURE: CPT

## 2025-02-24 PROCEDURE — 99285 EMERGENCY DEPT VISIT HI MDM: CPT

## 2025-02-24 PROCEDURE — 93005 ELECTROCARDIOGRAM TRACING: CPT | Mod: TC

## 2025-02-24 PROCEDURE — 83690 ASSAY OF LIPASE: CPT

## 2025-02-24 PROCEDURE — 770006 HCHG ROOM/CARE - MED/SURG/GYN SEMI*

## 2025-02-24 PROCEDURE — 82140 ASSAY OF AMMONIA: CPT

## 2025-02-24 PROCEDURE — 71045 X-RAY EXAM CHEST 1 VIEW: CPT

## 2025-02-24 PROCEDURE — 85610 PROTHROMBIN TIME: CPT

## 2025-02-24 PROCEDURE — 93005 ELECTROCARDIOGRAM TRACING: CPT | Mod: TC | Performed by: EMERGENCY MEDICINE

## 2025-02-24 PROCEDURE — 85055 RETICULATED PLATELET ASSAY: CPT

## 2025-02-24 PROCEDURE — 85025 COMPLETE CBC W/AUTO DIFF WBC: CPT

## 2025-02-24 PROCEDURE — 700102 HCHG RX REV CODE 250 W/ 637 OVERRIDE(OP)

## 2025-02-24 PROCEDURE — 85730 THROMBOPLASTIN TIME PARTIAL: CPT

## 2025-02-24 RX ORDER — POTASSIUM CHLORIDE 1500 MG/1
10 TABLET, EXTENDED RELEASE ORAL 2 TIMES DAILY
Status: DISCONTINUED | OUTPATIENT
Start: 2025-02-24 | End: 2025-02-27 | Stop reason: HOSPADM

## 2025-02-24 RX ORDER — OMEPRAZOLE 20 MG/1
20 CAPSULE, DELAYED RELEASE ORAL DAILY
Status: DISCONTINUED | OUTPATIENT
Start: 2025-02-25 | End: 2025-02-27 | Stop reason: HOSPADM

## 2025-02-24 RX ORDER — FUROSEMIDE 40 MG/1
40 TABLET ORAL EVERY MORNING
Status: DISCONTINUED | OUTPATIENT
Start: 2025-02-25 | End: 2025-02-27 | Stop reason: HOSPADM

## 2025-02-24 RX ORDER — MIDODRINE HYDROCHLORIDE 5 MG/1
10 TABLET ORAL EVERY 8 HOURS
Status: DISCONTINUED | OUTPATIENT
Start: 2025-02-24 | End: 2025-02-27 | Stop reason: HOSPADM

## 2025-02-24 RX ADMIN — DIBASIC SODIUM PHOSPHATE, MONOBASIC POTASSIUM PHOSPHATE AND MONOBASIC SODIUM PHOSPHATE 250 MG: 852; 155; 130 TABLET ORAL at 21:21

## 2025-02-24 RX ADMIN — MIDODRINE HYDROCHLORIDE 10 MG: 5 TABLET ORAL at 21:20

## 2025-02-24 RX ADMIN — POTASSIUM CHLORIDE 10 MEQ: 1500 TABLET, EXTENDED RELEASE ORAL at 21:20

## 2025-02-24 ASSESSMENT — COGNITIVE AND FUNCTIONAL STATUS - GENERAL
MOBILITY SCORE: 18
SUGGESTED CMS G CODE MODIFIER DAILY ACTIVITY: CK
MOVING TO AND FROM BED TO CHAIR: A LITTLE
PERSONAL GROOMING: A LITTLE
TURNING FROM BACK TO SIDE WHILE IN FLAT BAD: A LITTLE
DRESSING REGULAR LOWER BODY CLOTHING: A LITTLE
DAILY ACTIVITIY SCORE: 19
TOILETING: A LITTLE
HELP NEEDED FOR BATHING: A LITTLE
DRESSING REGULAR UPPER BODY CLOTHING: A LITTLE
WALKING IN HOSPITAL ROOM: A LITTLE
SUGGESTED CMS G CODE MODIFIER MOBILITY: CK
STANDING UP FROM CHAIR USING ARMS: A LITTLE
MOVING FROM LYING ON BACK TO SITTING ON SIDE OF FLAT BED: A LITTLE
CLIMB 3 TO 5 STEPS WITH RAILING: A LITTLE

## 2025-02-24 ASSESSMENT — LIFESTYLE VARIABLES
ALCOHOL_USE: NO
HAVE PEOPLE ANNOYED YOU BY CRITICIZING YOUR DRINKING: NO
HAVE YOU EVER FELT YOU SHOULD CUT DOWN ON YOUR DRINKING: NO
DOES PATIENT WANT TO STOP DRINKING: CANNOT ASSESS
CONSUMPTION TOTAL: NEGATIVE
ON A TYPICAL DAY WHEN YOU DRINK ALCOHOL HOW MANY DRINKS DO YOU HAVE: 0
AVERAGE NUMBER OF DAYS PER WEEK YOU HAVE A DRINK CONTAINING ALCOHOL: 0
HOW MANY TIMES IN THE PAST YEAR HAVE YOU HAD 5 OR MORE DRINKS IN A DAY: 0
EVER HAD A DRINK FIRST THING IN THE MORNING TO STEADY YOUR NERVES TO GET RID OF A HANGOVER: NO
TOTAL SCORE: 0
EVER FELT BAD OR GUILTY ABOUT YOUR DRINKING: NO

## 2025-02-24 ASSESSMENT — FIBROSIS 4 INDEX
FIB4 SCORE: 5.47
FIB4 SCORE: 5.26

## 2025-02-24 ASSESSMENT — SOCIAL DETERMINANTS OF HEALTH (SDOH)
WITHIN THE LAST YEAR, HAVE YOU BEEN HUMILIATED OR EMOTIONALLY ABUSED IN OTHER WAYS BY YOUR PARTNER OR EX-PARTNER?: NO
WITHIN THE LAST YEAR, HAVE YOU BEEN KICKED, HIT, SLAPPED, OR OTHERWISE PHYSICALLY HURT BY YOUR PARTNER OR EX-PARTNER?: NO
WITHIN THE PAST 12 MONTHS, THE FOOD YOU BOUGHT JUST DIDN'T LAST AND YOU DIDN'T HAVE MONEY TO GET MORE: NEVER TRUE
WITHIN THE LAST YEAR, HAVE YOU BEEN AFRAID OF YOUR PARTNER OR EX-PARTNER?: NO
WITHIN THE PAST 12 MONTHS, YOU WORRIED THAT YOUR FOOD WOULD RUN OUT BEFORE YOU GOT THE MONEY TO BUY MORE: NEVER TRUE
WITHIN THE LAST YEAR, HAVE TO BEEN RAPED OR FORCED TO HAVE ANY KIND OF SEXUAL ACTIVITY BY YOUR PARTNER OR EX-PARTNER?: NO
IN THE PAST 12 MONTHS, HAS THE ELECTRIC, GAS, OIL, OR WATER COMPANY THREATENED TO SHUT OFF SERVICE IN YOUR HOME?: NO

## 2025-02-24 ASSESSMENT — PATIENT HEALTH QUESTIONNAIRE - PHQ9
SUM OF ALL RESPONSES TO PHQ9 QUESTIONS 1 AND 2: 0
2. FEELING DOWN, DEPRESSED, IRRITABLE, OR HOPELESS: NOT AT ALL
1. LITTLE INTEREST OR PLEASURE IN DOING THINGS: NOT AT ALL

## 2025-02-24 ASSESSMENT — PAIN DESCRIPTION - PAIN TYPE
TYPE: ACUTE PAIN
TYPE: ACUTE PAIN

## 2025-02-24 NOTE — PROGRESS NOTES
Spoke with pt sister - on way to emergency room right now. Would like additional referrals to GI for second opinion. Requesting referral to [facility] and [facility].       1. Decompensated liver disease (HCC)  - Referral to Gastroenterology    2. Alcoholic cirrhosis of liver without ascites (HCC)  - Referral to Gastroenterology    Fabian Jansen MD   [facility]  [facility]

## 2025-02-24 NOTE — ED PROVIDER NOTES
ER Provider Note    Scribed for Alla Govea M.d. by Antonio Gauthier. 2/24/2025  3:27 PM    Primary Care Provider: Michelle Vanessa M.D.    CHIEF COMPLAINT  Chief Complaint   Patient presents with    Palpitations     LIMITATION TO HISTORY   Select: : None    HPI/ROS  OUTSIDE HISTORIAN(S):  Family Mother, , and sister are all present at bedside.     EXTERNAL RECORDS REVIEWED  Inpatient Notes Patient was admitted here and discharged on the 12 for decompensated liver failure. and Outpatient Notes Patient was seen as outpatient at [facility] on the 14. She has recurrent hepatic hydrothorax and quit using alcohol at the end of December. Patient has been compliant with her medications and is on Midodrine. Her Lactulose was increased to 4 mg per day.     Stacy Oliva is a 55 y.o. female who presents to the ED for evaluation of abdominal distention and palpitations onset three days ago. Patient's  reports that patient is dehydrated, lethargic, and her abdomen is distended. Her home nurse came in today, and her heart rate was elevated. Patient also has complaints of right sided back pain for the past two days. Her sister adds that she is now on oxygen at baseline. She was discharged form the hospital on the 13, and followed up with primary care. Patient has been attempting to schedule with outpatient GI, but has not yet had an appointment. Patient and her  reside in [facility]. Patient no longer drinks alcohol, and she quit drinking in December of 2024.     PAST MEDICAL HISTORY  Past Medical History:   Diagnosis Date    Liver disease     Patient denies medical problems        SURGICAL HISTORY  Past Surgical History:   Procedure Laterality Date    CHEST TUBE INSERTION  1/31/2025    IA UPPER GI ENDOSCOPY,DIAGNOSIS N/A 2/4/2024    Procedure: GASTROSCOPY;  Surgeon: Javi Paz M.D.;  Location: SURGERY [facility];  Service: Gastroenterology    IA UPPER GI ENDOSCOPY,ZAIN  "VARIX  2/4/2024    Procedure: GASTROSCOPY, WITH BANDING;  Surgeon: Javi Paz M.D.;  Location: SURGERY [facility];  Service: Gastroenterology       FAMILY HISTORY  History reviewed. No pertinent family history.    SOCIAL HISTORY   reports that she has never smoked. She has never used smokeless tobacco. She reports that she does not currently use alcohol. She reports current drug use.    CURRENT MEDICATIONS  Previous Medications    CEFUROXIME (CEFTIN) 500 MG TAB    Take 1 Tablet by mouth 2 times a day.    FUROSEMIDE (LASIX) 40 MG TAB    Take 1 Tablet by mouth every morning.    LACTULOSE 10 G/15ML SOLUTION    Take 30 mL by mouth 4 times a day for 210 days.    MIDODRINE (PROAMATINE) 10 MG TABLET    Take 1 Tablet by mouth every 8 hours.    OMEPRAZOLE (PRILOSEC) 20 MG DELAYED-RELEASE CAPSULE    Take 1 Capsule by mouth every day.    PHOSPHORUS (K-PHOS-NEUTRAL) 250 MG TABLET    Take 1 Tablet by mouth 2 times a day.    POTASSIUM CHLORIDE SA (KDUR) 20 MEQ TAB CR    Take 0.5 Tablets by mouth 2 times a day.       ALLERGIES  Patient has no known allergies.    PHYSICAL EXAM  /71   Pulse (!) 140   Temp 36 °C (96.8 °F) (Temporal)   Resp 16   Ht 1.651 m (5' 5\")   Wt 55.4 kg (122 lb 2.2 oz)   SpO2 98%   BMI 20.32 kg/m²   Constitutional: Alert in no apparent distress. Chronically ill appearing and acutely unwell.   HENT: No signs of trauma, Bilateral external ears normal, Nose normal.   Eyes: Pupils are equal and reactive, Conjunctiva normal, positive icterus.   Neck:  No stridor.   Cardiovascular: Regular rate and rhythm, no murmurs.   Thorax & Lungs: Decreased breath sounds on the right, No wheezing, No chest tenderness.   Abdomen: Distended abdomen with fluid, No peritoneal signs.  Skin: Jaundice. Warm, Dry, No erythema, No rash.   Musculoskeletal:  No major deformities noted.   Neurologic: Alert, moving all extremities without difficulty, no focal deficits.    DIAGNOSTIC STUDIES & PROCEDURES    Labs:   Results " for orders placed or performed during the hospital encounter of 02/24/25   CBC with Differential    Collection Time: 02/24/25  2:55 PM   Result Value Ref Range    WBC 9.5 4.8 - 10.8 K/uL    RBC 3.74 (L) 4.20 - 5.40 M/uL    Hemoglobin 14.3 12.0 - 16.0 g/dL    Hematocrit 40.7 37.0 - 47.0 %    .8 (H) 81.4 - 97.8 fL    MCH 38.2 (H) 27.0 - 33.0 pg    MCHC 35.1 32.2 - 35.5 g/dL    RDW 61.7 (H) 35.9 - 50.0 fL    Platelet Count 88 (L) 164 - 446 K/uL    MPV 9.2 9.0 - 12.9 fL    Neutrophils-Polys 67.20 44.00 - 72.00 %    Lymphocytes 18.20 (L) 22.00 - 41.00 %    Monocytes 12.40 0.00 - 13.40 %    Eosinophils 0.90 0.00 - 6.90 %    Basophils 0.40 0.00 - 1.80 %    Immature Granulocytes 0.90 0.00 - 0.90 %    Nucleated RBC 0.00 0.00 - 0.20 /100 WBC    Neutrophils (Absolute) 6.35 1.82 - 7.42 K/uL    Lymphs (Absolute) 1.73 1.00 - 4.80 K/uL    Monos (Absolute) 1.18 (H) 0.00 - 0.85 K/uL    Eos (Absolute) 0.09 0.00 - 0.51 K/uL    Baso (Absolute) 0.04 0.00 - 0.12 K/uL    Immature Granulocytes (abs) 0.09 0.00 - 0.11 K/uL    NRBC (Absolute) 0.00 K/uL   Complete Metabolic Panel    Collection Time: 02/24/25  2:55 PM   Result Value Ref Range    Sodium 125 (L) 135 - 145 mmol/L    Potassium 4.0 3.6 - 5.5 mmol/L    Chloride 88 (L) 96 - 112 mmol/L    Co2 23 20 - 33 mmol/L    Anion Gap 14.0 7.0 - 16.0    Glucose 123 (H) 65 - 99 mg/dL    Bun 11 8 - 22 mg/dL    Creatinine 0.58 0.50 - 1.40 mg/dL    Calcium 8.9 8.5 - 10.5 mg/dL    Correct Calcium 9.8 8.5 - 10.5 mg/dL    AST(SGOT) 96 (H) 12 - 45 U/L    ALT(SGPT) 135 (H) 2 - 50 U/L    Alkaline Phosphatase 232 (H) 30 - 99 U/L    Total Bilirubin 10.0 (H) 0.1 - 1.5 mg/dL    Albumin 2.9 (L) 3.2 - 4.9 g/dL    Total Protein 6.3 6.0 - 8.2 g/dL    Globulin 3.4 1.9 - 3.5 g/dL    A-G Ratio 0.9 g/dL   Lipase    Collection Time: 02/24/25  2:55 PM   Result Value Ref Range    Lipase 68 11 - 82 U/L   IMMATURE PLT FRACTION    Collection Time: 02/24/25  2:55 PM   Result Value Ref Range    Imm. Plt Fraction 3.2 0.6  - 13.1 %   ESTIMATED GFR    Collection Time: 25  2:55 PM   Result Value Ref Range    GFR (CKD-EPI) 106 >60 mL/min/1.73 m 2   APTT    Collection Time: 25  4:52 PM   Result Value Ref Range    APTT 39.1 (H) 24.7 - 36.0 sec   PROTHROMBIN TIME (INR)    Collection Time: 25  4:52 PM   Result Value Ref Range    PT 23.5 (H) 12.0 - 14.6 sec    INR 2.09 (H) 0.87 - 1.13   EKG    Collection Time: 25  6:40 PM   Result Value Ref Range    Report       [facility] Emergency [facility].    Test Date:  2025  Pt Name:    CARMEN CUADRA                 Department: ER  MRN:        [id number]                      Room:       [facility]  Gender:     Female                       Technician: [id number]  :        [id number]                   Requested By:ER TRIAGE PROTOCOL  Order #:    [id number]                    Reading MD: MICHAEL MCKEON    Measurements  Intervals                                Axis  Rate:       129                          P:          25  ME:         294                          QRS:        64  QRSD:       83                           T:          -21  QT:         305  QTc:        447    Interpretive Statements  Sinus tachycardia  Prolonged ME interval  Consider left atrial enlargement  Low voltage, precordial leads  Anteroseptal infarct, old  Borderline repol abnormality, diffuse leads  Compared to ECG 2025 19:33:32  First degree AV block now present  Low QRS voltage now present  Myocardial infarct finding now  present  Impression sinus tachycardia without evidence of ischemia.  Electronically Signed On 2025 18:40:03 PST by MICHAEL MCKEON        All labs reviewed by me.    EKG:   I have independently interpreted this EKG as seen above.    Point of Care Ultrasound    Indication: Ascites    Abdominal ultrasound for possible ascites performed.  There is minimal ascites in the abdomen    Image retained through [facility] as seen below:               This study is a limited ultrasound  examination performed and interpreted to evaluate for limited conditions as outlined above. There may be other clinically important information contained in the images that is outside this scope. When clinically warranted, a comprehensive ultrasound through the appropriate department is considered.      DX-CHEST-PORTABLE (1 VIEW)   Final Result      1. Opacification of the right hemithorax with mediastinal shift to the left.   2. Interstitial edema in the left lung.           COURSE & MEDICAL DECISION MAKING    ED Observation Status? No; Patient does not meet criteria for ED Observation.     3:27 PM - Patient seen and evaluated at bedside. Patient presents today with abdominal distention and palpitations. I had a discussion with the patient and her family and explained the details of liver disease. Ordered EKG, UA, Lipase, CMP, CBC with differential, Ammonia, Prothrombin time, and APTT to evaluate. She understands and agrees to the plan of care. Differential diagnoses include but are not limited to: Dehydration, Ascites, Recurrent hydrothorax.          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  Patient has a known history of recurrent hydrothorax secondary to ascites.  She normally is on 1/2 L nasal cannula was increased to 3 nasal cannula.  She is alert she is more jaundiced she has not been able to establish with outpatient GI yet.  Patient presents tachycardic she is jaundice she is chronically unwell appearing.  She does have some mild fluid in her abdomen with decreased breath sounds in the right chest I do suspect she has recurrent hydrothorax.    Labs are obtained she has worsening LFTs.  Her INR is 2.09 her MELD score is now 30.    Chest x-ray shows a recurrent hydrothorax with almost complete whiteout of the right hemithorax.  I do think she needs recurrent thoracentesis  for this her MELD score is quite high.  She has not received any GI consultation during her prior hospitalizations I do think it is reasonable to hospitalize her for a GI consult and drainage of her right hemithorax.  Patient is agreeable to this plan I spoke with the [facility] family medicine residents will consult for hospitalization.               DISPOSITION AND DISCUSSIONS  I have discussed management of the patient with the following physicians and SALMA's: [facility] family    Discussion of management with other [facility] or appropriate source(s): None         DISPOSITION:  Patient will be hospitalized by [facility] family medicine in guarded condition.     FINAL IMPRESSION   1. Chronic liver failure without hepatic coma (HCC)    2. Hyponatremia    3. Hydrothorax        Antonio GOMEZ (Scribe), am scribing for, and in the presence of, Alla Govea M.D..    Electronically signed by: Antonio Gauthier (Scribe), 2/24/2025    Alla GOMEZ M.D. personally performed the services described in this documentation, as scribed by Antonio Gauthier in my presence, and it is both accurate and complete.    The note accurately reflects work and decisions made by me.  Alla Govea M.D.  2/24/2025  6:40 PM

## 2025-02-24 NOTE — ED TRIAGE NOTES
Pt sent brought to triage for Abdominal distention, jaundice (known hx of cirrhosis), and palpitations. Pt is also more SOB. Released from hospital recently. Pt on 1.5L NC and has increased to 3L NC. EKG done and protocol orders placed. Pt is A&O and placed in lobby pending room. Charge notified.    no

## 2025-02-25 ENCOUNTER — APPOINTMENT (OUTPATIENT)
Dept: RADIOLOGY | Facility: MEDICAL CENTER | Age: 55
End: 2025-02-25
Payer: COMMERCIAL

## 2025-02-25 ENCOUNTER — PHARMACY VISIT (OUTPATIENT)
Dept: PHARMACY | Facility: MEDICAL CENTER | Age: 55
End: 2025-02-25
Payer: COMMERCIAL

## 2025-02-25 DIAGNOSIS — K74.69 DECOMPENSATED LIVER DISEASE (HCC): ICD-10-CM

## 2025-02-25 DIAGNOSIS — K70.30 ALCOHOLIC CIRRHOSIS OF LIVER WITHOUT ASCITES (HCC): ICD-10-CM

## 2025-02-25 DIAGNOSIS — J90 RECURRENT PLEURAL EFFUSION ON RIGHT: ICD-10-CM

## 2025-02-25 PROBLEM — F10.91 ALCOHOL USE DISORDER IN REMISSION: Status: ACTIVE | Noted: 2024-02-04

## 2025-02-25 LAB
ALBUMIN SERPL BCP-MCNC: 2.9 G/DL (ref 3.2–4.9)
ALBUMIN/GLOB SERPL: 0.9 G/DL
ALP SERPL-CCNC: 209 U/L (ref 30–99)
ALT SERPL-CCNC: 116 U/L (ref 2–50)
AMYLASE FLD-CCNC: 30 U/L
ANION GAP SERPL CALC-SCNC: 13 MMOL/L (ref 7–16)
APPEARANCE FLD: CLEAR
APPEARANCE UR: CLEAR
AST SERPL-CCNC: 72 U/L (ref 12–45)
BACTERIA #/AREA URNS HPF: ABNORMAL /HPF
BILIRUB SERPL-MCNC: 10.7 MG/DL (ref 0.1–1.5)
BILIRUB UR QL STRIP.AUTO: ABNORMAL
BODY FLD TYPE: NORMAL
BUN SERPL-MCNC: 12 MG/DL (ref 8–22)
CALCIUM ALBUM COR SERPL-MCNC: 9.5 MG/DL (ref 8.5–10.5)
CALCIUM SERPL-MCNC: 8.6 MG/DL (ref 8.5–10.5)
CASTS URNS QL MICRO: ABNORMAL /LPF (ref 0–2)
CELLS FLD: 1
CHLORIDE SERPL-SCNC: 87 MMOL/L (ref 96–112)
CO2 SERPL-SCNC: 26 MMOL/L (ref 20–33)
COLOR FLD: YELLOW
COLOR UR: ABNORMAL
CREAT SERPL-MCNC: 0.69 MG/DL (ref 0.5–1.4)
CYTOLOGY REG CYTOL: NORMAL
EPITHELIAL CELLS 1715: ABNORMAL /HPF (ref 0–5)
ERYTHROCYTE [DISTWIDTH] IN BLOOD BY AUTOMATED COUNT: 59.3 FL (ref 35.9–50)
GFR SERPLBLD CREATININE-BSD FMLA CKD-EPI: 102 ML/MIN/1.73 M 2
GLOBULIN SER CALC-MCNC: 3.1 G/DL (ref 1.9–3.5)
GLUCOSE FLD-MCNC: 120 MG/DL
GLUCOSE SERPL-MCNC: 102 MG/DL (ref 65–99)
GLUCOSE UR STRIP.AUTO-MCNC: NEGATIVE MG/DL
GRAM STN SPEC: NORMAL
HCT VFR BLD AUTO: 37.9 % (ref 37–47)
HGB BLD-MCNC: 13.3 G/DL (ref 12–16)
KETONES UR STRIP.AUTO-MCNC: NEGATIVE MG/DL
LDH FLD L TO P-CCNC: 44 U/L
LDH SERPL L TO P-CCNC: 470 U/L (ref 107–266)
LEUKOCYTE ESTERASE UR QL STRIP.AUTO: ABNORMAL
LYMPHOCYTES NFR FLD: 7 %
MCH RBC QN AUTO: 37.4 PG (ref 27–33)
MCHC RBC AUTO-ENTMCNC: 35.1 G/DL (ref 32.2–35.5)
MCV RBC AUTO: 106.5 FL (ref 81.4–97.8)
MICRO URNS: ABNORMAL
MONOS+MACROS NFR FLD MANUAL: 88 %
NEUTROPHILS NFR FLD: 4 %
NITRITE UR QL STRIP.AUTO: POSITIVE
NUC CELL # FLD: 26 CELLS/UL
PH FLD: 7 [PH]
PH UR STRIP.AUTO: 6.5 [PH] (ref 5–8)
PLATELET # BLD AUTO: 77 K/UL (ref 164–446)
PLATELETS.RETICULATED NFR BLD AUTO: 2.3 % (ref 0.6–13.1)
PMV BLD AUTO: 9.6 FL (ref 9–12.9)
POTASSIUM SERPL-SCNC: 3.4 MMOL/L (ref 3.6–5.5)
PROT FLD-MCNC: 0.5 G/DL
PROT SERPL-MCNC: 6 G/DL (ref 6–8.2)
PROT UR QL STRIP: NEGATIVE MG/DL
RBC # BLD AUTO: 3.56 M/UL (ref 4.2–5.4)
RBC # FLD: <2000 CELLS/UL
RBC # URNS HPF: ABNORMAL /HPF (ref 0–2)
RBC UR QL AUTO: ABNORMAL
SIGNIFICANT IND 70042: NORMAL
SITE SITE: NORMAL
SODIUM SERPL-SCNC: 126 MMOL/L (ref 135–145)
SOURCE SOURCE: NORMAL
SP GR UR STRIP.AUTO: 1.01
UROBILINOGEN UR STRIP.AUTO-MCNC: 2 EU/DL
WBC # BLD AUTO: 7.7 K/UL (ref 4.8–10.8)
WBC #/AREA URNS HPF: ABNORMAL /HPF

## 2025-02-25 PROCEDURE — 88112 CYTOPATH CELL ENHANCE TECH: CPT | Performed by: PATHOLOGY

## 2025-02-25 PROCEDURE — 85027 COMPLETE CBC AUTOMATED: CPT

## 2025-02-25 PROCEDURE — 80053 COMPREHEN METABOLIC PANEL: CPT

## 2025-02-25 PROCEDURE — 87205 SMEAR GRAM STAIN: CPT

## 2025-02-25 PROCEDURE — 85055 RETICULATED PLATELET ASSAY: CPT

## 2025-02-25 PROCEDURE — 87102 FUNGUS ISOLATION CULTURE: CPT

## 2025-02-25 PROCEDURE — 0W993ZZ DRAINAGE OF RIGHT PLEURAL CAVITY, PERCUTANEOUS APPROACH: ICD-10-PCS

## 2025-02-25 PROCEDURE — 87015 SPECIMEN INFECT AGNT CONCNTJ: CPT

## 2025-02-25 PROCEDURE — 87116 MYCOBACTERIA CULTURE: CPT

## 2025-02-25 PROCEDURE — 89051 BODY FLUID CELL COUNT: CPT

## 2025-02-25 PROCEDURE — 88305 TISSUE EXAM BY PATHOLOGIST: CPT | Mod: 26 | Performed by: PATHOLOGY

## 2025-02-25 PROCEDURE — 99221 1ST HOSP IP/OBS SF/LOW 40: CPT | Performed by: INTERNAL MEDICINE

## 2025-02-25 PROCEDURE — 87077 CULTURE AEROBIC IDENTIFY: CPT

## 2025-02-25 PROCEDURE — 82945 GLUCOSE OTHER FLUID: CPT

## 2025-02-25 PROCEDURE — 84157 ASSAY OF PROTEIN OTHER: CPT

## 2025-02-25 PROCEDURE — A9270 NON-COVERED ITEM OR SERVICE: HCPCS

## 2025-02-25 PROCEDURE — 36415 COLL VENOUS BLD VENIPUNCTURE: CPT

## 2025-02-25 PROCEDURE — 82150 ASSAY OF AMYLASE: CPT

## 2025-02-25 PROCEDURE — 83986 ASSAY PH BODY FLUID NOS: CPT

## 2025-02-25 PROCEDURE — 88112 CYTOPATH CELL ENHANCE TECH: CPT | Mod: 26 | Performed by: PATHOLOGY

## 2025-02-25 PROCEDURE — 88305 TISSUE EXAM BY PATHOLOGIST: CPT | Performed by: PATHOLOGY

## 2025-02-25 PROCEDURE — 81001 URINALYSIS AUTO W/SCOPE: CPT

## 2025-02-25 PROCEDURE — 770020 HCHG ROOM/CARE - TELE (206)

## 2025-02-25 PROCEDURE — 87070 CULTURE OTHR SPECIMN AEROBIC: CPT

## 2025-02-25 PROCEDURE — 87086 URINE CULTURE/COLONY COUNT: CPT

## 2025-02-25 PROCEDURE — C1729 CATH, DRAINAGE: HCPCS

## 2025-02-25 PROCEDURE — 71045 X-RAY EXAM CHEST 1 VIEW: CPT

## 2025-02-25 PROCEDURE — 83615 LACTATE (LD) (LDH) ENZYME: CPT

## 2025-02-25 PROCEDURE — 700102 HCHG RX REV CODE 250 W/ 637 OVERRIDE(OP)

## 2025-02-25 RX ORDER — LACTULOSE 10 G/15ML
30 SOLUTION ORAL 2 TIMES DAILY
Status: DISCONTINUED | OUTPATIENT
Start: 2025-02-25 | End: 2025-02-27 | Stop reason: HOSPADM

## 2025-02-25 RX ORDER — POTASSIUM CHLORIDE 1500 MG/1
20 TABLET, EXTENDED RELEASE ORAL ONCE
Status: COMPLETED | OUTPATIENT
Start: 2025-02-25 | End: 2025-02-25

## 2025-02-25 RX ADMIN — POTASSIUM CHLORIDE 10 MEQ: 1500 TABLET, EXTENDED RELEASE ORAL at 17:04

## 2025-02-25 RX ADMIN — POTASSIUM CHLORIDE 20 MEQ: 1500 TABLET, EXTENDED RELEASE ORAL at 08:54

## 2025-02-25 RX ADMIN — DIBASIC SODIUM PHOSPHATE, MONOBASIC POTASSIUM PHOSPHATE AND MONOBASIC SODIUM PHOSPHATE 250 MG: 852; 155; 130 TABLET ORAL at 05:03

## 2025-02-25 RX ADMIN — MIDODRINE HYDROCHLORIDE 10 MG: 5 TABLET ORAL at 23:11

## 2025-02-25 RX ADMIN — LACTULOSE 30 ML: 10 SOLUTION ORAL at 05:02

## 2025-02-25 RX ADMIN — FUROSEMIDE 40 MG: 40 TABLET ORAL at 05:02

## 2025-02-25 RX ADMIN — POTASSIUM CHLORIDE 10 MEQ: 1500 TABLET, EXTENDED RELEASE ORAL at 05:03

## 2025-02-25 RX ADMIN — MIDODRINE HYDROCHLORIDE 10 MG: 5 TABLET ORAL at 05:02

## 2025-02-25 RX ADMIN — MIDODRINE HYDROCHLORIDE 10 MG: 5 TABLET ORAL at 13:46

## 2025-02-25 RX ADMIN — DIBASIC SODIUM PHOSPHATE, MONOBASIC POTASSIUM PHOSPHATE AND MONOBASIC SODIUM PHOSPHATE 250 MG: 852; 155; 130 TABLET ORAL at 17:05

## 2025-02-25 ASSESSMENT — ENCOUNTER SYMPTOMS
DIAPHORESIS: 1
ORTHOPNEA: 0
HEMOPTYSIS: 0
CHILLS: 0
FEVER: 1
PALPITATIONS: 0
WHEEZING: 0
COUGH: 0
CONSTIPATION: 0
SHORTNESS OF BREATH: 1
LOSS OF CONSCIOUSNESS: 0
DIARRHEA: 1
ABDOMINAL PAIN: 0
BLOOD IN STOOL: 0

## 2025-02-25 ASSESSMENT — PAIN DESCRIPTION - PAIN TYPE
TYPE: ACUTE PAIN

## 2025-02-25 ASSESSMENT — LIFESTYLE VARIABLES: SUBSTANCE_ABUSE: 0

## 2025-02-25 ASSESSMENT — FIBROSIS 4 INDEX: FIB4 SCORE: 4.86

## 2025-02-25 NOTE — ASSESSMENT & PLAN NOTE
Patient's heart rate was 140 at time of admission.  It is improved slightly but continues to be elevated.  Likely secondary to the fluid overload but will continue to monitor.  Resolved after administration of albumin on 2/26 after thoracentesis was completed.

## 2025-02-25 NOTE — ASSESSMENT & PLAN NOTE
Patient sodium level in the ED is 125.  Per chart review patient has chronic hyponatremia with levels in the 120s.  Repeat labs shows sodium level stable at 126.  Held off on fluid administration due to fluid retention in the right lung.  Plan:   - Will continue to monitor with daily labs  - Fluid restriction at 1.5L and low sodium diet as patient is currently hypervolemic  - Spironolactone 50 mg daily restarted.  Will recheck BMP in a.m. after initiation of this medication.  If this remains stable/close to patient's baseline, will plan for discharge in a.m.

## 2025-02-25 NOTE — PROGRESS NOTES
Pt arrived to unit via Hospital bed at 0950. Pt oriented to room, unit, and plan of care. Tele-monitor placed and monitor room notified. All questions answered at this time. Call light within reach; fall precautions in place.

## 2025-02-25 NOTE — PROGRESS NOTES
4 Eyes Skin Assessment Completed by GUS Aguilera and GABRIELLE Clinton.    Head WDL  Ears WDL  Nose WDL  Mouth WDL  Neck WDL  Breast/Chest WDL  Shoulder Blades WDL  Spine WDL  (R) Arm/Elbow/Hand Redness and Blanching scab  (L) Arm/Elbow/Hand Redness/Blanching/scab  Abdomen WDL  Groin WDL  Scrotum/Coccyx/Buttocks Redness and Excoriation  (R) Leg WDL  (L) Leg Scab  (R) Heel/Foot/Toe Scab/scratches  (L) Heel/Foot/Toe Scab/scratches          Devices In Places Nasal Cannula      Interventions In Place Pillows    Possible Skin Injury No    Pictures Uploaded Into Epic Yes  Wound Consult Placed N/A  RN Wound Prevention Protocol Ordered No

## 2025-02-25 NOTE — ASSESSMENT & PLAN NOTE
Resolved.  Patient hypoxic in the ED requiring 3 L via nasal cannula.  Most likely secondary to fluid buildup in the lungs as chest x-ray revealed opacification of the right hemithorax with left mediastinal shift.  After admission her oxygen requirement decreased slightly and patient has been on 1.5 to 2 L.  Plan:   -Thoracentesis performed removing about 4 L of fluid from patient's lungs.  Follow-up chest x-ray showed resolution of right pleural effusion with reexpansion of the right lung and no evidence of pneumothorax  -Continuous pulse ox  -Patient will likely need regular thoracentesis.  Outpatient orders placed for standing thoracenteses weekly once patient is discharged.

## 2025-02-25 NOTE — PROGRESS NOTES
Patient arrived to unit at this time. Patient ambulated from the gurney to the bed, walks steadily, and tolerates well. Family present at bedside. Patient is A&Ox4 and reports no pain at this time. Patient on 3L NC, BL is 1.5L, awake, and alert. 2 RN Skin Check to be completed.     Patient oriented to room and unit. Patient educated on the use of the call light and verbalized understanding. Bed in the lowest and locked position with personal belongings and call light within reach. Standard precautions and hourly rounding in place. Safety education provided. POC discussed and patient declines any further needs at this time. Awaiting orders at this time.

## 2025-02-25 NOTE — PROGRESS NOTES
Assumed care of patient after receiving report from [place]. Pt is A&Ox 4. Pain level zero. Assessment complete. Call light within reach and bed is locked and in lowest position. Bed alarm on. Reinforced the need to call for assistance. Plan of care discussed and patient does not have further needs at this time.

## 2025-02-25 NOTE — ASSESSMENT & PLAN NOTE
Patient currently on daily 20 mg potassium chloride as well as K-Phos Neutral tablet 250 mg twice daily.  Plan:  -Continue home regimen and replete potassium as needed

## 2025-02-25 NOTE — CARE PLAN
The patient is Stable - Low risk of patient condition declining or worsening    Shift Goals  Clinical Goals: Patient will improve ventilation needs, remain hemodynamically stable  Patient Goals: Sleep, food  Family Goals: Updates, medications    Progress made toward(s) clinical / shift goals:  Patient required more oxygen this shift than usual baseline. Patient on 3L NC, BL is 1.5L. Focused respiratory assessment documented. Weaning in progress this shift. VSS and I&Os monitored. Patient remained hemodynamically stable this shift.     Problem: Knowledge Deficit - Standard  Goal: Patient and family/care givers will demonstrate understanding of plan of care, disease process/condition, diagnostic tests and medications  Outcome: Progressing     Problem: Respiratory  Goal: Patient will achieve/maintain optimum respiratory ventilation and gas exchange  Description: Target End Date:  Prior to discharge or change in level of care    Document on Assessment flowsheet    1.  Assess and monitor rate, rhythm, depth and effort of respiration  2.  Breath sounds assessed qshift and/or as needed  3.  Assess O2 saturation, administer/titrate oxygen as ordered  4.  Position patient for maximum ventilatory efficiency  5.  Turn, cough, and deep breath with splinting to improve effectiveness  6.  Collaborate with RT to administer medication/treatments per order  7.  Encourage use of incentive spirometer and encourage patient to cough after use and utilize splinting techniques if applicable  8.  Airway suctioning  9.  Monitor sputum production for changes in color, consistency and frequency  10. Perform frequent oral hygiene  11. Alternate physical activity with rest periods  Outcome: Progressing     Problem: Hemodynamics  Goal: Patient's hemodynamics, fluid balance and neurologic status will be stable or improve  Description: Target End Date:  Prior to discharge or change in level of care    Document on Assessment and I/O flowsheet  templates    1.  Monitor vital signs, pulse oximetry and cardiac monitor per provider order and/or policy  2.  Maintain blood pressure per provider order  3.  Hemodynamic monitoring per provider order  4.  Manage IV fluids and IV infusions  5.  Monitor intake and output  6.  Daily weights per unit policy or provider order  7.  Assess peripheral pulses and capillary refill  8.  Assess color and body temperature  9.  Position patient for maximum circulation/cardiac output  10. Monitor for signs/symptoms of excessive bleeding  11. Assess mental status, restlessness and changes in level of consciousness  12. Monitor temperature and report fever or hypothermia to provider immediately. Consideration of targeted temperature management.  Outcome: Progressing       Patient is not progressing towards the following goals:

## 2025-02-25 NOTE — ASSESSMENT & PLAN NOTE
Patient has history of alcohol use disorder, but is not currently using alcohol.  She also denies any tobacco use or recreational drug use.  Will order repeat PETH (phosphatidylethanol) testing.

## 2025-02-25 NOTE — H&P
[facility] MEDICINE HISTORY AND PHYSICAL     PATIENT ID:  NAME:  Stacy Oliva  MRN:               [id number]  YOB: 1970    Date of Admission: 2/24/2025     Attending: Roxanne Strong M.d.  Primary Care Physician:  Michelle Vanessa M.D.    CC:    Chief Complaint   Patient presents with    Palpitations       HPI: Stacy Oliva is a 55 y.o. female history of alcohol cirrhosis, with several hospitalizations recurrent hepatic hydrothorax is brought to the ED by her sister after she began experiencing shortness of breath at home. Patient denies use of home oxygen. During the interview, patient responded that she was very tired and had limited response to questions.      ERCourse:  In the ED, patient was tachycardic (HR: 140), and was placed on 3L supplemental oxygen via NC. CBC showed WBC wnl, thrombocytopenia (plt 88). CMP notable for hyponatremia (Na: 125, chronic), elevated LFTs (AST/ALT/Alk phos/total bilirubin of 96/135/235/10), ammonia (50), PT/PTT. Lipase (68), wnl. CXR showed opacification of right hemithorax with mediastinal shift to the left and interstitial edema in the left lung.  ERP noted that bedside abdominal ultrasound was performed, however, noted minimal ascites. ERP was concerned that patient has had several hospitalizations, however, GI as not being consulted.        REVIEW OF SYSTEMS:   Ten systems reviewed and were negative except as noted in the HPI.                PAST MEDICAL HISTORY:  Past Medical History:   Diagnosis Date    Liver disease     Patient denies medical problems        PAST SURGICAL HISTORY:  Past Surgical History:   Procedure Laterality Date    CHEST TUBE INSERTION  1/31/2025    MD UPPER GI ENDOSCOPY,DIAGNOSIS N/A 2/4/2024    Procedure: GASTROSCOPY;  Surgeon: Javi Paz M.D.;  Location: SURGERY [facility];  Service: Gastroenterology    MD UPPER GI ENDOSCOPY,LIGAT VARIX  2/4/2024    Procedure: GASTROSCOPY, WITH BANDING;  Surgeon: Javi Paz M.D.;  Location:  SURGERY [facility];  Service: Gastroenterology       FAMILY HISTORY:  History reviewed. No pertinent family history.    SOCIAL HISTORY:   Social History     Socioeconomic History    Marital status:      Spouse name: Not on file    Number of children: Not on file    Years of education: Not on file    Highest education level: Not on file   Occupational History    Not on file   Tobacco Use    Smoking status: Never    Smokeless tobacco: Never   Vaping Use    Vaping status: Never Used   Substance and Sexual Activity    Alcohol use: Not Currently     Comment: quit august -- used to drink 1-2 glasses wine/night    Drug use: Yes     Comment: marijuana    Sexual activity: Not on file   Other Topics Concern    Not on file   Social History Narrative    Not on file     Social Drivers of Health     Financial Resource Strain: Low Risk  (1/10/2025)    Overall Financial Resource Strain (CARDIA)     Difficulty of Paying Living Expenses: Not hard at all   Food Insecurity: No Food Insecurity (2/9/2025)    Hunger Vital Sign     Worried About Running Out of Food in the Last Year: Never true     Ran Out of Food in the Last Year: Never true   Transportation Needs: No Transportation Needs (2/9/2025)    PRAPARE - Transportation     Lack of Transportation (Medical): No     Lack of Transportation (Non-Medical): No   Physical Activity: Not on File (8/24/2019)    Received from [facility]    Physical Activity     Physical Activity: 0   Stress: No Stress Concern Present (1/10/2025)    [employer] [place] [employer] - Occupational Stress Questionnaire     Feeling of Stress : Only a little   Social Connections: Not on File (8/24/2019)    Received from [facility]    Social Connections     Social Connections and Isolation: 0   Intimate Partner Violence: Not At Risk (2/8/2025)    Humiliation, Afraid, Rape, and Kick questionnaire     Fear of Current or Ex-Partner: No     Emotionally Abused: No     Physically Abused: No      Sexually Abused: No   Housing Stability: Low Risk  (2025)    Housing Stability Vital Sign     Unable to Pay for Housing in the Last Year: No     Number of Times Moved in the Last Year: 0     Homeless in the Last Year: No       DIET:   Orders Placed This Encounter   Procedures    Diet Order Diet: 2 Gram Sodium     Standing Status:   Standing     Number of Occurrences:   1     Diet::   2 Gram Sodium [7]       ALLERGIES:  No Known Allergies    OUTPATIENT MEDICATIONS:  No current facility-administered medications for this encounter.    PHYSICAL EXAM:  Vitals:    25 1647 25 1701 25 1801 25 1918   BP:  90/58 98/56 95/56   Pulse:  (!) 112 (!) 114 (!) 115   Resp:    Temp:    35.9 °C (96.7 °F)   TempSrc:    Temporal   SpO2:  98% 97% 98%   Weight:       Height:       , Temp (24hrs), Av °C (96.8 °F), Min:35.9 °C (96.7 °F), Max:36 °C (96.8 °F)  , Pulse Oximetry: 98 %, O2 (LPM): 3, O2 Delivery Device: Nasal Cannula    General: cooperative, ill appearing  Skin:  Jaundice  HEENT: NC/AT. PERRL. EOMI. MMM. No nasal discharge. Oropharynx nonerythematous without exudate/plaques  Lungs:  Decreased breath sound on the right.    Cardiovascular:  Normal S1/S2, RRR without M/R/G.  Abdomen:  BS+, Soft, Mildly distended.  Extremities:  Full range of motion. No gross deformities noted. 2+ pulses in all extremities. No C/C/E   CNS:  A&Ox4, follows commands, Strength 3/5 in all extremities.         LAB TESTS:   Admission on 2025   Component Date Value Ref Range Status    Report 2025    Final                    Value:[facility]    Test Date:  2025  Pt Name:    CARMEN CUADRA                 Department: ER  MRN:        [id number]                      Room:        06  Gender:     Female                       Technician: [id number]  :        [id number]                   Requested By:ER TRIAGE PROTOCOL  Order #:    [id number]                    Reading MD: MICHAEL  MIKE    Measurements  Intervals                                Axis  Rate:       129                          P:          25  AZ:         294                          QRS:        64  QRSD:       83                           T:          -21  QT:         305  QTc:        447    Interpretive Statements  Sinus tachycardia  Prolonged AZ interval  Consider left atrial enlargement  Low voltage, precordial leads  Anteroseptal infarct, old  Borderline repol abnormality, diffuse leads  Compared to ECG 02/07/2025 19:33:32  First degree AV block now present  Low QRS voltage now present  Myocardial infarct finding now                           present  Impression sinus tachycardia without evidence of ischemia.  Electronically Signed On 02- 18:40:03 PST by MICHAEL MCKEON      WBC 02/24/2025 9.5  4.8 - 10.8 K/uL Final    RBC 02/24/2025 3.74 (L)  4.20 - 5.40 M/uL Final    Hemoglobin 02/24/2025 14.3  12.0 - 16.0 g/dL Final    Hematocrit 02/24/2025 40.7  37.0 - 47.0 % Final    MCV 02/24/2025 108.8 (H)  81.4 - 97.8 fL Final    MCH 02/24/2025 38.2 (H)  27.0 - 33.0 pg Final    MCHC 02/24/2025 35.1  32.2 - 35.5 g/dL Final    RDW 02/24/2025 61.7 (H)  35.9 - 50.0 fL Final    Platelet Count 02/24/2025 88 (L)  164 - 446 K/uL Final    MPV 02/24/2025 9.2  9.0 - 12.9 fL Final    Neutrophils-Polys 02/24/2025 67.20  44.00 - 72.00 % Final    Lymphocytes 02/24/2025 18.20 (L)  22.00 - 41.00 % Final    Monocytes 02/24/2025 12.40  0.00 - 13.40 % Final    Eosinophils 02/24/2025 0.90  0.00 - 6.90 % Final    Basophils 02/24/2025 0.40  0.00 - 1.80 % Final    Immature Granulocytes 02/24/2025 0.90  0.00 - 0.90 % Final    Nucleated RBC 02/24/2025 0.00  0.00 - 0.20 /100 WBC Final    Neutrophils (Absolute) 02/24/2025 6.35  1.82 - 7.42 K/uL Final    Includes immature neutrophils, if present.    Lymphs (Absolute) 02/24/2025 1.73  1.00 - 4.80 K/uL Final    Monos (Absolute) 02/24/2025 1.18 (H)  0.00 - 0.85 K/uL Final    Eos (Absolute) 02/24/2025 0.09   0.00 - 0.51 K/uL Final    Baso (Absolute) 02/24/2025 0.04  0.00 - 0.12 K/uL Final    Immature Granulocytes (abs) 02/24/2025 0.09  0.00 - 0.11 K/uL Final    NRBC (Absolute) 02/24/2025 0.00  K/uL Final    Sodium 02/24/2025 125 (L)  135 - 145 mmol/L Final    Potassium 02/24/2025 4.0  3.6 - 5.5 mmol/L Final    Comment: The hemolysis index of the specimen exceeds the allowed tolerance for the  test. Result may be affected.?Specimen recollection is recommended to  confirm the result.      Chloride 02/24/2025 88 (L)  96 - 112 mmol/L Final    Co2 02/24/2025 23  20 - 33 mmol/L Final    Anion Gap 02/24/2025 14.0  7.0 - 16.0 Final    Glucose 02/24/2025 123 (H)  65 - 99 mg/dL Final    Bun 02/24/2025 11  8 - 22 mg/dL Final    Creatinine 02/24/2025 0.58  0.50 - 1.40 mg/dL Final    Comment: The icterus index of the specimen exceeds the allowed tolerance for the  test. Result may be affected.      Calcium 02/24/2025 8.9  8.5 - 10.5 mg/dL Final    Correct Calcium 02/24/2025 9.8  8.5 - 10.5 mg/dL Final    AST(SGOT) 02/24/2025 96 (H)  12 - 45 U/L Final    Comment: The hemolysis index of the specimen exceeds the allowed tolerance for the  test. Result may be affected.?Specimen recollection is recommended to  confirm the result.      ALT(SGPT) 02/24/2025 135 (H)  2 - 50 U/L Final    Alkaline Phosphatase 02/24/2025 232 (H)  30 - 99 U/L Final    Total Bilirubin 02/24/2025 10.0 (H)  0.1 - 1.5 mg/dL Final    Albumin 02/24/2025 2.9 (L)  3.2 - 4.9 g/dL Final    Total Protein 02/24/2025 6.3  6.0 - 8.2 g/dL Final    Globulin 02/24/2025 3.4  1.9 - 3.5 g/dL Final    A-G Ratio 02/24/2025 0.9  g/dL Final    Lipase 02/24/2025 68  11 - 82 U/L Final    APTT 02/24/2025 39.1 (H)  24.7 - 36.0 sec Final    PT 02/24/2025 23.5 (H)  12.0 - 14.6 sec Final    INR 02/24/2025 2.09 (H)  0.87 - 1.13 Final    Comment: INR - Non-therapeutic Reference Range: 0.87-1.13  INR - Therapeutic Reference Range: 2.0-4.0      Imm. Plt Fraction 02/24/2025 3.2  0.6 - 13.1 %  "Final    GFR (CKD-EPI) 02/24/2025 106  >60 mL/min/1.73 m 2 Final    Comment: Estimated Glomerular Filtration Rate is calculated using  race neutral CKD-EPI 2021 equation per NKF-ASN recommendations.      Ammonia 02/24/2025 50 (H)  11 - 45 umol/L Final        CULTURES:   Results       Procedure Component Value Units Date/Time    Urinalysis [[id number]]     Order Status: Sent Specimen: Urine             IMAGES:  DX-CHEST-PORTABLE (1 VIEW)   Final Result      1. Opacification of the right hemithorax with mediastinal shift to the left.   2. Interstitial edema in the left lung.          CONSULTS:   None    ASSESSMENT/PLAN: 55 y.o. female admitted for:    #Alcohol Cirrhosis  Patient was seen by her primary care provider on 2/14/2025, during which patient and her family requested referral to [facility] or [facility] for hepatology.  Referral to gastroenterology was placed at the time.  However, referral was canceled on the same day as it was noted that \"patient would like to go to [facility] or [facility] for liver transplant.\" Patient reports that she has contacted [facility] for possible liver transplant and that \"she is on the list.\" Patient was unable to give more information as she admitted that she was tired.   - Continue home lactulose    #Hypoxia  #Tarchycardia  CXR showed right hemithorax with mediastinal shift to the left. Interstitial edema in the left lung. No left pleural effusion. No visible pneumothorax bilaterally. Patient on 3L supplemental oxygen via NC satting at 98%. WBC wnl. There is decrease breath sound on the right. Otherwise, patient remain stable.   - Will continue to monitor saturation and oxygen requirement  - Will order IR thoracentesis  - AM team to follow up with IR/Pulmonology for therapeutic thoracentesis  - NPO at midnight    #Hyponatremia  - Chronic and stable.     #History of hypotension  - Continue home midodrine  - Continue to monitor    #Thrombocytopenia  - Chronic. Continue to monitor      Core " Measures:  Fluids: PO  Lines: PIV  Abx: None  Diet: regular, sodium (2g)  PPX: SCDs  DISPO: inpatient for thoracentesis    CODE STATUS: Full      Juan Pablo Smith, PGY3  [facility]

## 2025-02-25 NOTE — ASSESSMENT & PLAN NOTE
Patient has history of liver cirrhosis secondary to alcohol use disorder.  At time of admission her AST is 96, , total bilirubin is 10.7, alk phos is 235, and ammonia is 50.  Her total bilirubin level has been elevated since December 2024, at that time it was over 20.  It has been consistently between 8 and 13.  Per chart review it appears that patient's last EGD was in May 2024 in which she had 4 bands placed for esophageal varices.  Patient's MELD score is 30.  Plan:   - Continue home Lactulose 30mL BID and Lasix 40mg daily   - Restart spironolactone 50 mg daily  - Continue 1500 cc fluid restriction and low-sodium diet  - Administered 25g of 25% albumin infusion due to thoracentesis of 4 L total fluid and ongoing tachycardia/soft blood pressures  -Patient follows with  with [facility] for GI. Called their office, they will plan to reach out to patient to schedule follow up appointment.   - Multiple referrals sent out for urgent GI follow-up. Will provide information for these referrals to patient so that hospital follow-up can be set up.

## 2025-02-25 NOTE — CARE PLAN
The patient is Watcher - Medium risk of patient condition declining or worsening    Shift Goals  Clinical Goals: orient to unit, thora  Patient Goals: rest, quiet  Family Goals: Updates, medications    Progress made toward(s) clinical / shift goals:    Problem: Knowledge Deficit - Standard  Goal: Patient and family/care givers will demonstrate understanding of plan of care, disease process/condition, diagnostic tests and medications  Outcome: Progressing     Problem: Hemodynamics  Goal: Patient's hemodynamics, fluid balance and neurologic status will be stable or improve  Outcome: Progressing     Problem: Fall Risk  Goal: Patient will remain free from falls  Outcome: Progressing       Patient is not progressing towards the following goals:

## 2025-02-25 NOTE — CONSULTS
Pulmonary Consult Note    ID  Stacy Oliva is a 55-year-old female with history of alcoholic cirrhosis presenting with recurrent right hydrothorax    HPI  Stacy Oliva is a 55-year-old female with history of alcoholic cirrhosis presenting with recurrent right hydrothorax.  She states she was in her normal state of health up until 2 to 3 days ago when she noted significant worsening in shortness of breath and generalized functional status.  Denies any chest pain, palpitations, lower extremity edema, abdominal pain.  States this was very similar to what happened during prior hospitalization for hydrothorax.  She had a prior admission for this problem January 31 and had subsequent thoracentesis with approximately 1 L drained at that time.  Denies any fever, dizziness or headache.      Review of Systems  Review of Systems   Constitutional:  Positive for diaphoresis and fever. Negative for chills.   Respiratory:  Positive for shortness of breath. Negative for cough, hemoptysis and wheezing.    Cardiovascular:  Negative for chest pain, palpitations, orthopnea and leg swelling.   Gastrointestinal:  Positive for diarrhea. Negative for abdominal pain, blood in stool, constipation and melena.   Genitourinary:  Negative for dysuria and urgency.   Neurological:  Negative for loss of consciousness.   Psychiatric/Behavioral:  Negative for substance abuse.        Past Medical History  -see HPI    Past Surgical History  -see HPI    Medications     Prior to Admission Medications   Prescriptions Last Dose Informant Patient Reported? Taking?   furosemide (LASIX) 40 MG Tab 2/24/2025 Morning  No Yes   Sig: Take 1 Tablet by mouth every morning.   lactulose 10 g/15mL Solution 2/24/2025 at  8:30 AM  No Yes   Sig: Take 30 mL by mouth 4 times a day for 210 days.   Patient taking differently: Take 20 g by mouth 2 times a day.   midodrine (PROAMATINE) 10 MG tablet 2/24/2025 at  2:00 PM  No Yes   Sig: Take 1 Tablet by mouth every 8 hours.    phosphorus (K-PHOS-NEUTRAL) 250 MG tablet 2/24/2025 Morning  No Yes   Sig: Take 1 Tablet by mouth 2 times a day.   potassium chloride SA (KDUR) 20 MEQ Tab CR 2/24/2025 Morning  No Yes   Sig: Take 0.5 Tablets by mouth 2 times a day.      Facility-Administered Medications: None        Allergies    No Known Allergies    Family History    family history is not on file.     Social History  -see HPI       Physical Exam    Vitals:  Temp:  [35.9 °C (96.7 °F)-36.1 °C (96.9 °F)] 36.1 °C (96.9 °F)  Pulse:  [108-140] 112  Resp:  [16-22] 18  BP: ()/(51-71) 102/67  SpO2:  [94 %-99 %] 94 %  Physical Exam  Vitals and nursing note reviewed.   Constitutional:       General: She is not in acute distress.     Appearance: Normal appearance. She is not ill-appearing.   HENT:      Head: Normocephalic and atraumatic.      Right Ear: External ear normal.      Left Ear: External ear normal.   Pulmonary:      Effort: Pulmonary effort is normal. No respiratory distress.   Skin:     General: Skin is warm and dry.      Coloration: Skin is not jaundiced or pale.   Neurological:      General: No focal deficit present.      Mental Status: She is alert and oriented to person, place, and time. Mental status is at baseline.   Psychiatric:         Behavior: Behavior normal.         Thought Content: Thought content normal.         Judgment: Judgment normal.             Data:  Lab Results   Component Value Date/Time    SODIUM 126 (L) 02/25/2025 06:42 AM    POTASSIUM 3.4 (L) 02/25/2025 06:42 AM    CHLORIDE 87 (L) 02/25/2025 06:42 AM    CO2 26 02/25/2025 06:42 AM    GLUCOSE 102 (H) 02/25/2025 06:42 AM    BUN 12 02/25/2025 06:42 AM    CREATININE 0.69 02/25/2025 06:42 AM     Lab Results   Component Value Date/Time    WBC 7.7 02/25/2025 06:42 AM    RBC 3.56 (L) 02/25/2025 06:42 AM    HEMOGLOBIN 13.3 02/25/2025 06:42 AM    HEMATOCRIT 37.9 02/25/2025 06:42 AM    .5 (H) 02/25/2025 06:42 AM    MCH 37.4 (H) 02/25/2025 06:42 AM    MCHC 35.1  02/25/2025 06:42 AM    MPV 9.6 02/25/2025 06:42 AM    NEUTSPOLYS 67.20 02/24/2025 02:55 PM    LYMPHOCYTES 18.20 (L) 02/24/2025 02:55 PM    MONOCYTES 12.40 02/24/2025 02:55 PM    EOSINOPHILS 0.90 02/24/2025 02:55 PM    BASOPHILS 0.40 02/24/2025 02:55 PM    ANISOCYTOSIS 1+ 02/07/2025 07:05 PM   X-Ray:  I have personally reviewed the images and compared with prior images..            Assessment/Plan  Stacy Oliva is a 55-year-old female with history of alcoholic cirrhosis presenting with recurrent right hydrothorax    #Right Hydrothorax  #alcoholic cirrhosis, without hepatic coma, (MELD 30 on admission)      Recommendations:  -IR consulting for thoracentesis.   -fluid labs pending at this time to calculate lights criteria  -Pulmonary to sign off. Thank you for the consult. Reach out for any further recommendations   -Primary care for proper age recommended vaccines.        Renard Loja PGY3  Internal Medicine Residency     Plan has been discussed with Attending Physician.

## 2025-02-25 NOTE — DISCHARGE PLANNING
"Case Management Discharge Planning    Admission Date: 2/24/2025  GMLOS: 3.3  ALOS: 1    6-Clicks ADL Score: 19  6-Clicks Mobility Score: 18    DME Needed: EMELIA received messaged from resident to see if CM can help assist with scheduling pt with outpatient IR for weekly thoracentesis. EMELIA called OP IR [id number] and spoke with Robert ([phone]) and per robert she stated with the referral that is placed pt will need to call the office herself once she is discharged. Robert sated that SW can provide pt with her number and robert will keep an eye out for pt or resident can standing order that stated she will need it weekly/as needed\" per IR. CESW notified resident.          "

## 2025-02-25 NOTE — PROGRESS NOTES
4 Eyes Skin Assessment Completed by GUS Gaviria and GUS Contreras.    Head WDL  Ears WDL  Nose WDL  Mouth WDL  Neck WDL  Breast/Chest WDL  Shoulder Blades WDL  Spine WDL  (R) Arm/Elbow/Hand Redness, Blanching, Bruising, and Scab  (L) Arm/Elbow/Hand Redness, Blanching, Bruising, and Scab  Abdomen WDL  Groin WDL  Scrotum/Coccyx/Buttocks Redness and Blanching  (R) Leg WDL  (L) Leg Scab on back of thigh (see photo)  (R) Heel/Foot/Toe Scab and Scratches on top of feet  (L) Heel/Foot/Toe Redness, Blanching, Scab and Scratches on top of feet          Devices In Places Nasal Cannula      Interventions In Place Gray Ear Foams and Heels Loaded W/Pillows    Possible Skin Injury No    Pictures Uploaded Into Epic Yes  Wound Consult Placed N/A  RN Wound Prevention Protocol Ordered No

## 2025-02-25 NOTE — PROGRESS NOTES
"Attending:   Dr. Strong    Resident:   Kamini Damon D.O.    PATIENT:   Stacy Oliva; [id number]; 1970    Hospital Day: 1    ID:   55 y.o. female with PMHx alcohol use disorder and liver cirrhosis admitted for hypoxia secondary with opacification of the right hemithorax found on chest x-ray.    SUBJECTIVE:   This morning patient states that while she is on the oxygen she feels like she can breathe okay, however if the oxygen is taken off for any time she developed shortness of breath.  She denies any abdominal pain, and says that her ascites seems to be better than it has been in the past.  She says she does not currently drink.  Patient says she has had multiple thoracentesis performed, at one point she was receiving them every couple of days because her lungs continued to feel with fluid.  Although she says since she has been on the Lasix, it seems to happen less often.  Patient says she was found to have varices on EGD a couple of years ago, prior to that she did have an episode of bloody emesis, but has not had this recently.  She has not been seeing GI outpatient, she is on a wait list with [facility] for liver transplant.    OBJECTIVE:  Vitals:    02/25/25 0500 02/25/25 0816 02/25/25 0948 02/25/25 1131   BP:  93/62 111/67 108/61   Pulse:  (!) 114 (!) 123 (!) 123   Resp:  13 18 17   Temp:  36.7 °C (98 °F) 36.5 °C (97.7 °F) 36.3 °C (97.3 °F)   TempSrc:  Temporal  Temporal   SpO2: 94% 96% 92% 93%   Weight:   63.6 kg (140 lb 3.4 oz)    Height:   1.651 m (5' 5\")        Intake/Output Summary (Last 24 hours) at 2/25/2025 1525  Last data filed at 2/25/2025 1000  Gross per 24 hour   Intake 250 ml   Output --   Net 250 ml        PHYSICAL EXAM:  General: Ill appearing  HEENT: NC/AT. EOMI. Scleral icterus noted  Cardiovascular: RRR without murmurs. Normal capillary refill   Respiratory: Minimal lung sounds heard over right side  Abdomen: soft, nontender, nondistended, no masses, mild fluid wave  EXT:  LLOYD, no edema  Skin: " Diffuse jaundice over face and trunk   Neuro: Non-focal    LABS:  Recent Labs     02/24/25  1455 02/25/25  0642   WBC 9.5 7.7   RBC 3.74* 3.56*   HEMOGLOBIN 14.3 13.3   HEMATOCRIT 40.7 37.9   .8* 106.5*   MCH 38.2* 37.4*   RDW 61.7* 59.3*   PLATELETCT 88* 77*   MPV 9.2 9.6   NEUTSPOLYS 67.20  --    LYMPHOCYTES 18.20*  --    MONOCYTES 12.40  --    EOSINOPHILS 0.90  --    BASOPHILS 0.40  --      Recent Labs     02/24/25  1455 02/25/25  0642   SODIUM 125* 126*   POTASSIUM 4.0 3.4*   CHLORIDE 88* 87*   CO2 23 26   BUN 11 12   CREATININE 0.58 0.69   CALCIUM 8.9 8.6   ALBUMIN 2.9* 2.9*     Estimated GFR/CRCL = Estimated Creatinine Clearance: 81.9 mL/min (by C-G formula based on SCr of 0.69 mg/dL).  Recent Labs     02/24/25  1455 02/25/25  0642   GLUCOSE 123* 102*     Recent Labs     02/24/25  1455 02/24/25  1652 02/24/25  1835 02/25/25  0642   ASTSGOT 96*  --   --  72*   ALTSGPT 135*  --   --  116*   TBILIRUBIN 10.0*  --   --  10.7*   ALKPHOSPHAT 232*  --   --  209*   GLOBULIN 3.4  --   --  3.1   INR  --  2.09*  --   --    AMMONIA  --   --  50*  --              Recent Labs     02/24/25 1652   INR 2.09*   APTT 39.1*         IMAGING:  DX-CHEST-PORTABLE (1 VIEW)   Final Result      1. Near complete resolution of the right pleural effusion after right thoracentesis with reexpansion of the right lung.   2. No postprocedure visible pneumothorax.   3. The remainder is stable.      DX-CHEST-PORTABLE (1 VIEW)   Final Result      1. Opacification of the right hemithorax with mediastinal shift to the left.   2. Interstitial edema in the left lung.      US-THORACENTESIS PUNCTURE RIGHT    (Results Pending)       MEDS:  Current Facility-Administered Medications   Medication Last Admin    lactulose 20 GM/30ML solution 30 mL 30 mL at 02/25/25 0502    furosemide (Lasix) tablet 40 mg 40 mg at 02/25/25 0502    midodrine (Proamatine) tablet 10 mg 10 mg at 02/25/25 1346    omeprazole (PriLOSEC) capsule 20 mg      phosphorus  (K-Phos-Neutral) per tablet 250 mg 250 mg at 02/25/25 0503    potassium chloride SA (Kdur) tablet 10 mEq 10 mEq at 02/25/25 0503       ASSESSMENT/PLAN:    Problem   Decompensated Cirrhosis (Hcc)   Tachycardia   Acute Hypoxic Respiratory Failure (Hcc)   Hyponatremia    Chronic and stable.     Hypokalemia   Alcohol Use Disorder in Remission       * Acute hypoxic respiratory failure (HCC)- (present on admission)  Assessment & Plan  Patient hypoxic in the ED requiring 3 L via nasal cannula.  Most likely secondary to fluid buildup in the lungs as chest x-ray revealed opacification of the right hemithorax with left mediastinal shift.  After admission her oxygen requirement decreased slightly and patient has been on 1.5 to 2 L.  Plan:   -Thoracentesis performed removing about 4 L of fluid from patient's lungs.  Follow-up chest x-ray showed resolution of right pleural effusion with reexpansion of the right lung and no evidence of pneumothorax  -Continuous pulse ox  -Patient will likely need regular thoracentesis    Decompensated cirrhosis (HCC)- (present on admission)  Assessment & Plan  Patient has history of liver cirrhosis secondary to alcohol use disorder.  At time of admission her AST is 96, , total bilirubin is 10.7, alk phos is 235, and ammonia is 50.  Her total bilirubin level has been elevated since December 2024, at that time it was over 20.  It has been consistently between 8 and 13.  Per chart review it appears that patient's last EGD was in May 2024 in which she had 4 bands placed for esophageal varices.  Patient's MELD score is 30.  Plan:   - Continue home Lactulose 30mL BID and Lasix 40mg daily   - Consider consultation to GI, they have seen patient previously in the hospital    Tachycardia- (present on admission)  Assessment & Plan  Patient's heart rate was 140 at time of admission.  It is improved slightly but continues to be elevated.  Likely secondary to the fluid overload but will continue to  monitor.    Hyponatremia- (present on admission)  Assessment & Plan  Patient sodium level in the ED is 125.  Per chart review patient has chronic hyponatremia with levels in the 120s.  Repeat labs shows sodium level stable at 126.  Held off on fluid administration due to fluid retention in the right lung.  Plan:   - Will continue to monitor with daily labs  - Fluid restriction at 1.5L and low sodium diet as patient is currently hypervolemic     Alcohol use disorder in remission- (present on admission)  Assessment & Plan  Patient has history of alcohol use disorder, but is not currently using alcohol.  She also denies any tobacco use or recreational drug use.    Hypokalemia- (present on admission)  Assessment & Plan  Patient currently on daily 20 mg potassium chloride as well as K-Phos Neutral tablet 250 mg twice daily.  Plan:  -Continue home regimen and replete potassium as needed         Core Measures:  Fluids: No fluids  Lines: PIV  Abx: None  Diet: Fluid restriction 1.5L and low sodium diet  PPX: SCDs, holding chemoprophylaxis due to increased risk of bleeding    DISPO: In patient with acute hypoxic respiratory failure due to fluid overload secondary to liver cirrhosis. Not medically cleared.       CODE STATUS: Full Code    Kamini Damon D.O.  PGY-1  [facility]

## 2025-02-26 LAB
ALBUMIN SERPL BCP-MCNC: 2.6 G/DL (ref 3.2–4.9)
ALBUMIN/GLOB SERPL: 0.7 G/DL
ALP SERPL-CCNC: 215 U/L (ref 30–99)
ALT SERPL-CCNC: 104 U/L (ref 2–50)
ANION GAP SERPL CALC-SCNC: 14 MMOL/L (ref 7–16)
AST SERPL-CCNC: 82 U/L (ref 12–45)
BILIRUB SERPL-MCNC: 9.9 MG/DL (ref 0.1–1.5)
BUN SERPL-MCNC: 15 MG/DL (ref 8–22)
CALCIUM ALBUM COR SERPL-MCNC: 9.8 MG/DL (ref 8.5–10.5)
CALCIUM SERPL-MCNC: 8.7 MG/DL (ref 8.5–10.5)
CHLORIDE SERPL-SCNC: 89 MMOL/L (ref 96–112)
CO2 SERPL-SCNC: 21 MMOL/L (ref 20–33)
CREAT SERPL-MCNC: 0.78 MG/DL (ref 0.5–1.4)
ERYTHROCYTE [DISTWIDTH] IN BLOOD BY AUTOMATED COUNT: 58.6 FL (ref 35.9–50)
FUNGUS SPEC CULT: NORMAL
FUNGUS SPEC FUNGUS STN: NORMAL
FUNGUS SPEC FUNGUS STN: NORMAL
GFR SERPLBLD CREATININE-BSD FMLA CKD-EPI: 89 ML/MIN/1.73 M 2
GLOBULIN SER CALC-MCNC: 3.5 G/DL (ref 1.9–3.5)
GLUCOSE SERPL-MCNC: 73 MG/DL (ref 65–99)
HCT VFR BLD AUTO: 38.9 % (ref 37–47)
HGB BLD-MCNC: 14 G/DL (ref 12–16)
MAGNESIUM SERPL-MCNC: 1.5 MG/DL (ref 1.5–2.5)
MCH RBC QN AUTO: 38 PG (ref 27–33)
MCHC RBC AUTO-ENTMCNC: 36 G/DL (ref 32.2–35.5)
MCV RBC AUTO: 105.7 FL (ref 81.4–97.8)
PHOSPHATE SERPL-MCNC: 2.6 MG/DL (ref 2.5–4.5)
PLATELET # BLD AUTO: 77 K/UL (ref 164–446)
PLATELETS.RETICULATED NFR BLD AUTO: 3 % (ref 0.6–13.1)
PMV BLD AUTO: 9.7 FL (ref 9–12.9)
POTASSIUM SERPL-SCNC: 3.7 MMOL/L (ref 3.6–5.5)
PROT SERPL-MCNC: 6.1 G/DL (ref 6–8.2)
RBC # BLD AUTO: 3.68 M/UL (ref 4.2–5.4)
SIGNIFICANT IND 70042: NORMAL
SIGNIFICANT IND 70042: NORMAL
SITE SITE: NORMAL
SITE SITE: NORMAL
SODIUM SERPL-SCNC: 124 MMOL/L (ref 135–145)
SOURCE SOURCE: NORMAL
SOURCE SOURCE: NORMAL
WBC # BLD AUTO: 9.7 K/UL (ref 4.8–10.8)

## 2025-02-26 PROCEDURE — A9270 NON-COVERED ITEM OR SERVICE: HCPCS

## 2025-02-26 PROCEDURE — 700105 HCHG RX REV CODE 258

## 2025-02-26 PROCEDURE — 770020 HCHG ROOM/CARE - TELE (206)

## 2025-02-26 PROCEDURE — 700102 HCHG RX REV CODE 250 W/ 637 OVERRIDE(OP)

## 2025-02-26 PROCEDURE — 80053 COMPREHEN METABOLIC PANEL: CPT

## 2025-02-26 PROCEDURE — 83735 ASSAY OF MAGNESIUM: CPT

## 2025-02-26 PROCEDURE — 700111 HCHG RX REV CODE 636 W/ 250 OVERRIDE (IP): Mod: JZ

## 2025-02-26 PROCEDURE — 85055 RETICULATED PLATELET ASSAY: CPT

## 2025-02-26 PROCEDURE — 85027 COMPLETE CBC AUTOMATED: CPT

## 2025-02-26 PROCEDURE — 84100 ASSAY OF PHOSPHORUS: CPT

## 2025-02-26 PROCEDURE — P9047 ALBUMIN (HUMAN), 25%, 50ML: HCPCS | Mod: JZ

## 2025-02-26 RX ORDER — ALBUMIN (HUMAN) 12.5 G/50ML
25 SOLUTION INTRAVENOUS ONCE
Status: COMPLETED | OUTPATIENT
Start: 2025-02-26 | End: 2025-02-26

## 2025-02-26 RX ORDER — MAGNESIUM SULFATE HEPTAHYDRATE 40 MG/ML
2 INJECTION, SOLUTION INTRAVENOUS ONCE
Status: COMPLETED | OUTPATIENT
Start: 2025-02-26 | End: 2025-02-26

## 2025-02-26 RX ORDER — SPIRONOLACTONE 25 MG/1
50 TABLET ORAL
Status: DISCONTINUED | OUTPATIENT
Start: 2025-02-26 | End: 2025-02-27 | Stop reason: HOSPADM

## 2025-02-26 RX ADMIN — CEFTRIAXONE SODIUM 2000 MG: 10 INJECTION, POWDER, FOR SOLUTION INTRAVENOUS at 12:17

## 2025-02-26 RX ADMIN — DIBASIC SODIUM PHOSPHATE, MONOBASIC POTASSIUM PHOSPHATE AND MONOBASIC SODIUM PHOSPHATE 250 MG: 852; 155; 130 TABLET ORAL at 04:25

## 2025-02-26 RX ADMIN — MIDODRINE HYDROCHLORIDE 10 MG: 5 TABLET ORAL at 04:24

## 2025-02-26 RX ADMIN — POTASSIUM CHLORIDE 10 MEQ: 1500 TABLET, EXTENDED RELEASE ORAL at 18:06

## 2025-02-26 RX ADMIN — SPIRONOLACTONE 50 MG: 25 TABLET ORAL at 11:29

## 2025-02-26 RX ADMIN — DIBASIC SODIUM PHOSPHATE, MONOBASIC POTASSIUM PHOSPHATE AND MONOBASIC SODIUM PHOSPHATE 250 MG: 852; 155; 130 TABLET ORAL at 18:06

## 2025-02-26 RX ADMIN — ALBUMIN (HUMAN) 25 G: 0.25 INJECTION, SOLUTION INTRAVENOUS at 12:21

## 2025-02-26 RX ADMIN — LACTULOSE 30 ML: 10 SOLUTION ORAL at 18:06

## 2025-02-26 RX ADMIN — MAGNESIUM SULFATE HEPTAHYDRATE 2 G: 2 INJECTION, SOLUTION INTRAVENOUS at 08:13

## 2025-02-26 RX ADMIN — POTASSIUM CHLORIDE 10 MEQ: 1500 TABLET, EXTENDED RELEASE ORAL at 04:24

## 2025-02-26 RX ADMIN — MIDODRINE HYDROCHLORIDE 10 MG: 5 TABLET ORAL at 13:46

## 2025-02-26 RX ADMIN — LACTULOSE 30 ML: 10 SOLUTION ORAL at 04:26

## 2025-02-26 RX ADMIN — MIDODRINE HYDROCHLORIDE 10 MG: 5 TABLET ORAL at 21:27

## 2025-02-26 RX ADMIN — OMEPRAZOLE 20 MG: 20 CAPSULE, DELAYED RELEASE ORAL at 04:25

## 2025-02-26 ASSESSMENT — COGNITIVE AND FUNCTIONAL STATUS - GENERAL
SUGGESTED CMS G CODE MODIFIER MOBILITY: CJ
HELP NEEDED FOR BATHING: A LITTLE
DAILY ACTIVITIY SCORE: 22
TOILETING: A LITTLE
STANDING UP FROM CHAIR USING ARMS: A LITTLE
WALKING IN HOSPITAL ROOM: A LITTLE
SUGGESTED CMS G CODE MODIFIER DAILY ACTIVITY: CJ
MOBILITY SCORE: 21
CLIMB 3 TO 5 STEPS WITH RAILING: A LITTLE

## 2025-02-26 ASSESSMENT — FIBROSIS 4 INDEX: FIB4 SCORE: 4.86

## 2025-02-26 ASSESSMENT — PAIN DESCRIPTION - PAIN TYPE
TYPE: ACUTE PAIN
TYPE: ACUTE PAIN

## 2025-02-26 NOTE — PROGRESS NOTES
Bedside report received from off going RN/tech: [place], assumed care of patient.     Fall Risk Score: LOW RISK  Fall risk interventions in place: Place yellow fall risk ID band on patient, Provide patient/family education based on risk assessment, Educate patient/family to call staff for assistance when getting out of bed, Place fall precaution signage outside patient door, and Place patient in room close to nursing station  Bed type: Regular (Kai Score less than 17 interventions in place)  Patient on cardiac monitor: Yes  IVF/IV medications: Not Applicable   Oxygen: Room Air  Bedside sitter: Not Applicable   Isolation: Not applicable

## 2025-02-26 NOTE — DISCHARGE PLANNING
Care Transition Team Assessment    @0900 CM RN met with patient to complete assessment.   Patient was admitted to [facility] on 2/24/25 with acute hypoxic respiratory failure.   Please see H&P for pertinent PMH.     Demographics on facesheet are incorrect. Updated information with the following:  Physical address is [address] [place] [address]; [place] [address]  Mailing addres is [address], [place] [address]  Patient's PCP is Dr. Vanessa  Patient's preferred pharmacy is [facility] in [place]  Housing-2 story home in [place].  Patient will be staying in [place], [place] for a few days in a friend's single level home, to ensure that she is going to be okay.  IADLS/ADLS:-patient reports independent PLOF. Patient's  drives her.  Mental Health Concerns: no  Substance Abuse: hx of alcoholism  DME - wheelchair, FWW  O2- none  Discharge support - sister is here from [place]. Patient identified sister as her first emergency contact.   Madeline Sierra   [phone]  Lives with .   Transportation-  will transport as needed  Discharge planning - no referrals for case management at this time.   Attending IDT note by voalte indicates planned albumin today and restarting spironolactone. If tolerates well, possible DC later today or tomorrow.   Setting up outpatient standing thoracentesis order for patient and GI follow up.                 Information Source  Orientation Level: Oriented X4  Information Given By: Patient  Who is responsible for making decisions for patient? : Patient    Readmission Evaluation  Is this a readmission?: Yes - unplanned readmission    Elopement Risk  Legal Hold: No  Ambulatory or Self Mobile in Wheelchair: Yes  Disoriented: No  Psychiatric Symptoms: None  History of Wandering: No  Elopement this Admit: No  Vocalizing Wanting to Leave: No  Displays Behaviors, Body Language Wanting to Leave: No-Not at Risk for Elopement  Elopement Risk: Not at Risk for Elopement    Interdisciplinary Discharge  Planning  Lives with - Patient's Self Care Capacity: Spouse  Patient or legal guardian wants to designate a caregiver: No  Support Systems: Spouse / Significant Other, Family Member(s)  Housing / Facility: [address]    Discharge Preparedness  What is your plan after discharge?: Home with help  What are your discharge supports?: Spouse  Prior Functional Level: Ambulatory, Independent with Activities of Daily Living, Independent with Medication Management  Difficulity with ADLs: None  Difficulity with IADLs: Driving  Difficulity with IADL Comments:  drives    Functional Assesment  Prior Functional Level: Ambulatory, Independent with Activities of Daily Living, Independent with Medication Management    Finances  Financial Barriers to Discharge: No  Prescription Coverage: Yes    Vision / Hearing Impairment  Vision Impairment : No  Right Eye Vision: Impaired, Wears Glasses  Left Eye Vision: Impaired, Wears Glasses  Hearing Impairment : No         Advance Directive  Advance Directive?: DPOA for Health Care  Durable Power of  Name and Contact : Willy Oliva, spouse [phone]    Domestic Abuse  Have you ever been the victim of abuse or violence?: No  Possible Abuse/Neglect Reported to:: Not Applicable    Psychological Assessment  History of Substance Abuse: Alcohol  Substance Abuse Comments: Alcohol use disorder-in remission  History of Psychiatric Problems: No  Non-compliant with Treatment: No  Newly Diagnosed Illness: No    Discharge Risks or Barriers  Discharge risks or barriers?: Complex medical needs, Other (comment) (Lives in [place], [place])  Patient risk factors: Complex medical needs, Readmission    Anticipated Discharge Information  Discharge Disposition: Discharged to home/self care (01)  Discharge Address: [address] [place] [id number]  Discharge Contact Phone Number: [phone]

## 2025-02-26 NOTE — PROGRESS NOTES
Bedside report received from day shift RN  GUS Jaquez. Pt is currently A&Ox4, ST, breathing at a normal rate and rhythm, Jaundiced, and in no visible emotional or physical distress. Bed locked in lowest position, call light is within reach, fall prevention measures in place. Pt educated to use call light before getting out of bed, pt verbalized understanding. Pt is on a cardiac monitor, order verified, transmitter connected appropriately, and leads placed correctly, rhythm and rate assessed by RN, monitor staff updated of changes and parameters as necessary.  No further needs at this time. Assumed care of pt. Report given to assisting staff - CNA/CCT/ACT    Fall Risk Score: LOW RISK  Fall risk interventions in place: Provide patient/family education based on risk assessment, Educate patient/family to call staff for assistance when getting out of bed, Place fall precaution signage outside patient door, and Place patient in room close to nursing station  Bed type: Regular (Kai Score less than 17 interventions in place)  Patient on cardiac monitor: Yes  IVF/IV medications: Not Applicable   Oxygen: Room Air  Bedside sitter: Not Applicable   Isolation: Not applicable

## 2025-02-26 NOTE — CARE PLAN
The patient is Stable - Low risk of patient condition declining or worsening    Shift Goals  Clinical Goals: VSS, comfort  Patient Goals: rest, shower  Family Goals: POC    Progress made toward(s) clinical / shift goals:    Problem: Knowledge Deficit - Standard  Goal: Patient and family/care givers will demonstrate understanding of plan of care, disease process/condition, diagnostic tests and medications  Outcome: Progressing  Note: Discussed plan of care, pt able to actively participate in the learning process and demonstrates understanding by return demonstration or return explanation. Improved ability to communicate regarding their healthcare and current admission. Improved ability to identify barriers to learning and care.       Problem: Fall Risk  Goal: Patient will remain free from falls  Outcome: Progressing  Note: Fall prevention measures in place, bed alarm on and connected correctly, call light within reach, hourly rounding, Education provided on fall prevention. Pt instructed to use call light prior to exiting the bed, educated on use of bed alarms, and risks and complications related to falls. Medication orders evaluated for fall risk, gait/mobility assessed, sensory deficits assessed, mental status assessed, assessed for hypotension, hypovolemia, weakness, fatigue, elimination, and confusion.         Patient is not progressing towards the following goals:

## 2025-02-26 NOTE — DIETARY
"Nutrition Services: Initial Assessment     Day 2 of admit. Stacy Oliva is 55 y.o., female with admitting DX of Tachycardia [R00.0].    Consult Received for: MST - Malnutrition Screening Tool    Current Hospital Problems List: Tachycardia. Acute hypoxic respiratory failure. Decompensated cirrhosis. Hypokalemia. ETOH use disorder in remission.       Nutrition Assessment:      Height: 165.1 cm (5' 5\")  Weight: 63.6 kg (140 lb 3.4 oz)  Weight taken via: Bed Scale  BMI Calculated: 23.33  BMI Classification: WNL     Wt Readings from Last 20 Encounters:   02/26/25 63.6 kg (140 lb 3.4 oz)   02/12/25 55.4 kg (122 lb 2.2 oz)   02/03/25 72.3 kg (159 lb 6.3 oz)   01/12/25 70.5 kg (155 lb 6.8 oz)   01/03/25 70.5 kg (155 lb 6.8 oz)   02/05/24 63.4 kg (139 lb 12.4 oz)     Weight lost not significant x 1 year review     Objective:   Pertinent Medical Hx: ETOH cirrhosis, Hepatic Hydrothorax.   Pertinent Labs: Sodium 124, Chloride 89. AST82, , Alkaline phosphate 215, Total bilirubin 9.9   Pertinent Meds: Lasix   Skin/Wounds:  n/a   Food Allergies:  NKFA    Last BM:  Type 7: Watery, no solid pieces-entirely liquid  02/25/25       Current Diet Order/Intake:   2 Gram sodium , 1500 ml fluid restriction     Subjective:      Patient was seen at the bedside for MST. She was admitted for decompensated cirrhosis due to alcohol-induced liver disease, with a history of four episodes of esophageal varices. The patient reported significant improvement during her hospital stay. She mentioned having poor appetite for the x 3 months prior to admission, stating she was only able to tolerate soft, moist foods and was unable to finish a full meal. No fat or muscle loss was noted on NFPE. The patient denied abdominal pain and reported loss stool but is forming.     Patient reported UBW: n/a   Dietary Recall/Energy Intake: patient reported poor oral intake for the 3 months prior to hospital admission. He stated that he was dependent on " moist foods (such as yogurt and cottage cheese) and was unable to tolerate a full meal    This indicates Insufficient energy intake.     Nutrition Focused Physical Exam (NFPE)  Weight Loss: n/a   Muscle Mass: Well Nourished  Subcutaneous Fat: Well Nourished  Fluid Accumulation: n/a   Reduced  Strength: N/A in acute care setting.    Nutrition Diagnosis:      Inadequate Oral Intake related to Altered GI secondary to ETOH cirrhosis  as evidenced by < 75% of estimate energy needs > 1 month.      based on RD assessment at this time, Patient does not meet criteria in congruence with ASPEN/Academy guidelines for malnutrition    Nutrition Interventions:      Patient aware of active plan of care as appropriate.       Nutrition Monitoring and Evaluation:      Monitor nutrition POC, goal for .% intake from meals and supplements.  Additional fluids per MD/DO  Monitor vital signs pertinent to nutrition.    RD following and will provide updated recommendations as indicated.      Cassy GAMBOA/AND CRITERIA FOR MALNUTRITION

## 2025-02-26 NOTE — PROGRESS NOTES
Attending:   Dr. Strong    Resident:   Darby Estrada M.D.    PATIENT:   Stacy Oliva; [id number]; 1970    Hospital Day: 2    ID:   55 y.o. female with PMHx alcohol use disorder and liver cirrhosis admitted for hypoxia secondary with opacification of the right hemithorax found on chest x-ray.    SUBJECTIVE:   No acute events overnight.  This morning, patient states that she feels that her breathing has much improved.  She also has more energy than she did on admission.  She did have some recorded low blood pressures overnight and remained tachycardiac, however she denies any lightheadedness or dizziness and was able to get up out of bed on her own and walk to the bathroom and back without feeling lightheaded.  Denies any dysuria, hematuria, urgency, fevers, chills.    OBJECTIVE:  Vitals:    02/26/25 0400 02/26/25 0754 02/26/25 1100 02/26/25 1534   BP: 90/53 98/62 93/61 94/66   Pulse: (!) 110 (!) 107 100 97   Resp:  17 17 16   Temp:  36.5 °C (97.7 °F) 36.4 °C (97.5 °F) 36.5 °C (97.7 °F)   TempSrc:  Temporal Temporal Temporal   SpO2:  97% 96% 90%   Weight:       Height:           Intake/Output Summary (Last 24 hours) at 2/26/2025 1619  Last data filed at 2/26/2025 1334  Gross per 24 hour   Intake 740 ml   Output 0 ml   Net 740 ml        PHYSICAL EXAM:  General: Ill appearing  HEENT: NC/AT. EOMI. Scleral icterus noted  Cardiovascular: RRR without murmurs. Normal capillary refill   Respiratory: CTAB, normal effort.  Abdomen: soft, nontender, nondistended, no masses, mild fluid wave  EXT:  LLOYD, no edema  Skin: Diffuse jaundice over face and trunk   Neuro: Non-focal    LABS:  Recent Labs     02/24/25  1455 02/25/25  0642 02/26/25  0643   WBC 9.5 7.7 9.7   RBC 3.74* 3.56* 3.68*   HEMOGLOBIN 14.3 13.3 14.0   HEMATOCRIT 40.7 37.9 38.9   .8* 106.5* 105.7*   MCH 38.2* 37.4* 38.0*   RDW 61.7* 59.3* 58.6*   PLATELETCT 88* 77* 77*   MPV 9.2 9.6 9.7   NEUTSPOLYS 67.20  --   --    LYMPHOCYTES 18.20*  --   --    MONOCYTES  12.40  --   --    EOSINOPHILS 0.90  --   --    BASOPHILS 0.40  --   --      Recent Labs     02/24/25  1455 02/25/25  0642 02/26/25  0445   SODIUM 125* 126* 124*   POTASSIUM 4.0 3.4* 3.7   CHLORIDE 88* 87* 89*   CO2 23 26 21   BUN 11 12 15   CREATININE 0.58 0.69 0.78   CALCIUM 8.9 8.6 8.7   MAGNESIUM  --   --  1.5   PHOSPHORUS  --   --  2.6   ALBUMIN 2.9* 2.9* 2.6*     Estimated GFR/CRCL = Estimated Creatinine Clearance: 72.5 mL/min (by C-G formula based on SCr of 0.78 mg/dL).  Recent Labs     02/24/25  1455 02/25/25  0642 02/26/25  0445   GLUCOSE 123* 102* 73     Recent Labs     02/24/25  1455 02/24/25  1652 02/24/25  1835 02/25/25  0642 02/26/25  0445   ASTSGOT 96*  --   --  72* 82*   ALTSGPT 135*  --   --  116* 104*   TBILIRUBIN 10.0*  --   --  10.7* 9.9*   ALKPHOSPHAT 232*  --   --  209* 215*   GLOBULIN 3.4  --   --  3.1 3.5   INR  --  2.09*  --   --   --    AMMONIA  --   --  50*  --   --              Recent Labs     02/24/25  1652   INR 2.09*   APTT 39.1*         IMAGING:  DX-CHEST-PORTABLE (1 VIEW)   Final Result      1. Near complete resolution of the right pleural effusion after right thoracentesis with reexpansion of the right lung.   2. No postprocedure visible pneumothorax.   3. The remainder is stable.      US-THORACENTESIS PUNCTURE RIGHT   Final Result      1. Ultrasound guided right sided diagnostic and therapeutic thoracentesis.      2. 4,025 mL of fluid withdrawn. Discussed with Dr. Dinero, pulmonary medicine, prior to total fluid removal.      DX-CHEST-PORTABLE (1 VIEW)   Final Result      1. Opacification of the right hemithorax with mediastinal shift to the left.   2. Interstitial edema in the left lung.          MEDS:  Current Facility-Administered Medications   Medication Last Admin    spironolactone (Aldactone) tablet 50 mg 50 mg at 02/26/25 1129    lactulose 20 GM/30ML solution 30 mL 30 mL at 02/26/25 0426    furosemide (Lasix) tablet 40 mg 40 mg at 02/25/25 0507    midodrine (Proamatine)  tablet 10 mg 10 mg at 02/26/25 1346    omeprazole (PriLOSEC) capsule 20 mg 20 mg at 02/26/25 0425    phosphorus (K-Phos-Neutral) per tablet 250 mg 250 mg at 02/26/25 0425    potassium chloride SA (Kdur) tablet 10 mEq 10 mEq at 02/26/25 0424       ASSESSMENT/PLAN:    Problem   Tachycardia   Acute hypoxic respiratory failure (HCC) in setting of recurrent Hepatic hydrothorax   Decompensated Cirrhosis (Hcc)   Hyponatremia    Chronic and stable.     Alcohol Use Disorder in Remission         * Acute hypoxic respiratory failure (HCC) in setting of recurrent Hepatic hydrothorax- (present on admission)  Assessment & Plan  Resolved.  Patient hypoxic in the ED requiring 3 L via nasal cannula.  Most likely secondary to fluid buildup in the lungs as chest x-ray revealed opacification of the right hemithorax with left mediastinal shift.  After admission her oxygen requirement decreased slightly and patient has been on 1.5 to 2 L.  Plan:   -Thoracentesis performed removing about 4 L of fluid from patient's lungs.  Follow-up chest x-ray showed resolution of right pleural effusion with reexpansion of the right lung and no evidence of pneumothorax  -Continuous pulse ox  -Patient will likely need regular thoracentesis.  Outpatient orders placed for standing thoracenteses weekly once patient is discharged.    Tachycardia- (present on admission)  Assessment & Plan  Patient's heart rate was 140 at time of admission.  It is improved slightly but continues to be elevated.  Likely secondary to the fluid overload but will continue to monitor.  Resolved after administration of albumin on 2/26 after thoracentesis was completed.    Decompensated cirrhosis (HCC)- (present on admission)  Assessment & Plan  Patient has history of liver cirrhosis secondary to alcohol use disorder.  At time of admission her AST is 96, , total bilirubin is 10.7, alk phos is 235, and ammonia is 50.  Her total bilirubin level has been elevated since December  2024, at that time it was over 20.  It has been consistently between 8 and 13.  Per chart review it appears that patient's last EGD was in May 2024 in which she had 4 bands placed for esophageal varices.  Patient's MELD score is 30.  Plan:   - Continue home Lactulose 30mL BID and Lasix 40mg daily   - Restart spironolactone 50 mg daily  - Continue 1500 cc fluid restriction and low-sodium diet  - Administered 25g of 25% albumin infusion due to thoracentesis of 4 L total fluid and ongoing tachycardia/soft blood pressures  -Patient follows with  with [facility] for [facility]. Called their office, they will plan to reach out to patient to schedule follow up appointment.   - Multiple referrals sent out for urgent GI follow-up. Will provide information for these referrals to patient so that hospital follow-up can be set up.     Hyponatremia- (present on admission)  Assessment & Plan  Patient sodium level in the ED is 125.  Per chart review patient has chronic hyponatremia with levels in the 120s.  Repeat labs shows sodium level stable at 126.  Held off on fluid administration due to fluid retention in the right lung.  Plan:   - Will continue to monitor with daily labs  - Fluid restriction at 1.5L and low sodium diet as patient is currently hypervolemic  - Spironolactone 50 mg daily restarted.  Will recheck BMP in a.m. after initiation of this medication.  If this remains stable/close to patient's baseline, will plan for discharge in a.m.    Alcohol use disorder in remission- (present on admission)  Assessment & Plan  Patient has history of alcohol use disorder, but is not currently using alcohol.  She also denies any tobacco use or recreational drug use.  Will order repeat PETH (phosphatidylethanol) testing.     Hypokalemia- (present on admission)  Assessment & Plan  Patient currently on daily 20 mg potassium chloride as well as K-Phos Neutral tablet 250 mg twice daily.  Plan:  -Continue home regimen  and replete potassium as needed       Core Measures:  Fluids: No fluids  Lines: PIV  Abx: None  Diet: Fluid restriction 1.5L and low sodium diet  PPX: SCDs, holding chemoprophylaxis due to increased risk of bleeding    DISPO: Inpatient for monitoring of BP and sodium levels after restarting spironolactone.      CODE STATUS: Full Code    Darby Estrada M.D.  PGY-2  [facility]

## 2025-02-27 ENCOUNTER — PHARMACY VISIT (OUTPATIENT)
Dept: PHARMACY | Facility: MEDICAL CENTER | Age: 55
End: 2025-02-27
Payer: COMMERCIAL

## 2025-02-27 VITALS
WEIGHT: 140.21 LBS | HEART RATE: 104 BPM | RESPIRATION RATE: 18 BRPM | TEMPERATURE: 98.4 F | BODY MASS INDEX: 23.36 KG/M2 | HEIGHT: 65 IN | OXYGEN SATURATION: 91 % | SYSTOLIC BLOOD PRESSURE: 90 MMHG | DIASTOLIC BLOOD PRESSURE: 68 MMHG

## 2025-02-27 PROBLEM — J96.01 ACUTE HYPOXIC RESPIRATORY FAILURE (HCC): Status: RESOLVED | Noted: 2025-01-31 | Resolved: 2025-02-27

## 2025-02-27 LAB
ALBUMIN SERPL BCP-MCNC: 2.8 G/DL (ref 3.2–4.9)
ALBUMIN/GLOB SERPL: 1.1 G/DL
ALP SERPL-CCNC: 156 U/L (ref 30–99)
ALT SERPL-CCNC: 71 U/L (ref 2–50)
ANION GAP SERPL CALC-SCNC: 12 MMOL/L (ref 7–16)
AST SERPL-CCNC: 53 U/L (ref 12–45)
BILIRUB SERPL-MCNC: 7.6 MG/DL (ref 0.1–1.5)
BUN SERPL-MCNC: 12 MG/DL (ref 8–22)
CALCIUM ALBUM COR SERPL-MCNC: 9.4 MG/DL (ref 8.5–10.5)
CALCIUM SERPL-MCNC: 8.4 MG/DL (ref 8.5–10.5)
CHLORIDE SERPL-SCNC: 90 MMOL/L (ref 96–112)
CO2 SERPL-SCNC: 25 MMOL/L (ref 20–33)
CREAT SERPL-MCNC: 0.72 MG/DL (ref 0.5–1.4)
GFR SERPLBLD CREATININE-BSD FMLA CKD-EPI: 98 ML/MIN/1.73 M 2
GLOBULIN SER CALC-MCNC: 2.5 G/DL (ref 1.9–3.5)
GLUCOSE SERPL-MCNC: 79 MG/DL (ref 65–99)
POTASSIUM SERPL-SCNC: 3.5 MMOL/L (ref 3.6–5.5)
PRELIMINARY RPT Q0601: NORMAL
PROT SERPL-MCNC: 5.3 G/DL (ref 6–8.2)
RHODAMINE-AURAMINE STN SPEC: NORMAL
SODIUM SERPL-SCNC: 127 MMOL/L (ref 135–145)

## 2025-02-27 PROCEDURE — 700102 HCHG RX REV CODE 250 W/ 637 OVERRIDE(OP)

## 2025-02-27 PROCEDURE — G0480 DRUG TEST DEF 1-7 CLASSES: HCPCS

## 2025-02-27 PROCEDURE — RXMED WILLOW AMBULATORY MEDICATION CHARGE

## 2025-02-27 PROCEDURE — A9270 NON-COVERED ITEM OR SERVICE: HCPCS

## 2025-02-27 PROCEDURE — 80053 COMPREHEN METABOLIC PANEL: CPT

## 2025-02-27 RX ORDER — OMEPRAZOLE 20 MG/1
20 CAPSULE, DELAYED RELEASE ORAL DAILY
Qty: 30 CAPSULE | Refills: 2 | Status: SHIPPED | OUTPATIENT
Start: 2025-02-28 | End: 2025-05-29

## 2025-02-27 RX ORDER — POTASSIUM CHLORIDE 1500 MG/1
40 TABLET, EXTENDED RELEASE ORAL ONCE
Status: COMPLETED | OUTPATIENT
Start: 2025-02-27 | End: 2025-02-27

## 2025-02-27 RX ORDER — MIDODRINE HYDROCHLORIDE 10 MG/1
10 TABLET ORAL EVERY 8 HOURS
Qty: 60 TABLET | Refills: 2 | Status: ON HOLD | OUTPATIENT
Start: 2025-02-27 | End: 2025-03-06

## 2025-02-27 RX ORDER — SPIRONOLACTONE 50 MG/1
50 TABLET, FILM COATED ORAL DAILY
Qty: 30 TABLET | Refills: 3 | Status: SHIPPED | OUTPATIENT
Start: 2025-02-28

## 2025-02-27 RX ORDER — FUROSEMIDE 40 MG/1
40 TABLET ORAL EVERY MORNING
Qty: 30 TABLET | Refills: 2 | Status: ON HOLD | OUTPATIENT
Start: 2025-02-27 | End: 2025-03-06

## 2025-02-27 RX ORDER — LACTULOSE 10 G/15ML
20 SOLUTION ORAL 4 TIMES DAILY
Qty: 3600 ML | Refills: 6 | Status: SHIPPED | OUTPATIENT
Start: 2025-02-27 | End: 2025-09-25

## 2025-02-27 RX ORDER — POTASSIUM CHLORIDE 1500 MG/1
40 TABLET, EXTENDED RELEASE ORAL DAILY
Qty: 180 TABLET | Refills: 0 | Status: SHIPPED | OUTPATIENT
Start: 2025-02-27 | End: 2025-03-07

## 2025-02-27 RX ADMIN — DIBASIC SODIUM PHOSPHATE, MONOBASIC POTASSIUM PHOSPHATE AND MONOBASIC SODIUM PHOSPHATE 250 MG: 852; 155; 130 TABLET ORAL at 04:37

## 2025-02-27 RX ADMIN — POTASSIUM CHLORIDE 10 MEQ: 1500 TABLET, EXTENDED RELEASE ORAL at 04:37

## 2025-02-27 RX ADMIN — OMEPRAZOLE 20 MG: 20 CAPSULE, DELAYED RELEASE ORAL at 04:37

## 2025-02-27 RX ADMIN — MIDODRINE HYDROCHLORIDE 10 MG: 5 TABLET ORAL at 04:37

## 2025-02-27 RX ADMIN — POTASSIUM CHLORIDE 40 MEQ: 1500 TABLET, EXTENDED RELEASE ORAL at 08:20

## 2025-02-27 RX ADMIN — FUROSEMIDE 40 MG: 40 TABLET ORAL at 04:37

## 2025-02-27 ASSESSMENT — PAIN DESCRIPTION - PAIN TYPE: TYPE: ACUTE PAIN

## 2025-02-27 ASSESSMENT — FIBROSIS 4 INDEX: FIB4 SCORE: 5.85

## 2025-02-27 NOTE — PROGRESS NOTES
Stacy Oliva has been provided discharge instructions, to include follow up care, home medications, and activity/diet reviewed. Copy of discharge instructions in patient chart, signed and reviewed. Patient verbalizes the understanding of the discharge instructions. PIV removed, tip intact, pressure dressing applied. Arm band removed. Patient did not have home meds during admit. Meds to Beds given. Questions and concerns addressed prior to leaving the discharge lounge. Transported via car, accompanied by family. Patient discharge to home.

## 2025-02-27 NOTE — CARE PLAN
The patient is Stable - Low risk of patient condition declining or worsening    Shift Goals  Clinical Goals: Hemodynamic stability  Patient Goals: Sleep  Family Goals: updates    Progress made toward(s) clinical / shift goals:    Problem: Respiratory  Goal: Patient will achieve/maintain optimum respiratory ventilation and gas exchange  Description: Target End Date:  Prior to discharge or change in level of care    Document on Assessment flowsheet    1.  Assess and monitor rate, rhythm, depth and effort of respiration  2.  Breath sounds assessed qshift and/or as needed  3.  Assess O2 saturation, administer/titrate oxygen as ordered  4.  Position patient for maximum ventilatory efficiency  5.  Turn, cough, and deep breath with splinting to improve effectiveness  6.  Collaborate with RT to administer medication/treatments per order  7.  Encourage use of incentive spirometer and encourage patient to cough after use and utilize splinting techniques if applicable  8.  Airway suctioning  9.  Monitor sputum production for changes in color, consistency and frequency  10. Perform frequent oral hygiene  11. Alternate physical activity with rest periods  Outcome: Progressing     Problem: Hemodynamics  Goal: Patient's hemodynamics, fluid balance and neurologic status will be stable or improve  Description: Target End Date:  Prior to discharge or change in level of care    Document on Assessment and I/O flowsheet templates    1.  Monitor vital signs, pulse oximetry and cardiac monitor per provider order and/or policy  2.  Maintain blood pressure per provider order  3.  Hemodynamic monitoring per provider order  4.  Manage IV fluids and IV infusions  5.  Monitor intake and output  6.  Daily weights per unit policy or provider order  7.  Assess peripheral pulses and capillary refill  8.  Assess color and body temperature  9.  Position patient for maximum circulation/cardiac output  10. Monitor for signs/symptoms of excessive  bleeding  11. Assess mental status, restlessness and changes in level of consciousness  12. Monitor temperature and report fever or hypothermia to provider immediately. Consideration of targeted temperature management.  Outcome: Progressing     Problem: Fall Risk  Goal: Patient will remain free from falls  Description: Target End Date:  Prior to discharge or change in level of care    Document interventions on the Nidhi Sanchez Fall Risk Assessment    1.  Assess for fall risk factors  2.  Implement fall precautions  Outcome: Progressing       Patient is not progressing towards the following goals:

## 2025-02-27 NOTE — DISCHARGE INSTR - CASE MGT
Please call [facility] at [phone] (extension [phone]) to schedule weekly thoracenteses.

## 2025-02-27 NOTE — DISCHARGE SUMMARY
[facility] Discharge Summary    Attending: Roxanne Strong M.d.  Senior Resident: Dr. Darby Estrada   Intern:  Dr. Kamini Damon  Contact Number: [phone]    CHIEF COMPLAINT ON ADMISSION  Chief Complaint   Patient presents with    Palpitations       Reason for Admission  Sent by MD     Admission Date  2/24/2025    CODE STATUS  Full Code    HPI & HOSPITAL COURSE  This is a 55 y.o. female with PMHx alcohol use disorder and liver cirrhosis here with hypoxia secondary with opacification of the right hemithorax found on chest x-ray.     HPI:  Stacy Oliva is a 55 y.o. female history of alcohol cirrhosis, with several hospitalizations recurrent hepatic hydrothorax is brought to the ED by her sister after she began experiencing shortness of breath at home. Patient denies use of home oxygen. During the interview, patient responded that she was very tired and had limited response to questions.     ERCourse:  In the ED, patient was tachycardic (HR: 140), and was placed on 3L supplemental oxygen via NC. CBC showed WBC wnl, thrombocytopenia (plt 88). CMP notable for hyponatremia (Na: 125, chronic), elevated LFTs (AST/ALT/Alk phos/total bilirubin of 96/135/235/10), ammonia (50), PT/PTT. Lipase (68), wnl. CXR showed opacification of right hemithorax with mediastinal shift to the left and interstitial edema in the left lung.  ERP noted that bedside abdominal ultrasound was performed, however, noted minimal ascites. ERP was concerned that patient has had several hospitalizations, however, GI as not being consulted.    Patient was admitted to telemetry floor for monitoring due to persistent tachycardia. Underwent right US guided thoracentesis on 2/25/25 with drainage of 4L fluid from right hemithorax. Had significant improvement in shortness of breath after this procedure. Continue to be tachycardic with soft blood pressures noted. On 2/26, was give 25g 25% albumin infusion and restarted on spironolactone 50mg daily for  diuresis to decrease rate of recurrence of hepatic hydrothorax. Monitored overnight for stable vital signs and repeat BMP in AM. Patient remained stable overnight and had improvement in serum sodium to 127 compared to 124 on 2/26. Determined to be medically stable for discharge with standing orders for IR therapeutic thoracenteses weekly as well as instructions for scheduling close follow-up with GI. PETH testing ordered, not yet resulted.     Therefore, she is discharged in good and stable condition to home with close outpatient follow-up.    The patient met 2-midnight criteria for an inpatient stay at the time of discharge.    Discharge Date  2/27/25    Physical Exam on Day of Discharge  Physical Exam  Constitutional:       General: She is not in acute distress.     Appearance: Normal appearance. She is not toxic-appearing.   HENT:      Head: Normocephalic and atraumatic.      Mouth/Throat:      Mouth: Mucous membranes are moist.      Pharynx: Oropharynx is clear. No oropharyngeal exudate or posterior oropharyngeal erythema.   Eyes:      General: Scleral icterus present.      Extraocular Movements: Extraocular movements intact.      Pupils: Pupils are equal, round, and reactive to light.   Cardiovascular:      Rate and Rhythm: Regular rhythm. Tachycardia present.      Pulses: Normal pulses.      Heart sounds: Normal heart sounds.   Pulmonary:      Effort: Pulmonary effort is normal. No respiratory distress.      Breath sounds: Normal breath sounds. No wheezing.   Abdominal:      General: Bowel sounds are normal. There is no distension.      Palpations: Abdomen is soft.      Tenderness: There is no abdominal tenderness. There is no guarding.   Skin:     General: Skin is warm and dry.      Coloration: Skin is jaundiced.   Neurological:      General: No focal deficit present.      Mental Status: She is alert and oriented to person, place, and time.       FOLLOW UP ITEMS POST DISCHARGE  - PETH testing pending, follow  up as outpatient  - Standing orders for outpatient therapeutic thoracenteses ordered, currently scheduled as weekly  - Patient has multiple referrals to GI, will plan to schedule close follow up. [facility] in mid-March.   - Follow up with PCP within 1 week of discharge    DISCHARGE DIAGNOSES  Principal Problem (Resolved):    Acute hypoxic respiratory failure (HCC) in setting of recurrent Hepatic hydrothorax (POA: Yes)  Active Problems:    Hypokalemia (POA: Yes)    Alcohol use disorder in remission (POA: Yes)    Hyponatremia (POA: Yes)      Overview: Chronic and stable.    Decompensated cirrhosis (HCC) (Chronic) (POA: Yes)    Tachycardia (POA: Yes)      FOLLOW UP  Future Appointments   Date Time Provider Department Center   3/14/2025 11:00 AM Michelle Vanessa M.D. [facility] Velma Vanessa M.D.  [address]  [facility] [address]  [phone]    Follow up in 1 week(s)      Artur Maravilla M.D.  [address] [address] [id number] [phone]    Follow up in 1 week(s)        MEDICATIONS ON DISCHARGE     Medication List        START taking these medications        Instructions   spironolactone 50 MG Tabs  Start taking on: February 28, 2025  Commonly known as: Aldactone   Take 1 Tablet by mouth every day.  Dose: 50 mg            CHANGE how you take these medications        Instructions   lactulose 10 g/15mL Soln  What changed: when to take this   Take 30 mL by mouth 4 times a day for 210 days.  Dose: 20 g     potassium chloride SA 20 MEQ Tbcr  What changed:   how much to take  when to take this  Commonly known as: Kdur   Take 2 Tablets by mouth every day for 90 days.  Dose: 40 mEq            CONTINUE taking these medications        Instructions   furosemide 40 MG Tabs  Commonly known as: Lasix   Take 1 Tablet by mouth every morning.  Dose: 40 mg     midodrine 10 MG tablet  Commonly known as: Proamatine   Take 1 Tablet by mouth every 8  hours.  Dose: 10 mg     omeprazole 20 MG delayed-release capsule  Start taking on: February 28, 2025  Commonly known as: PriLOSEC   Take 1 Capsule by mouth every day for 90 days.  Dose: 20 mg     phosphorus 250 MG tablet  Commonly known as: K-Phos-Neutral   Take 1 Tablet by mouth 2 times a day.  Dose: 1 Tablet              Allergies  No Known Allergies    DIET  Orders Placed This Encounter   Procedures    Diet Order Diet: 2 Gram Sodium; Fluid modifications: (optional): 1500 ml Fluid Restriction     Standing Status:   Standing     Number of Occurrences:   1     Diet::   2 Gram Sodium [7]     Fluid modifications: (optional):   1500 ml Fluid Restriction [9]     ACTIVITY  As tolerated.  Weight bearing as tolerated    CONSULTATIONS  N/A    PROCEDURES  Right side US guided thoracentesis 2/25/25    LABORATORY  Lab Results   Component Value Date    SODIUM 127 (L) 02/27/2025    POTASSIUM 3.5 (L) 02/27/2025    CHLORIDE 90 (L) 02/27/2025    CO2 25 02/27/2025    GLUCOSE 79 02/27/2025    BUN 12 02/27/2025    CREATININE 0.72 02/27/2025        Lab Results   Component Value Date    WBC 9.7 02/26/2025    HEMOGLOBIN 14.0 02/26/2025    HEMATOCRIT 38.9 02/26/2025    PLATELETCT 77 (L) 02/26/2025        Total time of the discharge process exceeds 30 minutes.    Darby Estrada M.D.  PGY-2  [facility]

## 2025-02-27 NOTE — PROGRESS NOTES
Monitor Summary  Rhythm: Normal Sinus Rhythm and Sinus Tachycardia  Rate: 96 - 107  Ectopy: None Noted  .16 / .08 / .32

## 2025-02-27 NOTE — DISCHARGE PLANNING
Case Management Discharge Planning    Admission Date: 2/24/2025  GMLOS: 3.3  ALOS: 3    6-Clicks ADL Score: 22  6-Clicks Mobility Score: 21      Anticipated Discharge Dispo: Discharge Disposition: Discharged to home/self care (01)  Discharge Address: [place] [address]  Discharge Contact Phone Number: [phone]    DME Needed: No    Action(s) Taken: LMSW spoke with family on concerns. Pt is requesting HH services. Pt plans to go back to [place]. LMSW received choice for [facility]. LMSW notified that insurance may be a barrier and will asked MD for outpatient referrals for PT/OT. Pt is agreeable. LMSW reached out to MD for orders and referrals.

## 2025-02-27 NOTE — DISCHARGE PLANNING
-1417  DPA sent referral to [facility], per choice form.     -1514  DPA informed by [facility] that agency will not be able to accept pt on service Pt did not make it to her previous PCP appointment to establish care. If pt makes her upcoming PCP appointment then agency can attempt to auth  services with insurance, but that will need to be setup with PCP.

## 2025-02-27 NOTE — PROGRESS NOTES
Bedside report received from off going RN/tech: Annette, assumed care of patient.     Fall Risk Score: LOW RISK  Fall risk interventions in place: Place yellow fall risk ID band on patient, Provide patient/family education based on risk assessment, Educate patient/family to call staff for assistance when getting out of bed, and Place fall precaution signage outside patient door  Bed type: Regular (Kai Score less than 17 interventions in place)  Patient on cardiac monitor: Yes  IVF/IV medications: Not Applicable   Oxygen: Room Air  Bedside sitter: Not Applicable   Isolation: Not applicable

## 2025-02-27 NOTE — PROGRESS NOTES
Bedside report received from off going RN/tech: GUS Angeles, assumed care of patient.     Fall Risk Score: LOW RISK  Fall risk interventions in place: Provide patient/family education based on risk assessment, Educate patient/family to call staff for assistance when getting out of bed, and Place fall precaution signage outside patient door  Bed type: Regular (Kai Score less than 17 interventions in place)  Patient on cardiac monitor: Yes  IVF/IV medications: Not Applicable   Oxygen: Room Air  Bedside sitter: Not Applicable   Isolation: Not applicable

## 2025-02-27 NOTE — FACE TO FACE
Face to Face Supporting Documentation - Home Health    The encounter with this patient was in whole or in part the primary reason for home health admission.    Date of encounter:   Patient:                    MRN:                       YOB: 2026  Stacy Oliva  [id number]  1970     Home health to see patient for:  Physical Therapy evaluation and treatment and Occupational therapy evaluation and treatment    Skilled need for:  Exacerbation of Chronic Disease State Advanced liver cirrhosis with recurrent hepatic hydrothorax    Skilled nursing interventions to include:  Comment: N/A    Homebound status evidenced by:  Needs the assistance of another person in order to leave the home. Leaving home requires a considerable and taxing effort. There is a normal inability to leave the home.    Community Physician to provide follow up care: Michelle Vanessa M.D.     Optional Interventions? No    I certify the face to face encounter for this home health care referral meets the CMS requirements and the encounter/clinical assessment with the patient was, in whole, or in part, for the medical condition(s) listed above, which is the primary reason for home health care. Based on my clinical findings: the service(s) are medically necessary, support the need for home health care, and the homebound criteria are met.  I certify that this patient has had a face to face encounter by myself.  Darby Estrada M.D. - NPI: [id number]

## 2025-02-28 ENCOUNTER — HOSPITAL ENCOUNTER (INPATIENT)
Facility: MEDICAL CENTER | Age: 55
LOS: 5 days | DRG: 432 | End: 2025-03-06
Attending: EMERGENCY MEDICINE | Admitting: HOSPITALIST
Payer: COMMERCIAL

## 2025-02-28 ENCOUNTER — APPOINTMENT (OUTPATIENT)
Dept: RADIOLOGY | Facility: MEDICAL CENTER | Age: 55
DRG: 432 | End: 2025-02-28
Attending: EMERGENCY MEDICINE
Payer: COMMERCIAL

## 2025-02-28 ENCOUNTER — PATIENT OUTREACH (OUTPATIENT)
Dept: HEALTH INFORMATION MANAGEMENT | Facility: OTHER | Age: 55
End: 2025-02-28
Payer: COMMERCIAL

## 2025-02-28 DIAGNOSIS — K74.69 DECOMPENSATED LIVER DISEASE (HCC): ICD-10-CM

## 2025-02-28 DIAGNOSIS — R74.01 TRANSAMINITIS: ICD-10-CM

## 2025-02-28 DIAGNOSIS — E87.1 HYPONATREMIA: ICD-10-CM

## 2025-02-28 DIAGNOSIS — E80.6 HYPERBILIRUBINEMIA: ICD-10-CM

## 2025-02-28 DIAGNOSIS — J98.2 PNEUMOMEDIASTINUM (HCC): ICD-10-CM

## 2025-02-28 DIAGNOSIS — J18.9 PNEUMONIA OF LEFT LUNG DUE TO INFECTIOUS ORGANISM, UNSPECIFIED PART OF LUNG: ICD-10-CM

## 2025-02-28 DIAGNOSIS — K74.60 DECOMPENSATED CIRRHOSIS (HCC): Primary | Chronic | ICD-10-CM

## 2025-02-28 DIAGNOSIS — K72.90 DECOMPENSATED CIRRHOSIS (HCC): Primary | Chronic | ICD-10-CM

## 2025-02-28 DIAGNOSIS — I10 HYPERTENSION, UNSPECIFIED TYPE: ICD-10-CM

## 2025-02-28 DIAGNOSIS — Z99.89 BIPAP (BIPHASIC POSITIVE AIRWAY PRESSURE) DEPENDENCE: ICD-10-CM

## 2025-02-28 DIAGNOSIS — R00.0 TACHYCARDIA: ICD-10-CM

## 2025-02-28 DIAGNOSIS — J90 PLEURAL EFFUSION: ICD-10-CM

## 2025-02-28 DIAGNOSIS — R06.03 RESPIRATORY DISTRESS: ICD-10-CM

## 2025-02-28 DIAGNOSIS — J18.9 PNEUMONIA DUE TO INFECTIOUS ORGANISM, UNSPECIFIED LATERALITY, UNSPECIFIED PART OF LUNG: ICD-10-CM

## 2025-02-28 DIAGNOSIS — D72.829 LEUKOCYTOSIS, UNSPECIFIED TYPE: ICD-10-CM

## 2025-02-28 LAB
ALBUMIN SERPL BCP-MCNC: 3 G/DL (ref 3.2–4.9)
ALBUMIN/GLOB SERPL: 1 G/DL
ALP SERPL-CCNC: 183 U/L (ref 30–99)
ALT SERPL-CCNC: 71 U/L (ref 2–50)
ANION GAP SERPL CALC-SCNC: 13 MMOL/L (ref 7–16)
AST SERPL-CCNC: 72 U/L (ref 12–45)
BACTERIA FLD AEROBE CULT: NORMAL
BACTERIA UR CULT: NORMAL
BASE EXCESS BLDV CALC-SCNC: -2 MMOL/L (ref -2–3)
BASOPHILS # BLD AUTO: 0.2 % (ref 0–1.8)
BASOPHILS # BLD: 0.04 K/UL (ref 0–0.12)
BILIRUB SERPL-MCNC: 7.2 MG/DL (ref 0.1–1.5)
BODY TEMPERATURE: 36.3 CENTIGRADE
BUN SERPL-MCNC: 9 MG/DL (ref 8–22)
CALCIUM ALBUM COR SERPL-MCNC: 9 MG/DL (ref 8.5–10.5)
CALCIUM SERPL-MCNC: 8.2 MG/DL (ref 8.5–10.5)
CHLORIDE SERPL-SCNC: 90 MMOL/L (ref 96–112)
CO2 SERPL-SCNC: 21 MMOL/L (ref 20–33)
CREAT SERPL-MCNC: 0.66 MG/DL (ref 0.5–1.4)
EKG IMPRESSION: NORMAL
EOSINOPHIL # BLD AUTO: 0.05 K/UL (ref 0–0.51)
EOSINOPHIL NFR BLD: 0.3 % (ref 0–6.9)
ERYTHROCYTE [DISTWIDTH] IN BLOOD BY AUTOMATED COUNT: 59.8 FL (ref 35.9–50)
FLUAV RNA SPEC QL NAA+PROBE: NEGATIVE
FLUBV RNA SPEC QL NAA+PROBE: NEGATIVE
GFR SERPLBLD CREATININE-BSD FMLA CKD-EPI: 103 ML/MIN/1.73 M 2
GLOBULIN SER CALC-MCNC: 2.9 G/DL (ref 1.9–3.5)
GLUCOSE SERPL-MCNC: 124 MG/DL (ref 65–99)
GRAM STN SPEC: NORMAL
HCO3 BLDV-SCNC: 21 MMOL/L (ref 22–29)
HCT VFR BLD AUTO: 39.6 % (ref 37–47)
HGB BLD-MCNC: 13.9 G/DL (ref 12–16)
IMM GRANULOCYTES # BLD AUTO: 0.11 K/UL (ref 0–0.11)
IMM GRANULOCYTES NFR BLD AUTO: 0.7 % (ref 0–0.9)
LACTATE SERPL-SCNC: 3.2 MMOL/L (ref 0.5–2)
LYMPHOCYTES # BLD AUTO: 1.74 K/UL (ref 1–4.8)
LYMPHOCYTES NFR BLD: 10.5 % (ref 22–41)
MCH RBC QN AUTO: 37.9 PG (ref 27–33)
MCHC RBC AUTO-ENTMCNC: 35.1 G/DL (ref 32.2–35.5)
MCV RBC AUTO: 107.9 FL (ref 81.4–97.8)
MONOCYTES # BLD AUTO: 1.03 K/UL (ref 0–0.85)
MONOCYTES NFR BLD AUTO: 6.2 % (ref 0–13.4)
NEUTROPHILS # BLD AUTO: 13.59 K/UL (ref 1.82–7.42)
NEUTROPHILS NFR BLD: 82.1 % (ref 44–72)
NRBC # BLD AUTO: 0.02 K/UL
NRBC BLD-RTO: 0.1 /100 WBC (ref 0–0.2)
NT-PROBNP SERPL IA-MCNC: 327 PG/ML (ref 0–125)
PCO2 BLDV: 29.5 MMHG (ref 38–54)
PCO2 TEMP ADJ BLDV: 28.6 MMHG (ref 38–54)
PH BLDV: 7.46 [PH] (ref 7.31–7.45)
PH TEMP ADJ BLDV: 7.47 [PH] (ref 7.31–7.45)
PLATELET # BLD AUTO: 89 K/UL (ref 164–446)
PLATELETS.RETICULATED NFR BLD AUTO: 4 % (ref 0.6–13.1)
PMV BLD AUTO: 10 FL (ref 9–12.9)
PO2 BLDV: 52.2 MMHG (ref 23–48)
PO2 TEMP ADJ BLDV: 49.7 MMHG (ref 23–48)
POTASSIUM SERPL-SCNC: 3.6 MMOL/L (ref 3.6–5.5)
PROT SERPL-MCNC: 5.9 G/DL (ref 6–8.2)
RBC # BLD AUTO: 3.67 M/UL (ref 4.2–5.4)
RSV RNA SPEC QL NAA+PROBE: NEGATIVE
SAO2 % BLDV: 82.5 % (ref 60–85)
SARS-COV-2 RNA RESP QL NAA+PROBE: NOTDETECTED
SIGNIFICANT IND 70042: NORMAL
SIGNIFICANT IND 70042: NORMAL
SITE SITE: NORMAL
SITE SITE: NORMAL
SODIUM SERPL-SCNC: 124 MMOL/L (ref 135–145)
SOURCE SOURCE: NORMAL
SOURCE SOURCE: NORMAL
TROPONIN T SERPL-MCNC: 19 NG/L (ref 6–19)
WBC # BLD AUTO: 16.6 K/UL (ref 4.8–10.8)

## 2025-02-28 PROCEDURE — 71045 X-RAY EXAM CHEST 1 VIEW: CPT

## 2025-02-28 PROCEDURE — 81001 URINALYSIS AUTO W/SCOPE: CPT

## 2025-02-28 PROCEDURE — 83880 ASSAY OF NATRIURETIC PEPTIDE: CPT

## 2025-02-28 PROCEDURE — 93005 ELECTROCARDIOGRAM TRACING: CPT | Mod: TC

## 2025-02-28 PROCEDURE — 85610 PROTHROMBIN TIME: CPT

## 2025-02-28 PROCEDURE — 82150 ASSAY OF AMYLASE: CPT

## 2025-02-28 PROCEDURE — 36415 COLL VENOUS BLD VENIPUNCTURE: CPT

## 2025-02-28 PROCEDURE — 0241U HCHG SARS-COV-2 COVID-19 NFCT DS RESP RNA 4 TRGT ED POC: CPT

## 2025-02-28 PROCEDURE — 88112 CYTOPATH CELL ENHANCE TECH: CPT | Mod: 26 | Performed by: PATHOLOGY

## 2025-02-28 PROCEDURE — 87205 SMEAR GRAM STAIN: CPT

## 2025-02-28 PROCEDURE — 94660 CPAP INITIATION&MGMT: CPT

## 2025-02-28 PROCEDURE — 87070 CULTURE OTHR SPECIMN AEROBIC: CPT

## 2025-02-28 PROCEDURE — 93005 ELECTROCARDIOGRAM TRACING: CPT | Mod: TC | Performed by: EMERGENCY MEDICINE

## 2025-02-28 PROCEDURE — 96365 THER/PROPH/DIAG IV INF INIT: CPT

## 2025-02-28 PROCEDURE — P9047 ALBUMIN (HUMAN), 25%, 50ML: HCPCS | Mod: JZ | Performed by: EMERGENCY MEDICINE

## 2025-02-28 PROCEDURE — 85055 RETICULATED PLATELET ASSAY: CPT

## 2025-02-28 PROCEDURE — 89051 BODY FLUID CELL COUNT: CPT

## 2025-02-28 PROCEDURE — 83615 LACTATE (LD) (LDH) ENZYME: CPT

## 2025-02-28 PROCEDURE — 84484 ASSAY OF TROPONIN QUANT: CPT

## 2025-02-28 PROCEDURE — 87086 URINE CULTURE/COLONY COUNT: CPT

## 2025-02-28 PROCEDURE — 0W993ZZ DRAINAGE OF RIGHT PLEURAL CAVITY, PERCUTANEOUS APPROACH: ICD-10-PCS | Performed by: EMERGENCY MEDICINE

## 2025-02-28 PROCEDURE — 88112 CYTOPATH CELL ENHANCE TECH: CPT | Performed by: PATHOLOGY

## 2025-02-28 PROCEDURE — 85025 COMPLETE CBC W/AUTO DIFF WBC: CPT

## 2025-02-28 PROCEDURE — 87116 MYCOBACTERIA CULTURE: CPT

## 2025-02-28 PROCEDURE — 700111 HCHG RX REV CODE 636 W/ 250 OVERRIDE (IP): Mod: JZ | Performed by: EMERGENCY MEDICINE

## 2025-02-28 PROCEDURE — 87102 FUNGUS ISOLATION CULTURE: CPT

## 2025-02-28 PROCEDURE — 82945 GLUCOSE OTHER FLUID: CPT

## 2025-02-28 PROCEDURE — 83986 ASSAY PH BODY FLUID NOS: CPT

## 2025-02-28 PROCEDURE — 83605 ASSAY OF LACTIC ACID: CPT

## 2025-02-28 PROCEDURE — 80053 COMPREHEN METABOLIC PANEL: CPT

## 2025-02-28 PROCEDURE — 32554 ASPIRATE PLEURA W/O IMAGING: CPT

## 2025-02-28 PROCEDURE — 88305 TISSUE EXAM BY PATHOLOGIST: CPT | Performed by: PATHOLOGY

## 2025-02-28 PROCEDURE — 99291 CRITICAL CARE FIRST HOUR: CPT

## 2025-02-28 PROCEDURE — 82803 BLOOD GASES ANY COMBINATION: CPT

## 2025-02-28 PROCEDURE — 87040 BLOOD CULTURE FOR BACTERIA: CPT | Mod: 91

## 2025-02-28 PROCEDURE — 88305 TISSUE EXAM BY PATHOLOGIST: CPT | Mod: 26 | Performed by: PATHOLOGY

## 2025-02-28 PROCEDURE — 84157 ASSAY OF PROTEIN OTHER: CPT

## 2025-02-28 RX ORDER — ALBUMIN (HUMAN) 12.5 G/50ML
25 SOLUTION INTRAVENOUS ONCE
Status: COMPLETED | OUTPATIENT
Start: 2025-02-28 | End: 2025-02-28

## 2025-02-28 RX ADMIN — ALBUMIN (HUMAN) 25 G: 0.25 INJECTION, SOLUTION INTRAVENOUS at 22:24

## 2025-02-28 ASSESSMENT — PULMONARY FUNCTION TESTS: EPAP_CMH2O: 8

## 2025-02-28 ASSESSMENT — FIBROSIS 4 INDEX: FIB4 SCORE: 4.57

## 2025-02-28 NOTE — PROGRESS NOTES
Community Health Worker Intake    Contact information provided to Stacy Oliva   Has PCP appointment scheduled for 3/3/25  Outpatient assessment completed.  Did the patient receive medications post discharge: Yes    CHW Danica took a call from the patient's sister Madeline as she is experiencing barriers with getting an PCP appointment and getting acceptance from [facility] .   CHW scheduled the patient a hospital follow up on 3/3/25 with Vishnu Pizano M.D. [facility]. Pt is worried about [facility] being contracted with [employer]. CHW checked the contracted payors list. [employer] is contracted. CHW also called [facility] clinic to double verify. Clinic verified that [employer] is accepted.   Pt states that it is not a Medicare advantage plan.   CHW provided the phone number to the [facility] PAR: [phone] in case they want to triple check.     Advised pt and family that they can ask about a new referral to Home Health at the appointment.     Pt does not have any other barriers.     Plan: Pt will call if needed.

## 2025-03-01 ENCOUNTER — APPOINTMENT (OUTPATIENT)
Dept: RADIOLOGY | Facility: MEDICAL CENTER | Age: 55
DRG: 432 | End: 2025-03-01
Attending: HOSPITALIST
Payer: COMMERCIAL

## 2025-03-01 PROBLEM — J98.2 PNEUMOMEDIASTINUM (HCC): Status: ACTIVE | Noted: 2025-03-01

## 2025-03-01 LAB
AMYLASE FLD-CCNC: 27 U/L
APPEARANCE FLD: CLEAR
APPEARANCE UR: ABNORMAL
BACTERIA #/AREA URNS HPF: ABNORMAL /HPF
BILIRUB UR QL STRIP.AUTO: ABNORMAL
BODY FLD TYPE: NORMAL
CA OXALATE CRYSTAL  1765: PRESENT /HPF
CASTS URNS QL MICRO: >20 /LPF (ref 0–2)
CELLS FLD: 8
COLOR FLD: YELLOW
COLOR UR: ABNORMAL
CYTOLOGY REG CYTOL: NORMAL
EPITHELIAL CELLS 1715: ABNORMAL /HPF (ref 0–5)
FUNGUS SPEC FUNGUS STN: NORMAL
GLUCOSE FLD-MCNC: 123 MG/DL
GLUCOSE UR STRIP.AUTO-MCNC: NEGATIVE MG/DL
GRAM STN SPEC: NORMAL
GRANULAR CASTS  1716G: PRESENT /LPF
HYALINE CAST   1831: PRESENT /LPF
INR PPP: 2.06 (ref 0.87–1.13)
KETONES UR STRIP.AUTO-MCNC: NEGATIVE MG/DL
LABORATORY REPORT: NORMAL
LACTATE SERPL-SCNC: 2.2 MMOL/L (ref 0.5–2)
LACTATE SERPL-SCNC: 2.6 MMOL/L (ref 0.5–2)
LDH FLD L TO P-CCNC: 74 U/L
LEUKOCYTE ESTERASE UR QL STRIP.AUTO: ABNORMAL
LYMPHOCYTES NFR FLD: 26 %
MICRO URNS: ABNORMAL
MONOS+MACROS NFR FLD MANUAL: 15 %
NEUTROPHILS NFR FLD: 51 %
NITRITE UR QL STRIP.AUTO: POSITIVE
NUC CELL # FLD: 98 CELLS/UL
PETH INTERPRETATION NL11780: NORMAL
PH FLD: 8 [PH]
PH UR STRIP.AUTO: 5 [PH] (ref 5–8)
PLPETH BLD-MCNC: <10 NG/ML
POPETH BLD-MCNC: <10 NG/ML
PROT FLD-MCNC: 0.5 G/DL
PROT UR QL STRIP: NEGATIVE MG/DL
PROTHROMBIN TIME: 23.4 SEC (ref 12–14.6)
RBC # FLD: <2000 CELLS/UL
RBC # URNS HPF: ABNORMAL /HPF (ref 0–2)
RBC UR QL AUTO: NEGATIVE
SIGNIFICANT IND 70042: NORMAL
SIGNIFICANT IND 70042: NORMAL
SITE SITE: NORMAL
SITE SITE: NORMAL
SOURCE SOURCE: NORMAL
SOURCE SOURCE: NORMAL
SP GR UR STRIP.AUTO: 1.02
UROBILINOGEN UR STRIP.AUTO-MCNC: 1 EU/DL
WBC #/AREA URNS HPF: ABNORMAL /HPF

## 2025-03-01 PROCEDURE — 700111 HCHG RX REV CODE 636 W/ 250 OVERRIDE (IP): Mod: JZ | Performed by: HOSPITALIST

## 2025-03-01 PROCEDURE — 700117 HCHG RX CONTRAST REV CODE 255: Performed by: EMERGENCY MEDICINE

## 2025-03-01 PROCEDURE — 700105 HCHG RX REV CODE 258: Performed by: HOSPITALIST

## 2025-03-01 PROCEDURE — A9270 NON-COVERED ITEM OR SERVICE: HCPCS | Performed by: HOSPITALIST

## 2025-03-01 PROCEDURE — 97602 WOUND(S) CARE NON-SELECTIVE: CPT

## 2025-03-01 PROCEDURE — 99223 1ST HOSP IP/OBS HIGH 75: CPT | Performed by: HOSPITALIST

## 2025-03-01 PROCEDURE — 700102 HCHG RX REV CODE 250 W/ 637 OVERRIDE(OP): Performed by: HOSPITALIST

## 2025-03-01 PROCEDURE — 71045 X-RAY EXAM CHEST 1 VIEW: CPT

## 2025-03-01 PROCEDURE — 700105 HCHG RX REV CODE 258: Performed by: EMERGENCY MEDICINE

## 2025-03-01 PROCEDURE — 770000 HCHG ROOM/CARE - INTERMEDIATE ICU *

## 2025-03-01 PROCEDURE — P9047 ALBUMIN (HUMAN), 25%, 50ML: HCPCS | Mod: JZ | Performed by: HOSPITALIST

## 2025-03-01 PROCEDURE — 71260 CT THORAX DX C+: CPT

## 2025-03-01 PROCEDURE — 96367 TX/PROPH/DG ADDL SEQ IV INF: CPT

## 2025-03-01 PROCEDURE — 83605 ASSAY OF LACTIC ACID: CPT | Mod: 91

## 2025-03-01 PROCEDURE — 700111 HCHG RX REV CODE 636 W/ 250 OVERRIDE (IP): Mod: JZ | Performed by: EMERGENCY MEDICINE

## 2025-03-01 RX ORDER — MIDODRINE HYDROCHLORIDE 5 MG/1
10 TABLET ORAL EVERY 8 HOURS
Status: DISCONTINUED | OUTPATIENT
Start: 2025-03-01 | End: 2025-03-01

## 2025-03-01 RX ORDER — PROMETHAZINE HYDROCHLORIDE 25 MG/1
12.5-25 SUPPOSITORY RECTAL EVERY 4 HOURS PRN
Status: DISCONTINUED | OUTPATIENT
Start: 2025-03-01 | End: 2025-03-06 | Stop reason: HOSPADM

## 2025-03-01 RX ORDER — MIDODRINE HYDROCHLORIDE 5 MG/1
10 TABLET ORAL EVERY 8 HOURS
Status: DISCONTINUED | OUTPATIENT
Start: 2025-03-01 | End: 2025-03-06

## 2025-03-01 RX ORDER — ONDANSETRON 2 MG/ML
4 INJECTION INTRAMUSCULAR; INTRAVENOUS EVERY 4 HOURS PRN
Status: DISCONTINUED | OUTPATIENT
Start: 2025-03-01 | End: 2025-03-01

## 2025-03-01 RX ORDER — SPIRONOLACTONE 50 MG/1
50 TABLET, FILM COATED ORAL DAILY
Status: DISCONTINUED | OUTPATIENT
Start: 2025-03-01 | End: 2025-03-06 | Stop reason: HOSPADM

## 2025-03-01 RX ORDER — ALBUMIN (HUMAN) 12.5 G/50ML
25 SOLUTION INTRAVENOUS ONCE
Status: COMPLETED | OUTPATIENT
Start: 2025-03-01 | End: 2025-03-01

## 2025-03-01 RX ORDER — PROCHLORPERAZINE EDISYLATE 5 MG/ML
5-10 INJECTION INTRAMUSCULAR; INTRAVENOUS EVERY 4 HOURS PRN
Status: DISCONTINUED | OUTPATIENT
Start: 2025-03-01 | End: 2025-03-06 | Stop reason: HOSPADM

## 2025-03-01 RX ORDER — CEFUROXIME AXETIL 500 MG/1
500 TABLET ORAL 2 TIMES DAILY
Status: ON HOLD | COMMUNITY
End: 2025-03-06

## 2025-03-01 RX ORDER — DOXYCYCLINE 100 MG/1
100 TABLET ORAL EVERY 12 HOURS
Status: DISCONTINUED | OUTPATIENT
Start: 2025-03-01 | End: 2025-03-04

## 2025-03-01 RX ORDER — LABETALOL HYDROCHLORIDE 5 MG/ML
10 INJECTION, SOLUTION INTRAVENOUS EVERY 4 HOURS PRN
Status: DISCONTINUED | OUTPATIENT
Start: 2025-03-01 | End: 2025-03-06 | Stop reason: HOSPADM

## 2025-03-01 RX ORDER — ONDANSETRON 4 MG/1
4 TABLET, ORALLY DISINTEGRATING ORAL EVERY 4 HOURS PRN
Status: DISCONTINUED | OUTPATIENT
Start: 2025-03-01 | End: 2025-03-01

## 2025-03-01 RX ORDER — FUROSEMIDE 10 MG/ML
40 INJECTION INTRAMUSCULAR; INTRAVENOUS DAILY
Status: DISCONTINUED | OUTPATIENT
Start: 2025-03-01 | End: 2025-03-06 | Stop reason: HOSPADM

## 2025-03-01 RX ORDER — OMEPRAZOLE 20 MG/1
20 CAPSULE, DELAYED RELEASE ORAL DAILY
Status: DISCONTINUED | OUTPATIENT
Start: 2025-03-01 | End: 2025-03-06 | Stop reason: HOSPADM

## 2025-03-01 RX ORDER — PREDNISOLONE SODIUM PHOSPHATE 15 MG/5ML
13.3 SOLUTION ORAL DAILY
COMMUNITY
Start: 2025-01-15 | End: 2025-03-07

## 2025-03-01 RX ORDER — PROMETHAZINE HYDROCHLORIDE 25 MG/1
12.5-25 TABLET ORAL EVERY 4 HOURS PRN
Status: DISCONTINUED | OUTPATIENT
Start: 2025-03-01 | End: 2025-03-06 | Stop reason: HOSPADM

## 2025-03-01 RX ADMIN — DIBASIC SODIUM PHOSPHATE, MONOBASIC POTASSIUM PHOSPHATE AND MONOBASIC SODIUM PHOSPHATE 250 MG: 852; 155; 130 TABLET ORAL at 18:17

## 2025-03-01 RX ADMIN — ALBUMIN (HUMAN) 25 G: 0.25 INJECTION, SOLUTION INTRAVENOUS at 02:47

## 2025-03-01 RX ADMIN — MIDODRINE HYDROCHLORIDE 10 MG: 5 TABLET ORAL at 14:20

## 2025-03-01 RX ADMIN — PIPERACILLIN AND TAZOBACTAM 4.5 G: 4; .5 INJECTION, POWDER, FOR SOLUTION INTRAVENOUS at 02:52

## 2025-03-01 RX ADMIN — IOHEXOL 75 ML: 350 INJECTION, SOLUTION INTRAVENOUS at 00:04

## 2025-03-01 RX ADMIN — MIDODRINE HYDROCHLORIDE 10 MG: 5 TABLET ORAL at 21:16

## 2025-03-01 RX ADMIN — OMEPRAZOLE 20 MG: 20 CAPSULE, DELAYED RELEASE ORAL at 05:32

## 2025-03-01 RX ADMIN — PIPERACILLIN AND TAZOBACTAM 4.5 G: 4; .5 INJECTION, POWDER, FOR SOLUTION INTRAVENOUS at 21:19

## 2025-03-01 RX ADMIN — PIPERACILLIN AND TAZOBACTAM 3.38 G: 3; .375 INJECTION, POWDER, FOR SOLUTION INTRAVENOUS at 00:17

## 2025-03-01 RX ADMIN — DIBASIC SODIUM PHOSPHATE, MONOBASIC POTASSIUM PHOSPHATE AND MONOBASIC SODIUM PHOSPHATE 250 MG: 852; 155; 130 TABLET ORAL at 05:32

## 2025-03-01 RX ADMIN — DOXYCYCLINE 100 MG: 100 TABLET, FILM COATED ORAL at 18:17

## 2025-03-01 RX ADMIN — DOXYCYCLINE 100 MG: 100 TABLET, FILM COATED ORAL at 02:51

## 2025-03-01 RX ADMIN — PIPERACILLIN AND TAZOBACTAM 4.5 G: 4; .5 INJECTION, POWDER, FOR SOLUTION INTRAVENOUS at 14:20

## 2025-03-01 RX ADMIN — MIDODRINE HYDROCHLORIDE 10 MG: 5 TABLET ORAL at 05:32

## 2025-03-01 ASSESSMENT — ENCOUNTER SYMPTOMS
BACK PAIN: 0
HEMOPTYSIS: 0
HALLUCINATIONS: 0
HEADACHES: 0
SPUTUM PRODUCTION: 0
WEAKNESS: 0
SINUS PAIN: 0
CHILLS: 0
ORTHOPNEA: 0
BLURRED VISION: 0
SORE THROAT: 0
WHEEZING: 0
POLYDIPSIA: 0
TINGLING: 0
FALLS: 0
DIZZINESS: 0
DIARRHEA: 0
EYE PAIN: 0
NAUSEA: 0
NECK PAIN: 0
DOUBLE VISION: 0
STRIDOR: 0
SHORTNESS OF BREATH: 1
MYALGIAS: 0
BLOOD IN STOOL: 0
ABDOMINAL PAIN: 0
FEVER: 0
DEPRESSION: 0
VOMITING: 0
FLANK PAIN: 0
PHOTOPHOBIA: 0
BRUISES/BLEEDS EASILY: 0
TREMORS: 0
PND: 0
HEARTBURN: 0
CONSTIPATION: 0
DIAPHORESIS: 0
COUGH: 1
PALPITATIONS: 0
CLAUDICATION: 0

## 2025-03-01 ASSESSMENT — LIFESTYLE VARIABLES: SUBSTANCE_ABUSE: 0

## 2025-03-01 ASSESSMENT — PAIN DESCRIPTION - PAIN TYPE
TYPE: ACUTE PAIN

## 2025-03-01 ASSESSMENT — FIBROSIS 4 INDEX: FIB4 SCORE: 5.38

## 2025-03-01 NOTE — CARE PLAN
The patient is Watcher - Medium risk of patient condition declining or worsening    Problem: Knowledge Deficit - Standard  Goal: Patient and family/care givers will demonstrate understanding of plan of care, disease process/condition, diagnostic tests and medications  3/1/2025 0314 by Leti Lassiter R.N.  Outcome: Progressing  3/1/2025 0313 by Leti Lassiter R.N.  Outcome: Progressing     Shift Goals  Clinical Goals: stable blood pressure, titrate oxygen, rest  Patient Goals: comfort, water  Family Goals: no family present    Progress made toward(s) clinical / shift goals:  met    Patient is not progressing towards the following goals:

## 2025-03-01 NOTE — ED PROVIDER NOTES
ED Provider Note    Scribed for Renard Melgar by Mame Salomon. 2/28/2025  9:49 PM    Primary care provider: Michelle Vanessa M.D.  Means of arrival: EMS  History obtained from: Patient  History limited by: None    CHIEF COMPLAINT  Chief Complaint   Patient presents with    Shortness of Breath     BIB EMS for acute SOB, 72% on home 3 L, arrived on BiPAP with O2 sat 84%, RR 40     EXTERNAL RECORDS REVIEWED  Inpatient Notes The patient was admitted on February 24th for a 3 day stay for acute hypoxic respiratory failure in the setting of recurrent hepatic hydrothorax.  Underwent right US guided thoracentesis on 2/25/25 with drainage of 4L fluid from right hemithorax. The patient has history of alcohol use disorder and liver cirrhosis.     HPI/ROS  LIMITATION TO HISTORY   Select: : None  OUTSIDE HISTORIAN(S):  EMS  at bedside to confirm sequence of events and collateral information provided. See HPI below.     HPI  Stacy Oliva is a 55 y.o. female who has history of has history of alcohol use disorder and liver cirrhosis presents to the Emergency Department via EMS for evaluation of shortness of breath onset earlier today. EMS reports that when they arrives, the patient was 72% on her 3 liters of at home oxygen. The patient was placed on non-rebreather with O2 saturation of 84%, so she was placed on CPAP. The patient denies any nausea, vomiting or diarrhea. The patient states that she has not been drinking.     REVIEW OF SYSTEMS  As above, all other systems reviewed and are negative.   See HPI for further details.     PAST MEDICAL HISTORY  Patient has history of alcohol use disorder and liver cirrhosis    SURGICAL HISTORY   has a past surgical history that includes upper gi endoscopy,diagnosis (N/A, 2/4/2024); upper gi endoscopy,ligat varix (2/4/2024); and chest tube insertion (1/31/2025).    SOCIAL HISTORY  Social History     Tobacco Use    Smoking status: Never    Smokeless tobacco: Never    Vaping Use    Vaping status: Never Used   Substance Use Topics    Alcohol use: Not Currently     Comment: quit august -- used to drink 1-2 glasses wine/night    Drug use: Yes     Comment: marijuana      Social History     Substance and Sexual Activity   Drug Use Yes    Comment: marijuana     FAMILY HISTORY  No family history noted.    CURRENT MEDICATIONS  Current Outpatient Medications   Medication Instructions    furosemide (LASIX) 40 mg, Oral, EVERY MORNING    lactulose 20 g, Oral, 4 TIMES DAILY    midodrine (PROAMATINE) 10 mg, Oral, EVERY 8 HOURS    omeprazole (PRILOSEC) 20 mg, Oral, DAILY    Phospha 250 Neutral 250 mg, Oral, 2 TIMES DAILY    potassium chloride SA (KDUR) 20 MEQ Tab CR 40 mEq, Oral, DAILY    spironolactone (ALDACTONE) 50 mg, Oral, DAILY      ALLERGIES  No Known Allergies    PHYSICAL EXAM    VITAL SIGNS:   Vitals:    02/28/25 2319 02/28/25 2349 03/01/25 0019 03/01/25 0034   BP: (!) 88/53 93/52 91/52 93/55   Pulse: (!) 119 (!) 114 (!) 109 (!) 104   Resp: 15 (!) 22 (!) 27 (!) 24   Temp:       SpO2: 98% 96% 96% 99%   Weight:       Height:         Vitals: My interpretation: hypertensive, tachycardic, afebrile, hypoxic    Reinterpretation of vitals: Improving    Cardiac Monitor Interpretation: The cardiac monitor revealed normal Sinus Rhythm as interpreted by me. The cardiac monitor was ordered secondary to the patient's history of acute hypoxic respiratory distress and to monitor for dysrhythmia and/or tachycardia.    PE:   Gen: speaking one to two word sentences, appears in moderate respiratory distress, BiPAP in place  ENT: Mucous membranes moist, posterior pharynx clear, uvula midline, nares patent bilaterally   Neck: Supple, FROM  Pulmonary: Severe respiratory distress, decreased breath sounds on the right  CV:  RRR, no murmur appreciated, pulses 2+ in both upper and lower extremities  Abdomen: soft, NT/ND; no rebound/guarding  : no CVA or suprapubic tenderness   Neuro: A&Ox4 (person, place,  time, situation), speech fluent, gait steady, no focal deficits appreciated  Skin: No rash or lesions.  No pallor or jaundice.  No cyanosis.  Warm and dry.     DIAGNOSTIC STUDIES / PROCEDURES    LABS  Results for orders placed or performed during the hospital encounter of 02/28/25   Lactic Acid    Collection Time: 02/28/25  9:58 PM   Result Value Ref Range    Lactic Acid 3.2 (H) 0.5 - 2.0 mmol/L   CBC with Differential    Collection Time: 02/28/25  9:58 PM   Result Value Ref Range    WBC 16.6 (H) 4.8 - 10.8 K/uL    RBC 3.67 (L) 4.20 - 5.40 M/uL    Hemoglobin 13.9 12.0 - 16.0 g/dL    Hematocrit 39.6 37.0 - 47.0 %    .9 (H) 81.4 - 97.8 fL    MCH 37.9 (H) 27.0 - 33.0 pg    MCHC 35.1 32.2 - 35.5 g/dL    RDW 59.8 (H) 35.9 - 50.0 fL    Platelet Count 89 (L) 164 - 446 K/uL    MPV 10.0 9.0 - 12.9 fL    Neutrophils-Polys 82.10 (H) 44.00 - 72.00 %    Lymphocytes 10.50 (L) 22.00 - 41.00 %    Monocytes 6.20 0.00 - 13.40 %    Eosinophils 0.30 0.00 - 6.90 %    Basophils 0.20 0.00 - 1.80 %    Immature Granulocytes 0.70 0.00 - 0.90 %    Nucleated RBC 0.10 0.00 - 0.20 /100 WBC    Neutrophils (Absolute) 13.59 (H) 1.82 - 7.42 K/uL    Lymphs (Absolute) 1.74 1.00 - 4.80 K/uL    Monos (Absolute) 1.03 (H) 0.00 - 0.85 K/uL    Eos (Absolute) 0.05 0.00 - 0.51 K/uL    Baso (Absolute) 0.04 0.00 - 0.12 K/uL    Immature Granulocytes (abs) 0.11 0.00 - 0.11 K/uL    NRBC (Absolute) 0.02 K/uL   Complete Metabolic Panel    Collection Time: 02/28/25  9:58 PM   Result Value Ref Range    Sodium 124 (L) 135 - 145 mmol/L    Potassium 3.6 3.6 - 5.5 mmol/L    Chloride 90 (L) 96 - 112 mmol/L    Co2 21 20 - 33 mmol/L    Anion Gap 13.0 7.0 - 16.0    Glucose 124 (H) 65 - 99 mg/dL    Bun 9 8 - 22 mg/dL    Creatinine 0.66 0.50 - 1.40 mg/dL    Calcium 8.2 (L) 8.5 - 10.5 mg/dL    Correct Calcium 9.0 8.5 - 10.5 mg/dL    AST(SGOT) 72 (H) 12 - 45 U/L    ALT(SGPT) 71 (H) 2 - 50 U/L    Alkaline Phosphatase 183 (H) 30 - 99 U/L    Total Bilirubin 7.2 (H) 0.1 - 1.5  mg/dL    Albumin 3.0 (L) 3.2 - 4.9 g/dL    Total Protein 5.9 (L) 6.0 - 8.2 g/dL    Globulin 2.9 1.9 - 3.5 g/dL    A-G Ratio 1.0 g/dL   Blood Culture - Draw one from central line and one from peripheral site    Collection Time: 02/28/25  9:58 PM    Specimen: Peripheral; Blood   Result Value Ref Range    Significant Indicator NEG     Source BLD     Site PERIPHERAL     Culture Result       No Growth  Note: Blood cultures are incubated for 5 days and  are monitored continuously.Positive blood cultures  are called to the RN and reported as soon as  they are identified.     Blood Culture - Draw one from central line and one from peripheral site    Collection Time: 02/28/25  9:58 PM    Specimen: Line; Blood   Result Value Ref Range    Significant Indicator NEG     Source BLD     Site Peripheral     Culture Result       No Growth  Note: Blood cultures are incubated for 5 days and  are monitored continuously.Positive blood cultures  are called to the RN and reported as soon as  they are identified.     Troponin    Collection Time: 02/28/25  9:58 PM   Result Value Ref Range    Troponin T 19 6 - 19 ng/L   proBrain Natriuretic Peptide, NT    Collection Time: 02/28/25  9:58 PM   Result Value Ref Range    NT-proBNP 327 (H) 0 - 125 pg/mL   IMMATURE PLT FRACTION    Collection Time: 02/28/25  9:58 PM   Result Value Ref Range    Imm. Plt Fraction 4.0 0.6 - 13.1 %   ESTIMATED GFR    Collection Time: 02/28/25  9:58 PM   Result Value Ref Range    GFR (CKD-EPI) 103 >60 mL/min/1.73 m 2   PT/INR    Collection Time: 02/28/25  9:58 PM   Result Value Ref Range    PT 23.4 (H) 12.0 - 14.6 sec    INR 2.06 (H) 0.87 - 1.13   POC CoV-2, FLU A/B, RSV by PCR    Collection Time: 02/28/25 10:08 PM   Result Value Ref Range    POC Influenza A RNA, PCR Negative Negative    POC Influenza B RNA, PCR Negative Negative    POC RSV, by PCR Negative Negative    POC SARS-CoV-2, PCR NotDetected NotDetected   VENOUS BLOOD GAS    Collection Time: 02/28/25 10:20 PM    Result Value Ref Range    Venous Bg Ph 7.46 (H) 7.31 - 7.45    Venous Bg Ph Temp Corrected 7.47 (H) 7.31 - 7.45    Venous Bg Pco2 29.5 (L) 38.0 - 54.0 mmHg    Venous Bg Pco2 Temp Corrected 28.6 (L) 38.0 - 54.0 mmHg    Venous Bg Po2 52.2 (H) 23.0 - 48.0 mmHg    Venous Bg Po2 Temp Corrected 49.7 (H) 23.0 - 48.0 mmHg    Venous Bg O2 Saturation 82.5 60.0 - 85.0 %    Venous Bg Hco3 21 (L) 22 - 29 mmol/L    Venous Bg Base Excess -2 -2 - 3 mmol/L    Body Temp 36.3 Centigrade   FLUID TOTAL PROTEIN    Collection Time: 25 10:58 PM   Result Value Ref Range    Total Protein Fluid 0.5 g/dL   FLUID LDH    Collection Time: 25 10:58 PM   Result Value Ref Range    Body Fluid LDH 74 U/L   FLUID GLUCOSE    Collection Time: 25 10:58 PM   Result Value Ref Range    Glucose, Fluid 123 mg/dL   FLUID AMYLASE    Collection Time: 25 10:58 PM   Result Value Ref Range    Body Fluid Amylase 27 U/L   Cytology    Collection Time: 25 10:58 PM   Result Value Ref Range    Cytology Reg See Path Report    EKG    Collection Time: 25 11:08 PM   Result Value Ref Range    Report       [facility] Emergency Dept.    Test Date:  2025  Pt Name:    CARMEN CUADRA                 Department: ER  MRN:        [id number]                      Room:       [facility]  Gender:     Female                       Technician: [id number]  :        [id number]                   Requested By:DOT HO  Order #:    [id number]                    Reading MD: Dot Ho    Measurements  Intervals                                Axis  Rate:       133                          P:          62  DC:         85                           QRS:        87  QRSD:       74                           T:          -22  QT:         390  QTc:        581    Interpretive Statements  Sinus tachycardia  Repol abnrm suggests ischemia, anterolateral  Prolonged QT interval  Compared to ECG 2025 14:48:22  Possible ischemia now  present  Prolonged QT interval now present  First degree AV block no longer present  Myocardial infarct finding no longer present  Electronically Signed On 02-  23:08:25 PST by Renard Melgar     Urinalysis    Collection Time: 02/28/25 11:35 PM    Specimen: Urine   Result Value Ref Range    Color Orange (A)     Character Cloudy (A)     Specific Gravity 1.021 <1.035    Ph 5.0 5.0 - 8.0    Glucose Negative Negative mg/dL    Ketones Negative Negative mg/dL    Protein Negative Negative mg/dL    Bilirubin Small (A) Negative    Urobilinogen, Urine 1.0 <=1.0 EU/dL    Nitrite Positive (A) Negative    Leukocyte Esterase Trace (A) Negative    Occult Blood Negative Negative    Micro Urine Req Microscopic    URINE MICROSCOPIC (W/UA)    Collection Time: 02/28/25 11:35 PM   Result Value Ref Range    WBC 0-2 /hpf    RBC 0-2 0 - 2 /hpf    Bacteria None Seen None /hpf    Epithelial Cells 3-5 0 - 5 /hpf    Ca Oxalate Crystal Present (A) Absent /hpf    Urine Casts >20 (A) 0 - 2 /lpf    Hyaline Cast Present /lpf    Granular Casts Present (A) Absent /lpf      All labs reviewed by me. Labs were compared to prior labs if they were available. Significant for lactic acid of 3.2, leukocytosis 16, no anemia, electrolytes show hyponatremia, otherwise normal, normal glucose, normal renal function, chronic transaminitis, hyperbilirubinemia, troponin negative, BNP negative, flu COVID and RSV negative, VBG shows no significant acidosis.    RADIOLOGY  I have independently interpreted the diagnostic imaging associated with this visit and am waiting the final reading from the radiologist.   My preliminary interpretation is a follows: Large right sided pleural effusion on first x-ray  Radiologist interpretation is as follows:  CT-CHEST (THORAX) WITH   Final Result      1.  Pneumomediastinum of uncertain etiology.   2.  Small RIGHT pleural fluid collection.   3.  No pneumothorax.   4.  Multifocal pneumonia/pneumonitis, worse on the LEFT.       Fleischner Society pulmonary nodule recommendations:   Not Applicable         DX-CHEST-PORTABLE (1 VIEW)   Final Result      1.  Marked improvement of RIGHT pleural effusion.   2.  No pneumothorax.   3.  Possible pneumomediastinum.      DX-CHEST-PORTABLE (1 VIEW)   Final Result      Opacification of RIGHT hemithorax with shift of mediastinal structures consistent with large RIGHT pleural effusion, new since 2/25/2025.        Thoracentesis Procedure Note  Indication: Pleural effusion w/ respiratory distress and mediastinal shift  Consent: The patient provided verbal consent for this procedure.  Procedure: The patient was placed in the sitting position, supported by a bed side stand and the appropriate landmarks were identified.  The skin over the puncture site in the right posterior chest wall was prepped with betadine and draped in a sterile fashion.  Local anesthesia was obtained by infiltration using 1% Lidocaine without epinephrine. A thoracentesis catheter was then advanced into the pleural space over a needle and the needle was withdrawn.  Pleural fluid was returned which was straw-colored. A total volume of 4 liters was withdrawn which was sent to the lab for appropriate testing.  The catheter was then withdrawn and a sterile dressing was placed over the site.  A post procedure film showed a decrease in the size of the effusion.  The patient tolerated the procedure well.  Complications: None     COURSE & MEDICAL DECISION MAKING  Nursing notes, VS, PMSFHx, labs, imaging, EKG reviewed in chart.    Heart Score: Low     ED Observation Status? No; Patient does not meet criteria for ED Observation.     Ddx: Pneumothorax, pleural effusion, pneumonia, COPD exacerbation, CHF exacerbation, sequela of liver cirrhosis    MDM: 9:49 PM Stacy Oliva is a 55 y.o. female who presented with acute hypoxic respiratory distress by EMS.  Patient was a pre and alert for critical patient with respiratory called to the room.   EMS transported patient after they were called for worsening difficulty breathing by family members.  Upon their arrival patient was 70% on room air and respiratory distress.  Placed on BiPAP with very little improvement.  Oxygen in the mid to upper 80s, blood pressure normal, twelve-lead normal.  Upon arrival respiratory at bedside with pharmacist.  Patient acute hypoxic respiratory distress.  Chart review showed that she was just discharged a few days ago after similar presentation with a large pleural effusion.  She has decreased breath sounds on the right.  She is in acute respiratory distress patient was switched to our BiPAP machine.  I did a bedside ultrasound which demonstrated large pleural effusion which was also seen on chest x-ray causing mediastinal shift and likely tension pathology.  As patient was in acute respiratory distress in order to stave off intubation, emergent thoracentesis was done by myself at bedside under ultrasound guidance with significant improvement in patient's symptoms after 4L removal of straw colored fluid.  As patient had a lactic acidosis and leukocytosis, and a recent thoracentesis was done, fluid was sent for evaluation to the lab make sure she does not have any infection present or organisms.  Repeat chest x-ray shows improvement but shows questionable pneumomediastinum and patient was sent for CT imaging per radiology request.  Her EKG shows sinus tachycardia but no ischemic changes. All labs reviewed by me. Labs were compared to prior labs if they were available. Significant for lactic acid of 3.2, leukocytosis 16, no anemia, electrolytes show hyponatremia, otherwise normal, normal glucose, normal renal function, chronic transaminitis, hyperbilirubinemia, troponin negative, BNP negative, flu COVID and RSV negative, VBG shows no significant acidosis.  She had a hypotensive episode but was asymptomatic after thoracentesis done and she was given 25 g of albumin.   Patient CT  scan unfortunately showed pneumonia, as well as pneumomediastinum.  I suspect the pneumomediastinum is from the positive pressure BiPAP that patient received in the setting of significant mediastinal shift and effusion with tension pneumothorax features.  Patient admitted to the stepdown unit for continued monitoring after discussion with the intensivist.  They will follow-up on thoracentesis labs.  Patient updated on plan for admission and is amenable.  Critically ill but improving here in the ED.    CRITICAL CARE TIME 80 minutes: Acute decompensated respiratory failure requiring BiPAP, frequent reevaluations, emergent drainage and diagnosis of large pleural effusion causing mediastinal shift and life-threatening pathology, discussion with multiple specialist  There was a very real possibility of deterioration of the patient's condition.  This patient required the highest level of care.  I provided critical care services which included: review of the medical record, treatment orders, ordering and reviewing test results, frequent reevaluation of the patient's condition and response to treatment, as well as discussing the case with appropriate personnel and various consultants. The critical care time associated with the care of this patient is exclusive of any procedures or specific interventions.     ADDITIONAL PROBLEM LIST AND DISPOSITION    I have discussed management of the patient with the following physicians and SALMA's: Intensivist, hospitalist    Discussion of management with other Q or appropriate source(s): RT regarding setting up for intubation      Barriers to care at this time, including but not limited to:  None known .     Decision tools and prescription drugs considered including, but not limited to: Antibiotics Zosyn .    FINAL IMPRESSION  1. Pneumomediastinum (HCC) Acute   2. Pleural effusion Acute   3. Respiratory distress Acute   4. BiPAP (biphasic positive airway pressure) dependence Acute   5.  Tachycardia Acute   6. Hypertension, unspecified type Acute   7. Leukocytosis, unspecified type Acute   8. Hyponatremia Acute   9. Transaminitis Acute   10. Hyperbilirubinemia Acute   11. Pneumonia of left lung due to infectious organism, unspecified part of lung Acute      Mame GOMEZ (Scribe), am scribing for, and in the presence of, Renard Melgar.    Electronically signed by: Mame Salomon (Scribe), 2/28/2025    I, Renard Melgar personally performed the services described in this documentation, as scribed by Mame Salomon in my presence, and it is both accurate and complete.    The note accurately reflects work and decisions made by me.  Renard Melgar  2/28/2025  11:37 PM

## 2025-03-01 NOTE — ED NOTES
Bedside report given to Suri WEBER of [id number]. Pt transported on full vitals monitoring with ICU RN.  Pt care transferred.

## 2025-03-01 NOTE — ED TRIAGE NOTES
Stacy Oliva  55 y.o. female  Chief Complaint   Patient presents with    Shortness of Breath     BIB EMS for acute SOB, 72% on home 3 L, arrived on BiPAP with O2 sat 84%, RR 40     Pt BIB EMS for above complaint.    Bilateral PIV initiated, labs drawn and sent. EKG and Xray at bedside.       Vitals:    02/28/25 2205   BP: (!) 162/70   Pulse: (!) 134   Resp: (!) 43   SpO2: 93%

## 2025-03-01 NOTE — ED NOTES
Medication history reviewed with patient/family at bedside.  Med rec is complete.  Allergies reviewed.     Patient completed a course of Cefuroxime 2/17/25    Anticoagulants taken in the last 14 days? No       Roxy Rowland PhT

## 2025-03-01 NOTE — ASSESSMENT & PLAN NOTE
Appears to be acute on chronic  Midodrine was added however patient continued to have borderline low blood pressure  Because of this,  a cortisol level was obtained and the previous hospitalist thought that she does have relative adrenal insufficiency with a cortisol level of 9.9  Because of this, he started low-dose Solu-Cortef  Likely the patient did not require pressor support, her mean arterial pressure has been greater than 60  She did however require close ongoing monitoring  While she does have pneumonia, I do not feel there is sepsis causing her hypotension  Addendum: Initially did improve with Solu-Cortef at 50 mg however I did just discuss the case with the bedside RN, systolic blood pressure then now dropped to 70/51  Solu-Cortef has now been decreased to 50 mg every 8 hours with good results.  Pharmacy recommends that we wean this as tolerable and Continue midodrine at 10 mg 3 times daily (possibly moving this up to 15 mg)

## 2025-03-01 NOTE — PROGRESS NOTES
"MD notified of low BP BP (!) 81/48   Pulse (!) 109   Temp 36.3 °C (97.3 °F) (Temporal)   Resp (!) 23   Ht 1.651 m (5' 5\")   Wt 63.5 kg (140 lb)   SpO2 100%     "

## 2025-03-01 NOTE — PROGRESS NOTES
[facility] Acceptance Note    Called by ERP for admission to IMCU after large volume right thoracentesis performed in ER today for respiratory distress while on BiPAP.  Patient has a history of EtOH cirrhosis, recurrent right hepatic hydrothorax with recent admission and thoracentesis, awaiting evaluation for liver transplantation until she is eligible in June.  CT scan showed pneumomediastinum and airspace opacities.  She was started on IV antibiotics but clinically looks well now, not in distress, lying flat on 2 LPM oxygen.  Recommend serial chest imaging with CXR and appropriate for monitoring in IMCU under the care of the hospitalist.  She may benefit from evaluation by GI/hepatology for further recommendations regarding rapidly reaccumulating hepatic hydrothorax while on dual diuretic therapy.

## 2025-03-01 NOTE — CARE PLAN
Problem: Knowledge Deficit - Standard  Goal: Patient and family/care givers will demonstrate understanding of plan of care, disease process/condition, diagnostic tests and medications  Outcome: Progressing     Problem: Respiratory:  Goal: Respiratory status will improve  Outcome: Progressing   The patient is Stable - Low risk of patient condition declining or worsening    Shift Goals  Clinical Goals: hemondynamic stability  Patient Goals: rest  Family Goals: updates    Progress made toward(s) clinical / shift goals:  patient a+o x 4, denies sob, titrated down to 1lpm of oxygen, lung sounds diminished throughout, iv abx a/o, afebrile, no needs at this time, poc ongoing,  at bedside    Patient is not progressing towards the following goals: n/a    Fall precautions/hourly rounding maintained, call light within reach and functioning, all items within reach.  Patient encouraged to call for assistance, poc reviewed with patient and , ?'s/concerns answered.

## 2025-03-01 NOTE — ASSESSMENT & PLAN NOTE
Her MELD is 30  With significant fluid overload especially right sided pleural effusion, now status post thoracentesis  Patient has had recurrent pleural effusions requiring thoracentesis, denies history of paracentesis  If her blood pressure improves, will consider imaging to determine how much ascites she has  Unfortunately, right now unable to give IV Lasix due to low blood pressure  Patient would benefit from Lasix and spironolactone when able  Associated with hypotension, continue midodrine  Ultrasound of the abdomen 3/4/2025  showed trace ascites not amenable to pleurocentesis  Family members asked that we reconsult GI.  This will be done 3/5/2025

## 2025-03-01 NOTE — ASSESSMENT & PLAN NOTE
Patient initially was satting 72% on her home 3 L nasal cannula  Markedly improved status post thoracentesis  She does have pneumonia and is at risk of worsening and does require ongoing monitoring

## 2025-03-01 NOTE — ASSESSMENT & PLAN NOTE
Because of her recent hospital stay, she was started on Zosyn and Doxy  Patient has had improvement with these antibiotics  Procalcitonin normal, will DC Zosyn and finish the course with doxycycline  Patient has had marked improvement in her cough and shortness of breath as well as her hypoxia

## 2025-03-01 NOTE — PROGRESS NOTES
Time of Arrival: 0220  HR: 114  BP: 83/49  SpO2: 93  RR: 16  Temp: 97.2f  Weight: 58.6 kg      Does patient consent to inventory of belongings:     Patient consents to inventory of belongings    Belongings at bedside:  Clothing    4 Eyes Skin Assessment Completed by Suri RN and Anastacia RN.    Head WDL  Ears Redness and Blanching  Nose WDL  Mouth WDL  Neck WDL  Breast/Chest Redness  Shoulder Blades WDL  Spine WDL R posterior thoracentesis site covered with band-aid  (R) Arm/Elbow/Hand Bruising Elbow redness and scabbing  (L) Arm/Elbow/Hand Redness and Blanching Bruising   Abdomen WDL  Groin WDL  Scrotum/Coccyx/Buttocks Redness and blanching,   (R) Leg WDL  (L) Leg WDL  (R) Heel/Foot/Toe Boggy R great toe callus  (L) Heel/Foot/Toe Boggy          Devices In Places ECG, Blood Pressure Cuff, Pulse Ox, SCD's, and Nasal Cannula      Interventions In Place Gray Ear Foams, Sacral Mepilex, TAP System, Pillows, Q2 Turns, Low Air Loss Mattress, Heels Loaded W/Pillows, and Pressure Redistribution Mattress    Possible Skin Injury No    Pictures Uploaded Into Epic Yes  Wound Consult Placed N/A  RN Wound Prevention Protocol Ordered No

## 2025-03-01 NOTE — ASSESSMENT & PLAN NOTE
I do believe this is secondary to her cirrhosis  Large volume right thoracentesis in the ER  Once her blood pressure improves and she could tolerate a paracentesis, we will consider imaging to further evaluate for need for paracentesis however I would anticipate she has relatively large volume ascites  She states she has no history of paracentesis  Patient would benefit from a discharge on Lasix and spironolactone if blood pressure tolerates  Pleural effusion was very large and caused acute on chronic respiratory failure with hypoxia,   Abdominal ultrasound on 3/4/2025 shows large right pleural effusion-will order ultrasound-guided thoracentesis

## 2025-03-01 NOTE — PROGRESS NOTES
Patient was initially seen and examined by maria.  I have also seen and examined the patient, discussed the case with the bedside RN.  Patient does have a borderline low blood pressures, likely chronic due to cirrhosis.  MAP has been greater than 60.  Patient was started on midodrine, continue  Tolerating IV Lasix  Patient did have pneumomediastinum noted on chest x-ray.  Was still present on repeat.  Patient has no chest pain, no significant shortness of breath.  She was placed on oxygen, this is actively being weaned, currently only requiring 1 L.  If there is any worsening in her respiratory status or she develops pain, will obtain surgical consultation.  Pneumomediastinum was present post thoracentesis, likely the cause.  Patient did have large volume thoracentesis with improvement in her respiratory status.  Avoid positive pressure ventilation.  Repeat imaging if worsening  Patient was also noted to have pneumonia, continue antibiotics.

## 2025-03-01 NOTE — ASSESSMENT & PLAN NOTE
Likely due to hypoperfusion and hypoxia  Repeat lactic acid this morning improved, acidosis is resolved  No additional workup at this time

## 2025-03-01 NOTE — H&P
Hospital Medicine History & Physical Note    Date of Service  3/1/2025    Primary Care Physician  Michelle Vanessa M.D.    Consultants      Specialist Names:     Code Status  Full Code    Chief Complaint  Chief Complaint   Patient presents with    Shortness of Breath     BIB EMS for acute SOB, 72% on home 3 L, arrived on BiPAP with O2 sat 84%, RR 40       History of Presenting Illness  Stacy Oliva is a 55 y.o. female who presented 2/28/2025 with alcoholic cirrhosis, recurrent pleural effusion who presents to the hospital for shortness of breath.  The patient has been short of breath since leaving the hospital on 2/27.  Is associated with a nonproductive cough.  She denies any fever, chills, nausea, vomiting.  Patient's had multiple hospital missions for pleural effusions and is not a candidate for a Pleurx catheter since she is not on palliative care.  She is compliant with her Lasix and Aldactone.  She denies a high salt intake.    Chest x-ray found complete opacification of the right hemithorax with shift of the mediastinal structures consistent with large right pleural effusion.  The patient was placed on BiPAP support.  The patient underwent a thoracentesis in the ER.  Chest x-ray found improvement of her pleural effusion but then developed a pneumomediastinum.    EKG interpreted by me found sinus tachycardia    CT scan of the chest confirmed pneumomediastinum.  She was found to have multifocal pneumonia    I discussed the plan of care with patient.    Review of Systems  Review of Systems   Constitutional:  Negative for chills, diaphoresis, fever and malaise/fatigue.   HENT:  Negative for congestion, ear discharge, ear pain, hearing loss, nosebleeds, sinus pain, sore throat and tinnitus.    Eyes:  Negative for blurred vision, double vision, photophobia and pain.   Respiratory:  Positive for cough and shortness of breath. Negative for hemoptysis, sputum production, wheezing and stridor.     Cardiovascular:  Negative for chest pain, palpitations, orthopnea, claudication, leg swelling and PND.   Gastrointestinal:  Negative for abdominal pain, blood in stool, constipation, diarrhea, heartburn, melena, nausea and vomiting.   Genitourinary:  Negative for dysuria, flank pain, frequency, hematuria and urgency.   Musculoskeletal:  Negative for back pain, falls, joint pain, myalgias and neck pain.   Skin:  Negative for itching and rash.   Neurological:  Negative for dizziness, tingling, tremors, weakness and headaches.   Endo/Heme/Allergies:  Negative for environmental allergies and polydipsia. Does not bruise/bleed easily.   Psychiatric/Behavioral:  Negative for depression, hallucinations, substance abuse and suicidal ideas.        Past Medical History   has a past medical history of Liver disease and Patient denies medical problems.    Surgical History   has a past surgical history that includes pr upper gi endoscopy,diagnosis (N/A, 2/4/2024); pr upper gi endoscopy,ligat varix (2/4/2024); and chest tube insertion (1/31/2025).     Family History  family history is not on file.   Family history reviewed with patient. There is no family history that is pertinent to the chief complaint.     Social History   reports that she has never smoked. She has never used smokeless tobacco. She reports that she does not currently use alcohol. She reports current drug use.    Allergies  No Known Allergies    Medications  Prior to Admission Medications   Prescriptions Last Dose Informant Patient Reported? Taking?   cefUROXime (CEFTIN) 500 MG Tab 2/17/2025 Patient Yes Yes   Sig: Take 500 mg by mouth 2 times a day. 5 day course started 2/12/25   Patient taking differently: Take 500 mg by mouth 2 times a day. 5 day course started 2/12/25  PRESCRIPTION COURSE WAS COMPLETED   furosemide (LASIX) 40 MG Tab 2/28/2025 Morning Patient No Yes   Sig: Take 1 Tablet by mouth every morning.   lactulose 10 g/15mL Solution 2/28/2025 Morning  Patient No Yes   Sig: Take 30 mL by mouth 4 times a day for 210 days.   midodrine (PROAMATINE) 10 MG tablet 2/28/2025 at  2:00 PM Patient No Yes   Sig: Take 1 Tablet by mouth every 8 hours.   omeprazole (PRILOSEC) 20 MG delayed-release capsule 2/28/2025 Morning Patient No Yes   Sig: Take 1 Capsule by mouth every day for 90 days.   phosphorus (K-PHOS-NEUTRAL) 250 MG tablet 2/28/2025 Evening Patient No Yes   Sig: Take 1 Tablet by mouth 2 times a day.   potassium chloride SA (KDUR) 20 MEQ Tab CR 2/28/2025 Evening Patient No Yes   Sig: Take 2 Tablets by mouth every day for 90 days.   Patient taking differently: Take 20 mEq by mouth 2 times a day.   prednisoLONE sodium phosphate (PEDIAPRED) 15 mg/5mL oral solution 2/8/2025 Patient Yes Yes   Sig: Take 13.3 mL by mouth every day. For 24 days starting 1/15/25   Patient taking differently: Take 13.3 mL by mouth every day. For 24 days starting 1/15/25  PRESCRIPTION COURSE WAS COMPLETED   spironolactone (ALDACTONE) 50 MG Tab 2/28/2025 Morning Patient No Yes   Sig: Take 1 Tablet by mouth every day.      Facility-Administered Medications: None       Physical Exam  Temp:  [36.3 °C (97.3 °F)] 36.3 °C (97.3 °F)  Pulse:  [104-134] 110  Resp:  [15-57] 20  BP: ()/(51-70) 93/52  SpO2:  [93 %-100 %] 100 %  Blood Pressure: 93/55   Temperature: 36.3 °C (97.3 °F)   Pulse: (!) 104   Respiration: (!) 24   Pulse Oximetry: 99 %       Physical Exam  Vitals and nursing note reviewed.   Constitutional:       General: She is not in acute distress.     Appearance: Normal appearance. She is ill-appearing. She is not toxic-appearing or diaphoretic.   HENT:      Head: Normocephalic and atraumatic.      Nose: No congestion or rhinorrhea.      Mouth/Throat:      Pharynx: No oropharyngeal exudate or posterior oropharyngeal erythema.   Eyes:      General: No scleral icterus.  Neck:      Vascular: JVD present. No carotid bruit.   Cardiovascular:      Rate and Rhythm: Normal rate and regular rhythm.       Pulses: Normal pulses.      Heart sounds: Normal heart sounds. No murmur heard.     No friction rub. No gallop.   Pulmonary:      Effort: Pulmonary effort is normal. No respiratory distress.      Breath sounds: No stridor. Rales present. No wheezing or rhonchi.   Abdominal:      General: Abdomen is flat. There is no distension.      Palpations: There is no mass.      Tenderness: There is no abdominal tenderness. There is no left CVA tenderness, guarding or rebound.      Hernia: No hernia is present.   Musculoskeletal:         General: No swelling. Normal range of motion.      Cervical back: No rigidity. No muscular tenderness.      Right lower leg: Edema present.      Left lower leg: Edema present.   Lymphadenopathy:      Cervical: No cervical adenopathy.   Skin:     General: Skin is warm and dry.      Capillary Refill: Capillary refill takes more than 3 seconds.      Coloration: Skin is not jaundiced or pale.      Findings: No bruising or erythema.   Neurological:      Mental Status: She is alert.         Laboratory:  Recent Labs     02/26/25 0643 02/28/25 2158   WBC 9.7 16.6*   RBC 3.68* 3.67*   HEMOGLOBIN 14.0 13.9   HEMATOCRIT 38.9 39.6   .7* 107.9*   MCH 38.0* 37.9*   MCHC 36.0* 35.1   RDW 58.6* 59.8*   PLATELETCT 77* 89*   MPV 9.7 10.0     Recent Labs     02/26/25 0445 02/27/25 0443 02/28/25 2158   SODIUM 124* 127* 124*   POTASSIUM 3.7 3.5* 3.6   CHLORIDE 89* 90* 90*   CO2 21 25 21   GLUCOSE 73 79 124*   BUN 15 12 9   CREATININE 0.78 0.72 0.66   CALCIUM 8.7 8.4* 8.2*     Recent Labs     02/26/25 0445 02/27/25 0443 02/28/25 2158   ALTSGPT 104* 71* 71*   ASTSGOT 82* 53* 72*   ALKPHOSPHAT 215* 156* 183*   TBILIRUBIN 9.9* 7.6* 7.2*   GLUCOSE 73 79 124*     Recent Labs     02/28/25 2158   INR 2.06*     Recent Labs     02/28/25 2158   NTPROBNP 327*         Recent Labs     02/28/25 2158   TROPONINT 19       Imaging:  CT-CHEST (THORAX) WITH   Final Result      1.  Pneumomediastinum of uncertain  etiology.   2.  Small RIGHT pleural fluid collection.   3.  No pneumothorax.   4.  Multifocal pneumonia/pneumonitis, worse on the LEFT.      Fleischner Society pulmonary nodule recommendations:   Not Applicable         DX-CHEST-PORTABLE (1 VIEW)   Final Result      1.  Marked improvement of RIGHT pleural effusion.   2.  No pneumothorax.   3.  Possible pneumomediastinum.      DX-CHEST-PORTABLE (1 VIEW)   Final Result      Opacification of RIGHT hemithorax with shift of mediastinal structures consistent with large RIGHT pleural effusion, new since 2/25/2025.      DX-CHEST-LIMITED (1 VIEW)    (Results Pending)       X-Ray:  I have personally reviewed the images and compared with prior images.  EKG:  I have personally reviewed the images and compared with prior images.    Assessment/Plan:  Justification for Admission Status  I anticipate this patient will require at least two midnights for appropriate medical management, necessitating inpatient admission because pleural effusion    Patient will need a Intermediate Care (Adult and Pediatrics) bed on MEDICAL service .  The need is secondary to pleural effusion.    * Decompensated liver disease (HCC)- (present on admission)  Assessment & Plan  MELD 30  Strict ins and outs and daily weights  Monitor volume status  Check INR, ammonia  IV Lasix 40 daily  Hold Aldactone    Pneumomediastinum (HCC)  Assessment & Plan  Patient is not in respiratory distress now after thoracentesis  Possibly from thoracentesis causing bronchial pleural fistula rather than esophageal perforation  I have ordered a chest x-ray for the morning to see progression  Thoracic surgery should be consulted if any acute worsening in respiratory status      Pneumonia- (present on admission)  Assessment & Plan  Multifocal pneumonia  Start IV Zosyn and doxycycline  Follow-up on sputum cultures and blood cultures    Hypotension- (present on admission)  Assessment & Plan  Chronic  Continue midodrine    Acute  respiratory failure with hypoxia (HCC)- (present on admission)  Assessment & Plan  On 2 L of O2      Recurrent pleural effusion- (present on admission)  Assessment & Plan  Patient presented with respiratory distress and was placed on BiPAP  Urgent thoracentesis was completed in ER  I have ordered 1 dose of IV albumin  I believe the patient should get a Pleurx catheter to avoid complications such as her being admitted for respiratory distress    Lactic acid acidosis- (present on admission)  Assessment & Plan  Continue to trend lactic acid      Hyponatremia- (present on admission)  Assessment & Plan  Hold Aldactone  Hypervolemic hyponatremia  Start IV Lasix  Trend BMP    Thrombocytopenia (HCC)- (present on admission)  Assessment & Plan  Chronic  Continue to trend        VTE prophylaxis: SCDs/TEDs        Patient is critically ill.   The patient continues to have: Respiratory distress, pneumomediastinum  The vital organ system that is affected is the: Cardiac, pulmonary  If untreated there is a high chance of deterioration into: Cardiac arrest, septic shock  And eventually death.   The critical care that I am providing today is: Medical management of pneumomediastinum  The critical that has been undertaken is medically complex.   There has been no overlap in critical care time.   Critical Care Time not including procedures: 60 minutes

## 2025-03-01 NOTE — ED NOTES
Health Maintenance Due   Topic Date Due   • IPV Vaccine (3 of 4 - All-IPV series) 2018   • Pneumococcal Vaccine (3 of 4 - Standard Series) 2018   • DTaP/Tdap/Td Vaccine (3 - DTaP) 2018   • Hepatitis B Vaccine (4 of 4 - 4-dose series) 2018   • Influenza Vaccine (1 of 2) 2018       Patient is due for topics as listed above but declines Immunization(s) Hep B, Influenza, IPV and Pneumococcal at this time.  Education provided for Immunization(s) Dtap/Tdap/Td, Hep B, Influenza, IPV and Pneumococcal.             Thoracentesis done. ERP back at bedside.  3.8L fluid removed.

## 2025-03-01 NOTE — ASSESSMENT & PLAN NOTE
This was noted post thoracentesis  I think the changes likely secondary to thoracentesis  It is mild, no worsening on repeat  Patient's respiratory status has markedly improved, no chest pain  If patient develops worsening respiratory symptoms, will will consider repeat imaging and/or thoracic surgery consultation but none is deemed necessary at this time

## 2025-03-01 NOTE — WOUND TEAM
[facility]  Inpatient Services  Wound and Skin Care Brief Evaluation    Admission Date: 2/28/2025     Last order of IP CONSULT TO WOUND CARE was found on 3/1/2025 from Hospital Encounter on 2/28/2025     HPI, PMH, SH: Reviewed    Chief Complaint   Patient presents with    Shortness of Breath     BIB EMS for acute SOB, 72% on home 3 L, arrived on BiPAP with O2 sat 84%, RR 40     Diagnosis: Decompensated liver disease (HCC) [K74.69]    Unit where seen by Wound Team: [id number]     Wound consult placed regarding Mole on L. buttocks. Chart and images reviewed.  . This clinician in to assess patient. Patient pleasant and agreeable. Discussed concern regarding patient's buttocks-patient stated her sacrum does hurt a little.  Skin assessed. Non-selectively debrided with Moist warm washcloth.     Dimple within the gluteal cleft blanches    Bilateral heels      Mole per patient      No pressure injuries or advanced wound care needs identified. Wound consult completed. No further follow up unless indicated and consulted.            PREVENTATIVE INTERVENTIONS:    Q shift Kai - performed per nursing policy  Q shift pressure point assessments - performed per nursing policy    Surface/Positioning  ICU Low Airloss - Currently in Place  Reposition q 2 hours - Ordered  TAPs Turning system - Ordered    Offloading/Redistribution  Sacral offloading dressing (Silicone dressing) - REMOVED  Heel offloading dressing (Silicone dressing) - Ordered      Respiratory  Silicone O2 tubing - Currently in Place  Gray Foam Ear protectors - Currently in Place    Containment/Moisture Prevention    Purwick/Condom Cath - Currently in Place  Barrier paste - Ordered

## 2025-03-01 NOTE — ED NOTES
Thoracentesis performed by Dr. Melgar with Marcello WEBER at bedside. Patient care transferred to Marcello WEBER. Bedside report given. BiPAP in progress.

## 2025-03-01 NOTE — ASSESSMENT & PLAN NOTE
Due to volume overload   this is chronic and now back to her baseline  I started IV steroids which did improve her sodium level  Repeat BMP in the morning  No need for frequent sodium checks

## 2025-03-02 PROBLEM — J96.21 ACUTE ON CHRONIC RESPIRATORY FAILURE WITH HYPOXIA (HCC): Status: ACTIVE | Noted: 2025-01-31

## 2025-03-02 LAB
ALBUMIN SERPL BCP-MCNC: 3.2 G/DL (ref 3.2–4.9)
ALBUMIN/GLOB SERPL: 1.7 G/DL
ALP SERPL-CCNC: 110 U/L (ref 30–99)
ALT SERPL-CCNC: 42 U/L (ref 2–50)
ANION GAP SERPL CALC-SCNC: 9 MMOL/L (ref 7–16)
AST SERPL-CCNC: 64 U/L (ref 12–45)
BASOPHILS # BLD AUTO: 0.3 % (ref 0–1.8)
BASOPHILS # BLD: 0.02 K/UL (ref 0–0.12)
BILIRUB SERPL-MCNC: 7.8 MG/DL (ref 0.1–1.5)
BUN SERPL-MCNC: 8 MG/DL (ref 8–22)
CALCIUM ALBUM COR SERPL-MCNC: 9 MG/DL (ref 8.5–10.5)
CALCIUM SERPL-MCNC: 8.4 MG/DL (ref 8.5–10.5)
CHLORIDE SERPL-SCNC: 93 MMOL/L (ref 96–112)
CO2 SERPL-SCNC: 25 MMOL/L (ref 20–33)
CORTIS SERPL-MCNC: 9.9 UG/DL (ref 0–23)
CREAT SERPL-MCNC: 0.76 MG/DL (ref 0.5–1.4)
EOSINOPHIL # BLD AUTO: 0.12 K/UL (ref 0–0.51)
EOSINOPHIL NFR BLD: 1.8 % (ref 0–6.9)
ERYTHROCYTE [DISTWIDTH] IN BLOOD BY AUTOMATED COUNT: 58.8 FL (ref 35.9–50)
GFR SERPLBLD CREATININE-BSD FMLA CKD-EPI: 92 ML/MIN/1.73 M 2
GLOBULIN SER CALC-MCNC: 1.9 G/DL (ref 1.9–3.5)
GLUCOSE SERPL-MCNC: 87 MG/DL (ref 65–99)
HCT VFR BLD AUTO: 30.1 % (ref 37–47)
HGB BLD-MCNC: 10.7 G/DL (ref 12–16)
IMM GRANULOCYTES # BLD AUTO: 0.04 K/UL (ref 0–0.11)
IMM GRANULOCYTES NFR BLD AUTO: 0.6 % (ref 0–0.9)
LYMPHOCYTES # BLD AUTO: 0.93 K/UL (ref 1–4.8)
LYMPHOCYTES NFR BLD: 14.2 % (ref 22–41)
MCH RBC QN AUTO: 38.5 PG (ref 27–33)
MCHC RBC AUTO-ENTMCNC: 35.5 G/DL (ref 32.2–35.5)
MCV RBC AUTO: 108.3 FL (ref 81.4–97.8)
MONOCYTES # BLD AUTO: 0.37 K/UL (ref 0–0.85)
MONOCYTES NFR BLD AUTO: 5.7 % (ref 0–13.4)
NEUTROPHILS # BLD AUTO: 5.06 K/UL (ref 1.82–7.42)
NEUTROPHILS NFR BLD: 77.4 % (ref 44–72)
NRBC # BLD AUTO: 0 K/UL
NRBC BLD-RTO: 0 /100 WBC (ref 0–0.2)
PLATELET # BLD AUTO: 51 K/UL (ref 164–446)
PLATELETS.RETICULATED NFR BLD AUTO: 3.7 % (ref 0.6–13.1)
PMV BLD AUTO: 9.6 FL (ref 9–12.9)
POTASSIUM SERPL-SCNC: 3.5 MMOL/L (ref 3.6–5.5)
PROCALCITONIN SERPL-MCNC: 0.09 NG/ML
PROT SERPL-MCNC: 5.1 G/DL (ref 6–8.2)
RBC # BLD AUTO: 2.78 M/UL (ref 4.2–5.4)
SODIUM SERPL-SCNC: 127 MMOL/L (ref 135–145)
WBC # BLD AUTO: 6.5 K/UL (ref 4.8–10.8)

## 2025-03-02 PROCEDURE — 99291 CRITICAL CARE FIRST HOUR: CPT | Performed by: INTERNAL MEDICINE

## 2025-03-02 PROCEDURE — 700102 HCHG RX REV CODE 250 W/ 637 OVERRIDE(OP): Performed by: HOSPITALIST

## 2025-03-02 PROCEDURE — A9270 NON-COVERED ITEM OR SERVICE: HCPCS | Performed by: HOSPITALIST

## 2025-03-02 PROCEDURE — 770020 HCHG ROOM/CARE - TELE (206)

## 2025-03-02 PROCEDURE — 82533 TOTAL CORTISOL: CPT

## 2025-03-02 PROCEDURE — 700111 HCHG RX REV CODE 636 W/ 250 OVERRIDE (IP): Mod: JZ | Performed by: INTERNAL MEDICINE

## 2025-03-02 PROCEDURE — 84145 PROCALCITONIN (PCT): CPT

## 2025-03-02 PROCEDURE — 85025 COMPLETE CBC W/AUTO DIFF WBC: CPT

## 2025-03-02 PROCEDURE — 85055 RETICULATED PLATELET ASSAY: CPT

## 2025-03-02 PROCEDURE — 700105 HCHG RX REV CODE 258: Performed by: HOSPITALIST

## 2025-03-02 PROCEDURE — P9047 ALBUMIN (HUMAN), 25%, 50ML: HCPCS | Mod: JZ | Performed by: HOSPITALIST

## 2025-03-02 PROCEDURE — 80053 COMPREHEN METABOLIC PANEL: CPT

## 2025-03-02 PROCEDURE — 700111 HCHG RX REV CODE 636 W/ 250 OVERRIDE (IP): Mod: JZ | Performed by: HOSPITALIST

## 2025-03-02 RX ORDER — NOREPINEPHRINE BITARTRATE 0.03 MG/ML
0-1 INJECTION, SOLUTION INTRAVENOUS CONTINUOUS
Status: DISCONTINUED | OUTPATIENT
Start: 2025-03-02 | End: 2025-03-03

## 2025-03-02 RX ORDER — ALBUMIN (HUMAN) 12.5 G/50ML
12.5 SOLUTION INTRAVENOUS ONCE
Status: DISCONTINUED | OUTPATIENT
Start: 2025-03-02 | End: 2025-03-02

## 2025-03-02 RX ORDER — HYDROCORTISONE SODIUM SUCCINATE 100 MG/2ML
50 INJECTION INTRAMUSCULAR; INTRAVENOUS EVERY 8 HOURS
Status: DISCONTINUED | OUTPATIENT
Start: 2025-03-02 | End: 2025-03-02

## 2025-03-02 RX ORDER — ALBUMIN (HUMAN) 12.5 G/50ML
12.5 SOLUTION INTRAVENOUS ONCE
Status: COMPLETED | OUTPATIENT
Start: 2025-03-02 | End: 2025-03-02

## 2025-03-02 RX ORDER — HYDROCORTISONE SODIUM SUCCINATE 100 MG/2ML
100 INJECTION INTRAMUSCULAR; INTRAVENOUS EVERY 8 HOURS
Status: DISCONTINUED | OUTPATIENT
Start: 2025-03-02 | End: 2025-03-03

## 2025-03-02 RX ADMIN — ALBUMIN (HUMAN) 12.5 G: 0.25 INJECTION, SOLUTION INTRAVENOUS at 01:31

## 2025-03-02 RX ADMIN — MIDODRINE HYDROCHLORIDE 10 MG: 5 TABLET ORAL at 13:02

## 2025-03-02 RX ADMIN — DIBASIC SODIUM PHOSPHATE, MONOBASIC POTASSIUM PHOSPHATE AND MONOBASIC SODIUM PHOSPHATE 250 MG: 852; 155; 130 TABLET ORAL at 05:16

## 2025-03-02 RX ADMIN — MIDODRINE HYDROCHLORIDE 10 MG: 5 TABLET ORAL at 05:16

## 2025-03-02 RX ADMIN — MIDODRINE HYDROCHLORIDE 10 MG: 5 TABLET ORAL at 21:09

## 2025-03-02 RX ADMIN — DOXYCYCLINE 100 MG: 100 TABLET, FILM COATED ORAL at 05:16

## 2025-03-02 RX ADMIN — HYDROCORTISONE SODIUM SUCCINATE 100 MG: 100 INJECTION, POWDER, FOR SOLUTION INTRAMUSCULAR; INTRAVENOUS at 21:09

## 2025-03-02 RX ADMIN — DOXYCYCLINE 100 MG: 100 TABLET, FILM COATED ORAL at 17:35

## 2025-03-02 RX ADMIN — DIBASIC SODIUM PHOSPHATE, MONOBASIC POTASSIUM PHOSPHATE AND MONOBASIC SODIUM PHOSPHATE 250 MG: 852; 155; 130 TABLET ORAL at 17:35

## 2025-03-02 RX ADMIN — OMEPRAZOLE 20 MG: 20 CAPSULE, DELAYED RELEASE ORAL at 05:16

## 2025-03-02 RX ADMIN — PIPERACILLIN AND TAZOBACTAM 4.5 G: 4; .5 INJECTION, POWDER, FOR SOLUTION INTRAVENOUS at 05:22

## 2025-03-02 RX ADMIN — PIPERACILLIN AND TAZOBACTAM 4.5 G: 4; .5 INJECTION, POWDER, FOR SOLUTION INTRAVENOUS at 13:03

## 2025-03-02 RX ADMIN — PIPERACILLIN AND TAZOBACTAM 4.5 G: 4; .5 INJECTION, POWDER, FOR SOLUTION INTRAVENOUS at 21:15

## 2025-03-02 RX ADMIN — HYDROCORTISONE SODIUM SUCCINATE 50 MG: 100 INJECTION, POWDER, FOR SOLUTION INTRAMUSCULAR; INTRAVENOUS at 13:09

## 2025-03-02 ASSESSMENT — PAIN DESCRIPTION - PAIN TYPE
TYPE: ACUTE PAIN

## 2025-03-02 ASSESSMENT — ENCOUNTER SYMPTOMS
CONSTIPATION: 0
DIZZINESS: 0
ABDOMINAL PAIN: 0
STRIDOR: 0
TINGLING: 0
DEPRESSION: 0
HEADACHES: 0
NAUSEA: 0
PALPITATIONS: 0
SHORTNESS OF BREATH: 1
MYALGIAS: 0
SPUTUM PRODUCTION: 0
FEVER: 0
VOMITING: 0
DIARRHEA: 0
WEAKNESS: 0
FALLS: 0
COUGH: 1
CHILLS: 0
LOSS OF CONSCIOUSNESS: 0

## 2025-03-02 NOTE — PROGRESS NOTES
Patient has had one blood pressure this shift under 90 systolic. Bps holding around 90s/50s, notified provider, lasix will be held

## 2025-03-02 NOTE — CARE PLAN
The patient is Watcher - Medium risk of patient condition declining or worsening    Shift Goals  Clinical Goals: Blood pressure will remain above 90 systolic  Patient Goals: rest  Family Goals: lazaro    Progress made toward(s) clinical / shift goals:  Patient oxygen titrated down to 0.5L patient remained above 92% SPO2 this shift. Lung sounds free of wheezes/crackles. Diminished in all bases. Patient blood pressure did not remain above 90 systolic this shift. MD notified and albumin administered. Midodrine scheduled q8h    Problem: Respiratory:   Goal: Respiratory status will improve  Outcome: Progressing     Problem: Hemodynamics  Goal: Patient's hemodynamics, fluid balance and neurologic status will be stable or improve  Outcome: Progressing

## 2025-03-02 NOTE — PROGRESS NOTES
Bedside report received from José Miguel WEBER    Patient is A&O 4 , pt calls for assistance appropriately  Patient pain level declines    Patient on 1L nc    Hourly rounding in place, call light within reach, bed locked in lowest position, frame alarm on. Patient has friend at bedside. All patient needs met at this time.

## 2025-03-02 NOTE — CARE PLAN
Problem: Knowledge Deficit - Standard  Goal: Patient and family/care givers will demonstrate understanding of plan of care, disease process/condition, diagnostic tests and medications  Description: Target End Date:  1-3 days or as soon as patient condition allows    Document in Patient Education    1.  Patient and family/caregiver oriented to unit, equipment, visitation policy and means for communicating concern  2.  Complete/review Learning Assessment  3.  Assess knowledge level of disease process/condition, treatment plan, diagnostic tests and medications  4.  Explain disease process/condition, treatment plan, diagnostic tests and medications  Outcome: Progressing     Problem: Safety  Goal: Will remain free from injury  Outcome: Progressing     Problem: Pain Management  Goal: Pain level will decrease to patient's comfort goal  Outcome: Progressing   The patient is Stable - Low risk of patient condition declining or worsening    Shift Goals  Clinical Goals: Blood pressure will remain above 90 systolic  Patient Goals: rest  Family Goals: lazaro    Progress made toward(s) clinical / shift goals:  Monitor Oxygen, VS, Labs, Pain management. Abx therapy..     Patient is not progressing towards the following goals:

## 2025-03-02 NOTE — PROGRESS NOTES
[facility] Medicine Daily Progress Note    Date of Service  3/2/2025    Chief Complaint  Stacy Oliva is a 55 y.o. female admitted 2/28/2025 with shortness of breath.    Hospital Course  55-year-old female with a history of alcoholic cirrhosis, recurrent right sided pleural effusions status post multiple thoracentesis presented to the emergency department due to shortness of breath.  Patient states she was recently discharged on 2/27, has been short of breath ever since.  She does states she has a nonproductive cough.  Upon arrival, patient did have complete opacification of the right hemithorax with shift of mediastinal structures consistent with a large right pleural effusion.  Patient was initially placed on BiPAP support, she underwent thoracentesis in the ER with significant fluid out.  After this, she did improved and BiPAP was able to be removed.  After thoracentesis, chest x-ray was obtained which did show marked improvement in the pleural effusion however mild pneumomediastinum.  CT scan was also obtained which confirmed the pneumomediastinum, also diagnosed with multifocal pneumonia.    Interval Problem Update  Patient has had borderline low blood pressure, midodrine was initiated  Patient continued to have borderline low blood pressures, I obtained a cortisol with relative adrenal insufficiency so steroids were initiated today.  Patient still complains of some shortness of breath but it has improved.    I have discussed this patient's plan of care and discharge plan at IDT rounds today with Case Management, Nursing, Nursing leadership, and other members of the IDT team.    Consultants/Specialty  None    Code Status  Full Code    Disposition  The patient is not medically cleared for discharge to home or a post-acute facility.  Anticipate discharge to: home with close outpatient follow-up    I have placed the appropriate orders for post-discharge needs.    Review of Systems  Review of Systems    Constitutional:  Positive for malaise/fatigue. Negative for chills and fever.   HENT:  Negative for congestion.    Respiratory:  Positive for cough and shortness of breath. Negative for sputum production and stridor.    Cardiovascular:  Negative for chest pain, palpitations and leg swelling.   Gastrointestinal:  Negative for abdominal pain, constipation, diarrhea, nausea and vomiting.   Genitourinary:  Negative for dysuria and urgency.   Musculoskeletal:  Negative for falls and myalgias.   Neurological:  Negative for dizziness, tingling, loss of consciousness, weakness and headaches.   Psychiatric/Behavioral:  Negative for depression and suicidal ideas.    All other systems reviewed and are negative.       Physical Exam  Temp:  [36.1 °C (96.9 °F)-36.3 °C (97.4 °F)] 36.3 °C (97.4 °F)  Pulse:  [] 90  Resp:  [15-29] 19  BP: ()/(45-60) 95/50  SpO2:  [92 %-98 %] 95 %    Physical Exam  Vitals and nursing note reviewed.   Constitutional:       General: She is not in acute distress.     Appearance: She is well-developed. She is not diaphoretic.   HENT:      Head: Normocephalic and atraumatic.      Right Ear: External ear normal.      Left Ear: External ear normal.      Nose: Nose normal. No congestion or rhinorrhea.      Mouth/Throat:      Mouth: Mucous membranes are moist.      Pharynx: No oropharyngeal exudate.   Eyes:      General: Scleral icterus present.         Right eye: No discharge.         Left eye: No discharge.   Neck:      Trachea: No tracheal deviation.   Cardiovascular:      Rate and Rhythm: Normal rate and regular rhythm.      Heart sounds: No murmur heard.     No friction rub. No gallop.   Pulmonary:      Effort: Pulmonary effort is normal. No respiratory distress.      Breath sounds: No stridor. Rales present. No wheezing.   Chest:      Chest wall: No tenderness.   Abdominal:      General: Bowel sounds are normal. There is no distension.      Palpations: Abdomen is soft.      Tenderness:  There is no abdominal tenderness.   Musculoskeletal:         General: No tenderness. Normal range of motion.      Cervical back: Neck supple.      Right lower leg: No edema.      Left lower leg: No edema.   Lymphadenopathy:      Cervical: No cervical adenopathy.   Skin:     General: Skin is warm and dry.      Coloration: Skin is jaundiced.      Findings: No erythema or rash.   Neurological:      Mental Status: She is alert and oriented to person, place, and time.      Cranial Nerves: No cranial nerve deficit.   Psychiatric:         Mood and Affect: Mood normal.         Behavior: Behavior normal.         Thought Content: Thought content normal.         Judgment: Judgment normal.         Fluids    Intake/Output Summary (Last 24 hours) at 3/2/2025 1052  Last data filed at 3/2/2025 1000  Gross per 24 hour   Intake 1299.52 ml   Output 1040 ml   Net 259.52 ml        Laboratory  Recent Labs     02/28/25 2158 03/02/25  0538   WBC 16.6* 6.5   RBC 3.67* 2.78*   HEMOGLOBIN 13.9 10.7*   HEMATOCRIT 39.6 30.1*   .9* 108.3*   MCH 37.9* 38.5*   MCHC 35.1 35.5   RDW 59.8* 58.8*   PLATELETCT 89* 51*   MPV 10.0 9.6     Recent Labs     02/28/25 2158 03/02/25  0230   SODIUM 124* 127*   POTASSIUM 3.6 3.5*   CHLORIDE 90* 93*   CO2 21 25   GLUCOSE 124* 87   BUN 9 8   CREATININE 0.66 0.76   CALCIUM 8.2* 8.4*     Recent Labs     02/28/25 2158   INR 2.06*               Imaging  DX-CHEST-LIMITED (1 VIEW)   Final Result         1. Increasing diffuse left lung alveolar infiltrate.      2. Pneumomediastinum again noted. No pneumothorax or pleural effusion.                     CT-CHEST (THORAX) WITH   Final Result      1.  Pneumomediastinum of uncertain etiology.   2.  Small RIGHT pleural fluid collection.   3.  No pneumothorax.   4.  Multifocal pneumonia/pneumonitis, worse on the LEFT.      Fleischner Society pulmonary nodule recommendations:   Not Applicable         DX-CHEST-PORTABLE (1 VIEW)   Final Result      1.  Marked improvement  of RIGHT pleural effusion.   2.  No pneumothorax.   3.  Possible pneumomediastinum.      DX-CHEST-PORTABLE (1 VIEW)   Final Result      Opacification of RIGHT hemithorax with shift of mediastinal structures consistent with large RIGHT pleural effusion, new since 2/25/2025.           Assessment/Plan  * Decompensated liver disease (HCC)- (present on admission)  Assessment & Plan  Her MELD is 30  With significant fluid overload especially right sided pleural effusion, now status post thoracentesis  Patient has had recurrent pleural effusions requiring thoracentesis, denies history of paracentesis  If her blood pressure improves, will consider imaging to determine how much ascites she has  Unfortunately, right now unable to give IV Lasix due to low blood pressure  Patient would benefit from Lasix and spironolactone when able  Associated with hypotension, continue midodrine    Pneumonia- (present on admission)  Assessment & Plan  Because of her recent hospital stay, she was started on Zosyn and Doxy, continue  I will obtain a procalcitonin for a baseline and to determine if de-escalation is possible  Patient has had improvement on these antibiotics, for now we will continue    Hypotension- (present on admission)  Assessment & Plan  Appears to be acute on chronic  Midodrine was added however patient continued to have borderline low blood pressure  Because of this, I did obtain a cortisol level, she does have relative adrenal insufficiency with a cortisol level of 9.9  Because of this, I am going to start low-dose Solu-Cortef  At this point in time, I do not feel patient requires pressor support, her mean arterial pressure has been greater than 60  She does however require close ongoing monitoring  While she does have pneumonia, I do not feel there is sepsis causing her hypotension  Addendum: Initially did improve with Solu-Cortef at 50 mg however I did just discuss the case with the bedside RN, systolic blood pressure is  now dropped to 70/51  Because of this, I am going to increase Solu-Cortef 200 mg every 8 hours  I will also add Levophed to maintain a MAP greater than 60 or systolic blood pressure greater than 90    Patient is critically ill.   The patient continues to have: Shock  The vital organ system that is affected is the: All are at risk  If untreated there is a high chance of deterioration into: Worsening shock  And eventually death.   The critical care that I am providing today is: Levophed drip  The critical that has been undertaken is medically complex.   There has been no overlap in critical care time.   Critical Care Time not including procedures: 42 minutes    Acute on chronic respiratory failure with hypoxia (HCC)- (present on admission)  Assessment & Plan  Patient initially was satting 72% on her home 3 L nasal cannula  Markedly improved status post thoracentesis  She does have pneumonia and is at risk of worsening and does require ongoing monitoring    Recurrent pleural effusion- (present on admission)  Assessment & Plan  I do believe this is secondary to her cirrhosis  Large volume right thoracentesis in the ER  Once her blood pressure improves and she could tolerate a paracentesis, we will consider imaging to further evaluate for need for paracentesis however I would anticipate she has relatively large volume ascites  She states she has no history of paracentesis  Patient would benefit from a discharge on Lasix and spironolactone if blood pressure tolerates  Pleural effusion was very large and caused acute on chronic respiratory failure with hypoxia, now improved    Pneumomediastinum (HCC)  Assessment & Plan  This was noted post thoracentesis  I think the changes likely secondary to thoracentesis  It is mild, no worsening on repeat  Patient's respiratory status has markedly improved, no chest pain  If patient develops worsening respiratory symptoms, will will consider repeat imaging and/or thoracic surgery  consultation but none is deemed necessary at this time    Lactic acid acidosis- (present on admission)  Assessment & Plan  Likely due to hypoperfusion and hypoxia  I do not feel this is secondary to sepsis  Repeat a lactic acid in the morning    Hyponatremia- (present on admission)  Assessment & Plan  Due to volume overload   this is chronic and now back to her baseline  I am starting IV steroids which will likely improve her sodium level  Repeat BMP in the morning  No need for frequent sodium checks    Thrombocytopenia (HCC)- (present on admission)  Assessment & Plan  No sign of gross bleeding  Repeat CBC in the morning         VTE prophylaxis:   SCDs/TEDs      I have performed a physical exam and reviewed and updated ROS and Plan today (3/2/2025). In review of yesterday's note (3/1/2025), there are no changes except as documented above.

## 2025-03-03 ENCOUNTER — APPOINTMENT (OUTPATIENT)
Dept: MEDICAL GROUP | Facility: CLINIC | Age: 55
End: 2025-03-03
Payer: COMMERCIAL

## 2025-03-03 LAB
ALBUMIN SERPL BCP-MCNC: 3.2 G/DL (ref 3.2–4.9)
ALBUMIN/GLOB SERPL: 1.6 G/DL
ALP SERPL-CCNC: 117 U/L (ref 30–99)
ALT SERPL-CCNC: 42 U/L (ref 2–50)
ANION GAP SERPL CALC-SCNC: 13 MMOL/L (ref 7–16)
AST SERPL-CCNC: 44 U/L (ref 12–45)
BACTERIA UR CULT: NORMAL
BILIRUB SERPL-MCNC: 7.5 MG/DL (ref 0.1–1.5)
BUN SERPL-MCNC: 9 MG/DL (ref 8–22)
CALCIUM ALBUM COR SERPL-MCNC: 9 MG/DL (ref 8.5–10.5)
CALCIUM SERPL-MCNC: 8.4 MG/DL (ref 8.5–10.5)
CHLORIDE SERPL-SCNC: 91 MMOL/L (ref 96–112)
CO2 SERPL-SCNC: 25 MMOL/L (ref 20–33)
CREAT SERPL-MCNC: 0.75 MG/DL (ref 0.5–1.4)
ERYTHROCYTE [DISTWIDTH] IN BLOOD BY AUTOMATED COUNT: 57.2 FL (ref 35.9–50)
GFR SERPLBLD CREATININE-BSD FMLA CKD-EPI: 93 ML/MIN/1.73 M 2
GLOBULIN SER CALC-MCNC: 2 G/DL (ref 1.9–3.5)
GLUCOSE SERPL-MCNC: 126 MG/DL (ref 65–99)
HCT VFR BLD AUTO: 32.6 % (ref 37–47)
HGB BLD-MCNC: 11.4 G/DL (ref 12–16)
LACTATE SERPL-SCNC: 2.1 MMOL/L (ref 0.5–2)
MCH RBC QN AUTO: 37.5 PG (ref 27–33)
MCHC RBC AUTO-ENTMCNC: 35 G/DL (ref 32.2–35.5)
MCV RBC AUTO: 107.2 FL (ref 81.4–97.8)
PLATELET # BLD AUTO: 59 K/UL (ref 164–446)
PLATELETS.RETICULATED NFR BLD AUTO: 3.1 % (ref 0.6–13.1)
PMV BLD AUTO: 10.3 FL (ref 9–12.9)
POTASSIUM SERPL-SCNC: 3 MMOL/L (ref 3.6–5.5)
PRELIMINARY RPT Q0601: NORMAL
PROT SERPL-MCNC: 5.2 G/DL (ref 6–8.2)
RBC # BLD AUTO: 3.04 M/UL (ref 4.2–5.4)
RHODAMINE-AURAMINE STN SPEC: NORMAL
SIGNIFICANT IND 70042: NORMAL
SITE SITE: NORMAL
SODIUM SERPL-SCNC: 129 MMOL/L (ref 135–145)
SOURCE SOURCE: NORMAL
WBC # BLD AUTO: 6.8 K/UL (ref 4.8–10.8)

## 2025-03-03 PROCEDURE — 700105 HCHG RX REV CODE 258: Performed by: HOSPITALIST

## 2025-03-03 PROCEDURE — 80053 COMPREHEN METABOLIC PANEL: CPT

## 2025-03-03 PROCEDURE — 770006 HCHG ROOM/CARE - MED/SURG/GYN SEMI*

## 2025-03-03 PROCEDURE — 83605 ASSAY OF LACTIC ACID: CPT

## 2025-03-03 PROCEDURE — 85027 COMPLETE CBC AUTOMATED: CPT

## 2025-03-03 PROCEDURE — A9270 NON-COVERED ITEM OR SERVICE: HCPCS

## 2025-03-03 PROCEDURE — 700102 HCHG RX REV CODE 250 W/ 637 OVERRIDE(OP)

## 2025-03-03 PROCEDURE — 700102 HCHG RX REV CODE 250 W/ 637 OVERRIDE(OP): Performed by: HOSPITALIST

## 2025-03-03 PROCEDURE — 700111 HCHG RX REV CODE 636 W/ 250 OVERRIDE (IP): Mod: JZ | Performed by: INTERNAL MEDICINE

## 2025-03-03 PROCEDURE — 99233 SBSQ HOSP IP/OBS HIGH 50: CPT | Performed by: INTERNAL MEDICINE

## 2025-03-03 PROCEDURE — 700111 HCHG RX REV CODE 636 W/ 250 OVERRIDE (IP): Mod: JZ | Performed by: HOSPITALIST

## 2025-03-03 PROCEDURE — 85055 RETICULATED PLATELET ASSAY: CPT

## 2025-03-03 PROCEDURE — A9270 NON-COVERED ITEM OR SERVICE: HCPCS | Performed by: INTERNAL MEDICINE

## 2025-03-03 PROCEDURE — A9270 NON-COVERED ITEM OR SERVICE: HCPCS | Performed by: HOSPITALIST

## 2025-03-03 PROCEDURE — 700102 HCHG RX REV CODE 250 W/ 637 OVERRIDE(OP): Performed by: INTERNAL MEDICINE

## 2025-03-03 RX ORDER — POTASSIUM CHLORIDE 1500 MG/1
40 TABLET, EXTENDED RELEASE ORAL 2 TIMES DAILY
Status: COMPLETED | OUTPATIENT
Start: 2025-03-03 | End: 2025-03-04

## 2025-03-03 RX ORDER — POTASSIUM CHLORIDE 1500 MG/1
40 TABLET, EXTENDED RELEASE ORAL ONCE
Status: COMPLETED | OUTPATIENT
Start: 2025-03-03 | End: 2025-03-03

## 2025-03-03 RX ORDER — HYDROCORTISONE SODIUM SUCCINATE 100 MG/2ML
50 INJECTION INTRAMUSCULAR; INTRAVENOUS EVERY 8 HOURS
Status: DISCONTINUED | OUTPATIENT
Start: 2025-03-03 | End: 2025-03-06

## 2025-03-03 RX ADMIN — MIDODRINE HYDROCHLORIDE 10 MG: 5 TABLET ORAL at 05:15

## 2025-03-03 RX ADMIN — SPIRONOLACTONE 50 MG: 25 TABLET ORAL at 05:15

## 2025-03-03 RX ADMIN — DIBASIC SODIUM PHOSPHATE, MONOBASIC POTASSIUM PHOSPHATE AND MONOBASIC SODIUM PHOSPHATE 250 MG: 852; 155; 130 TABLET ORAL at 05:15

## 2025-03-03 RX ADMIN — HYDROCORTISONE SODIUM SUCCINATE 50 MG: 100 INJECTION, POWDER, FOR SOLUTION INTRAMUSCULAR; INTRAVENOUS at 21:46

## 2025-03-03 RX ADMIN — DOXYCYCLINE 100 MG: 100 TABLET, FILM COATED ORAL at 17:35

## 2025-03-03 RX ADMIN — MIDODRINE HYDROCHLORIDE 10 MG: 5 TABLET ORAL at 21:46

## 2025-03-03 RX ADMIN — POTASSIUM CHLORIDE 40 MEQ: 1500 TABLET, EXTENDED RELEASE ORAL at 05:15

## 2025-03-03 RX ADMIN — HYDROCORTISONE SODIUM SUCCINATE 100 MG: 100 INJECTION, POWDER, FOR SOLUTION INTRAMUSCULAR; INTRAVENOUS at 05:15

## 2025-03-03 RX ADMIN — PIPERACILLIN AND TAZOBACTAM 4.5 G: 4; .5 INJECTION, POWDER, FOR SOLUTION INTRAVENOUS at 05:32

## 2025-03-03 RX ADMIN — DOXYCYCLINE 100 MG: 100 TABLET, FILM COATED ORAL at 05:15

## 2025-03-03 RX ADMIN — MIDODRINE HYDROCHLORIDE 10 MG: 5 TABLET ORAL at 13:00

## 2025-03-03 RX ADMIN — OMEPRAZOLE 20 MG: 20 CAPSULE, DELAYED RELEASE ORAL at 05:15

## 2025-03-03 RX ADMIN — POTASSIUM CHLORIDE 40 MEQ: 1500 TABLET, EXTENDED RELEASE ORAL at 17:36

## 2025-03-03 RX ADMIN — DIBASIC SODIUM PHOSPHATE, MONOBASIC POTASSIUM PHOSPHATE AND MONOBASIC SODIUM PHOSPHATE 250 MG: 852; 155; 130 TABLET ORAL at 17:36

## 2025-03-03 RX ADMIN — PIPERACILLIN AND TAZOBACTAM 4.5 G: 4; .5 INJECTION, POWDER, FOR SOLUTION INTRAVENOUS at 13:44

## 2025-03-03 RX ADMIN — HYDROCORTISONE SODIUM SUCCINATE 100 MG: 100 INJECTION, POWDER, FOR SOLUTION INTRAMUSCULAR; INTRAVENOUS at 13:00

## 2025-03-03 ASSESSMENT — ENCOUNTER SYMPTOMS
MYALGIAS: 0
DIZZINESS: 0
WEAKNESS: 0
SORE THROAT: 0
NECK PAIN: 0
HEADACHES: 0
CHILLS: 0
COUGH: 0
ABDOMINAL PAIN: 0
LOSS OF CONSCIOUSNESS: 0
SPUTUM PRODUCTION: 0
VOMITING: 0
DEPRESSION: 0
FALLS: 0
SHORTNESS OF BREATH: 0
NAUSEA: 0
FEVER: 0
STRIDOR: 0

## 2025-03-03 ASSESSMENT — PAIN DESCRIPTION - PAIN TYPE
TYPE: ACUTE PAIN

## 2025-03-03 ASSESSMENT — FIBROSIS 4 INDEX: FIB4 SCORE: 6.44

## 2025-03-03 NOTE — CARE PLAN
Problem: Pain Management  Goal: Pain level will decrease to patient's comfort goal  Outcome: Progressing     Problem: Respiratory:  Goal: Respiratory status will improve  Outcome: Progressing     Problem: Hemodynamics  Goal: Patient's hemodynamics, fluid balance and neurologic status will be stable or improve  Description: Target End Date:  Prior to discharge or change in level of care    Document on Assessment and I/O flowsheet templates    1.  Monitor vital signs, pulse oximetry and cardiac monitor per provider order and/or policy  2.  Maintain blood pressure per provider order  3.  Hemodynamic monitoring per provider order  4.  Manage IV fluids and IV infusions  5.  Monitor intake and output  6.  Daily weights per unit policy or provider order  7.  Assess peripheral pulses and capillary refill  8.  Assess color and body temperature  9.  Position patient for maximum circulation/cardiac output  10. Monitor for signs/symptoms of excessive bleeding  11. Assess mental status, restlessness and changes in level of consciousness  12. Monitor temperature and report fever or hypothermia to provider immediately. Consideration of targeted temperature management.  Outcome: Progressing   The patient is Watcher - Medium risk of patient condition declining or worsening    Shift Goals  Clinical Goals: Hemodynamic stability  Patient Goals: Rest  Family Goals: Updates    Progress made toward(s) clinical / shift goals:  pain management per MAR, improve respiratory status, monitor bp    Patient is not progressing towards the following goals:

## 2025-03-03 NOTE — PROGRESS NOTES
Bedside report received, Assume care.     A&O x 4, Able to make needs known.  Pain Assessment: 0/10 . Medication provided per MAR.  Continuous cardiac and [place] monitoring in place.     Bed in low position and locked, pt resting comfortably now, call light with in reach, all needs met at this time. Interventions will be executed per plan of care.   1416- Report given to GUS Patel from [place] [id number], Pt and family aware and compliant of transfer orders.

## 2025-03-03 NOTE — DISCHARGE PLANNING
Care Transition Team Assessment    LSW met with pt and pt's spouse, Willy, at bedside to complete assessment. Pt's physical home address is [address] [place] [id number]. Pt lives live Willy and her brother in law, Jose, in a two story house that has nine steps to enter. Pt reported upon DC, she will be staying on the first floor of the home. Pt's spouse and MINA intend to carry pt up the nine steps into the home. Prior to recent hospitalizations pt was independent with ADLs/IADLs, however has progressively required more assistance. Willy reported between him and Jose they are able to assist pt with everything as needed. Willy does not work at this time and Jose runs his own business and can be available whenever. Pt has a bedside commode, FWW, travel wheelchair, and uses O2 at home through [employer] (last order was written for 1L). 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 Pt denies any financial concerns at this time. Pt has a history of etoh use and is currently sober. No MH concerns. Willy reported they just signed advance directive documents yesterday and will bring them into the hospital.    Willy asked for assistance with getting HHC for pt. They are aware there is only one HHC agency that will go to [place], [place] ([facility]) and that this agency may not accept pt's insurance ([employer]). Choice form signed and faxed to [facility] and requested HHC orders and face to face from attending.    Information Source  Orientation Level: Oriented X4  Information Given By: Spouse  Informant's Name: Willy Oliva  Who is responsible for making decisions for patient? : Patient    Readmission Evaluation  Is this a readmission?: Yes - unplanned readmission    Elopement Risk  Legal Hold: No  Ambulatory or Self Mobile in Wheelchair: No-Not an Elopement Risk  Disoriented: No  Psychiatric Symptoms: None  History of Wandering: No  Elopement this Admit: No  Vocalizing Wanting to Leave: No  Displays  Behaviors, Body Language Wanting to Leave: No-Not at Risk for Elopement  Elopement Risk: Not at Risk for Elopement    Discharge Preparedness  What is your plan after discharge?: Home with help  What are your discharge supports?: Spouse, Sibling  Prior Functional Level: Ambulatory, Independent with Medication Management, Uses Walker, Needs Assist with Activities of Daily Living  Difficulity with ADLs: None  Difficulity with IADLs: Cooking, Laundry, Shopping    Functional Assesment  Prior Functional Level: Ambulatory, Independent with Medication Management, Uses Walker, Needs Assist with Activities of Daily Living    Finances  Financial Barriers to Discharge: No  Prescription Coverage: Yes    Advance Directive  Advance Directive?: Living Will, DPOA for Health Care  Durable Power of  Name and Contact : Willy Oliva    Domestic Abuse  Have you ever been the victim of abuse or violence?: No    Psychological Assessment  History of Substance Abuse: Alcohol  History of Psychiatric Problems: No  Non-compliant with Treatment: No  Newly Diagnosed Illness: No    Discharge Risks or Barriers  Discharge risks or barriers?: Substance abuse  Patient risk factors: Cognitive / sensory / physical deficit, Complex medical needs, Readmission, Substance abuse    Anticipated Discharge Information  Discharge Disposition: Discharged to home/self care (01)  Discharge Address: [address] [place] [id number]  Discharge Contact Phone Number: [phone]

## 2025-03-03 NOTE — PROGRESS NOTES
[facility] Medicine Daily Progress Note    Date of Service  3/3/2025    Chief Complaint  Stacy Oliva is a 55 y.o. female admitted 2/28/2025 with shortness of breath.    Hospital Course  55-year-old female with a history of alcoholic cirrhosis, recurrent right sided pleural effusions status post multiple thoracentesis presented to the emergency department due to shortness of breath.  Patient states she was recently discharged on 2/27, has been short of breath ever since.  She does states she has a nonproductive cough.  Upon arrival, patient did have complete opacification of the right hemithorax with shift of mediastinal structures consistent with a large right pleural effusion.  Patient was initially placed on BiPAP support, she underwent thoracentesis in the ER with significant fluid out.  After this, she did improved and BiPAP was able to be removed.  After thoracentesis, chest x-ray was obtained which did show marked improvement in the pleural effusion however mild pneumomediastinum.  CT scan was also obtained which confirmed the pneumomediastinum, also diagnosed with multifocal pneumonia.    Interval Problem Update  Patient has had borderline low blood pressure, midodrine was initiated  Patient continued to have borderline low blood pressures, I obtained a cortisol with relative adrenal insufficiency so steroids were initiated today.  Patient was complaining of some shortness of breath but it has resolved.  Patient continues to have borderline low blood pressure, yesterday Solu-Cortef was increased to 100 mg every 8    Overnight  No acute events  Systolic blood pressure 90-1 20  NSR  Afebrile    I have discussed this patient's plan of care and discharge plan at IDT rounds today with Case Management, Nursing, Nursing leadership, and other members of the IDT team.    Consultants/Specialty  None    Code Status  Full Code    Disposition  The patient is not medically cleared for discharge to home or a post-acute  facility.  Anticipate discharge to: home with organized home healthcare and close outpatient follow-up    I have placed the appropriate orders for post-discharge needs.    Review of Systems  Review of Systems   Constitutional:  Negative for chills, fever and malaise/fatigue.   HENT:  Negative for congestion and sore throat.    Respiratory:  Negative for cough, sputum production, shortness of breath and stridor.    Cardiovascular:  Negative for chest pain and leg swelling.   Gastrointestinal:  Negative for abdominal pain, nausea and vomiting.   Genitourinary:  Negative for dysuria, frequency and urgency.   Musculoskeletal:  Negative for falls, myalgias and neck pain.   Neurological:  Negative for dizziness, loss of consciousness, weakness and headaches.   Psychiatric/Behavioral:  Negative for depression and suicidal ideas.    All other systems reviewed and are negative.       Physical Exam  Temp:  [36.5 °C (97.7 °F)-36.7 °C (98 °F)] 36.5 °C (97.7 °F)  Pulse:  [] 88  Resp:  [14-53] 21  BP: ()/(44-67) 106/63  SpO2:  [90 %-98 %] 97 %    Physical Exam  Vitals and nursing note reviewed.   Constitutional:       General: She is not in acute distress.     Appearance: She is well-developed. She is ill-appearing. She is not diaphoretic.   HENT:      Head: Normocephalic and atraumatic.      Right Ear: External ear normal.      Left Ear: External ear normal.      Nose: No congestion or rhinorrhea.      Mouth/Throat:      Mouth: Mucous membranes are moist.      Pharynx: No oropharyngeal exudate.   Eyes:      General: Scleral icterus present.         Right eye: No discharge.         Left eye: No discharge.   Neck:      Trachea: No tracheal deviation.   Cardiovascular:      Rate and Rhythm: Normal rate and regular rhythm.      Heart sounds: No murmur heard.  Pulmonary:      Effort: Pulmonary effort is normal. No respiratory distress.      Breath sounds: No stridor. No wheezing or rales.   Chest:      Chest wall: No  tenderness.   Abdominal:      General: Bowel sounds are normal. There is no distension.      Palpations: Abdomen is soft.      Tenderness: There is no abdominal tenderness.   Musculoskeletal:         General: No tenderness.      Cervical back: Normal range of motion and neck supple.      Right lower leg: No edema.      Left lower leg: No edema.   Lymphadenopathy:      Cervical: No cervical adenopathy.   Skin:     General: Skin is warm and dry.      Coloration: Skin is jaundiced.      Findings: No erythema.   Neurological:      General: No focal deficit present.      Mental Status: She is alert and oriented to person, place, and time.      Cranial Nerves: No cranial nerve deficit.   Psychiatric:         Mood and Affect: Mood normal.         Behavior: Behavior normal.         Judgment: Judgment normal.         Fluids    Intake/Output Summary (Last 24 hours) at 3/3/2025 1309  Last data filed at 3/3/2025 0800  Gross per 24 hour   Intake 160.22 ml   Output 350 ml   Net -189.78 ml        Laboratory  Recent Labs     02/28/25 2158 03/02/25  0538 03/03/25  0245   WBC 16.6* 6.5 6.8   RBC 3.67* 2.78* 3.04*   HEMOGLOBIN 13.9 10.7* 11.4*   HEMATOCRIT 39.6 30.1* 32.6*   .9* 108.3* 107.2*   MCH 37.9* 38.5* 37.5*   MCHC 35.1 35.5 35.0   RDW 59.8* 58.8* 57.2*   PLATELETCT 89* 51* 59*   MPV 10.0 9.6 10.3     Recent Labs     02/28/25 2158 03/02/25  0230 03/03/25  0245   SODIUM 124* 127* 129*   POTASSIUM 3.6 3.5* 3.0*   CHLORIDE 90* 93* 91*   CO2 21 25 25   GLUCOSE 124* 87 126*   BUN 9 8 9   CREATININE 0.66 0.76 0.75   CALCIUM 8.2* 8.4* 8.4*     Recent Labs     02/28/25 2158   INR 2.06*               Imaging  DX-CHEST-LIMITED (1 VIEW)   Final Result         1. Increasing diffuse left lung alveolar infiltrate.      2. Pneumomediastinum again noted. No pneumothorax or pleural effusion.                     CT-CHEST (THORAX) WITH   Final Result      1.  Pneumomediastinum of uncertain etiology.   2.  Small RIGHT pleural fluid  collection.   3.  No pneumothorax.   4.  Multifocal pneumonia/pneumonitis, worse on the LEFT.      Fleischner Society pulmonary nodule recommendations:   Not Applicable         DX-CHEST-PORTABLE (1 VIEW)   Final Result      1.  Marked improvement of RIGHT pleural effusion.   2.  No pneumothorax.   3.  Possible pneumomediastinum.      DX-CHEST-PORTABLE (1 VIEW)   Final Result      Opacification of RIGHT hemithorax with shift of mediastinal structures consistent with large RIGHT pleural effusion, new since 2/25/2025.           Assessment/Plan  * Decompensated liver disease (HCC)- (present on admission)  Assessment & Plan  Her MELD is 30  With significant fluid overload especially right sided pleural effusion, now status post thoracentesis  Patient has had recurrent pleural effusions requiring thoracentesis, denies history of paracentesis  If her blood pressure improves, will consider imaging to determine how much ascites she has  Unfortunately, right now unable to give IV Lasix due to low blood pressure  Patient would benefit from Lasix and spironolactone when able  Associated with hypotension, continue midodrine  While her abdominal exam is not significantly distended or taut, I will obtain an ultrasound to evaluate for ascites considering recurrent pleural effusions    Pneumonia- (present on admission)  Assessment & Plan  Because of her recent hospital stay, she was started on Zosyn and Doxy  Patient has had improvement with these antibiotics  Procalcitonin normal, will DC Zosyn and finish the course with doxycycline  Patient has had marked improvement in her cough and shortness of breath as well as her hypoxia    Hypotension- (present on admission)  Assessment & Plan  Appears to be acute on chronic  Midodrine was added however patient continued to have borderline low blood pressure  Because of this, I did obtain a cortisol level, she does have relative adrenal insufficiency with a cortisol level of 9.9  Because of  this, I am going to start low-dose Solu-Cortef  At this point in time, I do not feel patient requires pressor support, her mean arterial pressure has been greater than 60  She does however require close ongoing monitoring  While she does have pneumonia, I do not feel there is sepsis causing her hypotension  Addendum: Initially did improve with Solu-Cortef at 50 mg however I did just discuss the case with the bedside RN, systolic blood pressure then now dropped to 70/51  Because of this, I am increased Solu-Cortef yesterday 100 mg every 8 hours  I will added Levophed to maintain a MAP greater than 60 or systolic blood pressure greater than 90  Patient drastically improved with Solu-Cortef increased to 100 mg every 8 hours, systolic blood pressure has been greater than 90 overnight and all day today, will decrease Solu-Cortef back to 50 mg every 8 hours  Continue midodrine at 10 mg 3 times daily      Acute on chronic respiratory failure with hypoxia (HCC)- (present on admission)  Assessment & Plan  Patient initially was satting 72% on her home 3 L nasal cannula  Markedly improved status post thoracentesis  She does have pneumonia and is at risk of worsening and does require ongoing monitoring    Recurrent pleural effusion- (present on admission)  Assessment & Plan  I do believe this is secondary to her cirrhosis  Large volume right thoracentesis in the ER  Once her blood pressure improves and she could tolerate a paracentesis, we will consider imaging to further evaluate for need for paracentesis however I would anticipate she has relatively large volume ascites  She states she has no history of paracentesis  Patient would benefit from a discharge on Lasix and spironolactone if blood pressure tolerates  Pleural effusion was very large and caused acute on chronic respiratory failure with hypoxia, now improved    Pneumomediastinum (HCC)  Assessment & Plan  This was noted post thoracentesis  I think the changes likely  secondary to thoracentesis  It is mild, no worsening on repeat  Patient's respiratory status has markedly improved, no chest pain  If patient develops worsening respiratory symptoms, will will consider repeat imaging and/or thoracic surgery consultation but none is deemed necessary at this time    Lactic acid acidosis- (present on admission)  Assessment & Plan  Likely due to hypoperfusion and hypoxia  Repeat lactic acid this morning improved, acidosis is resolved  No additional workup at this time    Hyponatremia- (present on admission)  Assessment & Plan  Due to volume overload   this is chronic and now back to her baseline  I started IV steroids which did improve her sodium level  Repeat BMP in the morning  No need for frequent sodium checks    Thrombocytopenia (HCC)- (present on admission)  Assessment & Plan  No sign of gross bleeding  Repeat CBC in the morning         VTE prophylaxis:   SCDs/TEDs      I have performed a physical exam and reviewed and updated ROS and Plan today (3/3/2025). In review of yesterday's note (3/2/2025), there are no changes except as documented above.

## 2025-03-03 NOTE — CARE PLAN
Problem: Knowledge Deficit - Standard  Goal: Patient and family/care givers will demonstrate understanding of plan of care, disease process/condition, diagnostic tests and medications  Description: Target End Date:  1-3 days or as soon as patient condition allows    Document in Patient Education    1.  Patient and family/caregiver oriented to unit, equipment, visitation policy and means for communicating concern  2.  Complete/review Learning Assessment  3.  Assess knowledge level of disease process/condition, treatment plan, diagnostic tests and medications  4.  Explain disease process/condition, treatment plan, diagnostic tests and medications  Outcome: Progressing     Problem: Safety  Goal: Will remain free from injury  Outcome: Progressing     Problem: Pain Management  Goal: Pain level will decrease to patient's comfort goal  Outcome: Progressing     Problem: Hemodynamics  Goal: Patient's hemodynamics, fluid balance and neurologic status will be stable or improve  Description: Target End Date:  Prior to discharge or change in level of care    Document on Assessment and I/O flowsheet templates    1.  Monitor vital signs, pulse oximetry and cardiac monitor per provider order and/or policy  2.  Maintain blood pressure per provider order  3.  Hemodynamic monitoring per provider order  4.  Manage IV fluids and IV infusions  5.  Monitor intake and output  6.  Daily weights per unit policy or provider order  7.  Assess peripheral pulses and capillary refill  8.  Assess color and body temperature  9.  Position patient for maximum circulation/cardiac output  10. Monitor for signs/symptoms of excessive bleeding  11. Assess mental status, restlessness and changes in level of consciousness  12. Monitor temperature and report fever or hypothermia to provider immediately. Consideration of targeted temperature management.  Outcome: Progressing   The patient is Stable - Low risk of patient condition declining or  worsening    Shift Goals  Clinical Goals: Hemodynamic stability  Patient Goals: Rest  Family Goals: Updates    Progress made toward(s) clinical / shift goals:  Monitor Labs, VS, I&O's.     Patient is not progressing towards the following goals:

## 2025-03-03 NOTE — FACE TO FACE
Face to Face Supporting Documentation - Home Health    The encounter with this patient was in whole or in part the primary reason for home health admission.    Date of encounter:   Patient:                    MRN:                       YOB: 2026  Stacy Oliva  [id number]  1970     Home health to see patient for:  Skilled Nursing care for assessment, interventions & education, Physical Therapy evaluation and treatment, and Occupational therapy evaluation and treatment    Skilled need for:  Exacerbation of Chronic Disease State decompensated liver failure    Skilled nursing interventions to include:  Wound Care and Line/Drain/Airway education and care    Homebound status evidenced by:  Need the aid of supportive devices such as crutches, canes, wheelchairs or walkers or Needs the assistance of another person in order to leave the home. Leaving home requires a considerable and taxing effort. There is a normal inability to leave the home.    Community Physician to provide follow up care: Michelle Vanessa M.D.     Optional Interventions? No      I certify the face to face encounter for this home health care referral meets the CMS requirements and the encounter/clinical assessment with the patient was, in whole, or in part, for the medical condition(s) listed above, which is the primary reason for home health care. Based on my clinical findings: the service(s) are medically necessary, support the need for home health care, and the homebound criteria are met.  I certify that this patient has had a face to face encounter by myself.  Scotty Moyer D.O. - NPI: [id number]

## 2025-03-03 NOTE — PROGRESS NOTES
Bedside report received, Assume care.     A&O x 4, Able to make needs known.  Pain Assessment: 0/10. Medication provided per MAR.  Continuous cardiac and [place] monitoring in place.     Bed in low position and locked, pt resting comfortably now, call light with in reach, all needs met at this time. Interventions will be executed per plan of care.   1608-Pt 74/51-92, 80/53, asymptomatic. Notified Dr Moyer. Per Dr Moyer to have pt remian on TICU, charge nurse aware.  New IVF order for Levo a Map equal or less then 60.  1632- Pt 83/53 Map of 63.

## 2025-03-04 ENCOUNTER — APPOINTMENT (OUTPATIENT)
Dept: RADIOLOGY | Facility: MEDICAL CENTER | Age: 55
DRG: 432 | End: 2025-03-04
Attending: INTERNAL MEDICINE
Payer: COMMERCIAL

## 2025-03-04 LAB
ALBUMIN SERPL BCP-MCNC: 3.3 G/DL (ref 3.2–4.9)
ALBUMIN/GLOB SERPL: 1.4 G/DL
ALP SERPL-CCNC: 125 U/L (ref 30–99)
ALT SERPL-CCNC: 56 U/L (ref 2–50)
ANION GAP SERPL CALC-SCNC: 12 MMOL/L (ref 7–16)
AST SERPL-CCNC: 67 U/L (ref 12–45)
BACTERIA FLD AEROBE CULT: NORMAL
BILIRUB SERPL-MCNC: 6.8 MG/DL (ref 0.1–1.5)
BUN SERPL-MCNC: 13 MG/DL (ref 8–22)
CALCIUM ALBUM COR SERPL-MCNC: 9.4 MG/DL (ref 8.5–10.5)
CALCIUM SERPL-MCNC: 8.8 MG/DL (ref 8.5–10.5)
CHLORIDE SERPL-SCNC: 95 MMOL/L (ref 96–112)
CO2 SERPL-SCNC: 24 MMOL/L (ref 20–33)
CREAT SERPL-MCNC: 0.8 MG/DL (ref 0.5–1.4)
ERYTHROCYTE [DISTWIDTH] IN BLOOD BY AUTOMATED COUNT: 59.4 FL (ref 35.9–50)
GFR SERPLBLD CREATININE-BSD FMLA CKD-EPI: 86 ML/MIN/1.73 M 2
GLOBULIN SER CALC-MCNC: 2.4 G/DL (ref 1.9–3.5)
GLUCOSE SERPL-MCNC: 113 MG/DL (ref 65–99)
GRAM STN SPEC: NORMAL
HCT VFR BLD AUTO: 36.3 % (ref 37–47)
HGB BLD-MCNC: 12.9 G/DL (ref 12–16)
MAGNESIUM SERPL-MCNC: 1.7 MG/DL (ref 1.5–2.5)
MCH RBC QN AUTO: 38.6 PG (ref 27–33)
MCHC RBC AUTO-ENTMCNC: 35.5 G/DL (ref 32.2–35.5)
MCV RBC AUTO: 108.7 FL (ref 81.4–97.8)
PLATELET # BLD AUTO: 74 K/UL (ref 164–446)
PLATELETS.RETICULATED NFR BLD AUTO: 2.9 % (ref 0.6–13.1)
PMV BLD AUTO: 10.3 FL (ref 9–12.9)
POTASSIUM SERPL-SCNC: 4.4 MMOL/L (ref 3.6–5.5)
PROT SERPL-MCNC: 5.7 G/DL (ref 6–8.2)
RBC # BLD AUTO: 3.34 M/UL (ref 4.2–5.4)
SIGNIFICANT IND 70042: NORMAL
SITE SITE: NORMAL
SODIUM SERPL-SCNC: 131 MMOL/L (ref 135–145)
SOURCE SOURCE: NORMAL
WBC # BLD AUTO: 12.9 K/UL (ref 4.8–10.8)

## 2025-03-04 PROCEDURE — 700111 HCHG RX REV CODE 636 W/ 250 OVERRIDE (IP): Mod: JZ | Performed by: INTERNAL MEDICINE

## 2025-03-04 PROCEDURE — 85027 COMPLETE CBC AUTOMATED: CPT

## 2025-03-04 PROCEDURE — 76700 US EXAM ABDOM COMPLETE: CPT

## 2025-03-04 PROCEDURE — 80053 COMPREHEN METABOLIC PANEL: CPT

## 2025-03-04 PROCEDURE — 83735 ASSAY OF MAGNESIUM: CPT

## 2025-03-04 PROCEDURE — 700102 HCHG RX REV CODE 250 W/ 637 OVERRIDE(OP): Performed by: HOSPITALIST

## 2025-03-04 PROCEDURE — A9270 NON-COVERED ITEM OR SERVICE: HCPCS | Performed by: HOSPITALIST

## 2025-03-04 PROCEDURE — 700102 HCHG RX REV CODE 250 W/ 637 OVERRIDE(OP): Performed by: FAMILY MEDICINE

## 2025-03-04 PROCEDURE — 700102 HCHG RX REV CODE 250 W/ 637 OVERRIDE(OP): Performed by: INTERNAL MEDICINE

## 2025-03-04 PROCEDURE — 99233 SBSQ HOSP IP/OBS HIGH 50: CPT | Performed by: FAMILY MEDICINE

## 2025-03-04 PROCEDURE — 770006 HCHG ROOM/CARE - MED/SURG/GYN SEMI*

## 2025-03-04 PROCEDURE — 36415 COLL VENOUS BLD VENIPUNCTURE: CPT

## 2025-03-04 PROCEDURE — 85055 RETICULATED PLATELET ASSAY: CPT

## 2025-03-04 PROCEDURE — A9270 NON-COVERED ITEM OR SERVICE: HCPCS | Performed by: INTERNAL MEDICINE

## 2025-03-04 PROCEDURE — A9270 NON-COVERED ITEM OR SERVICE: HCPCS | Performed by: FAMILY MEDICINE

## 2025-03-04 RX ADMIN — HYDROCORTISONE SODIUM SUCCINATE 50 MG: 100 INJECTION, POWDER, FOR SOLUTION INTRAMUSCULAR; INTRAVENOUS at 22:24

## 2025-03-04 RX ADMIN — OMEPRAZOLE 20 MG: 20 CAPSULE, DELAYED RELEASE ORAL at 05:19

## 2025-03-04 RX ADMIN — HYDROCORTISONE SODIUM SUCCINATE 50 MG: 100 INJECTION, POWDER, FOR SOLUTION INTRAMUSCULAR; INTRAVENOUS at 14:51

## 2025-03-04 RX ADMIN — AMOXICILLIN AND CLAVULANATE POTASSIUM 1 TABLET: 875; 125 TABLET, FILM COATED ORAL at 17:45

## 2025-03-04 RX ADMIN — DIBASIC SODIUM PHOSPHATE, MONOBASIC POTASSIUM PHOSPHATE AND MONOBASIC SODIUM PHOSPHATE 250 MG: 852; 155; 130 TABLET ORAL at 05:20

## 2025-03-04 RX ADMIN — MIDODRINE HYDROCHLORIDE 10 MG: 5 TABLET ORAL at 05:19

## 2025-03-04 RX ADMIN — POTASSIUM CHLORIDE 40 MEQ: 1500 TABLET, EXTENDED RELEASE ORAL at 05:20

## 2025-03-04 RX ADMIN — DOXYCYCLINE 100 MG: 100 TABLET, FILM COATED ORAL at 05:19

## 2025-03-04 RX ADMIN — MIDODRINE HYDROCHLORIDE 10 MG: 5 TABLET ORAL at 14:51

## 2025-03-04 RX ADMIN — MIDODRINE HYDROCHLORIDE 10 MG: 5 TABLET ORAL at 22:23

## 2025-03-04 RX ADMIN — HYDROCORTISONE SODIUM SUCCINATE 50 MG: 100 INJECTION, POWDER, FOR SOLUTION INTRAMUSCULAR; INTRAVENOUS at 05:20

## 2025-03-04 ASSESSMENT — COGNITIVE AND FUNCTIONAL STATUS - GENERAL
MOVING TO AND FROM BED TO CHAIR: A LITTLE
HELP NEEDED FOR BATHING: A LITTLE
WALKING IN HOSPITAL ROOM: A LITTLE
CLIMB 3 TO 5 STEPS WITH RAILING: A LITTLE
STANDING UP FROM CHAIR USING ARMS: A LITTLE
DRESSING REGULAR LOWER BODY CLOTHING: A LITTLE
MOBILITY SCORE: 20
SUGGESTED CMS G CODE MODIFIER MOBILITY: CJ
TOILETING: A LITTLE
SUGGESTED CMS G CODE MODIFIER DAILY ACTIVITY: CJ
DRESSING REGULAR UPPER BODY CLOTHING: A LITTLE
DAILY ACTIVITIY SCORE: 20

## 2025-03-04 ASSESSMENT — ENCOUNTER SYMPTOMS
CHILLS: 0
DIZZINESS: 0
SHORTNESS OF BREATH: 1
VOMITING: 0
ABDOMINAL PAIN: 0
CONSTIPATION: 0
NAUSEA: 0
FEVER: 0
HEADACHES: 0
HEMOPTYSIS: 0
MUSCULOSKELETAL NEGATIVE: 1
DIARRHEA: 0
WHEEZING: 0

## 2025-03-04 ASSESSMENT — PAIN DESCRIPTION - PAIN TYPE
TYPE: ACUTE PAIN
TYPE: ACUTE PAIN

## 2025-03-04 NOTE — PROGRESS NOTES
4 Eyes Skin Assessment Completed by GUS MCLAUGHLIN and GUS CHO.    Head WDL  Ears WDL  Nose WDL  Mouth WDL  Neck WDL  Breast/Chest WDL  Shoulder Blades WDL  Spine WDL  (R) Arm/Elbow/Hand WDL  (L) Arm/Elbow/Hand WDL  Abdomen WDL  Groin WDL  Scrotum/Coccyx/Buttocks WDL  (R) Leg WDL  (L) Leg WDL  (R) Heel/Foot/Toe WDL  (L) Heel/Foot/Toe WDL          Devices In Places Nasal Cannula, PIV      Interventions In Place NC W/Ear Foams and Pillows    Possible Skin Injury No    Pictures Uploaded Into Epic N/A  Wound Consult Placed N/A  RN Wound Prevention Protocol Ordered No

## 2025-03-04 NOTE — PROGRESS NOTES
Hospital Medicine Daily Progress Note    Date of Service  3/4/2025    Chief Complaint  Stacy is a 55-year-old female with a history of alcoholic cirrhosis, recurrent right sided pleural effusions status post multiple thoracentesis presented to the emergency department due to shortness of breath.  Patient states she was recently discharged on 2/27, has been short of breath ever since.  She does states she has a nonproductive cough.  Upon arrival, patient did have complete opacification of the right hemithorax with shift of mediastinal structures consistent with a large right pleural effusion.  Patient was initially placed on BiPAP support, she underwent thoracentesis in the ER with significant fluid out (approx 2.5 liters) After this, she did improved and BiPAP was able to be removed.  After thoracentesis, chest x-ray was obtained which did show marked improvement in the pleural effusion however mild pneumomediastinum.  CT scan was also obtained which confirmed the pneumomediastinum, also diagnosed with multifocal pneumonia..  The mild pneumomediastinum is likely secondary to BiPAP.     Interval Problem Update  Patient has had borderline low blood pressure, midodrine was initiated  Patient continued to have borderline low blood pressures, previous hospitalist obtained a cortisol with relative adrenal insufficiency so steroids were initiated today.    Today the patient did have tachypnea and tachycardia while ambulating in the halls, but it has since resolved.  Patient continues to have borderline low blood pressure, 2 days ago Solu-Cortef was increased to 100 mg every 8 hours, but then this was decreased to 50 mg every 8 hours.     Overnight  No acute events  Systolic blood pressure 90-1 20  NSR  Afebrile    Hospital Course  No notes on file    Interval Problem Update  Patient states that she feels like she is doing well.    I have discussed this patient's plan of care and discharge plan at IDT rounds today with  Case Management, Nursing, Nursing leadership, and other members of the IDT team.        Code Status  Full Code    Disposition  The patient is not medically cleared for discharge to home or a post-acute facility.      I have placed the appropriate orders for post-discharge needs.    Review of Systems  Review of Systems   Constitutional:  Negative for chills, fever and malaise/fatigue.   HENT: Negative.     Respiratory:  Positive for shortness of breath. Negative for hemoptysis and wheezing.    Gastrointestinal:  Negative for abdominal pain, constipation, diarrhea, nausea and vomiting.   Genitourinary: Negative.    Musculoskeletal: Negative.    Skin:  Negative for rash.   Neurological:  Negative for dizziness and headaches.        Physical Exam  Temp:  [36.1 °C (96.9 °F)-36.4 °C (97.6 °F)] 36.4 °C (97.5 °F)  Pulse:  [] 108  Resp:  [17-18] 18  BP: (91-99)/(61-69) 96/66  SpO2:  [95 %-97 %] 95 %    Physical Exam  Constitutional:       General: She is not in acute distress.     Appearance: Normal appearance. She is normal weight. She is not toxic-appearing.   HENT:      Mouth/Throat:      Mouth: Mucous membranes are moist.   Eyes:      Extraocular Movements: Extraocular movements intact.   Cardiovascular:      Rate and Rhythm: Normal rate and regular rhythm.      Heart sounds: Normal heart sounds.   Pulmonary:      Effort: Pulmonary effort is normal.      Breath sounds: Normal breath sounds.      Comments: Patient did get short of breath and tachycardic while ambulating in the hallway today.  Pulse went to 108 O2 sats went to 78 but then both returned back to normal upon resting  Musculoskeletal:         General: Normal range of motion.      Cervical back: Normal range of motion.   Skin:     Coloration: Skin is jaundiced.   Neurological:      General: No focal deficit present.      Mental Status: She is alert and oriented to person, place, and time. Mental status is at baseline.   Psychiatric:         Mood and  Affect: Mood normal.         Behavior: Behavior normal.         Thought Content: Thought content normal.         Fluids    Intake/Output Summary (Last 24 hours) at 3/4/2025 1607  Last data filed at 3/4/2025 1421  Gross per 24 hour   Intake 600 ml   Output --   Net 600 ml        Laboratory  Recent Labs     03/02/25  0538 03/03/25  0245 03/04/25  0705   WBC 6.5 6.8 12.9*   RBC 2.78* 3.04* 3.34*   HEMOGLOBIN 10.7* 11.4* 12.9   HEMATOCRIT 30.1* 32.6* 36.3*   .3* 107.2* 108.7*   MCH 38.5* 37.5* 38.6*   MCHC 35.5 35.0 35.5   RDW 58.8* 57.2* 59.4*   PLATELETCT 51* 59* 74*   MPV 9.6 10.3 10.3     Recent Labs     03/02/25  0230 03/03/25  0245 03/04/25  0705   SODIUM 127* 129* 131*   POTASSIUM 3.5* 3.0* 4.4   CHLORIDE 93* 91* 95*   CO2 25 25 24   GLUCOSE 87 126* 113*   BUN 8 9 13   CREATININE 0.76 0.75 0.80   CALCIUM 8.4* 8.4* 8.8                   Imaging  Abdominal ultrasound on 3/5/2025 =    IMPRESSION:      1. Nodular liver consistent with cirrhosis. No bile duct dilatation.     2. Gallstones. Mild gallbladder wall thickening.     3. Trace ascites. Large right pleural effusion.     4. Right kidney slightly small. No hydronephrosis bilaterally.     5. Spleen normal in size.    Assessment/Plan  * Decompensated liver disease (HCC)- (present on admission)  Assessment & Plan  Her MELD is 30  With significant fluid overload especially right sided pleural effusion, now status post thoracentesis  Patient has had recurrent pleural effusions requiring thoracentesis, denies history of paracentesis  If her blood pressure improves, will consider imaging to determine how much ascites she has  Unfortunately, right now unable to give IV Lasix due to low blood pressure  Patient would benefit from Lasix and spironolactone when able  Associated with hypotension, continue midodrine  Ultrasound of the abdomen 3/4/2025  showed trace ascites not amenable to pleurocentesis  Family members asked that we reconsult GI.  This will be done  3/5/2025    Pneumomediastinum (HCC)  Assessment & Plan  This was noted post thoracentesis  I think the changes likely secondary to thoracentesis  It is mild, no worsening on repeat  Patient's respiratory status has markedly improved, no chest pain  If patient develops worsening respiratory symptoms, will will consider repeat imaging and/or thoracic surgery consultation but none is deemed necessary at this time    Pneumonia- (present on admission)  Assessment & Plan  Because of her recent hospital stay, she was started on Zosyn and Doxy  Patient has had improvement with these antibiotics  Procalcitonin normal, will DC Zosyn and finish the course with doxycycline  Patient has had marked improvement in her cough and shortness of breath as well as her hypoxia    Hypotension- (present on admission)  Assessment & Plan  Appears to be acute on chronic  Midodrine was added however patient continued to have borderline low blood pressure  Because of this,  a cortisol level was obtained and the previous hospitalist thought that she does have relative adrenal insufficiency with a cortisol level of 9.9  Because of this, he started low-dose Solu-Cortef  Likely the patient did not require pressor support, her mean arterial pressure has been greater than 60  She did however require close ongoing monitoring  While she does have pneumonia, I do not feel there is sepsis causing her hypotension  Addendum: Initially did improve with Solu-Cortef at 50 mg however I did just discuss the case with the bedside RN, systolic blood pressure then now dropped to 70/51  Solu-Cortef has now been decreased to 50 mg every 8 hours with good results.  Pharmacy recommends that we wean this as tolerable and Continue midodrine at 10 mg 3 times daily (possibly moving this up to 15 mg)      Acute on chronic respiratory failure with hypoxia (HCC)- (present on admission)  Assessment & Plan  Patient initially was satting 72% on her home 3 L nasal  cannula  Markedly improved status post thoracentesis  She does have pneumonia and is at risk of worsening and does require ongoing monitoring    Recurrent pleural effusion- (present on admission)  Assessment & Plan  I do believe this is secondary to her cirrhosis  Large volume right thoracentesis in the ER  Once her blood pressure improves and she could tolerate a paracentesis, we will consider imaging to further evaluate for need for paracentesis however I would anticipate she has relatively large volume ascites  She states she has no history of paracentesis  Patient would benefit from a discharge on Lasix and spironolactone if blood pressure tolerates  Pleural effusion was very large and caused acute on chronic respiratory failure with hypoxia,   Abdominal ultrasound on 3/4/2025 shows large right pleural effusion-will order ultrasound-guided thoracentesis    Lactic acid acidosis- (present on admission)  Assessment & Plan  Likely due to hypoperfusion and hypoxia  Repeat lactic acid this morning improved, acidosis is resolved  No additional workup at this time    Hyponatremia- (present on admission)  Assessment & Plan  Due to volume overload   this is chronic and now back to her baseline  I started IV steroids which did improve her sodium level  Repeat BMP in the morning  No need for frequent sodium checks    Thrombocytopenia (HCC)- (present on admission)  Assessment & Plan  No sign of gross bleeding  Repeat CBC in the morning         VTE prophylaxis:    pharmacologic prophylaxis contraindicated due to Liver failure    Spoke with patient's sister at length today regarding plan of care, GI consult, and expected course of patient's illness.  Spent at least 40 minutes on the phone with her.  She asked that I consult GI which will be done tomorrow    I have performed a physical exam and reviewed and updated ROS and Plan today (3/4/2025). In review of yesterday's note (3/3/2025), there are no changes except as documented  above.

## 2025-03-04 NOTE — DISCHARGE PLANNING
@[id number]  Agency/Facility Name: [facility]   Outcome: DPA left a voice message for Lina in admissions.  Awaiting a call back.    @[id number]  Agency/Facility Name: [facility]   Spoke To: Lina  Outcome: Lina called stating the pt needs a PCP.  Pt missed the last appt that was set up and the pt's sister set up an appt for this week.  As soon as the pt establishes care with the PCP, [facility]  can accept the pt onto service.

## 2025-03-04 NOTE — CARE PLAN
Pt AO x 4.  Pt denies pain during initial assessment. Bed alarm on. Call light and belongings within reach.  Bed locked and in lowest position.  Hourly rounding.  Needs currently met.             The patient is Stable - Low risk of patient condition declining or worsening    Shift Goals  Clinical Goals: Monitor O2  Patient Goals: Rest  Family Goals: Updates    Progress made toward(s) clinical / shift goals:      Problem: Knowledge Deficit - Standard  Goal: Patient and family/care givers will demonstrate understanding of plan of care, disease process/condition, diagnostic tests and medications  Outcome: Progressing     Problem: Safety  Goal: Will remain free from injury  Outcome: Progressing     Problem: Pain Management  Goal: Pain level will decrease to patient's comfort goal  Outcome: Progressing     Problem: Hemodynamics  Goal: Patient's hemodynamics, fluid balance and neurologic status will be stable or improve  Outcome: Progressing       Patient is not progressing towards the following goals:

## 2025-03-04 NOTE — CARE PLAN
The patient is Stable - Low risk of patient condition declining or worsening    Shift Goals  Clinical Goals: Monitor O2, NPO at midnight, pt safety  Patient Goals: Rest  Family Goals: Updates    Progress made toward(s) clinical / shift goals:  Patient alert and oriented x4, declines pain or discomfort. Patient respirations even and unlabored on 0.5L NC, O2 maintained above 90%. Patient NPO at midnight for abdominal ultrasound in the morning. Patient able to make needs known, bed in lowest position, call light within reach, fall precautions in place.      Problem: Knowledge Deficit - Standard  Goal: Patient and family/care givers will demonstrate understanding of plan of care, disease process/condition, diagnostic tests and medications  Outcome: Progressing     Problem: Safety  Goal: Will remain free from falls  Outcome: Progressing     Problem: Respiratory:  Goal: Respiratory status will improve  Outcome: Progressing       Patient is not progressing towards the following goals:

## 2025-03-05 ENCOUNTER — APPOINTMENT (OUTPATIENT)
Dept: RADIOLOGY | Facility: MEDICAL CENTER | Age: 55
DRG: 432 | End: 2025-03-05
Attending: FAMILY MEDICINE
Payer: COMMERCIAL

## 2025-03-05 LAB
ALBUMIN SERPL BCP-MCNC: 3.4 G/DL (ref 3.2–4.9)
ALBUMIN/GLOB SERPL: 1.4 G/DL
ALP SERPL-CCNC: 128 U/L (ref 30–99)
ALT SERPL-CCNC: 64 U/L (ref 2–50)
ANION GAP SERPL CALC-SCNC: 12 MMOL/L (ref 7–16)
AST SERPL-CCNC: 51 U/L (ref 12–45)
BACTERIA BLD CULT: NORMAL
BACTERIA BLD CULT: NORMAL
BASOPHILS # BLD AUTO: 0.2 % (ref 0–1.8)
BASOPHILS # BLD: 0.03 K/UL (ref 0–0.12)
BILIRUB SERPL-MCNC: 6.8 MG/DL (ref 0.1–1.5)
BUN SERPL-MCNC: 16 MG/DL (ref 8–22)
CALCIUM ALBUM COR SERPL-MCNC: 9.9 MG/DL (ref 8.5–10.5)
CALCIUM SERPL-MCNC: 9.4 MG/DL (ref 8.5–10.5)
CHLORIDE SERPL-SCNC: 94 MMOL/L (ref 96–112)
CO2 SERPL-SCNC: 26 MMOL/L (ref 20–33)
CREAT SERPL-MCNC: 0.81 MG/DL (ref 0.5–1.4)
EOSINOPHIL # BLD AUTO: 0 K/UL (ref 0–0.51)
EOSINOPHIL NFR BLD: 0 % (ref 0–6.9)
ERYTHROCYTE [DISTWIDTH] IN BLOOD BY AUTOMATED COUNT: 58.9 FL (ref 35.9–50)
GFR SERPLBLD CREATININE-BSD FMLA CKD-EPI: 85 ML/MIN/1.73 M 2
GLOBULIN SER CALC-MCNC: 2.4 G/DL (ref 1.9–3.5)
GLUCOSE SERPL-MCNC: 109 MG/DL (ref 65–99)
HCT VFR BLD AUTO: 37.6 % (ref 37–47)
HGB BLD-MCNC: 12.9 G/DL (ref 12–16)
IMM GRANULOCYTES # BLD AUTO: 0.06 K/UL (ref 0–0.11)
IMM GRANULOCYTES NFR BLD AUTO: 0.5 % (ref 0–0.9)
LYMPHOCYTES # BLD AUTO: 1.25 K/UL (ref 1–4.8)
LYMPHOCYTES NFR BLD: 10.2 % (ref 22–41)
MAGNESIUM SERPL-MCNC: 1.6 MG/DL (ref 1.5–2.5)
MCH RBC QN AUTO: 37.7 PG (ref 27–33)
MCHC RBC AUTO-ENTMCNC: 34.3 G/DL (ref 32.2–35.5)
MCV RBC AUTO: 109.9 FL (ref 81.4–97.8)
MONOCYTES # BLD AUTO: 0.63 K/UL (ref 0–0.85)
MONOCYTES NFR BLD AUTO: 5.2 % (ref 0–13.4)
NEUTROPHILS # BLD AUTO: 10.24 K/UL (ref 1.82–7.42)
NEUTROPHILS NFR BLD: 83.9 % (ref 44–72)
NRBC # BLD AUTO: 0 K/UL
NRBC BLD-RTO: 0 /100 WBC (ref 0–0.2)
PLATELET # BLD AUTO: 79 K/UL (ref 164–446)
PLATELETS.RETICULATED NFR BLD AUTO: 3.3 % (ref 0.6–13.1)
PMV BLD AUTO: 9.4 FL (ref 9–12.9)
POTASSIUM SERPL-SCNC: 4.3 MMOL/L (ref 3.6–5.5)
PROT SERPL-MCNC: 5.8 G/DL (ref 6–8.2)
RBC # BLD AUTO: 3.42 M/UL (ref 4.2–5.4)
SIGNIFICANT IND 70042: NORMAL
SIGNIFICANT IND 70042: NORMAL
SITE SITE: NORMAL
SITE SITE: NORMAL
SODIUM SERPL-SCNC: 132 MMOL/L (ref 135–145)
SOURCE SOURCE: NORMAL
SOURCE SOURCE: NORMAL
WBC # BLD AUTO: 12.2 K/UL (ref 4.8–10.8)

## 2025-03-05 PROCEDURE — 85025 COMPLETE CBC W/AUTO DIFF WBC: CPT

## 2025-03-05 PROCEDURE — 85055 RETICULATED PLATELET ASSAY: CPT

## 2025-03-05 PROCEDURE — 700102 HCHG RX REV CODE 250 W/ 637 OVERRIDE(OP): Performed by: HOSPITALIST

## 2025-03-05 PROCEDURE — 99222 1ST HOSP IP/OBS MODERATE 55: CPT | Performed by: NURSE PRACTITIONER

## 2025-03-05 PROCEDURE — 700111 HCHG RX REV CODE 636 W/ 250 OVERRIDE (IP): Mod: JZ | Performed by: INTERNAL MEDICINE

## 2025-03-05 PROCEDURE — A9270 NON-COVERED ITEM OR SERVICE: HCPCS | Performed by: FAMILY MEDICINE

## 2025-03-05 PROCEDURE — 71045 X-RAY EXAM CHEST 1 VIEW: CPT

## 2025-03-05 PROCEDURE — 770006 HCHG ROOM/CARE - MED/SURG/GYN SEMI*

## 2025-03-05 PROCEDURE — 83735 ASSAY OF MAGNESIUM: CPT

## 2025-03-05 PROCEDURE — 700102 HCHG RX REV CODE 250 W/ 637 OVERRIDE(OP): Performed by: FAMILY MEDICINE

## 2025-03-05 PROCEDURE — C1729 CATH, DRAINAGE: HCPCS

## 2025-03-05 PROCEDURE — 80053 COMPREHEN METABOLIC PANEL: CPT

## 2025-03-05 PROCEDURE — A9270 NON-COVERED ITEM OR SERVICE: HCPCS | Performed by: HOSPITALIST

## 2025-03-05 PROCEDURE — 99232 SBSQ HOSP IP/OBS MODERATE 35: CPT | Performed by: FAMILY MEDICINE

## 2025-03-05 RX ADMIN — AMOXICILLIN AND CLAVULANATE POTASSIUM 1 TABLET: 875; 125 TABLET, FILM COATED ORAL at 17:58

## 2025-03-05 RX ADMIN — HYDROCORTISONE SODIUM SUCCINATE 50 MG: 100 INJECTION, POWDER, FOR SOLUTION INTRAMUSCULAR; INTRAVENOUS at 14:32

## 2025-03-05 RX ADMIN — MIDODRINE HYDROCHLORIDE 10 MG: 5 TABLET ORAL at 14:32

## 2025-03-05 RX ADMIN — HYDROCORTISONE SODIUM SUCCINATE 50 MG: 100 INJECTION, POWDER, FOR SOLUTION INTRAMUSCULAR; INTRAVENOUS at 21:32

## 2025-03-05 RX ADMIN — AMOXICILLIN AND CLAVULANATE POTASSIUM 1 TABLET: 875; 125 TABLET, FILM COATED ORAL at 05:31

## 2025-03-05 RX ADMIN — OMEPRAZOLE 20 MG: 20 CAPSULE, DELAYED RELEASE ORAL at 05:31

## 2025-03-05 RX ADMIN — MIDODRINE HYDROCHLORIDE 10 MG: 5 TABLET ORAL at 05:31

## 2025-03-05 RX ADMIN — MIDODRINE HYDROCHLORIDE 10 MG: 5 TABLET ORAL at 21:31

## 2025-03-05 RX ADMIN — HYDROCORTISONE SODIUM SUCCINATE 50 MG: 100 INJECTION, POWDER, FOR SOLUTION INTRAMUSCULAR; INTRAVENOUS at 05:31

## 2025-03-05 ASSESSMENT — ENCOUNTER SYMPTOMS
WEAKNESS: 1
VOMITING: 0
SPUTUM PRODUCTION: 0
BLOOD IN STOOL: 0
SORE THROAT: 0
ABDOMINAL PAIN: 0
MYALGIAS: 1
HEMOPTYSIS: 0
NAUSEA: 0
COUGH: 1
DIAPHORESIS: 0
FALLS: 0
SHORTNESS OF BREATH: 1
FEVER: 0
NERVOUS/ANXIOUS: 0
CONSTIPATION: 0
INSOMNIA: 0
DIARRHEA: 0
DIZZINESS: 0
EYES NEGATIVE: 1
FLANK PAIN: 0
CARDIOVASCULAR NEGATIVE: 1
RESPIRATORY NEGATIVE: 1
CHILLS: 0

## 2025-03-05 ASSESSMENT — PAIN DESCRIPTION - PAIN TYPE
TYPE: ACUTE PAIN
TYPE: ACUTE PAIN

## 2025-03-05 NOTE — CARE PLAN
The patient is Stable - Low risk of patient condition declining or worsening    Shift Goals  Clinical Goals: Safety, Monitor O2 saturation  Patient Goals: Rest  Family Goals: lazaro    Progress made toward(s) clinical / shift goals: Patient A&Ox4, remains on O2 at 0.5L satting >90%. Ambulated to BR multiple time with handhel assist, tolerated activity well. Denies any pain or discomfort at this time. Bed to lowest position, bed alarms on, threaded slipper socks and call light in reach.     Patient is not progressing towards the following goals: N/A

## 2025-03-05 NOTE — CONSULTS
Date of Consultation:  3/5/2025    Patient: : Stacy Oliva  MRN: [id number]    Referring Physician:  Scotty Aceves MD     GI:MELO Blackwell     Reason for Consultation: End stage liver disease    History of Present Illness:     This is a 55-year-old female with a past medical history including decompensated alcohol cirrhosis, hepatic hydrothorax s/p multiple thoracentesis, alcohol use disorder, esophageal varices, hepatic encephalopathy who presented to [facility] on 2/28/2025 with shortness of breath.  She was recently admitted 2/24/2025-2/27/2025 during her hospitalization, she was found to have the hepatic hydrothorax and required multiple thoracentesis.  Her MELD was over 18 and she was not a candidate for TIPS.  Furthermore, due to relapse of alcohol use this past fall, she was not a candidate for liver transplant.  She was treated with supportive care including diuretics, albumin trial, low-sodium diet, lactulose, and rifaximin.  Since her discharge, she reports that she has had ongoing shortness of breath as well as a nonproductive cough.  On arrival, she was noted to have complete opacification of the right hemothorax with a shift of mediastinal structures consistent with a large right pleural effusion.  She was initially placed on BiPAP support and underwent thoracentesis in the emergency department (2500 mL output).  Post thoracentesis, she was able to be removed from BiPAP but chest x-ray showed mild pneumomediastinum.  Follow-up CT scan was obtained which confirmed pneumomediastinum as well as multifocal pneumonia.    During her admission, she has been hypotensive requiring initiation of midodrine.  Furthermore, cortisol levels were noted to be low, likely of adrenal insufficiency, therefore steroid therapy was started.  GI was consulted for recommendations for end-stage liver disease management at the behest of patient's .      Tobacco use: None  Alcohol use: None  since December 2024 her patient and   Illicit drug use: Denies    Last EGD: 2/4/2024 by Dr. Paz  GAVE bleeding, s/p banding x 1.   Some PHG oozing, not significant.   No overt gastric varices.   Esophageal varices x 3, moderate sized, no active bleeding, s/p banding x 4.   Grossly normal other part of upper GI.   Old blood in stomach, cover around 45% of area.       NSAID/ASA use: None  Anticoagulation use: None      Past Medical History:   Diagnosis Date    Liver disease     Patient denies medical problems          Past Surgical History:   Procedure Laterality Date    CHEST TUBE INSERTION  1/31/2025    [place] UPPER GI ENDOSCOPY,DIAGNOSIS N/A 2/4/2024    Procedure: GASTROSCOPY;  Surgeon: Javi Paz M.D.;  Location: SURGERY [facility];  Service: Gastroenterology    [place] UPPER GI ENDOSCOPY,LIGAT VARIX  2/4/2024    Procedure: GASTROSCOPY, WITH BANDING;  Surgeon: Javi Paz M.D.;  Location: [facility];  Service: Gastroenterology       History reviewed. No pertinent family history.    Social History     Socioeconomic History    Marital status:    Tobacco Use    Smoking status: Never    Smokeless tobacco: Never   Vaping Use    Vaping status: Never Used   Substance and Sexual Activity    Alcohol use: Not Currently     Comment: quit august -- used to drink 1-2 glasses wine/night    Drug use: Yes     Comment: marijuana     Social Drivers of Health     Financial Resource Strain: Low Risk  (1/10/2025)    Overall Financial Resource Strain (CARDIA)     Difficulty of Paying Living Expenses: Not hard at all   Food Insecurity: No Food Insecurity (2/24/2025)    Hunger Vital Sign     Worried About Running Out of Food in the Last Year: Never true     Ran Out of Food in the Last Year: Never true   Transportation Needs: No Transportation Needs (2/24/2025)    PRAPARE - Transportation     Lack of Transportation (Medical): No     Lack of Transportation (Non-Medical): No   Physical Activity: Not on File (8/24/2019)     [facility] from [facility]    Physical Activity     Physical Activity: 0   Stress: No Stress Concern Present (1/10/2025)    [employer] - Occupational Stress Questionnaire     Feeling of Stress : Only a little   Social Connections: Not on File (8/24/2019)    Received from [facility]    Social Connections     Social Connections and Isolation: 0   Intimate Partner Violence: Not At Risk (2/24/2025)    Humiliation, Afraid, Rape, and Kick questionnaire     Fear of Current or Ex-Partner: No     Emotionally Abused: No     Physically Abused: No     Sexually Abused: No   Housing Stability: Low Risk  (2/24/2025)    Housing Stability Vital Sign     Unable to Pay for Housing in the Last Year: No     Number of Times Moved in the Last Year: 0     Homeless in the Last Year: No       Review of systems:  Review of Systems   Constitutional:  Positive for malaise/fatigue. Negative for chills and fever.   HENT:  Negative for congestion and sore throat.    Respiratory:  Positive for cough and shortness of breath. Negative for hemoptysis and sputum production.    Cardiovascular:  Negative for chest pain and leg swelling.   Gastrointestinal:  Negative for abdominal pain, blood in stool, constipation, diarrhea, melena, nausea and vomiting.   Genitourinary:  Negative for dysuria and flank pain.   Musculoskeletal:  Positive for myalgias. Negative for falls.   Neurological:  Positive for weakness. Negative for dizziness.   Psychiatric/Behavioral:  The patient is not nervous/anxious and does not have insomnia.    All other systems reviewed and are negative.        Physical Exam:  Vitals:    03/05/25 0434 03/05/25 0815 03/05/25 0925 03/05/25 1132   BP: 91/65 113/68     Pulse: 96 (!) 105     Resp: 18 20     Temp: (!) 35.7 °C (96.2 °F) 35.9 °C (96.6 °F)     TempSrc: Temporal Temporal     SpO2: 90% 95% 88% 92%   Weight:       Height:           Physical Exam  Vitals and nursing note reviewed.   Constitutional:        General: She is awake. She is not in acute distress.     Appearance: She is normal weight. She is ill-appearing (Chronically).   HENT:      Head: Normocephalic and atraumatic.      Nose: Nose normal. No congestion.      Mouth/Throat:      Mouth: Mucous membranes are moist.      Pharynx: Oropharynx is clear. No oropharyngeal exudate.   Eyes:      General: Scleral icterus present.      Extraocular Movements: Extraocular movements intact.      Conjunctiva/sclera: Conjunctivae normal.   Cardiovascular:      Rate and Rhythm: Normal rate and regular rhythm.      Pulses: Normal pulses.      Heart sounds: Normal heart sounds.   Pulmonary:      Effort: Pulmonary effort is normal. No respiratory distress.      Comments: Diminished bilateral bases  Abdominal:      General: Abdomen is flat. Bowel sounds are normal. There is no distension.      Palpations: Abdomen is soft.      Tenderness: There is no abdominal tenderness. There is no guarding.   Musculoskeletal:      Right lower leg: No edema.      Left lower leg: No edema.   Skin:     General: Skin is warm and dry.      Capillary Refill: Capillary refill takes less than 2 seconds.      Coloration: Skin is jaundiced and pale.   Neurological:      General: No focal deficit present.      Mental Status: She is alert and oriented to person, place, and time. Mental status is at baseline.   Psychiatric:         Mood and Affect: Mood normal.         Behavior: Behavior normal. Behavior is cooperative.           Labs:  Recent Labs     03/03/25 0245 03/04/25 0705 03/05/25  0521   WBC 6.8 12.9* 12.2*   RBC 3.04* 3.34* 3.42*   HEMOGLOBIN 11.4* 12.9 12.9   HEMATOCRIT 32.6* 36.3* 37.6   .2* 108.7* 109.9*   MCH 37.5* 38.6* 37.7*   MCHC 35.0 35.5 34.3   RDW 57.2* 59.4* 58.9*   PLATELETCT 59* 74* 79*   MPV 10.3 10.3 9.4     Recent Labs     03/03/25 0245 03/04/25  0705 03/05/25  0521   SODIUM 129* 131* 132*   POTASSIUM 3.0* 4.4 4.3   CHLORIDE 91* 95* 94*   CO2 25 24 26   GLUCOSE 126*  113* 109*   BUN 9 13 16           Recent Labs     03/03/25  0245 03/04/25  0705 03/05/25  0521   ASTSGOT 44 67* 51*   ALTSGPT 42 56* 64*   TBILIRUBIN 7.5* 6.8* 6.8*   ALKPHOSPHAT 117* 125* 128*   GLOBULIN 2.0 2.4 2.4         Imaging:  DX-CHEST-PORTABLE (1 VIEW)  Narrative: 3/5/2025 12:04 PM    HISTORY/REASON FOR EXAM:  Plural Effusion.    TECHNIQUE/EXAM DESCRIPTION AND NUMBER OF VIEWS:  Single portable view of the chest.    COMPARISON: Chest x-ray 3/1/2025    FINDINGS:  Heart size is within normal limits.  Irregular wedge-shaped airspace opacity in the right upper lobe and perihilar region.  Blunting of right costophrenic angle. There is bilateral pleural thickening  Impression: 1.  Small to moderate right pleural effusion and associated right lateral pleural thickening.    2.  Irregular airspace opacity in the right upper lobe suspicious for pneumonitis            Impressions:  Decompensated alcohol liver disease- MELD 3.0 on 2/28/2025 28- not candidate for TIPS  Recurrent pleural effusion-likely hepatic hydrothorax  Thrombocytopenia  Hyponatremia  Pneumomediastinum      Recommendations:  2 g low-sodium diet  Diuretic regimen of spironolactone 100 mg p.o. daily and furosemide 40 mg p.o. daily with successful therapeutic regimen for cirrhotic ascites.  If clinical response or desired effect not evident after 3-5 days, doses can be increased by 100 mg and 40 mg, respectively.  Uptitrate as needed maintaining the same ratio with a maximum recommended doses are spironolactone 400 mg per day and furosemide 160 mg per day.   Lactulose and rifaximin.  Titrate lactulose to achieve 2-3 loose bowel movements per day  Renal functions are normal at this time.  Continue to monitor  Trace ascites on ultrasound-no indication for paracentesis  Patient's  had questions regarding getting patient eligible for transplant evaluation.  He is aware that she is not a transplant candidate at this time.  Discussed with him that  patient needs to maintain sobriety for a minimum of 6 months, encouraged her to attend online/phone AA meetings, mobilize while in the hospital, resistance band training as well while in the hospital.  He states that he himself is sober and is supportive in regards to helping her with AA meetings, transporting to doctors appointments.  Referral to Dr. Maravilla, GI at [facility] as patient lives in [place] for ongoing management/follow-up.  Defer further thoracentesis to primary team      No further inventions from acute GI team.  GI to sign off.  Please reconsult for any further questions or concerns.      Discussed with patient,  at bedside, primary team, Dr. Boyd.    This note was generated using voice recognition software which has a small chance of producing errors of grammar and possibly content. I have made every reasonable attempt to find and correct any obvious errors, but expect that some may not be found prior to finalization of this note.

## 2025-03-05 NOTE — PROGRESS NOTES
Refused home O2 evaluation at this time. Patient stated she needs to eat first, since she has not eaten for 2 days.

## 2025-03-06 ENCOUNTER — PHARMACY VISIT (OUTPATIENT)
Dept: PHARMACY | Facility: MEDICAL CENTER | Age: 55
End: 2025-03-06
Payer: COMMERCIAL

## 2025-03-06 VITALS
BODY MASS INDEX: 23.62 KG/M2 | WEIGHT: 141.76 LBS | DIASTOLIC BLOOD PRESSURE: 60 MMHG | HEART RATE: 62 BPM | RESPIRATION RATE: 17 BRPM | SYSTOLIC BLOOD PRESSURE: 94 MMHG | HEIGHT: 65 IN | OXYGEN SATURATION: 97 % | TEMPERATURE: 96.7 F

## 2025-03-06 PROBLEM — J96.21 ACUTE ON CHRONIC RESPIRATORY FAILURE WITH HYPOXIA (HCC): Status: RESOLVED | Noted: 2025-01-31 | Resolved: 2025-03-06

## 2025-03-06 LAB
ALBUMIN SERPL BCP-MCNC: 3.4 G/DL (ref 3.2–4.9)
ALBUMIN/GLOB SERPL: 1.4 G/DL
ALP SERPL-CCNC: 134 U/L (ref 30–99)
ALT SERPL-CCNC: 71 U/L (ref 2–50)
ANION GAP SERPL CALC-SCNC: 13 MMOL/L (ref 7–16)
AST SERPL-CCNC: 54 U/L (ref 12–45)
BILIRUB SERPL-MCNC: 6.8 MG/DL (ref 0.1–1.5)
BUN SERPL-MCNC: 18 MG/DL (ref 8–22)
CALCIUM ALBUM COR SERPL-MCNC: 9.7 MG/DL (ref 8.5–10.5)
CALCIUM SERPL-MCNC: 9.2 MG/DL (ref 8.5–10.5)
CHLORIDE SERPL-SCNC: 93 MMOL/L (ref 96–112)
CO2 SERPL-SCNC: 24 MMOL/L (ref 20–33)
CREAT SERPL-MCNC: 0.81 MG/DL (ref 0.5–1.4)
ERYTHROCYTE [DISTWIDTH] IN BLOOD BY AUTOMATED COUNT: 58.4 FL (ref 35.9–50)
GFR SERPLBLD CREATININE-BSD FMLA CKD-EPI: 85 ML/MIN/1.73 M 2
GLOBULIN SER CALC-MCNC: 2.5 G/DL (ref 1.9–3.5)
GLUCOSE SERPL-MCNC: 116 MG/DL (ref 65–99)
HCT VFR BLD AUTO: 37.6 % (ref 37–47)
HGB BLD-MCNC: 13.1 G/DL (ref 12–16)
MCH RBC QN AUTO: 37.8 PG (ref 27–33)
MCHC RBC AUTO-ENTMCNC: 34.8 G/DL (ref 32.2–35.5)
MCV RBC AUTO: 108.4 FL (ref 81.4–97.8)
PLATELET # BLD AUTO: 75 K/UL (ref 164–446)
PLATELETS.RETICULATED NFR BLD AUTO: 2.7 % (ref 0.6–13.1)
PMV BLD AUTO: 9.4 FL (ref 9–12.9)
POTASSIUM SERPL-SCNC: 4 MMOL/L (ref 3.6–5.5)
PROT SERPL-MCNC: 5.9 G/DL (ref 6–8.2)
RBC # BLD AUTO: 3.47 M/UL (ref 4.2–5.4)
SODIUM SERPL-SCNC: 130 MMOL/L (ref 135–145)
WBC # BLD AUTO: 7.8 K/UL (ref 4.8–10.8)

## 2025-03-06 PROCEDURE — 80053 COMPREHEN METABOLIC PANEL: CPT

## 2025-03-06 PROCEDURE — 700102 HCHG RX REV CODE 250 W/ 637 OVERRIDE(OP): Performed by: FAMILY MEDICINE

## 2025-03-06 PROCEDURE — 85055 RETICULATED PLATELET ASSAY: CPT

## 2025-03-06 PROCEDURE — 700102 HCHG RX REV CODE 250 W/ 637 OVERRIDE(OP): Performed by: HOSPITALIST

## 2025-03-06 PROCEDURE — 700111 HCHG RX REV CODE 636 W/ 250 OVERRIDE (IP): Mod: JZ | Performed by: INTERNAL MEDICINE

## 2025-03-06 PROCEDURE — RXMED WILLOW AMBULATORY MEDICATION CHARGE: Performed by: FAMILY MEDICINE

## 2025-03-06 PROCEDURE — 85027 COMPLETE CBC AUTOMATED: CPT

## 2025-03-06 PROCEDURE — A9270 NON-COVERED ITEM OR SERVICE: HCPCS | Performed by: HOSPITALIST

## 2025-03-06 PROCEDURE — 99239 HOSP IP/OBS DSCHRG MGMT >30: CPT | Performed by: FAMILY MEDICINE

## 2025-03-06 PROCEDURE — A9270 NON-COVERED ITEM OR SERVICE: HCPCS | Performed by: FAMILY MEDICINE

## 2025-03-06 RX ORDER — MIDODRINE HYDROCHLORIDE 5 MG/1
15 TABLET ORAL EVERY 8 HOURS
Status: DISCONTINUED | OUTPATIENT
Start: 2025-03-06 | End: 2025-03-06 | Stop reason: HOSPADM

## 2025-03-06 RX ORDER — FUROSEMIDE 20 MG/1
20 TABLET ORAL EVERY MORNING
Qty: 30 TABLET | Refills: 2 | Status: SHIPPED | OUTPATIENT
Start: 2025-03-06

## 2025-03-06 RX ORDER — MIDODRINE HYDROCHLORIDE 10 MG/1
15 TABLET ORAL EVERY 8 HOURS
Qty: 60 TABLET | Refills: 2 | Status: SHIPPED | OUTPATIENT
Start: 2025-03-06

## 2025-03-06 RX ADMIN — MIDODRINE HYDROCHLORIDE 10 MG: 5 TABLET ORAL at 05:22

## 2025-03-06 RX ADMIN — MIDODRINE HYDROCHLORIDE 15 MG: 5 TABLET ORAL at 13:45

## 2025-03-06 RX ADMIN — OMEPRAZOLE 20 MG: 20 CAPSULE, DELAYED RELEASE ORAL at 05:22

## 2025-03-06 RX ADMIN — AMOXICILLIN AND CLAVULANATE POTASSIUM 1 TABLET: 875; 125 TABLET, FILM COATED ORAL at 05:22

## 2025-03-06 RX ADMIN — HYDROCORTISONE SODIUM SUCCINATE 50 MG: 100 INJECTION, POWDER, FOR SOLUTION INTRAMUSCULAR; INTRAVENOUS at 05:23

## 2025-03-06 ASSESSMENT — PAIN DESCRIPTION - PAIN TYPE: TYPE: ACUTE PAIN

## 2025-03-06 NOTE — DISCHARGE SUMMARY
Discharge Summary    CHIEF COMPLAINT ON ADMISSION  Chief Complaint   Patient presents with    Shortness of Breath     BIB EMS for acute SOB, 72% on home 3 L, arrived on BiPAP with O2 sat 84%, RR 40       Reason for Admission  EMS     Admission Date  2/28/2025    CODE STATUS  Full Code    HPI & HOSPITAL COURSE  Stacy is a 55-year-old female with a history of alcoholic cirrhosis, recurrent right sided pleural effusions status post multiple thoracentesis presented to the emergency department due to shortness of breath.  Patient states she was recently discharged on 2/27, has been short of breath ever since.  She does states she has a nonproductive cough.  Upon arrival, patient did have complete opacification of the right hemithorax with shift of mediastinal structures consistent with a large right pleural effusion.  Patient was initially placed on BiPAP support, she underwent thoracentesis in the ER with significant fluid out (approx 2.5 liters) After this, she did improved and BiPAP was able to be removed.  After thoracentesis, chest x-ray was obtained which did show marked improvement in the pleural effusion however mild pneumomediastinum.  CT scan was also obtained which confirmed the pneumomediastinum, also diagnosed with multifocal pneumonia..  The mild pneumomediastinum is likely secondary to BiPAP.     Interval Problem Update  Patient has had borderline low blood pressure, midodrine was re-initiated  Patient continued to have borderline low blood pressures, previous hospitalist obtained a cortisol with relative adrenal insufficiency so steroids were initiated today (pt has been on chronic steroids though).     Likely the patient has improved dramatically throughout the hospital stay.  Patient today is laying in bed with no oxygen and doing well.  Patient does have oxygen supplies at home and will need it when she walks.  Patient and her  asked for an oxime mask instead of the nasal cannula which I will  have the nurse give them.    Patient's right pleural effusion was tapped on 3/5/2025 with approximately 2 L withdrawn.  This help the patient feel better as well.    I spoke with the patient's sister who asked that we get GI involved again to help with the medication management.  I believe this is appropriate considering the patient has been having some soft blood pressures but does need to be restarted on her spironolactone as well as her Lasix.    Per the GI recommendations below they recommended spironolactone 100 and Lasix 40 but I started at half the dose for both of those because I do not want the patient's blood pressure to drop precipitously.  I also increased the patient's midodrine to 15.    GI Recommendations 3/5/25:  2 g low-sodium diet  Diuretic regimen of spironolactone 100 mg p.o. daily and furosemide 40 mg p.o. daily with successful therapeutic regimen for cirrhotic ascites.  If clinical response or desired effect not evident after 3-5 days, doses can be increased by 100 mg and 40 mg, respectively.  Uptitrate as needed maintaining the same ratio with a maximum recommended doses are spironolactone 400 mg per day and furosemide 160 mg per day.   Lactulose and rifaximin.  Titrate lactulose to achieve 2-3 loose bowel movements per day  Renal functions are normal at this time.  Continue to monitor  Trace ascites on ultrasound-no indication for paracentesis  Patient's  had questions regarding getting patient eligible for transplant evaluation.  He is aware that she is not a transplant candidate at this time.  Discussed with him that patient needs to maintain sobriety for a minimum of 6 months, encouraged her to attend online/phone AA meetings, mobilize while in the hospital, resistance band training as well while in the hospital.  He states that he himself is sober and is supportive in regards to helping her with AA meetings, transporting to doctors appointments.  Referral to TERRA Haile at  [facility] as patient lives in [place] for ongoing management/follow-up.  Defer further thoracentesis to primary team    Therefore, she is discharged in good and stable condition to home with close outpatient follow-up.    The patient met 2-midnight criteria for an inpatient stay at the time of discharge.    Discharge Date  3/6/2025    FOLLOW UP ITEMS POST DISCHARGE  Follow-up with primary care provider for home health referral follow-up with Dr. Mcnulty down to GI at [facility] as mentioned above as well.      DISCHARGE DIAGNOSES  Principal Problem:    Decompensated liver disease (HCC) (POA: Yes)      Overview: Patient has had several hospitalizations recently due to her cirrhosis and       recurrent hepatic hydrothorax.  She reports a previous history of alcohol       use but quit at the end of December.  Patient has been compliant with her       medications including lactulose which she is taking twice daily.  Patient       reports having a bowel movement every 3-4 days on the lactulose.  Her and       her family also had questions regarding her chronic hyponatremia from her       cirrhosis.  Active Problems:    Thrombocytopenia (HCC) (POA: Yes)    Hyponatremia (POA: Yes)      Overview: Chronic and stable.    Lactic acid acidosis (POA: Yes)    Recurrent pleural effusion (POA: Yes)    Hypotension (POA: Yes)      Overview: Patient's blood pressures have been ranging between 80-90s/60 in the past       few days.  She is compliant with her midodrine.  The patient denies any       symptoms of hypotension including dizziness.    Pneumonia (POA: Yes)    Pneumomediastinum (HCC) (POA: Unknown)  Resolved Problems:    Acute on chronic respiratory failure with hypoxia (HCC) (POA: Yes)      FOLLOW UP  Future Appointments   Date Time Provider Department Center   3/10/2025  9:30 AM [facility]   3/14/2025 11:00 AM [facility]DRY[facility]     No follow-up provider  specified.    MEDICATIONS ON DISCHARGE     Medication List        START taking these medications        Instructions   amoxicillin-clavulanate 875-125 MG Tabs  Commonly known as: Augmentin   Take 1 Tablet by mouth every 12 hours.  Dose: 1 Tablet            CHANGE how you take these medications        Instructions   furosemide 40 MG Tabs  What changed: how much to take  Commonly known as: Lasix   Take 0.5 Tablets by mouth every morning.  Dose: 20 mg     midodrine 10 MG tablet  What changed: how much to take  Commonly known as: Proamatine   Take 1.5 Tablets by mouth every 8 hours.  Dose: 15 mg     potassium chloride SA 20 MEQ Tbcr  What changed:   how much to take  when to take this  Commonly known as: Kdur   Take 2 Tablets by mouth every day for 90 days.  Dose: 40 mEq     prednisoLONE sodium phosphate 15 mg/5mL oral solution  What changed: additional instructions  Commonly known as: Pediapred   Take 13.3 mL by mouth every day. For 24 days starting 1/15/25  Dose: 13.3 mL            CONTINUE taking these medications        Instructions   lactulose 10 g/15mL Soln   Take 30 mL by mouth 4 times a day for 210 days.  Dose: 20 g     omeprazole 20 MG delayed-release capsule  Commonly known as: PriLOSEC   Take 1 Capsule by mouth every day for 90 days.  Dose: 20 mg     Phospha 250 Neutral 155-852-130 MG tablet  Generic drug: phosphorus   Take 1 Tablet by mouth 2 times a day.  Dose: 1 Tablet     spironolactone 50 MG Tabs  Commonly known as: Aldactone   Take 1 Tablet by mouth every day.  Dose: 50 mg            STOP taking these medications      cefUROXime 500 MG Tabs  Commonly known as: Ceftin              Allergies  No Known Allergies    DIET  Orders Placed This Encounter   Procedures    Diet Order Diet: 2 Gram Sodium     Standing Status:   Standing     Number of Occurrences:   1     Diet::   2 Gram Sodium [7]       ACTIVITY  As tolerated.  Weight bearing as tolerated    CONSULTATIONS  GI    PROCEDURES  Right thoracentesis x 2  (see above).    LABORATORY  Lab Results   Component Value Date    SODIUM 130 (L) 03/06/2025    POTASSIUM 4.0 03/06/2025    CHLORIDE 93 (L) 03/06/2025    CO2 24 03/06/2025    GLUCOSE 116 (H) 03/06/2025    BUN 18 03/06/2025    CREATININE 0.81 03/06/2025        Lab Results   Component Value Date    WBC 7.8 03/06/2025    HEMOGLOBIN 13.1 03/06/2025    HEMATOCRIT 37.6 03/06/2025    PLATELETCT 75 (L) 03/06/2025      Patient seen and examined today she is afebrile vitals are stable patient is doing well.  Patient is tolerating p.o. well.  Patient is ambulating.  Patient is on room air while lying in bed.  Patient is stable for discharge home.  I discussed the medication regiments recommended by GI with the patient and  at length and they expressed understanding of discharge instructions and stated they will comply    Total time of the discharge process exceeds 40 minutes.

## 2025-03-06 NOTE — CARE PLAN
The patient is Stable - Low risk of patient condition declining or worsening    Shift Goals  Clinical Goals: Saety, Wean O2, Monitor O2 saturation  Patient Goals: Rest  Family Goals: lazaro    Progress made toward(s) clinical / shift goals: Patient A&Ox4, remains on O2 at 0.5L satting >90% at rest and 4L O2 with ambulation. Ambulated to BR multiple times with handheld assist, tolerated activity well. Denies any pain or discomfort at this time. Bed to lowest position, bed alarms on, threaded slipper socks and call light in reach.     Patient is not progressing towards the following goals: N/A

## 2025-03-06 NOTE — PROGRESS NOTES
[facility] Medicine Daily Progress Note    Date of Service  3/5/2025    Chief Complaint  Stacy Oliva is a 55 y.o. female admitted 2/28/2025 with end-stage liver failure and pneumonia    Hospital Course  Stacy is a 55-year-old female with a history of alcoholic cirrhosis, recurrent right sided pleural effusions status post multiple thoracentesis presented to the emergency department due to shortness of breath.  Patient states she was recently discharged on 2/27, has been short of breath ever since.  She does states she has a nonproductive cough.  Upon arrival, patient did have complete opacification of the right hemithorax with shift of mediastinal structures consistent with a large right pleural effusion.  Patient was initially placed on BiPAP support, she underwent thoracentesis in the ER with significant fluid out (approx 2.5 liters) After this, she did improved and BiPAP was able to be removed.  After thoracentesis, chest x-ray was obtained which did show marked improvement in the pleural effusion however mild pneumomediastinum.  CT scan was also obtained which confirmed the pneumomediastinum, also diagnosed with multifocal pneumonia..  The mild pneumomediastinum is likely secondary to BiPAP.     Interval Problem Update  Patient had thoracentesis with approximately 2 L of fluid taken out.  Patient states she feels better.  I spoke with patient's sister yesterday who asked that we reconsult GI to help us manage the end-stage liver failure and hypotension and recurrent pleural effusions.  [facility] was consulted today    I have discussed this patient's plan of care and discharge plan at IDT rounds today with Case Management, Nursing, Nursing leadership, and other members of the IDT team.    Consultants/Specialty  GI    Code Status  Full Code    Disposition  The patient is not medically cleared for discharge to home or a post-acute facility.  Anticipate discharge to: home with close outpatient follow-up    I  have placed the appropriate orders for post-discharge needs.    Review of Systems  Review of Systems   Constitutional:  Positive for malaise/fatigue. Negative for chills, diaphoresis and fever.   Eyes: Negative.    Respiratory: Negative.     Cardiovascular: Negative.    Gastrointestinal:  Negative for abdominal pain, constipation, diarrhea, nausea and vomiting.   Genitourinary:  Negative for dysuria.   Skin:  Negative for rash.        Physical Exam  Temp:  [35.7 °C (96.2 °F)-36.1 °C (97 °F)] 35.9 °C (96.6 °F)  Pulse:  [] 63  Resp:  [16-20] 16  BP: ()/(55-82) 92/55  SpO2:  [88 %-96 %] 96 %    Physical Exam  Constitutional:       General: She is not in acute distress.     Appearance: Normal appearance. She is normal weight. She is not toxic-appearing.   HENT:      Head: Normocephalic and atraumatic.      Mouth/Throat:      Mouth: Mucous membranes are moist.   Eyes:      Extraocular Movements: Extraocular movements intact.   Cardiovascular:      Rate and Rhythm: Normal rate and regular rhythm.   Pulmonary:      Effort: Pulmonary effort is normal. No respiratory distress.      Breath sounds: Normal breath sounds. No wheezing or rhonchi.   Abdominal:      General: There is no distension.      Palpations: Abdomen is soft.      Tenderness: There is no abdominal tenderness.   Musculoskeletal:         General: Normal range of motion.      Cervical back: Normal range of motion.   Skin:     General: Skin is warm and dry.   Neurological:      General: No focal deficit present.      Mental Status: She is alert and oriented to person, place, and time.         Fluids    Intake/Output Summary (Last 24 hours) at 3/5/2025 1640  Last data filed at 3/4/2025 1925  Gross per 24 hour   Intake 120 ml   Output --   Net 120 ml        Laboratory  Recent Labs     03/03/25  0245 03/04/25  0705 03/05/25  0521   WBC 6.8 12.9* 12.2*   RBC 3.04* 3.34* 3.42*   HEMOGLOBIN 11.4* 12.9 12.9   HEMATOCRIT 32.6* 36.3* 37.6   .2* 108.7*  109.9*   MCH 37.5* 38.6* 37.7*   MCHC 35.0 35.5 34.3   RDW 57.2* 59.4* 58.9*   PLATELETCT 59* 74* 79*   MPV 10.3 10.3 9.4     Recent Labs     03/03/25  0245 03/04/25  0705 03/05/25  0521   SODIUM 129* 131* 132*   POTASSIUM 3.0* 4.4 4.3   CHLORIDE 91* 95* 94*   CO2 25 24 26   GLUCOSE 126* 113* 109*   BUN 9 13 16   CREATININE 0.75 0.80 0.81   CALCIUM 8.4* 8.8 9.4                   Imaging      Assessment/Plan  * Decompensated liver disease (HCC)- (present on admission)  Assessment & Plan  Her MELD is 30  With significant fluid overload especially right sided pleural effusion, now status post thoracentesis  Patient has had recurrent pleural effusions requiring thoracentesis, denies history of paracentesis  If her blood pressure improves, will consider imaging to determine how much ascites she has  Unfortunately, right now unable to give IV Lasix due to low blood pressure  Patient would benefit from Lasix and spironolactone when able  Associated with hypotension, continue midodrine  Ultrasound of the abdomen 3/4/2025  showed trace ascites not amenable to pleurocentesis  Family members asked that we reconsult GI.  This will be done 3/5/2025    Pneumomediastinum (HCC)  Assessment & Plan  This was noted post thoracentesis  I think the changes likely secondary to thoracentesis  It is mild, no worsening on repeat  Patient's respiratory status has markedly improved, no chest pain  If patient develops worsening respiratory symptoms, will will consider repeat imaging and/or thoracic surgery consultation but none is deemed necessary at this time    Pneumonia- (present on admission)  Assessment & Plan  Because of her recent hospital stay, she was started on Zosyn and Doxy  Patient has had improvement with these antibiotics  Procalcitonin normal, will DC Zosyn and finish the course with doxycycline  Patient has had marked improvement in her cough and shortness of breath as well as her hypoxia    Hypotension- (present on  admission)  Assessment & Plan  Appears to be acute on chronic  Midodrine was added however patient continued to have borderline low blood pressure  Because of this,  a cortisol level was obtained and the previous hospitalist thought that she does have relative adrenal insufficiency with a cortisol level of 9.9  Because of this, he started low-dose Solu-Cortef  Likely the patient did not require pressor support, her mean arterial pressure has been greater than 60  She did however require close ongoing monitoring  While she does have pneumonia, I do not feel there is sepsis causing her hypotension  Addendum: Initially did improve with Solu-Cortef at 50 mg however I did just discuss the case with the bedside RN, systolic blood pressure then now dropped to 70/51  Solu-Cortef has now been decreased to 50 mg every 8 hours with good results.  Pharmacy recommends that we wean this as tolerable and Continue midodrine at 10 mg 3 times daily (possibly moving this up to 15 mg)      Acute on chronic respiratory failure with hypoxia (HCC)- (present on admission)  Assessment & Plan  Patient initially was satting 72% on her home 3 L nasal cannula  Markedly improved status post thoracentesis  She does have pneumonia and is at risk of worsening and does require ongoing monitoring    Recurrent pleural effusion- (present on admission)  Assessment & Plan  I do believe this is secondary to her cirrhosis  Large volume right thoracentesis in the ER  Once her blood pressure improves and she could tolerate a paracentesis, we will consider imaging to further evaluate for need for paracentesis however I would anticipate she has relatively large volume ascites  She states she has no history of paracentesis  Patient would benefit from a discharge on Lasix and spironolactone if blood pressure tolerates  Pleural effusion was very large and caused acute on chronic respiratory failure with hypoxia,   Abdominal ultrasound on 3/4/2025 shows large  right pleural effusion-will order ultrasound-guided thoracentesis    Lactic acid acidosis- (present on admission)  Assessment & Plan  Likely due to hypoperfusion and hypoxia  Repeat lactic acid this morning improved, acidosis is resolved  No additional workup at this time    Hyponatremia- (present on admission)  Assessment & Plan  Due to volume overload   this is chronic and now back to her baseline  I started IV steroids which did improve her sodium level  Repeat BMP in the morning  No need for frequent sodium checks    Thrombocytopenia (HCC)- (present on admission)  Assessment & Plan  No sign of gross bleeding  Repeat CBC in the morning         VTE prophylaxis:    pharmacologic prophylaxis contraindicated due to Liver failure      I have performed a physical exam and reviewed and updated ROS and Plan today (3/5/2025). In review of yesterday's note (3/4/2025), there are no changes except as documented above.

## 2025-03-06 NOTE — PROGRESS NOTES
Discharge orders received.  Patient arrived to the discharge lounge.  PIV removed.  Instructions given, medications reviewed and general discharge education provided to patient.  Follow up appointments discussed.  Patient verbalized understanding of dc instructions and prescriptions.  Patient signed discharge instructions.  Patient verbalized understanding had all belongings with her.  Patient left with meds and family. Wished patient a speedy recovery.

## 2025-03-06 NOTE — PROGRESS NOTES
Pt sent down to discharge lounge with transport via wheelchair.  Spouse took all belongings before transport arrived.

## 2025-03-07 ENCOUNTER — HOSPITAL ENCOUNTER (EMERGENCY)
Facility: MEDICAL CENTER | Age: 55
End: 2025-03-08
Attending: STUDENT IN AN ORGANIZED HEALTH CARE EDUCATION/TRAINING PROGRAM
Payer: COMMERCIAL

## 2025-03-07 ENCOUNTER — PHARMACY VISIT (OUTPATIENT)
Dept: PHARMACY | Facility: MEDICAL CENTER | Age: 55
End: 2025-03-07
Payer: COMMERCIAL

## 2025-03-07 ENCOUNTER — APPOINTMENT (OUTPATIENT)
Dept: RADIOLOGY | Facility: MEDICAL CENTER | Age: 55
End: 2025-03-07
Attending: STUDENT IN AN ORGANIZED HEALTH CARE EDUCATION/TRAINING PROGRAM
Payer: COMMERCIAL

## 2025-03-07 DIAGNOSIS — J90 RECURRENT PLEURAL EFFUSION ON RIGHT: ICD-10-CM

## 2025-03-07 DIAGNOSIS — K70.30 ALCOHOLIC CIRRHOSIS OF LIVER WITHOUT ASCITES (HCC): ICD-10-CM

## 2025-03-07 LAB
ABO GROUP BLD: NORMAL
ALBUMIN SERPL BCP-MCNC: 3.2 G/DL (ref 3.2–4.9)
ALBUMIN/GLOB SERPL: 1.2 G/DL
ALP SERPL-CCNC: 135 U/L (ref 30–99)
ALT SERPL-CCNC: 82 U/L (ref 2–50)
ANION GAP SERPL CALC-SCNC: 12 MMOL/L (ref 7–16)
APTT PPP: 32.4 SEC (ref 24.7–36)
AST SERPL-CCNC: 66 U/L (ref 12–45)
BASOPHILS # BLD AUTO: 0.3 % (ref 0–1.8)
BASOPHILS # BLD: 0.02 K/UL (ref 0–0.12)
BILIRUB SERPL-MCNC: 6.8 MG/DL (ref 0.1–1.5)
BLD GP AB SCN SERPL QL: NORMAL
BUN SERPL-MCNC: 16 MG/DL (ref 8–22)
CALCIUM ALBUM COR SERPL-MCNC: 9.9 MG/DL (ref 8.5–10.5)
CALCIUM SERPL-MCNC: 9.3 MG/DL (ref 8.5–10.5)
CHLORIDE SERPL-SCNC: 95 MMOL/L (ref 96–112)
CO2 SERPL-SCNC: 24 MMOL/L (ref 20–33)
CREAT SERPL-MCNC: 0.78 MG/DL (ref 0.5–1.4)
EKG IMPRESSION: NORMAL
EOSINOPHIL # BLD AUTO: 0.02 K/UL (ref 0–0.51)
EOSINOPHIL NFR BLD: 0.3 % (ref 0–6.9)
ERYTHROCYTE [DISTWIDTH] IN BLOOD BY AUTOMATED COUNT: 59.4 FL (ref 35.9–50)
GFR SERPLBLD CREATININE-BSD FMLA CKD-EPI: 89 ML/MIN/1.73 M 2
GLOBULIN SER CALC-MCNC: 2.7 G/DL (ref 1.9–3.5)
GLUCOSE SERPL-MCNC: 113 MG/DL (ref 65–99)
HCT VFR BLD AUTO: 41.8 % (ref 37–47)
HGB BLD-MCNC: 14 G/DL (ref 12–16)
IMM GRANULOCYTES # BLD AUTO: 0.06 K/UL (ref 0–0.11)
IMM GRANULOCYTES NFR BLD AUTO: 0.9 % (ref 0–0.9)
INR PPP: 1.92 (ref 0.87–1.13)
LYMPHOCYTES # BLD AUTO: 0.84 K/UL (ref 1–4.8)
LYMPHOCYTES NFR BLD: 13 % (ref 22–41)
MCH RBC QN AUTO: 36.7 PG (ref 27–33)
MCHC RBC AUTO-ENTMCNC: 33.5 G/DL (ref 32.2–35.5)
MCV RBC AUTO: 109.7 FL (ref 81.4–97.8)
MONOCYTES # BLD AUTO: 0.85 K/UL (ref 0–0.85)
MONOCYTES NFR BLD AUTO: 13.1 % (ref 0–13.4)
NEUTROPHILS # BLD AUTO: 4.68 K/UL (ref 1.82–7.42)
NEUTROPHILS NFR BLD: 72.4 % (ref 44–72)
NRBC # BLD AUTO: 0.02 K/UL
NRBC BLD-RTO: 0.3 /100 WBC (ref 0–0.2)
NT-PROBNP SERPL IA-MCNC: 314 PG/ML (ref 0–125)
PLATELET # BLD AUTO: 75 K/UL (ref 164–446)
PLATELETS.RETICULATED NFR BLD AUTO: 3.5 % (ref 0.6–13.1)
PMV BLD AUTO: 9.9 FL (ref 9–12.9)
POTASSIUM SERPL-SCNC: 4.5 MMOL/L (ref 3.6–5.5)
PROT SERPL-MCNC: 5.9 G/DL (ref 6–8.2)
PROTHROMBIN TIME: 22.1 SEC (ref 12–14.6)
RBC # BLD AUTO: 3.81 M/UL (ref 4.2–5.4)
RH BLD: NORMAL
SODIUM SERPL-SCNC: 131 MMOL/L (ref 135–145)
TROPONIN T SERPL-MCNC: 19 NG/L (ref 6–19)
WBC # BLD AUTO: 6.5 K/UL (ref 4.8–10.8)

## 2025-03-07 PROCEDURE — 85025 COMPLETE CBC W/AUTO DIFF WBC: CPT

## 2025-03-07 PROCEDURE — 32554 ASPIRATE PLEURA W/O IMAGING: CPT

## 2025-03-07 PROCEDURE — 85730 THROMBOPLASTIN TIME PARTIAL: CPT

## 2025-03-07 PROCEDURE — 93005 ELECTROCARDIOGRAM TRACING: CPT | Mod: TC | Performed by: STUDENT IN AN ORGANIZED HEALTH CARE EDUCATION/TRAINING PROGRAM

## 2025-03-07 PROCEDURE — 85610 PROTHROMBIN TIME: CPT

## 2025-03-07 PROCEDURE — RXMED WILLOW AMBULATORY MEDICATION CHARGE: Performed by: FAMILY MEDICINE

## 2025-03-07 PROCEDURE — 71045 X-RAY EXAM CHEST 1 VIEW: CPT

## 2025-03-07 PROCEDURE — 86900 BLOOD TYPING SEROLOGIC ABO: CPT

## 2025-03-07 PROCEDURE — 83880 ASSAY OF NATRIURETIC PEPTIDE: CPT

## 2025-03-07 PROCEDURE — 99285 EMERGENCY DEPT VISIT HI MDM: CPT

## 2025-03-07 PROCEDURE — 84484 ASSAY OF TROPONIN QUANT: CPT

## 2025-03-07 PROCEDURE — 86901 BLOOD TYPING SEROLOGIC RH(D): CPT

## 2025-03-07 PROCEDURE — 80053 COMPREHEN METABOLIC PANEL: CPT

## 2025-03-07 PROCEDURE — 85055 RETICULATED PLATELET ASSAY: CPT

## 2025-03-07 PROCEDURE — 36415 COLL VENOUS BLD VENIPUNCTURE: CPT

## 2025-03-07 PROCEDURE — 86850 RBC ANTIBODY SCREEN: CPT

## 2025-03-07 PROCEDURE — 93005 ELECTROCARDIOGRAM TRACING: CPT | Mod: TC

## 2025-03-07 RX ORDER — PREDNISONE 20 MG/1
20 TABLET ORAL DAILY
Qty: 30 TABLET | Refills: 0 | Status: SHIPPED | OUTPATIENT
Start: 2025-03-07 | End: 2025-04-06

## 2025-03-07 RX ORDER — FUROSEMIDE 10 MG/ML
60 INJECTION INTRAMUSCULAR; INTRAVENOUS ONCE
Status: DISCONTINUED | OUTPATIENT
Start: 2025-03-07 | End: 2025-03-07

## 2025-03-07 RX ORDER — POTASSIUM CHLORIDE 1500 MG/1
20 TABLET, EXTENDED RELEASE ORAL 2 TIMES DAILY
Qty: 60 TABLET | Refills: 10 | Status: SHIPPED | OUTPATIENT
Start: 2025-03-07

## 2025-03-07 RX ORDER — CEFUROXIME AXETIL 500 MG/1
500 TABLET ORAL 2 TIMES DAILY
Status: SHIPPED | COMMUNITY
End: 2025-03-10

## 2025-03-07 ASSESSMENT — FIBROSIS 4 INDEX: FIB4 SCORE: 4.79

## 2025-03-08 ENCOUNTER — APPOINTMENT (OUTPATIENT)
Dept: RADIOLOGY | Facility: MEDICAL CENTER | Age: 55
End: 2025-03-08
Attending: STUDENT IN AN ORGANIZED HEALTH CARE EDUCATION/TRAINING PROGRAM
Payer: COMMERCIAL

## 2025-03-08 VITALS
HEART RATE: 100 BPM | RESPIRATION RATE: 18 BRPM | SYSTOLIC BLOOD PRESSURE: 117 MMHG | TEMPERATURE: 97.5 F | BODY MASS INDEX: 23.66 KG/M2 | WEIGHT: 142 LBS | HEIGHT: 65 IN | OXYGEN SATURATION: 96 % | DIASTOLIC BLOOD PRESSURE: 59 MMHG

## 2025-03-08 LAB
APPEARANCE FLD: CLEAR
BODY FLD TYPE: NORMAL
CELLS FLD: 21
COLOR FLD: YELLOW
GRAM STN SPEC: NORMAL
LYMPHOCYTES NFR FLD: 9 %
MONOS+MACROS NFR FLD MANUAL: 64 %
NEUTROPHILS NFR FLD: 6 %
NUC CELL # FLD: 54 CELLS/UL
RBC # FLD: <2000 CELLS/UL
SIGNIFICANT IND 70042: NORMAL
SITE SITE: NORMAL
SOURCE SOURCE: NORMAL

## 2025-03-08 PROCEDURE — A9270 NON-COVERED ITEM OR SERVICE: HCPCS | Performed by: STUDENT IN AN ORGANIZED HEALTH CARE EDUCATION/TRAINING PROGRAM

## 2025-03-08 PROCEDURE — 87070 CULTURE OTHR SPECIMN AEROBIC: CPT

## 2025-03-08 PROCEDURE — 89051 BODY FLUID CELL COUNT: CPT

## 2025-03-08 PROCEDURE — 87205 SMEAR GRAM STAIN: CPT

## 2025-03-08 PROCEDURE — 71045 X-RAY EXAM CHEST 1 VIEW: CPT

## 2025-03-08 PROCEDURE — 700102 HCHG RX REV CODE 250 W/ 637 OVERRIDE(OP): Performed by: STUDENT IN AN ORGANIZED HEALTH CARE EDUCATION/TRAINING PROGRAM

## 2025-03-08 RX ORDER — MIDODRINE HYDROCHLORIDE 5 MG/1
15 TABLET ORAL ONCE
Status: COMPLETED | OUTPATIENT
Start: 2025-03-08 | End: 2025-03-08

## 2025-03-08 RX ORDER — MIDODRINE HYDROCHLORIDE 5 MG/1
15 TABLET ORAL
Status: DISCONTINUED | OUTPATIENT
Start: 2025-03-08 | End: 2025-03-08

## 2025-03-08 RX ADMIN — MIDODRINE HYDROCHLORIDE 15 MG: 5 TABLET ORAL at 01:21

## 2025-03-08 NOTE — DISCHARGE INSTRUCTIONS
You were seen for shortness of breath.  You are diagnosed with a pleural effusion.  We drained the effusion.  You are feeling better so decision was made to discharge you home.  Please follow-up with your GI physician.  If you have worsening shortness of breath or chest pain come back for reevaluation.  Please continue take all of your medications including spironolactone and Lasix and reduce your sodium intake to less than 2 g daily.

## 2025-03-08 NOTE — ED NOTES
"Bedside report received from off going RN Kathy, assumed care of patient.  POC discussed with patient. Call light within reach, all needs addressed at this time.       Fall risk interventions in place: Patient's personal possessions are with in their safe reach, Place fall risk sign on patient's door, and Keep floor surfaces clean and dry (all applicable per [facility] Fall risk assessment)   Continuous monitoring: Cardiac Leads, Pulse Ox, or Blood Pressure  IVF/IV medications: Not Applicable   Oxygen: How many liters 2L  Bedside sitter: Not Applicable   Isolation: Not Applicable    Patient awake, AOX4, but confused on and off, Patient states \"just push me through the window and ill find out\"    Patient unable to tell me complete address        "

## 2025-03-08 NOTE — ED NOTES
ERP at bedside    As per ERP patient can stay until we come up with safe discharge    Charge nurse informed    Waiting for  to call back

## 2025-03-08 NOTE — ED NOTES
Med Rec completed per patient and family at bedside     Allergies reviewed: yes     Antibiotics in the past 30 days: yes  Augmentin 875-125 mg 2 day course started 03/06/2025 pm. Last Dose: 03/07/2025 am   Ceftin 500 mg. 5 day course completed 02/17/2025    Anticoagulant in past 14 days: no    Pharmacy patient utilizes: [facility]  [phone]

## 2025-03-08 NOTE — ED NOTES
Attempted patient's  (Willy) to call for ride home. No answer. 3 times    Voice message left.    Attempted patient's mother (Yohana), mother at [place]. ERP spoke with patient's mother    Patient calm and relax, no complains made

## 2025-03-08 NOTE — ED PROVIDER NOTES
"ED Provider Note    CHIEF COMPLAINT  Chief Complaint   Patient presents with    Weakness     Pt BIB EMS from home. Pt complains of weakness/dizziness that started \"a couple months ago\", pt states dizziness got worse this AM. Pt denies N/V/D. Pt states dizziness gets worse when standing.       EXTERNAL RECORDS REVIEWED  Reviewed inpatient admission discharge summary from yesterday, had presented for shortness of breath, had complete opacification of the right hemithorax, initially placed on BiPAP, thoracentesis was performed in the emergency department with approximately 2.5 L out.  Patient was found after thoracentesis to have pneumomediastinum thought to be secondary to positive pressure, patient was admitted to the hospital for evaluation.    Reviewed GI recommendations 3/5/2025: 2 g low-sodium diet, diuretic regimen spironolactone 100 mg daily furosemide 40 mg daily lactulose and rifaximin.  Patient is not a transplant candidate at this time..  Patient is followed by , GI up at [facility] as she lives in [place]    HPI/ROS  LIMITATION TO HISTORY   Select: : None  OUTSIDE HISTORIAN(S):  Partner at bedside providing collateral history    Stacy Oliva is a 55 y.o. female with past medical history of alcoholic cirrhosis with recurrent right-sided pleural effusions presenting to the emergency department for generalized weakness.  Patient was admitted to the hospital over the last several days from 3/20/2025, was discharged yesterday.  Says that since she was discharged from the hospital she has had progressive shortness of breath.  Feels consistent with her recurrent pleural effusion.  Patient feels like she cannot take a deep breath.  She denies any fevers chills.  She does feel weak and felt like she was going to pass out.  Says that her symptoms have been ongoing for last several months but got worse throughout the day today.        PAST MEDICAL HISTORY   has a past medical history of " "Liver disease and Patient denies medical problems.    SURGICAL HISTORY   has a past surgical history that includes upper gi endoscopy,diagnosis (N/A, 2/4/2024); upper gi endoscopy,ligat varix (2/4/2024); and chest tube insertion (1/31/2025).    FAMILY HISTORY  No family history on file.    SOCIAL HISTORY  Social History     Tobacco Use    Smoking status: Never    Smokeless tobacco: Never   Vaping Use    Vaping status: Never Used   Substance and Sexual Activity    Alcohol use: Not Currently     Comment: quit august -- used to drink 1-2 glasses wine/night    Drug use: Yes     Comment: marijuana    Sexual activity: Not on file       CURRENT MEDICATIONS  Home Medications       Reviewed by Cami Treviño (Pharmacy Tech) on 03/07/25 at 2313  Med List Status: Complete     Medication Last Dose Status   amoxicillin-clavulanate (AUGMENTIN) 875-125 MG Tab 3/7/2025 Active   cefUROXime (CEFTIN) 500 MG Tab 2/17/2025 Active   furosemide (LASIX) 20 MG Tab 3/7/2025 Active   lactulose 10 g/15mL Solution 3/7/2025 Active   midodrine (PROAMATINE) 10 MG tablet 3/7/2025 Active   omeprazole (PRILOSEC) 20 MG delayed-release capsule 3/7/2025 Active   phosphorus (K-PHOS-NEUTRAL) 250 MG tablet 3/7/2025 Active   potassium chloride SA (KDUR) 20 MEQ Tab CR  Active   predniSONE (DELTASONE) 20 MG Tab 3/7/2025 Active   spironolactone (ALDACTONE) 50 MG Tab Unknown Active                  Audit from Redirected Encounters    **Home medications have not yet been reviewed for this encounter**         ALLERGIES  No Known Allergies    PHYSICAL EXAM  VITAL SIGNS: /60   Pulse 88   Temp 36.4 °C (97.5 °F) (Temporal)   Resp 20   Ht 1.651 m (5' 5\")   Wt 64.4 kg (142 lb)   SpO2 97%   BMI 23.63 kg/m²    General: Chronically ill-appearing  Neuro: oriented x 3, moving all extremities.   HEENT:   - Head: Normocephalic, atraumatic  - Eyes: PERRL.  Mild scleral icterus  - Ears/Nose: normal external nose and ears  - Mouth: moist mucosal membranes  Resp: " Diminished breath sounds on the right.   CV: Regular rate and rhythm  Abd: Soft, non-tender, non-distended  Extremities: No peripheral edema  Skin: Mild jaundice  Psych: lucid             DIAGNOSTIC STUDIES / PROCEDURES    LABS and ECG  Results for orders placed or performed during the hospital encounter of 25   EKG    Collection Time: 25  7:58 PM   Result Value Ref Range    Report       [facility] Emergency Dept.    Test Date:  2025  Pt Name:    CARMEN CUADRA                 Department: ER  MRN:        [id number]                      Room:       [facility]  Gender:     Female                       Technician: [id number]  :        [id number]                   Requested By:ER TRIAGE PROTOCOL  Order #:    [id number]                    Reading MD:    Measurements  Intervals                                Axis  Rate:       75                           P:          6  WY:         87                           QRS:        48  QRSD:       89                           T:          -24  QT:         360  QTc:        402    Interpretive Statements  Sinus rhythm  Short WY interval  Borderline T abnormalities, inferior leads  Compared to ECG 2025 21:55:27  Short WY interval now present  T-wave abnormality now present  Sinus tachycardia no longer present  Early repolarization no longer present  Possible ischemia no longer present  Prolonged QT interval no longer pre sent     CBC WITH DIFFERENTIAL    Collection Time: 25  8:10 PM   Result Value Ref Range    WBC 6.5 4.8 - 10.8 K/uL    RBC 3.81 (L) 4.20 - 5.40 M/uL    Hemoglobin 14.0 12.0 - 16.0 g/dL    Hematocrit 41.8 37.0 - 47.0 %    .7 (H) 81.4 - 97.8 fL    MCH 36.7 (H) 27.0 - 33.0 pg    MCHC 33.5 32.2 - 35.5 g/dL    RDW 59.4 (H) 35.9 - 50.0 fL    Platelet Count 75 (L) 164 - 446 K/uL    MPV 9.9 9.0 - 12.9 fL    Neutrophils-Polys 72.40 (H) 44.00 - 72.00 %    Lymphocytes 13.00 (L) 22.00 - 41.00 %    Monocytes 13.10 0.00 - 13.40 %     Eosinophils 0.30 0.00 - 6.90 %    Basophils 0.30 0.00 - 1.80 %    Immature Granulocytes 0.90 0.00 - 0.90 %    Nucleated RBC 0.30 (H) 0.00 - 0.20 /100 WBC    Neutrophils (Absolute) 4.68 1.82 - 7.42 K/uL    Lymphs (Absolute) 0.84 (L) 1.00 - 4.80 K/uL    Monos (Absolute) 0.85 0.00 - 0.85 K/uL    Eos (Absolute) 0.02 0.00 - 0.51 K/uL    Baso (Absolute) 0.02 0.00 - 0.12 K/uL    Immature Granulocytes (abs) 0.06 0.00 - 0.11 K/uL    NRBC (Absolute) 0.02 K/uL   COMP METABOLIC PANEL    Collection Time: 03/07/25  8:10 PM   Result Value Ref Range    Sodium 131 (L) 135 - 145 mmol/L    Potassium 4.5 3.6 - 5.5 mmol/L    Chloride 95 (L) 96 - 112 mmol/L    Co2 24 20 - 33 mmol/L    Anion Gap 12.0 7.0 - 16.0    Glucose 113 (H) 65 - 99 mg/dL    Bun 16 8 - 22 mg/dL    Creatinine 0.78 0.50 - 1.40 mg/dL    Calcium 9.3 8.5 - 10.5 mg/dL    Correct Calcium 9.9 8.5 - 10.5 mg/dL    AST(SGOT) 66 (H) 12 - 45 U/L    ALT(SGPT) 82 (H) 2 - 50 U/L    Alkaline Phosphatase 135 (H) 30 - 99 U/L    Total Bilirubin 6.8 (H) 0.1 - 1.5 mg/dL    Albumin 3.2 3.2 - 4.9 g/dL    Total Protein 5.9 (L) 6.0 - 8.2 g/dL    Globulin 2.7 1.9 - 3.5 g/dL    A-G Ratio 1.2 g/dL   PROTHROMBIN TIME (INR)    Collection Time: 03/07/25  8:10 PM   Result Value Ref Range    PT 22.1 (H) 12.0 - 14.6 sec    INR 1.92 (H) 0.87 - 1.13   APTT    Collection Time: 03/07/25  8:10 PM   Result Value Ref Range    APTT 32.4 24.7 - 36.0 sec   proBrain Natriuretic Peptide, NT    Collection Time: 03/07/25  8:10 PM   Result Value Ref Range    NT-proBNP 314 (H) 0 - 125 pg/mL   TROPONIN    Collection Time: 03/07/25  8:10 PM   Result Value Ref Range    Troponin T 19 6 - 19 ng/L   IMMATURE PLT FRACTION    Collection Time: 03/07/25  8:10 PM   Result Value Ref Range    Imm. Plt Fraction 3.5 0.6 - 13.1 %   ESTIMATED GFR    Collection Time: 03/07/25  8:10 PM   Result Value Ref Range    GFR (CKD-EPI) 89 >60 mL/min/1.73 m 2   COD - Adult (Type and Screen)    Collection Time: 03/07/25  9:15 PM   Result Value  Ref Range    ABO Grouping Only A     Rh Grouping Only POS     Antibody Screen-Cod NEG    FLUID CELL COUNT    Collection Time: 03/08/25 12:22 AM   Result Value Ref Range    Fluid Type Ascites     Color-Body Fluid Yellow     Character-Body Fluid Clear     Total RBC Count <2000 cells/uL    FL Total Nucleated Cells 54 cells/uL    Polys 6 %    Lymphs 9 %    Fl Mono Macrophages 64 %    Fl Lining Cells 21        RADIOLOGY  I have independently interpreted the diagnostic imaging associated with this visit and am waiting the final reading from the radiologist.   My preliminary interpretation is as follows:   - Initial plain film of the chest shows large right pleural effusion.  Repeat plain film shows resolution of pleural effusion.  Radiologist interpretation:   DX-CHEST-PORTABLE (1 VIEW)   Final Result         1.  No acute cardiopulmonary disease.   2.  Trace right pleural effusion      DX-CHEST-PORTABLE (1 VIEW)   Final Result         1.  Hazy right pulmonary infiltrates.   2.  Moderate layering right pleural effusion, increased since prior study.              MEDICAL DECISION MAKING      ED COURSE AND PLAN    55-year-old female with a past medical history of alcoholic cirrhosis presenting to the emergency department for generalized weakness and shortness of breath.  Patient says that she felt like she was going to pass out earlier.  She has had recurrent right sided pleural effusions.  Says that since she was discharged in the hospital, her shortness of breath has increased.  Patient is not confused no evidence of hepatic encephalopathy.    She had a thoracentesis on 2/28/2025 with 2.5 L out.  Bedside ultrasound of the lungs reveals a very large right-sided pleural effusion.  At this time patient is not hypoxic, she is quite weak and when I sat her up to sit upright for ultrasound she became quite dyspneic.    Will plan to obtain basic labs, chest x-ray, EKG, likely perform thoracentesis.    I reviewed labs, CBC shows no  leukocytosis.  Her hemoglobin is stable.  She does have stable thrombocytopenia.  Chemistry reveals mild hyponatremia, AST ALT alk phos and total bilirubin are consistent with recent numbers, no evidence of decompensated cirrhosis at this time.  Chest x-ray shows a right pleural effusion which is increased in size since her most recent thoracentesis.  EKG without ischemic changes.  Troponin is normal.  Her BNP is slightly elevated at 314 consistent with most recent values.    I performed thoracentesis and removed about 1500 cc of fluid.  Patient was given a dose of her home midodrine after the procedure.  I considered administration of albumin but did not feel that it was indicated.    After thoracentesis, patient says that her shortness of breath and weakness was markedly improved.  Postprocedure x-ray showed near complete resolution of the pleural effusion.    Patient was observed for some time after the procedure.  She does feel comfortable going home.  I see no admittable diagnosis.  Her mother was concerned about her going home, had a long conversation with mother about outpatient follow-up with gastroenterology, advised to continue the meds that were appropriately prescribed from her most recent hospitalization but I reiterated that there was no admittable diagnosis at this time.    Patient is appropriate for discharge home.  I advised on strict return precautions.               Procedures:      Point of Care Ultrasound    ED POINT OF CARE ULTRASOUND: LIMITED lung and abdomen    Indication for exam: Evaluation for pleural effusion  Findings: Very large right-sided pleural effusion, small left pleural effusion.  No significant abdominal ascites.    Image retained through [facility] as seen below:      Additional interpretation    This study is a limited ultrasound examination performed and interpreted to evaluate for limited conditions as outlined above. There may be other clinically important information contained  in the images that is outside this scope. When clinically warranted, a comprehensive ultrasound through the appropriate department is considered.      Thoracentesis Procedure Note    Indication: Pleural effusion    Consent: The patient was counseled regarding the procedure, it's indications, risks, potential complications and alternatives and any questions were answered. Consent was obtained.    Procedure: The patient was placed in the sitting position, supported by a bed side stand and the appropriate landmarks were identified.  The skin over the puncture site in the right posterior chest wall was prepped with Chloraprep.  Local anesthesia was obtained by infiltration using 1% Lidocaine without epinephrine. A thoracentesis catheter was then advanced into the pleural space over a needle and the needle was withdrawn.  Pleural fluid was returned which was clear yellow. A total volume of 1500 cc was withdrawn which was sent to the lab for cell count and differential and gram stain and culture.  The catheter was then withdrawn and a sterile dressing was placed over the site.  A post procedure film showed a decrease in the size of the effusion.  There is no evidence of a postprocedure pneumothorax    The patient tolerated the procedure well.    Complications: None      ----------------------------------------------------------------------------------  DISCUSSIONS    I have discussed management of the patient with the following physicians and SALMA's:      Discussion of management with other Q or appropriate source(s):     Escalation of care considered, and ultimately not performed:    Barriers to care at this time, including but not limited to:     Decision tools and prescription drugs considered including, but not limited to:     FINAL IMPRESSION    1. Recurrent pleural effusion on right    2. Alcoholic cirrhosis of liver without ascites (HCC)        New Prescriptions    No medications on file       No follow-up  provider specified.      DISPOSITION    Discharge home, Stable        This chart was dictated using an electronic voice recognition software. The chart has been reviewed and edited but there is still possibility for dictation errors due to limitation of software.    Fabian Cooper DO 3/7/2025

## 2025-03-08 NOTE — ED NOTES
Mother of patient called, wants  assistance about patient situation.    Mother Ngoc and Sister Madeline lives and work in [place]. As per mother's patient, Willy  is sick and unable to respond immediately. Patient has no one, but only her  here in [place].    Patient has appointment on Monday to GI doctor.      SW informed.

## 2025-03-08 NOTE — DISCHARGE PLANNING
Medical Social Work     EMMIE called the pt  Willy at [phone]. EMMIE left a message for the pt  requesting a call back.

## 2025-03-08 NOTE — ED NOTES
PIV removed. Written and verbal instructions provided to pt. Pt instructed to follow up with GI. Pt instructed to return to emergency department for new or worsening symptoms. Pt verbalized understanding of discharge instructions. Pt utilized a wheelchair to the 's car upon discharge and left with all belongings.

## 2025-03-08 NOTE — ED NOTES
Mother of pt, Ngoc, calling in regards to requesting case management assistance for potential placement. Per mother, mother and sister have been unable to reach pt's . Family requesting call back with additional information and with resources.

## 2025-03-08 NOTE — ED TRIAGE NOTES
"Chief Complaint   Patient presents with    Weakness     Pt BIB EMS from home. Pt complains of weakness/dizziness that started \"a couple months ago\", pt states dizziness got worse this AM. Pt denies N/V/D. Pt states dizziness gets worse when standing.    BP 90/57   Pulse 83   Temp 36.4 °C (97.5 °F) (Temporal)   Resp 16   Ht 1.651 m (5' 5\")   Wt 64.4 kg (142 lb)   SpO2 97%   BMI 23.63 kg/m²    "

## 2025-03-08 NOTE — ED NOTES
Bedside report received from off going RN: Prosper, assumed care of patient.  POC discussed with patient. Call light within reach, all needs addressed at this time.       Fall risk interventions in place: Patient's personal possessions are with in their safe reach and Keep floor surfaces clean and dry (all applicable per [facility] Fall risk assessment)   Continuous monitoring: Cardiac Leads, Pulse Ox, or Blood Pressure  IVF/IV medications: Not Applicable   Oxygen: How many liters 2L and Traced the line to wall oxygen  Bedside sitter: Not Applicable   Isolation: Not Applicable

## 2025-03-10 ENCOUNTER — OFFICE VISIT (OUTPATIENT)
Dept: MEDICAL GROUP | Facility: CLINIC | Age: 55
End: 2025-03-10
Payer: COMMERCIAL

## 2025-03-10 VITALS
BODY MASS INDEX: 23.63 KG/M2 | OXYGEN SATURATION: 99 % | TEMPERATURE: 97 F | RESPIRATION RATE: 18 BRPM | HEIGHT: 65 IN | HEART RATE: 110 BPM | SYSTOLIC BLOOD PRESSURE: 92 MMHG | DIASTOLIC BLOOD PRESSURE: 60 MMHG

## 2025-03-10 DIAGNOSIS — Z71.89 COMPLEX CARE COORDINATION: ICD-10-CM

## 2025-03-10 DIAGNOSIS — K72.10 CHRONIC LIVER FAILURE WITHOUT HEPATIC COMA (HCC): ICD-10-CM

## 2025-03-10 PROCEDURE — 1126F AMNT PAIN NOTED NONE PRSNT: CPT | Performed by: STUDENT IN AN ORGANIZED HEALTH CARE EDUCATION/TRAINING PROGRAM

## 2025-03-10 PROCEDURE — 3078F DIAST BP <80 MM HG: CPT | Performed by: STUDENT IN AN ORGANIZED HEALTH CARE EDUCATION/TRAINING PROGRAM

## 2025-03-10 PROCEDURE — 3074F SYST BP LT 130 MM HG: CPT | Performed by: STUDENT IN AN ORGANIZED HEALTH CARE EDUCATION/TRAINING PROGRAM

## 2025-03-10 PROCEDURE — 99213 OFFICE O/P EST LOW 20 MIN: CPT | Performed by: STUDENT IN AN ORGANIZED HEALTH CARE EDUCATION/TRAINING PROGRAM

## 2025-03-10 ASSESSMENT — PAIN SCALES - GENERAL: PAINLEVEL_OUTOF10: NO PAIN

## 2025-03-10 NOTE — PROGRESS NOTES
[facility] OFFICE VISIT    Date: 3/10/2025    MRN: [id number]  Patient ID: Stacy Oliva    SUBJECTIVE:  Stacy Oliva is a 55 y.o. female here for hospital follow-up.  Patient was discharged on 3/6/2025 following inpatient treatment for chronic hepatic failure leading to pulmonary edema requiring thoracentesis.  Patient notes that they have not heard back on the status of their multiple referrals placed since discharge.  This has been an ongoing issue per patient's spouse who is also present at today's visit.  Family has called several times to try and schedule appointments, but is unclear why referrals are not being processed properly.    PMHx/PSHx:  Past Medical History:   Diagnosis Date    Liver disease     Patient denies medical problems      Patient Active Problem List   Diagnosis    Hematemesis    Hypokalemia    Acute blood loss anemia    Alcohol use disorder in remission    Hypomagnesemia    Advance care planning    Pleural effusion    Coagulopathy (HCC)    Thrombocytopenia (HCC)    Hyponatremia    Lactic acid acidosis    Recurrent pleural effusion    Decompensated cirrhosis (HCC)    Shock (HCC)    ACP (advance care planning)    Decompensated liver disease (HCC)    Hypotension    Pneumonia    Tachycardia    Pneumomediastinum (HCC)     Past Surgical History:   Procedure Laterality Date    CHEST TUBE INSERTION  1/31/2025    UT UPPER GI ENDOSCOPY,DIAGNOSIS N/A 2/4/2024    Procedure: GASTROSCOPY;  Surgeon: Javi Paz M.D.;  [facility] SURG[facility];  Service: Gastroenterology    UT UPPER GI ENDOSCOPY,LIGAT VARIX  2/4/2024    Procedure: GASTROSCOPY, WITH BANDING;  Surgeon: Javi Paz M.D.;  Location: [facility];  Service: Gastroenterology       Allergies: Patient has no known allergies.    OBJECTIVE:  Vitals:    03/10/25 0921   BP: 92/60   Pulse: (!) 110   Resp: 18   Temp: 36.1 °C (97 °F)   SpO2: 99%     Vitals:    03/10/25 0921   BP: 92/60   Height: 1.651 m (5'  "5\")       Physical Examination:  General: Tired appearing female in no acute distress, resting on arrival to room  HEENT: Normocephalic, atraumatic, EOMI  Cardiovascular: RRR, no murmurs, gallops, or rubs  Pulmonary: Clear left lung fields to auscultation, clear upper right lung field, absent breath sounds right mid to lower lung field, no rales or rhonchi  Extremities: Moves all spontaneously  Neurological: Alert and oriented  Skin: Faint jaundice diffusely    ASSESSMENT & PLAN:  Stacy Oliva is a 55 y.o. female here for hospital follow-up, with needs for complex care coordination as discussed below.    1. Complex care coordination        2. Chronic liver failure without hepatic coma (HCC)            No orders of the defined types were placed in this encounter.      # Complex care coordination  # Chronic liver failure without hepatic coma  Reviewed hospital records. Reviewed referrals status reports. Advised family that based on current records, issue with previously placed referrals has to do with patient's insurance carrier, which requires that her [place]-assigned PCP placed all referrals including to chronic care management, home health, and gastroenterology.  Provided patient with contact information for PCP, and family was able to schedule an appointment for tomorrow.  Recommended keeping that appointment as it is urgent and important for management of complex care moving forward.  Discussed strict home ambulatory monitoring parameters which would warrant immediate repeat evaluation, potentially in the emergency department.  Family verbalized understanding and agreement with plan of care.    Vishnu Pizano M.D.          "

## 2025-03-11 LAB
BACTERIA FLD AEROBE CULT: NORMAL
COMPONENT CELLULAR 8504CLL: NORMAL
GRAM STN SPEC: NORMAL
SIGNIFICANT IND 70042: NORMAL
SITE SITE: NORMAL
SOURCE SOURCE: NORMAL

## 2025-03-14 ENCOUNTER — APPOINTMENT (OUTPATIENT)
Dept: MEDICAL GROUP | Facility: CLINIC | Age: 55
End: 2025-03-14
Payer: COMMERCIAL

## 2025-04-23 LAB
FINAL REPORT Q0603: NORMAL
PRELIMINARY RPT Q0601: NORMAL
RHODAMINE-AURAMINE STN SPEC: NORMAL

## 2025-04-26 LAB
FINAL REPORT Q0603: NORMAL
PRELIMINARY RPT Q0601: NORMAL
RHODAMINE-AURAMINE STN SPEC: NORMAL

## (undated) DEVICE — SENSOR OXIMETER ADULT SPO2 RD SET (20EA/BX)

## (undated) DEVICE — BUTTON ENDOSCOPY DISPOSABLE

## (undated) DEVICE — BLOCK BITE ENDOSCOPIC 2809 - (100/BX) INTERMEDIATE

## (undated) DEVICE — SET LEADWIRE 5 LEAD BEDSIDE DISPOSABLE ECG (1SET OF 5/EA)

## (undated) DEVICE — KIT ANESTHESIA W/CIRCUIT & 3/LT BAG W/FILTER (20EA/CA)

## (undated) DEVICE — NEPTUNE 4 PORT MANIFOLD - (20/PK)

## (undated) DEVICE — MASK PANORAMIC OXYGEN PRO2 (30EA/CA)

## (undated) DEVICE — MASK OXYGEN VNYL ADLT MED CONC WITH 7 FOOT TUBING  - (50EA/CA)

## (undated) DEVICE — CANISTER SUCTION RIGID RED 1500CC (40EA/CA)

## (undated) DEVICE — TUBE CONNECTING SUCTION - CLEAR PLASTIC STERILE 72 IN (50EA/CA)

## (undated) DEVICE — SPEEDBAND SUPERVIEW SUPER 7 (4/BX)

## (undated) DEVICE — TUBE E-T HI-LO CUFF 6.5MM (10EA/BX)

## (undated) DEVICE — MASK ANESTHESIA ADULT  - (100/CA)

## (undated) DEVICE — FILM CASSETTE ENDO

## (undated) DEVICE — PORT AUXILLARY WATER (50EA/BX)

## (undated) DEVICE — KIT CUSTOM PROCEDURE SINGLE FOR ENDO  (15/CA)

## (undated) DEVICE — TUBE SUCTION YANKAUER  1/4 X 6FT (20EA/CA)"

## (undated) DEVICE — WATER IRRIGATION STERILE 1000ML (12EA/CA)